# Patient Record
Sex: MALE | Race: BLACK OR AFRICAN AMERICAN | NOT HISPANIC OR LATINO
[De-identification: names, ages, dates, MRNs, and addresses within clinical notes are randomized per-mention and may not be internally consistent; named-entity substitution may affect disease eponyms.]

---

## 2017-01-10 ENCOUNTER — APPOINTMENT (OUTPATIENT)
Dept: GASTROENTEROLOGY | Facility: AMBULATORY MEDICAL SERVICES | Age: 47
End: 2017-01-10

## 2017-03-28 ENCOUNTER — APPOINTMENT (OUTPATIENT)
Dept: GASTROENTEROLOGY | Facility: AMBULATORY MEDICAL SERVICES | Age: 47
End: 2017-03-28

## 2017-04-12 ENCOUNTER — APPOINTMENT (OUTPATIENT)
Dept: NEPHROLOGY | Facility: CLINIC | Age: 47
End: 2017-04-12

## 2017-04-19 ENCOUNTER — APPOINTMENT (OUTPATIENT)
Dept: NEPHROLOGY | Facility: CLINIC | Age: 47
End: 2017-04-19

## 2017-04-25 ENCOUNTER — APPOINTMENT (OUTPATIENT)
Dept: NEPHROLOGY | Facility: CLINIC | Age: 47
End: 2017-04-25

## 2017-04-25 ENCOUNTER — LABORATORY RESULT (OUTPATIENT)
Age: 47
End: 2017-04-25

## 2017-04-25 VITALS
HEIGHT: 71 IN | SYSTOLIC BLOOD PRESSURE: 123 MMHG | BODY MASS INDEX: 31.78 KG/M2 | OXYGEN SATURATION: 96 % | WEIGHT: 227 LBS | DIASTOLIC BLOOD PRESSURE: 79 MMHG | HEART RATE: 86 BPM

## 2017-04-26 LAB
25(OH)D3 SERPL-MCNC: 31.7 NG/ML
ALBUMIN SERPL ELPH-MCNC: 3.9 G/DL
ANION GAP SERPL CALC-SCNC: 12 MMOL/L
APPEARANCE: CLEAR
BACTERIA: NEGATIVE
BASOPHILS # BLD AUTO: 0.05 K/UL
BASOPHILS NFR BLD AUTO: 1 %
BILIRUBIN URINE: NEGATIVE
BLOOD URINE: NEGATIVE
BUN SERPL-MCNC: 17 MG/DL
CALCIUM SERPL-MCNC: 9.4 MG/DL
CHLORIDE SERPL-SCNC: 106 MMOL/L
CO2 SERPL-SCNC: 22 MMOL/L
COLOR: YELLOW
CREAT SERPL-MCNC: 1.22 MG/DL
CREAT SPEC-SCNC: 74 MG/DL
CREAT/PROT UR: 0.2 RATIO
EOSINOPHIL # BLD AUTO: 0 K/UL
EOSINOPHIL NFR BLD AUTO: 0 %
GLUCOSE QUALITATIVE U: NORMAL MG/DL
GLUCOSE SERPL-MCNC: 80 MG/DL
HCT VFR BLD CALC: 24 %
HGB BLD-MCNC: 8.5 G/DL
HYALINE CASTS: 1 /LPF
KETONES URINE: NEGATIVE
LEUKOCYTE ESTERASE URINE: NEGATIVE
LYMPHOCYTES # BLD AUTO: 1.38 K/UL
LYMPHOCYTES NFR BLD AUTO: 30 %
MAN DIFF?: NORMAL
MCHC RBC-ENTMCNC: 35.4 GM/DL
MCHC RBC-ENTMCNC: 41.1 PG
MCV RBC AUTO: 115.9 FL
MICROSCOPIC-UA: NORMAL
MONOCYTES # BLD AUTO: 0.28 K/UL
MONOCYTES NFR BLD AUTO: 6 %
NEUTROPHILS # BLD AUTO: 2.9 K/UL
NEUTROPHILS NFR BLD AUTO: 63 %
NITRITE URINE: NEGATIVE
PH URINE: 6
PHOSPHATE SERPL-MCNC: 3.3 MG/DL
PLATELET # BLD AUTO: 254 K/UL
POTASSIUM SERPL-SCNC: 4.5 MMOL/L
PROT UR-MCNC: 18 MG/DL
PROTEIN URINE: NEGATIVE MG/DL
RBC # BLD: 2.07 M/UL
RBC # FLD: 17.8 %
RED BLOOD CELLS URINE: 1 /HPF
SODIUM SERPL-SCNC: 140 MMOL/L
SPECIFIC GRAVITY URINE: 1.01
SQUAMOUS EPITHELIAL CELLS: 0 /HPF
UROBILINOGEN URINE: NORMAL MG/DL
WBC # FLD AUTO: 4.61 K/UL
WHITE BLOOD CELLS URINE: 0 /HPF

## 2017-10-25 ENCOUNTER — APPOINTMENT (OUTPATIENT)
Dept: NEPHROLOGY | Facility: CLINIC | Age: 47
End: 2017-10-25

## 2017-11-08 ENCOUNTER — LABORATORY RESULT (OUTPATIENT)
Age: 47
End: 2017-11-08

## 2017-11-08 ENCOUNTER — APPOINTMENT (OUTPATIENT)
Dept: NEPHROLOGY | Facility: CLINIC | Age: 47
End: 2017-11-08
Payer: COMMERCIAL

## 2017-11-08 VITALS
WEIGHT: 238.1 LBS | SYSTOLIC BLOOD PRESSURE: 123 MMHG | DIASTOLIC BLOOD PRESSURE: 83 MMHG | HEART RATE: 93 BPM | HEIGHT: 71 IN | BODY MASS INDEX: 33.33 KG/M2 | OXYGEN SATURATION: 99 %

## 2017-11-08 VITALS — DIASTOLIC BLOOD PRESSURE: 80 MMHG | SYSTOLIC BLOOD PRESSURE: 140 MMHG

## 2017-11-08 PROCEDURE — 99214 OFFICE O/P EST MOD 30 MIN: CPT

## 2017-11-08 RX ORDER — BUDESONIDE AND FORMOTEROL FUMARATE DIHYDRATE 160; 4.5 UG/1; UG/1
160-4.5 AEROSOL RESPIRATORY (INHALATION)
Qty: 10 | Refills: 0 | Status: DISCONTINUED | COMMUNITY
Start: 2017-01-06 | End: 2017-11-08

## 2017-11-08 RX ORDER — ERYTHROPOIETIN 40000 [IU]/ML
40000 INJECTION, SOLUTION INTRAVENOUS; SUBCUTANEOUS
Qty: 4 | Refills: 0 | Status: DISCONTINUED | COMMUNITY
Start: 2016-10-26 | End: 2017-11-08

## 2017-11-09 LAB
25(OH)D3 SERPL-MCNC: 28.6 NG/ML
ALBUMIN SERPL ELPH-MCNC: 4.2 G/DL
ANION GAP SERPL CALC-SCNC: 15 MMOL/L
APPEARANCE: CLEAR
BACTERIA: NEGATIVE
BASOPHILS # BLD AUTO: 0 K/UL
BASOPHILS NFR BLD AUTO: 0 %
BILIRUBIN URINE: NEGATIVE
BLOOD URINE: NEGATIVE
BUN SERPL-MCNC: 18 MG/DL
CALCIUM SERPL-MCNC: 9.9 MG/DL
CALCIUM SERPL-MCNC: 9.9 MG/DL
CHLORIDE SERPL-SCNC: 105 MMOL/L
CO2 SERPL-SCNC: 21 MMOL/L
COLOR: YELLOW
CREAT SERPL-MCNC: 1.19 MG/DL
CREAT SPEC-SCNC: 64 MG/DL
CREAT/PROT UR: 0.3 RATIO
EOSINOPHIL # BLD AUTO: 0.06 K/UL
EOSINOPHIL NFR BLD AUTO: 1 %
GLUCOSE QUALITATIVE U: NEGATIVE MG/DL
GLUCOSE SERPL-MCNC: 118 MG/DL
HCT VFR BLD CALC: 24.6 %
HGB BLD-MCNC: 8.5 G/DL
KETONES URINE: NEGATIVE
LEUKOCYTE ESTERASE URINE: NEGATIVE
LYMPHOCYTES # BLD AUTO: 1.16 K/UL
LYMPHOCYTES NFR BLD AUTO: 19 %
MAN DIFF?: NORMAL
MCHC RBC-ENTMCNC: 34.6 GM/DL
MCHC RBC-ENTMCNC: 41.7 PG
MCV RBC AUTO: 120.6 FL
MICROSCOPIC-UA: NORMAL
MONOCYTES # BLD AUTO: 0.31 K/UL
MONOCYTES NFR BLD AUTO: 5 %
NEUTROPHILS # BLD AUTO: 4.03 K/UL
NEUTROPHILS NFR BLD AUTO: 75 %
NITRITE URINE: NEGATIVE
PARATHYROID HORMONE INTACT: 75 PG/ML
PH URINE: 5.5
PHOSPHATE SERPL-MCNC: 3.7 MG/DL
PLATELET # BLD AUTO: 305 K/UL
POTASSIUM SERPL-SCNC: 4.4 MMOL/L
PROT UR-MCNC: 19 MG/DL
PROTEIN URINE: NEGATIVE MG/DL
RBC # BLD: 2.04 M/UL
RBC # FLD: 16.9 %
RED BLOOD CELLS URINE: 1 /HPF
SODIUM SERPL-SCNC: 141 MMOL/L
SPECIFIC GRAVITY URINE: 1.01
SQUAMOUS EPITHELIAL CELLS: 0 /HPF
UROBILINOGEN URINE: NEGATIVE MG/DL
WBC # FLD AUTO: 6.11 K/UL
WHITE BLOOD CELLS URINE: 0 /HPF

## 2018-03-06 ENCOUNTER — APPOINTMENT (OUTPATIENT)
Dept: NEPHROLOGY | Facility: CLINIC | Age: 48
End: 2018-03-06

## 2018-04-05 ENCOUNTER — RX RENEWAL (OUTPATIENT)
Age: 48
End: 2018-04-05

## 2018-11-14 ENCOUNTER — LABORATORY RESULT (OUTPATIENT)
Age: 48
End: 2018-11-14

## 2018-11-14 ENCOUNTER — APPOINTMENT (OUTPATIENT)
Dept: NEPHROLOGY | Facility: CLINIC | Age: 48
End: 2018-11-14
Payer: COMMERCIAL

## 2018-11-14 VITALS
WEIGHT: 244 LBS | BODY MASS INDEX: 34.16 KG/M2 | HEART RATE: 94 BPM | OXYGEN SATURATION: 97 % | DIASTOLIC BLOOD PRESSURE: 87 MMHG | SYSTOLIC BLOOD PRESSURE: 146 MMHG | HEIGHT: 71 IN

## 2018-11-14 VITALS — SYSTOLIC BLOOD PRESSURE: 136 MMHG | DIASTOLIC BLOOD PRESSURE: 80 MMHG

## 2018-11-14 DIAGNOSIS — Z78.9 OTHER SPECIFIED HEALTH STATUS: ICD-10-CM

## 2018-11-14 DIAGNOSIS — E55.9 VITAMIN D DEFICIENCY, UNSPECIFIED: ICD-10-CM

## 2018-11-14 DIAGNOSIS — Z87.891 PERSONAL HISTORY OF NICOTINE DEPENDENCE: ICD-10-CM

## 2018-11-14 LAB
APPEARANCE: CLEAR
BACTERIA: NEGATIVE
BILIRUBIN URINE: NEGATIVE
BLOOD URINE: NEGATIVE
COLOR: YELLOW
GLUCOSE QUALITATIVE U: NEGATIVE MG/DL
HYALINE CASTS: 1 /LPF
KETONES URINE: NEGATIVE
LEUKOCYTE ESTERASE URINE: NEGATIVE
MICROSCOPIC-UA: NORMAL
NITRITE URINE: NEGATIVE
PH URINE: 6
PROTEIN URINE: 30 MG/DL
RED BLOOD CELLS URINE: 1 /HPF
SPECIFIC GRAVITY URINE: 1.01
SQUAMOUS EPITHELIAL CELLS: 0 /HPF
UROBILINOGEN URINE: NEGATIVE MG/DL
WHITE BLOOD CELLS URINE: 0 /HPF

## 2018-11-14 PROCEDURE — 99214 OFFICE O/P EST MOD 30 MIN: CPT

## 2018-11-15 PROBLEM — E55.9 VITAMIN D DEFICIENCY, UNSPECIFIED: Status: ACTIVE | Noted: 2018-11-15

## 2018-11-15 LAB
25(OH)D3 SERPL-MCNC: 17.9 NG/ML
ALBUMIN SERPL ELPH-MCNC: 3.9 G/DL
ANION GAP SERPL CALC-SCNC: 11 MMOL/L
BASOPHILS # BLD AUTO: 0.04 K/UL
BASOPHILS NFR BLD AUTO: 0.4 %
BUN SERPL-MCNC: 16 MG/DL
CALCIUM SERPL-MCNC: 9.8 MG/DL
CALCIUM SERPL-MCNC: 9.8 MG/DL
CHLORIDE SERPL-SCNC: 104 MMOL/L
CO2 SERPL-SCNC: 24 MMOL/L
CREAT SERPL-MCNC: 1.09 MG/DL
CREAT SPEC-SCNC: 71 MG/DL
CREAT/PROT UR: 0.5 RATIO
EOSINOPHIL # BLD AUTO: 0.5 K/UL
EOSINOPHIL NFR BLD AUTO: 4.4 %
GLUCOSE SERPL-MCNC: 100 MG/DL
HCT VFR BLD CALC: 25 %
HGB BLD-MCNC: 8.5 G/DL
IMM GRANULOCYTES NFR BLD AUTO: 0.8 %
LYMPHOCYTES # BLD AUTO: 1.83 K/UL
LYMPHOCYTES NFR BLD AUTO: 16.3 %
MAN DIFF?: NORMAL
MCHC RBC-ENTMCNC: 34 GM/DL
MCHC RBC-ENTMCNC: 34.8 PG
MCV RBC AUTO: 102.5 FL
MONOCYTES # BLD AUTO: 1.17 K/UL
MONOCYTES NFR BLD AUTO: 10.4 %
NEUTROPHILS # BLD AUTO: 7.62 K/UL
NEUTROPHILS NFR BLD AUTO: 67.7 %
PARATHYROID HORMONE INTACT: 79 PG/ML
PHOSPHATE SERPL-MCNC: 3.2 MG/DL
PLATELET # BLD AUTO: 428 K/UL
POTASSIUM SERPL-SCNC: 4.7 MMOL/L
PROT UR-MCNC: 37 MG/DL
RBC # BLD: 2.44 M/UL
RBC # FLD: 22.4 %
SODIUM SERPL-SCNC: 139 MMOL/L
WBC # FLD AUTO: 11.25 K/UL

## 2018-11-15 RX ORDER — ADHESIVE TAPE 3"X 2.3 YD
50 MCG TAPE, NON-MEDICATED TOPICAL
Qty: 90 | Refills: 2 | Status: DISCONTINUED | COMMUNITY
Start: 2017-11-09 | End: 2018-11-15

## 2019-03-13 ENCOUNTER — APPOINTMENT (OUTPATIENT)
Dept: NEPHROLOGY | Facility: CLINIC | Age: 49
End: 2019-03-13

## 2019-04-17 ENCOUNTER — RX RENEWAL (OUTPATIENT)
Age: 49
End: 2019-04-17

## 2019-05-22 ENCOUNTER — APPOINTMENT (OUTPATIENT)
Dept: GASTROENTEROLOGY | Facility: CLINIC | Age: 49
End: 2019-05-22
Payer: COMMERCIAL

## 2019-05-22 VITALS
DIASTOLIC BLOOD PRESSURE: 85 MMHG | HEIGHT: 71 IN | SYSTOLIC BLOOD PRESSURE: 130 MMHG | WEIGHT: 240 LBS | OXYGEN SATURATION: 98 % | HEART RATE: 79 BPM | BODY MASS INDEX: 33.6 KG/M2 | TEMPERATURE: 98.1 F

## 2019-05-22 PROCEDURE — 99214 OFFICE O/P EST MOD 30 MIN: CPT

## 2019-05-22 NOTE — PHYSICAL EXAM
[General Appearance - Alert] : alert [General Appearance - In No Acute Distress] : in no acute distress [Sclera] : the sclera and conjunctiva were normal [PERRL With Normal Accommodation] : pupils were equal in size, round, and reactive to light [Extraocular Movements] : extraocular movements were intact [Outer Ear] : the ears and nose were normal in appearance [Oropharynx] : the oropharynx was normal [Neck Appearance] : the appearance of the neck was normal [Neck Cervical Mass (___cm)] : no neck mass was observed [Jugular Venous Distention Increased] : there was no jugular-venous distention [Thyroid Diffuse Enlargement] : the thyroid was not enlarged [Thyroid Nodule] : there were no palpable thyroid nodules [] : no respiratory distress [Auscultation Breath Sounds / Voice Sounds] : lungs were clear to auscultation bilaterally [Heart Rate And Rhythm] : heart rate was normal and rhythm regular [Heart Sounds] : normal S1 and S2 [Heart Sounds Gallop] : no gallops [Murmurs] : no murmurs [Heart Sounds Pericardial Friction Rub] : no pericardial rub [Normal] : normal [Soft, Nontender] : the abdomen was soft and nontender [Epigastric] : in the epigastric area [No Mass] : no masses were palpated [No HSM] : no hepatosplenomegaly noted [Cervical Lymph Nodes Enlarged Posterior Bilaterally] : posterior cervical [Cervical Lymph Nodes Enlarged Anterior Bilaterally] : anterior cervical [Supraclavicular Lymph Nodes Enlarged Bilaterally] : supraclavicular [Axillary Lymph Nodes Enlarged Bilaterally] : axillary [Femoral Lymph Nodes Enlarged Bilaterally] : femoral [Inguinal Lymph Nodes Enlarged Bilaterally] : inguinal [No CVA Tenderness] : no ~M costovertebral angle tenderness [No Spinal Tenderness] : no spinal tenderness [Abnormal Walk] : normal gait [Nail Clubbing] : no clubbing  or cyanosis of the fingernails [Musculoskeletal - Swelling] : no joint swelling seen [Motor Tone] : muscle strength and tone were normal

## 2019-05-22 NOTE — HISTORY OF PRESENT ILLNESS
[de-identified] : Colon 2017 - [place] \par \par On Opiods \par \par Tried relistor \par \par  A low FODMAP diet was discussed with the patient at length. The patient had multiple questions all of which were answered. I recommended a nutritionist. Also recommended that the patient keep a food diary. We discussed  options such as Vegetables. Fresh fruits. Dairy that is lactose-free, and hard cheeses, or ripened/matured cheeses including... Beef, pork, chicken, fish, eggs. Avoid breadcrumbs, marinades, and sauces/gravies that may be high in FODMAPs. Soy products including tofu, tempeh. Grains.\par \par \par The causes of constipation were discussed at length  We discussed : Eat three meals each day. Do not skip meals. Gradually increase the amount of FIBER  in your diet. Choose more whole grain breads, cereals and rice. Select more raw fruits and vegetables -- eat the peel, if appropriate. Read food labels and look for the "dietary fiber" content of foods. Good sources have 2 grams of fiber or more. Drink six to eight glasses of water each day. Limit highly refined and processed foods. \par \par

## 2019-06-07 ENCOUNTER — APPOINTMENT (OUTPATIENT)
Dept: INTERNAL MEDICINE | Facility: CLINIC | Age: 49
End: 2019-06-07

## 2019-06-11 ENCOUNTER — APPOINTMENT (OUTPATIENT)
Dept: RADIOLOGY | Facility: CLINIC | Age: 49
End: 2019-06-11

## 2019-06-11 ENCOUNTER — APPOINTMENT (OUTPATIENT)
Dept: ULTRASOUND IMAGING | Facility: CLINIC | Age: 49
End: 2019-06-11

## 2019-06-24 ENCOUNTER — OUTPATIENT (OUTPATIENT)
Dept: OUTPATIENT SERVICES | Facility: HOSPITAL | Age: 49
LOS: 1 days | End: 2019-06-24

## 2019-06-24 ENCOUNTER — LABORATORY RESULT (OUTPATIENT)
Age: 49
End: 2019-06-24

## 2019-06-24 ENCOUNTER — APPOINTMENT (OUTPATIENT)
Dept: INTERNAL MEDICINE | Facility: CLINIC | Age: 49
End: 2019-06-24
Payer: COMMERCIAL

## 2019-06-24 VITALS — SYSTOLIC BLOOD PRESSURE: 140 MMHG | OXYGEN SATURATION: 96 % | HEART RATE: 87 BPM | DIASTOLIC BLOOD PRESSURE: 80 MMHG

## 2019-06-24 LAB — FERRITIN SERPL-MCNC: 1184 NG/ML — HIGH (ref 30–400)

## 2019-06-24 PROCEDURE — 99203 OFFICE O/P NEW LOW 30 MIN: CPT

## 2019-06-30 NOTE — HISTORY OF PRESENT ILLNESS
[de-identified] : Te patient is a 47 yo male with HGB SS, here for a second opinion. He has been mostly well during his lifetime, but recently ( within the last few years) has had increasing hospitalizations and sickle cell crises. On average, he is hospitalized 1x per year, for 5 days. He began taking hydroxyurea in 2012, at first 6 tabs/day, now taking HU 3 tabs per day. When he started the HU, he almost immediately did well. He notes an 80 pound weight gain over the past 7 years. \par since the worsening in his symptoms, he tried l-glutamine. He did not take it for very long, and stopped 9 months ago.Has not taken it since. He is interested in taking Xickle, if other options fail. He also asked about chronic transfusion, saying that post-transfusion, he feels much better\par He has been treated with Aranesp by his hematologist. he does not think that it has made a huge difference in his symptoms.\par He does have HTN ( due to Aranesp?) and has been treated with HCTZ, 25 mg QD as well as Lisinopril. \par \par Sickle cell history- He has mild proteinuria\par Core decompression right hip 1998, with subsequent good function. When he has sickle cell pain, he tends to get it in his hips\par no h/o CVA\par transfused only two units at [facility]\par Has had bilateral leg ulcers\par Hematologist: Dr. BurciagaGdz-[phone]\par [phone]\par \par other PMH- IBS on Linzess\par \par immunizations: unknown\par needs ophtho\par \par PE: gen- wdwn male in nad, talkative \par vss\par mild icterus\par no tonsillar hypertrophty\par lungs- cta\par cor: rrr-m\par abd benign\par ext; -c/c/e, scarring from past LE ulcers, both ankles\par \par a/P- 47 yo male with HGB SS, worsening symptoms, still mild\par 1. HTN- will DC HCTZ- would prefer agent that does not cause dehydration\par amlodipine- 5 mg QD\par 2. contact heme for baseline labs-  unable to obtain labs today\par 3.Aranesp- might actually be causing an increase in pain and blood pressure- will discuss with the patient's hematologist to see if the patient actually needs it\par 4. HU- will increase to 2000 mg QD, as patient has had weight gain, and 1500 mg may not be enough anymore\par Re-start trial of Endari for at least one month\par Consider Xickle if endari does not improve status\par 5. patient to come back in 3 months for flu shot/Prevnar/ophtho referral\par \par Brittany Velzo MD\par

## 2019-07-01 DIAGNOSIS — I10 ESSENTIAL (PRIMARY) HYPERTENSION: ICD-10-CM

## 2019-07-01 DIAGNOSIS — D57.1 SICKLE-CELL DISEASE WITHOUT CRISIS: ICD-10-CM

## 2019-07-01 DIAGNOSIS — R80.9 PROTEINURIA, UNSPECIFIED: ICD-10-CM

## 2019-07-01 DIAGNOSIS — Z87.19 PERSONAL HISTORY OF OTHER DISEASES OF THE DIGESTIVE SYSTEM: ICD-10-CM

## 2019-08-27 ENCOUNTER — APPOINTMENT (OUTPATIENT)
Dept: INTERNAL MEDICINE | Facility: CLINIC | Age: 49
End: 2019-08-27

## 2019-08-29 ENCOUNTER — APPOINTMENT (OUTPATIENT)
Dept: RADIOLOGY | Facility: CLINIC | Age: 49
End: 2019-08-29

## 2019-08-29 ENCOUNTER — APPOINTMENT (OUTPATIENT)
Dept: ULTRASOUND IMAGING | Facility: CLINIC | Age: 49
End: 2019-08-29

## 2020-02-17 ENCOUNTER — FORM ENCOUNTER (OUTPATIENT)
Age: 50
End: 2020-02-17

## 2020-02-18 ENCOUNTER — APPOINTMENT (OUTPATIENT)
Dept: RADIOLOGY | Facility: CLINIC | Age: 50
End: 2020-02-18
Payer: COMMERCIAL

## 2020-02-18 ENCOUNTER — OUTPATIENT (OUTPATIENT)
Dept: OUTPATIENT SERVICES | Facility: HOSPITAL | Age: 50
LOS: 1 days | End: 2020-02-18
Payer: COMMERCIAL

## 2020-02-18 ENCOUNTER — APPOINTMENT (OUTPATIENT)
Dept: ULTRASOUND IMAGING | Facility: CLINIC | Age: 50
End: 2020-02-18
Payer: COMMERCIAL

## 2020-02-18 ENCOUNTER — APPOINTMENT (OUTPATIENT)
Dept: RADIOLOGY | Facility: IMAGING CENTER | Age: 50
End: 2020-02-18
Payer: COMMERCIAL

## 2020-02-18 DIAGNOSIS — K59.09 OTHER CONSTIPATION: ICD-10-CM

## 2020-02-18 DIAGNOSIS — N18.2 CHRONIC KIDNEY DISEASE, STAGE 2 (MILD): ICD-10-CM

## 2020-02-18 DIAGNOSIS — Z87.19 PERSONAL HISTORY OF OTHER DISEASES OF THE DIGESTIVE SYSTEM: ICD-10-CM

## 2020-02-18 DIAGNOSIS — Z00.8 ENCOUNTER FOR OTHER GENERAL EXAMINATION: ICD-10-CM

## 2020-02-18 PROCEDURE — 74019 RADEX ABDOMEN 2 VIEWS: CPT | Mod: 26

## 2020-02-18 PROCEDURE — 76700 US EXAM ABDOM COMPLETE: CPT

## 2020-02-18 PROCEDURE — 71046 X-RAY EXAM CHEST 2 VIEWS: CPT | Mod: 26

## 2020-02-18 PROCEDURE — 76700 US EXAM ABDOM COMPLETE: CPT | Mod: 26

## 2020-02-18 PROCEDURE — 74019 RADEX ABDOMEN 2 VIEWS: CPT

## 2020-02-18 PROCEDURE — 71046 X-RAY EXAM CHEST 2 VIEWS: CPT

## 2020-02-25 ENCOUNTER — APPOINTMENT (OUTPATIENT)
Dept: INTERNAL MEDICINE | Facility: CLINIC | Age: 50
End: 2020-02-25
Payer: COMMERCIAL

## 2020-02-25 ENCOUNTER — OUTPATIENT (OUTPATIENT)
Dept: OUTPATIENT SERVICES | Facility: HOSPITAL | Age: 50
LOS: 1 days | End: 2020-02-25

## 2020-02-25 VITALS
BODY MASS INDEX: 33.46 KG/M2 | HEIGHT: 71 IN | HEART RATE: 80 BPM | DIASTOLIC BLOOD PRESSURE: 80 MMHG | SYSTOLIC BLOOD PRESSURE: 130 MMHG | OXYGEN SATURATION: 96 % | WEIGHT: 239 LBS

## 2020-02-25 PROCEDURE — 99213 OFFICE O/P EST LOW 20 MIN: CPT

## 2020-02-27 NOTE — HISTORY OF PRESENT ILLNESS
[de-identified] : Te patient is a 47 yo male with HGB SS, here for a second visit ( after 8 month hiatus). He never increased his HU to 2000 mg QD.\par Patient never increased his HU dose. Still taking aranesp.\par Says that the endari makes him ill.\par He has been taking amlodipine, 5 mg, reliably with his Lisinopril. \par He was hospitalized for a week, had DVT, now taking Eliquis\par Did not go to ophtho.\par Hematologist: Dr. BurciagaKoe-[phone]\par [phone]\par \par other PMH- IBS on Linzess\par \par PE: gen- wdwn male in nad, talkative \par vss\par mild icterus\par no tonsillar hypertrophty\par lungs- cta\par cor: rrr-m\par abd benign\par ext; -c/c/e, scarring from past LE ulcers, both ankles\par \par a/P- 47 yo male with HGB SS, worsening symptoms, still mild\par 1. HTN- will DC HCTZ- would prefer agent that does not cause dehydration\par Increase amlodipine- to 10 mg QD\par 2. heme to fax labs\par 3. HU- will increase to 2000 mg QD\par 4. start Voxelator\par 5. prevnar\par 6. ophtho referral\par 7. f/u three months\par \par Brittany Veloz MD\par

## 2020-02-28 DIAGNOSIS — D57.1 SICKLE-CELL DISEASE WITHOUT CRISIS: ICD-10-CM

## 2020-02-28 DIAGNOSIS — K59.09 OTHER CONSTIPATION: ICD-10-CM

## 2020-02-28 DIAGNOSIS — Z23 ENCOUNTER FOR IMMUNIZATION: ICD-10-CM

## 2020-02-28 DIAGNOSIS — I10 ESSENTIAL (PRIMARY) HYPERTENSION: ICD-10-CM

## 2020-03-04 ENCOUNTER — APPOINTMENT (OUTPATIENT)
Dept: GASTROENTEROLOGY | Facility: CLINIC | Age: 50
End: 2020-03-04

## 2020-03-17 ENCOUNTER — APPOINTMENT (OUTPATIENT)
Dept: GASTROENTEROLOGY | Facility: CLINIC | Age: 50
End: 2020-03-17

## 2020-04-14 ENCOUNTER — APPOINTMENT (OUTPATIENT)
Dept: GASTROENTEROLOGY | Facility: CLINIC | Age: 50
End: 2020-04-14
Payer: COMMERCIAL

## 2020-04-14 PROCEDURE — 99214 OFFICE O/P EST MOD 30 MIN: CPT | Mod: 95

## 2020-04-14 NOTE — HISTORY OF PRESENT ILLNESS
[Home] : at home, [unfilled] , at the time of the visit. [Other Location: e.g. Home (Enter Location, City,State)___] : at [unfilled] [Patient] : the patient [Self] : self [Heartburn] : heartburn [de-identified] : Pt with ibs-c , on linzess , \par moving bowels \par bloated\par GERD\par \par \par A low acid / reflux diet was discussed in great detail including  not smoking, not drinking alcohol, and not consuming foods that irritate the esophagus. It is helpful to eat small meals throughout the day instead of large meals. You should avoid eating before bedtime or lying down after you eat. It can be helpful to raise the head of your bed six inches. Additionally, you should maintain a healthy weight and good posture.. The patient was given written material to take home and review.\par \par The patient mentioned feeling BLOATED\par \par  A low FODMAP diet was discussed with the patient at length. The patient had multiple questions all of which were answered. I recommended a nutritionist. Also recommended that the patient keep a food diary. We discussed  options such as Vegetables. Fresh fruits. Dairy that is lactose-free, and hard cheeses, or ripened/matured cheeses including... Beef, pork, chicken, fish, eggs. Avoid breadcrumbs, marinades, and sauces/gravies that may be high in FODMAPs. Soy products including tofu, tempeh. Grains.\par \par \par The Patient mentioned being GASEOUS \par \par We discussed Probiotics and limiting leafy vegetables and other changes\par \par \par The patient mentioned some GERD\par \par We discussed a low acid , high protien diet \par \par The patient said their is occasional CONSTIPATION \par \par We discussed the proper use of fiber and water intake \par \par \par The above medical issues were discussed in detail and all questions answered over a 45 minute period.\par \par will check xray

## 2020-05-06 ENCOUNTER — APPOINTMENT (OUTPATIENT)
Dept: GASTROENTEROLOGY | Facility: CLINIC | Age: 50
End: 2020-05-06
Payer: COMMERCIAL

## 2020-05-06 PROCEDURE — 99214 OFFICE O/P EST MOD 30 MIN: CPT | Mod: 95

## 2020-05-06 NOTE — HISTORY OF PRESENT ILLNESS
[Heartburn] : heartburn [de-identified] : Pt with ibs-c , on linzess , \par Moving bowels \par Bloat better \par \par GERD- mild \par \par will get xrays\par \par \par A low acid / reflux diet was discussed in great detail including  not smoking, not drinking alcohol, and not consuming foods that irritate the esophagus. It is helpful to eat small meals throughout the day instead of large meals. You should avoid eating before bedtime or lying down after you eat. It can be helpful to raise the head of your bed six inches. Additionally, you should maintain a healthy weight and good posture.. The patient was given written material to take home and review.\par \par The patient mentioned feeling BLOATED\par \par  A low FODMAP diet was discussed with the patient at length. The patient had multiple questions all of which were answered. I recommended a nutritionist. Also recommended that the patient keep a food diary. We discussed  options such as Vegetables. Fresh fruits. Dairy that is lactose-free, and hard cheeses, or ripened/matured cheeses including... Beef, pork, chicken, fish, eggs. Avoid breadcrumbs, marinades, and sauces/gravies that may be high in FODMAPs. Soy products including tofu, tempeh. Grains.\par \par \par The Patient mentioned being GASEOUS \par \par We discussed Probiotics and limiting leafy vegetables and other changes\par \par \par The patient mentioned some GERD\par \par We discussed a low acid , high protien diet \par \par The patient said their is occasional CONSTIPATION \par \par We discussed the proper use of fiber and water intake \par \par \par The above medical issues were discussed in detail and all questions answered over a 45 minute period.\par \par will check xray [Home] : at home, [unfilled] , at the time of the visit. [Other Location: e.g. Home (Enter Location, City,State)___] : at [unfilled] [Patient] : the patient [Self] : self

## 2020-05-15 ENCOUNTER — NON-APPOINTMENT (OUTPATIENT)
Age: 50
End: 2020-05-15

## 2020-05-19 ENCOUNTER — APPOINTMENT (OUTPATIENT)
Dept: GASTROENTEROLOGY | Facility: CLINIC | Age: 50
End: 2020-05-19
Payer: COMMERCIAL

## 2020-05-19 PROCEDURE — 99214 OFFICE O/P EST MOD 30 MIN: CPT | Mod: 95

## 2020-05-19 NOTE — HISTORY OF PRESENT ILLNESS
[Heartburn] : heartburn [de-identified] : Pt with ibs-c , on linzess , - Soft BMs\par Moving bowels \par Bloat better \par \par GERD- mild \par \par Has not started PPI \par \par \par \par A low acid / reflux diet was discussed in great detail including  not smoking, not drinking alcohol, and not consuming foods that irritate the esophagus. It is helpful to eat small meals throughout the day instead of large meals. You should avoid eating before bedtime or lying down after you eat. It can be helpful to raise the head of your bed six inches. Additionally, you should maintain a healthy weight and good posture.. The patient was given written material to take home and review.\par \par The patient mentioned feeling BLOATED\par \par  A low FODMAP diet was discussed with the patient at length. The patient had multiple questions all of which were answered. I recommended a nutritionist. Also recommended that the patient keep a food diary. We discussed  options such as Vegetables. Fresh fruits. Dairy that is lactose-free, and hard cheeses, or ripened/matured cheeses including... Beef, pork, chicken, fish, eggs. Avoid breadcrumbs, marinades, and sauces/gravies that may be high in FODMAPs. Soy products including tofu, tempeh. Grains.\par \par \par The Patient mentioned being GASEOUS \par \par We discussed Probiotics and limiting leafy vegetables and other changes\par \par \par The patient mentioned some GERD\par \par We discussed a low acid , high protien diet \par \par The patient said their is occasional CONSTIPATION \par \par We discussed the proper use of fiber and water intake \par \par \par The above medical issues were discussed in detail and all questions answered over a 45 minute period.\par \par will check xray [Home] : at home, [unfilled] , at the time of the visit. [Other Location: e.g. Home (Enter Location, City,State)___] : at [unfilled] [Patient] : the patient [Self] : self

## 2020-05-26 ENCOUNTER — APPOINTMENT (OUTPATIENT)
Dept: GASTROENTEROLOGY | Facility: CLINIC | Age: 50
End: 2020-05-26
Payer: COMMERCIAL

## 2020-05-26 PROCEDURE — 99214 OFFICE O/P EST MOD 30 MIN: CPT | Mod: 95

## 2020-05-26 NOTE — HISTORY OF PRESENT ILLNESS
[Heartburn] : heartburn [de-identified] : Pt with ibs-c , on linzess- NOW QOQ , - NL  BMs\par Moving bowels \par Bloat better \par \par GERD- mild \par \par MUCH better on PPI \par \par \par \par A low acid / reflux diet was discussed in great detail including  not smoking, not drinking alcohol, and not consuming foods that irritate the esophagus. It is helpful to eat small meals throughout the day instead of large meals. You should avoid eating before bedtime or lying down after you eat. It can be helpful to raise the head of your bed six inches. Additionally, you should maintain a healthy weight and good posture.. The patient was given written material to take home and review.\par \par The patient mentioned feeling BLOATED\par \par  A low FODMAP diet was discussed with the patient at length. The patient had multiple questions all of which were answered. I recommended a nutritionist. Also recommended that the patient keep a food diary. We discussed  options such as Vegetables. Fresh fruits. Dairy that is lactose-free, and hard cheeses, or ripened/matured cheeses including... Beef, pork, chicken, fish, eggs. Avoid breadcrumbs, marinades, and sauces/gravies that may be high in FODMAPs. Soy products including tofu, tempeh. Grains.\par \par \par The Patient mentioned being GASEOUS \par \par We discussed Probiotics and limiting leafy vegetables and other changes\par \par \par The patient mentioned some GERD\par \par We discussed a low acid , high protien diet \par \par The patient said their is occasional CONSTIPATION \par \par We discussed the proper use of fiber and water intake \par \par \par The above medical issues were discussed in detail and all questions answered over a 45 minute period.\par \par will check xray [Other Location: e.g. Home (Enter Location, City,State)___] : at [unfilled] [Home] : at home, [unfilled] , at the time of the visit. [Patient] : the patient [Self] : self

## 2020-05-28 ENCOUNTER — TRANSCRIPTION ENCOUNTER (OUTPATIENT)
Age: 50
End: 2020-05-28

## 2020-06-02 ENCOUNTER — APPOINTMENT (OUTPATIENT)
Dept: INTERNAL MEDICINE | Facility: CLINIC | Age: 50
End: 2020-06-02
Payer: COMMERCIAL

## 2020-06-02 ENCOUNTER — OUTPATIENT (OUTPATIENT)
Dept: OUTPATIENT SERVICES | Facility: HOSPITAL | Age: 50
LOS: 1 days | End: 2020-06-02

## 2020-06-02 PROCEDURE — 99442: CPT

## 2020-06-02 NOTE — HISTORY OF PRESENT ILLNESS
[Home] : at home, [unfilled] , at the time of the visit. [Other Location: e.g. Home (Enter Location, City,State)___] : at [unfilled] [Verbal consent obtained from patient] : the patient, [unfilled] [FreeTextEntry1] : 48 yo male with HGB SS, for f/u. [de-identified] : 50 yo male with HGB SS, for f/u. This interview is being conducted via telephone.\par Had c/o foot ulcer. It is mostly healed, right foot. Took zinc supplement bid, with resolution. Seeing wound care.\par Oxbryta causes diarrhea, but he is taking it. \par He says that he is feeling healthier than he has before. Is taking the amlodipine 10 QD, and BP is normal. No complaints today.\par , wears mask gloves. Does not take fares anymore ([place] ruling)\par \par ROS- neg\par \par A/P- 50 yo male with HGB SS\par 1. continue HU, 2000 mg QD with Oxbryta- If HGB goes up substantially, will consider DC-ing Aranesp\par 2. wound- as per wound care\par  continue zinc supp indefinitely\par 3. covid prevention discussed\par 4. f/u 9/1 @ 11 am\par \par Brittany Veloz MD

## 2020-06-04 DIAGNOSIS — L97.909 NON-PRESSURE CHRONIC ULCER OF UNSPECIFIED PART OF UNSPECIFIED LOWER LEG WITH UNSPECIFIED SEVERITY: ICD-10-CM

## 2020-06-04 DIAGNOSIS — D57.1 SICKLE-CELL DISEASE WITHOUT CRISIS: ICD-10-CM

## 2020-06-04 DIAGNOSIS — Z87.19 PERSONAL HISTORY OF OTHER DISEASES OF THE DIGESTIVE SYSTEM: ICD-10-CM

## 2020-09-01 ENCOUNTER — OUTPATIENT (OUTPATIENT)
Dept: OUTPATIENT SERVICES | Facility: HOSPITAL | Age: 50
LOS: 1 days | End: 2020-09-01

## 2020-09-01 ENCOUNTER — APPOINTMENT (OUTPATIENT)
Dept: INTERNAL MEDICINE | Facility: CLINIC | Age: 50
End: 2020-09-01
Payer: COMMERCIAL

## 2020-09-01 PROCEDURE — 99442: CPT

## 2020-09-01 NOTE — HISTORY OF PRESENT ILLNESS
[de-identified] : 48 yo male with HGB SS, taking aranesp .\par Taking Oxbryta 1500 qd, gave him diarrhea. The patient's HGB went as high as 10 with the Oxbryta, but he stopped it due to side effects ( HGB returned to 7 with stopping). The patient is taking Linzess for IBS with diarrhea and constipaiton.\par ROS- new foot ulcer\par \par A/P- 48 yo male with HGB SS\par 1. patient to restart Oxbryta. He will also contact GI to discuss a possible role in stopping Linzess, and controlling constipation with the Oxbryta. \par 2. covid prevention discussed\par 3. f/u 11/3 @ 11 am\par Brittany Veloz MD

## 2020-09-08 DIAGNOSIS — I10 ESSENTIAL (PRIMARY) HYPERTENSION: ICD-10-CM

## 2020-09-08 DIAGNOSIS — D57.1 SICKLE-CELL DISEASE WITHOUT CRISIS: ICD-10-CM

## 2020-09-08 DIAGNOSIS — Z87.19 PERSONAL HISTORY OF OTHER DISEASES OF THE DIGESTIVE SYSTEM: ICD-10-CM

## 2020-09-08 DIAGNOSIS — L97.909 NON-PRESSURE CHRONIC ULCER OF UNSPECIFIED PART OF UNSPECIFIED LOWER LEG WITH UNSPECIFIED SEVERITY: ICD-10-CM

## 2020-10-28 ENCOUNTER — APPOINTMENT (OUTPATIENT)
Dept: OPHTHALMOLOGY | Facility: CLINIC | Age: 50
End: 2020-10-28

## 2020-11-24 ENCOUNTER — APPOINTMENT (OUTPATIENT)
Dept: OPHTHALMOLOGY | Facility: CLINIC | Age: 50
End: 2020-11-24
Payer: COMMERCIAL

## 2020-11-24 ENCOUNTER — NON-APPOINTMENT (OUTPATIENT)
Age: 50
End: 2020-11-24

## 2020-11-24 PROCEDURE — 92004 COMPRE OPH EXAM NEW PT 1/>: CPT

## 2021-02-02 ENCOUNTER — OUTPATIENT (OUTPATIENT)
Dept: OUTPATIENT SERVICES | Facility: HOSPITAL | Age: 51
LOS: 1 days | End: 2021-02-02

## 2021-02-02 ENCOUNTER — APPOINTMENT (OUTPATIENT)
Dept: INTERNAL MEDICINE | Facility: CLINIC | Age: 51
End: 2021-02-02
Payer: COMMERCIAL

## 2021-02-02 ENCOUNTER — NON-APPOINTMENT (OUTPATIENT)
Age: 51
End: 2021-02-02

## 2021-02-02 PROCEDURE — 99442: CPT

## 2021-02-02 NOTE — HISTORY OF PRESENT ILLNESS
[Home] : at home, [unfilled] , at the time of the visit. [Other Location: e.g. Home (Enter Location, City,State)___] : at [unfilled] [Verbal consent obtained from patient] : the patient, [unfilled] [de-identified] : 50 yo male with HGB SS, taking aranesp  and Oxbryta.\par The patient has re-started Oxbryta.  He patient does not need linzess anymore, as the oxbryta still causes diarrhea, counteracting the constipation that he has with IBS.\par Last HGB 8.5. \par The patient's job physician has requested that the patient go to the health clinic q 6months for monitoring of chronic condition.s. Despite the patient's FMLA form, the job has said that they will lay the patient off if his HGB does not reach 10. This is unreasonable, as the patient has FMLA protection, and in addition, will most likely never have a HGB of 10 ( baseline HGB 7-8).\par The patient has viery little sickle cell pain, has no CP, dyspnea, or other any other sx of anemia. 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 SCD- patient to start Aranesp wean as per heme\par continue Oxbryta- 1500 mg QD\par continue HU- 2000 mg QD\par 2. patient may continue his work as a  without restriction\par 3. f/u 4/26 @ 4:30\par Brittany Veloz MD

## 2021-02-05 DIAGNOSIS — I10 ESSENTIAL (PRIMARY) HYPERTENSION: ICD-10-CM

## 2021-02-05 DIAGNOSIS — D57.1 SICKLE-CELL DISEASE WITHOUT CRISIS: ICD-10-CM

## 2021-02-05 DIAGNOSIS — Z87.19 PERSONAL HISTORY OF OTHER DISEASES OF THE DIGESTIVE SYSTEM: ICD-10-CM

## 2021-04-26 ENCOUNTER — APPOINTMENT (OUTPATIENT)
Dept: INTERNAL MEDICINE | Facility: CLINIC | Age: 51
End: 2021-04-26
Payer: COMMERCIAL

## 2021-04-26 ENCOUNTER — RESULT REVIEW (OUTPATIENT)
Age: 51
End: 2021-04-26

## 2021-04-26 ENCOUNTER — OUTPATIENT (OUTPATIENT)
Dept: OUTPATIENT SERVICES | Facility: HOSPITAL | Age: 51
LOS: 1 days | End: 2021-04-26

## 2021-04-26 VITALS
OXYGEN SATURATION: 98 % | TEMPERATURE: 97.2 F | SYSTOLIC BLOOD PRESSURE: 116 MMHG | BODY MASS INDEX: 35 KG/M2 | HEIGHT: 71 IN | WEIGHT: 250 LBS | HEART RATE: 78 BPM | DIASTOLIC BLOOD PRESSURE: 70 MMHG

## 2021-04-26 LAB
ALBUMIN SERPL ELPH-MCNC: 4.4 G/DL — SIGNIFICANT CHANGE UP (ref 3.3–5)
ALP SERPL-CCNC: 96 U/L — SIGNIFICANT CHANGE UP (ref 40–120)
ALT FLD-CCNC: 16 U/L — SIGNIFICANT CHANGE UP (ref 4–41)
ANION GAP SERPL CALC-SCNC: 11 MMOL/L — SIGNIFICANT CHANGE UP (ref 7–14)
ANISOCYTOSIS BLD QL: SIGNIFICANT CHANGE UP
APPEARANCE UR: CLEAR — SIGNIFICANT CHANGE UP
AST SERPL-CCNC: 20 U/L — SIGNIFICANT CHANGE UP (ref 4–40)
BACTERIA # UR AUTO: NEGATIVE — SIGNIFICANT CHANGE UP
BASOPHILS # BLD AUTO: 0 K/UL — SIGNIFICANT CHANGE UP (ref 0–0.2)
BASOPHILS NFR BLD AUTO: 0 % — SIGNIFICANT CHANGE UP (ref 0–2)
BILIRUB SERPL-MCNC: 0.5 MG/DL — SIGNIFICANT CHANGE UP (ref 0.2–1.2)
BILIRUB UR-MCNC: NEGATIVE — SIGNIFICANT CHANGE UP
BUN SERPL-MCNC: 30 MG/DL — HIGH (ref 7–23)
CALCIUM SERPL-MCNC: 10.3 MG/DL — SIGNIFICANT CHANGE UP (ref 8.4–10.5)
CHLORIDE SERPL-SCNC: 107 MMOL/L — SIGNIFICANT CHANGE UP (ref 98–107)
CO2 SERPL-SCNC: 22 MMOL/L — SIGNIFICANT CHANGE UP (ref 22–31)
COLOR SPEC: YELLOW — SIGNIFICANT CHANGE UP
CREAT SERPL-MCNC: 1.55 MG/DL — HIGH (ref 0.5–1.3)
DIFF PNL FLD: NEGATIVE — SIGNIFICANT CHANGE UP
EOSINOPHIL # BLD AUTO: 0.1 K/UL — SIGNIFICANT CHANGE UP (ref 0–0.5)
EOSINOPHIL NFR BLD AUTO: 1.9 % — SIGNIFICANT CHANGE UP (ref 0–6)
EPI CELLS # UR: 6 /HPF — HIGH (ref 0–5)
FERRITIN SERPL-MCNC: 656 NG/ML — HIGH (ref 30–400)
GIANT PLATELETS BLD QL SMEAR: PRESENT — SIGNIFICANT CHANGE UP
GLUCOSE SERPL-MCNC: 123 MG/DL — HIGH (ref 70–99)
GLUCOSE UR QL: NEGATIVE — SIGNIFICANT CHANGE UP
HCT VFR BLD CALC: 23.9 % — LOW (ref 39–50)
HGB BLD-MCNC: 8.4 G/DL — LOW (ref 13–17)
HYALINE CASTS # UR AUTO: 4 /LPF — SIGNIFICANT CHANGE UP (ref 0–7)
IANC: 3.22 K/UL — SIGNIFICANT CHANGE UP (ref 1.5–8.5)
KETONES UR-MCNC: NEGATIVE — SIGNIFICANT CHANGE UP
LEUKOCYTE ESTERASE UR-ACNC: NEGATIVE — SIGNIFICANT CHANGE UP
LYMPHOCYTES # BLD AUTO: 1.46 K/UL — SIGNIFICANT CHANGE UP (ref 1–3.3)
LYMPHOCYTES # BLD AUTO: 27.1 % — SIGNIFICANT CHANGE UP (ref 13–44)
MACROCYTES BLD QL: SIGNIFICANT CHANGE UP
MCHC RBC-ENTMCNC: 35.1 GM/DL — SIGNIFICANT CHANGE UP (ref 32–36)
MCHC RBC-ENTMCNC: 42 PG — HIGH (ref 27–34)
MCV RBC AUTO: 119.5 FL — HIGH (ref 80–100)
MONOCYTES # BLD AUTO: 0.2 K/UL — SIGNIFICANT CHANGE UP (ref 0–0.9)
MONOCYTES NFR BLD AUTO: 3.7 % — SIGNIFICANT CHANGE UP (ref 2–14)
NEUTROPHILS # BLD AUTO: 3.63 K/UL — SIGNIFICANT CHANGE UP (ref 1.8–7.4)
NEUTROPHILS NFR BLD AUTO: 67.3 % — SIGNIFICANT CHANGE UP (ref 43–77)
NITRITE UR-MCNC: NEGATIVE — SIGNIFICANT CHANGE UP
NRBC # BLD: 33 /100 — HIGH (ref 0–0)
PH UR: 5.5 — SIGNIFICANT CHANGE UP (ref 5–8)
PLAT MORPH BLD: NORMAL — SIGNIFICANT CHANGE UP
PLATELET # BLD AUTO: 291 K/UL — SIGNIFICANT CHANGE UP (ref 150–400)
PLATELET COUNT - ESTIMATE: NORMAL — SIGNIFICANT CHANGE UP
POLYCHROMASIA BLD QL SMEAR: SIGNIFICANT CHANGE UP
POTASSIUM SERPL-MCNC: 4.4 MMOL/L — SIGNIFICANT CHANGE UP (ref 3.5–5.3)
POTASSIUM SERPL-SCNC: 4.4 MMOL/L — SIGNIFICANT CHANGE UP (ref 3.5–5.3)
PROT SERPL-MCNC: 7.6 G/DL — SIGNIFICANT CHANGE UP (ref 6–8.3)
PROT UR-MCNC: ABNORMAL
RBC # BLD: 2 M/UL — LOW (ref 4.2–5.8)
RBC # BLD: 2 M/UL — LOW (ref 4.2–5.8)
RBC # FLD: 16.6 % — HIGH (ref 10.3–14.5)
RBC BLD AUTO: ABNORMAL
RBC CASTS # UR COMP ASSIST: 1 /HPF — SIGNIFICANT CHANGE UP (ref 0–4)
RETICS #: 86.8 K/UL — SIGNIFICANT CHANGE UP (ref 25–125)
RETICS/RBC NFR: 4.3 % — HIGH (ref 0.5–2.5)
SICKLE CELLS BLD QL SMEAR: SLIGHT — SIGNIFICANT CHANGE UP
SODIUM SERPL-SCNC: 140 MMOL/L — SIGNIFICANT CHANGE UP (ref 135–145)
SP GR SPEC: 1.01 — SIGNIFICANT CHANGE UP (ref 1.01–1.02)
TARGETS BLD QL SMEAR: SIGNIFICANT CHANGE UP
UROBILINOGEN FLD QL: SIGNIFICANT CHANGE UP
WBC # BLD: 5.4 K/UL — SIGNIFICANT CHANGE UP (ref 3.8–10.5)
WBC # FLD AUTO: 5.4 K/UL — SIGNIFICANT CHANGE UP (ref 3.8–10.5)
WBC UR QL: 7 /HPF — HIGH (ref 0–5)

## 2021-04-26 PROCEDURE — 99214 OFFICE O/P EST MOD 30 MIN: CPT

## 2021-04-27 DIAGNOSIS — D57.1 SICKLE-CELL DISEASE WITHOUT CRISIS: ICD-10-CM

## 2021-04-27 DIAGNOSIS — I10 ESSENTIAL (PRIMARY) HYPERTENSION: ICD-10-CM

## 2021-04-27 DIAGNOSIS — R80.9 PROTEINURIA, UNSPECIFIED: ICD-10-CM

## 2021-04-27 LAB
24R-OH-CALCIDIOL SERPL-MCNC: 38.2 NG/ML — SIGNIFICANT CHANGE UP (ref 30–80)
HEMOGLOBIN INTERPRETATION: SIGNIFICANT CHANGE UP
HGB A MFR BLD: 0 % — LOW
HGB A2 MFR BLD: 4.4 % — HIGH (ref 2.4–3.5)
HGB F MFR BLD: 23.4 % — HIGH (ref 0–1.5)
HGB S MFR BLD: 72.2 % — HIGH

## 2021-04-27 NOTE — HISTORY OF PRESENT ILLNESS
[de-identified] : The patient is a 49 yo male with HGB SS, here for f/u. Taking  HU 2000 mg QD.Still taking aranesp q 3 weeks with his hematologist.\par Taking Oxbryta at full dose 2x perweek. It still causes severe diarrhea, and so he rarely takes it during the week when he is working.\par He feels that the Oxbryta has contributed to his well-being, and decrease in hospitalizations. \par Says that the endari makes him ill.\par He has been taking amlodipine, 5 mg, reliably with his Lisinopril. \par He had a sickle cell crisis that required an ED visit within the last month. Otherwise he has been well.\par The patient has been treated with eliquis for more than a year for a first DVT-\par His workplace is requiring a doctor's note saying that the patient may continue working ( as a ) with a HGB less than 10. The patient has been working as a  without difficulty, for a least 10 years.\par Ophtho 11/2020. No sickle cell retinopathy seen. \par Hematologist: Dr. BurciagaEyl-[phone]\par [phone]\par fully covid vaxed\par other PMH- IBS- no longer needs the Linzess ( as the Oxbryta decreases his constipation).\par \par PE: gen- wdwn male in nad, talkative \par vss\par mild icterus\par lungs- cta\par cor: rrr-m\par abd benign, obese\par ext; -c/c/e, scarring from past LE ulcers, both ankles\par \par a/P- 49 yo male with HGB SS, worsening symptoms, still mild\par 1. HTN- continue amlodipine- to 10 mg QD, continue lisinopril 20 qd for proteinuria\par 2. routine  labs\par 3. HU- continue 2000 mg QD, continue arenesp \par 4. continue Voxelator as tolerated\par 5. patient encouraged to wear mask despite covid vax, as disease is still a risk\par 6. dilaudid- 2 mg tab- #10\par ISTOP checked\par 7. D/C eliquis-\par 8. f/u 6 months\par \par time-istop 2 ', interview 25', note 7'\par \par Brittany Veloz MD\par

## 2021-05-12 ENCOUNTER — APPOINTMENT (OUTPATIENT)
Dept: NEPHROLOGY | Facility: CLINIC | Age: 51
End: 2021-05-12
Payer: COMMERCIAL

## 2021-05-12 VITALS
SYSTOLIC BLOOD PRESSURE: 129 MMHG | DIASTOLIC BLOOD PRESSURE: 69 MMHG | BODY MASS INDEX: 34.17 KG/M2 | TEMPERATURE: 97.9 F | OXYGEN SATURATION: 99 % | WEIGHT: 245 LBS | HEART RATE: 96 BPM

## 2021-05-12 DIAGNOSIS — N17.9 ACUTE KIDNEY FAILURE, UNSPECIFIED: ICD-10-CM

## 2021-05-12 PROCEDURE — 99072 ADDL SUPL MATRL&STAF TM PHE: CPT

## 2021-05-12 PROCEDURE — 99215 OFFICE O/P EST HI 40 MIN: CPT

## 2021-05-12 RX ORDER — AMLODIPINE BESYLATE 10 MG/1
10 TABLET ORAL
Qty: 90 | Refills: 3 | Status: DISCONTINUED | COMMUNITY
Start: 2019-06-24 | End: 2021-05-12

## 2021-05-13 PROBLEM — N17.9 ACUTE KIDNEY INJURY: Status: ACTIVE | Noted: 2021-05-13

## 2021-05-13 LAB
ALBUMIN SERPL ELPH-MCNC: 4.2 G/DL
ANION GAP SERPL CALC-SCNC: 11 MMOL/L
APPEARANCE: CLEAR
BACTERIA: NEGATIVE
BILIRUBIN URINE: NEGATIVE
BLOOD URINE: NEGATIVE
BUN SERPL-MCNC: 22 MG/DL
CALCIUM SERPL-MCNC: 10 MG/DL
CHLORIDE SERPL-SCNC: 107 MMOL/L
CO2 SERPL-SCNC: 21 MMOL/L
COLOR: NORMAL
CREAT SERPL-MCNC: 1.37 MG/DL
CREAT SPEC-SCNC: 84 MG/DL
CREAT/PROT UR: 0.3 RATIO
GLUCOSE QUALITATIVE U: NEGATIVE
GLUCOSE SERPL-MCNC: 129 MG/DL
HYALINE CASTS: 0 /LPF
KETONES URINE: NEGATIVE
LEUKOCYTE ESTERASE URINE: NEGATIVE
MICROSCOPIC-UA: NORMAL
NITRITE URINE: NEGATIVE
PH URINE: 6
PHOSPHATE SERPL-MCNC: 2.5 MG/DL
POTASSIUM SERPL-SCNC: 4 MMOL/L
PROT UR-MCNC: 23 MG/DL
PROTEIN URINE: NORMAL
RED BLOOD CELLS URINE: 0 /HPF
SODIUM SERPL-SCNC: 140 MMOL/L
SPECIFIC GRAVITY URINE: 1.01
SQUAMOUS EPITHELIAL CELLS: 0 /HPF
UROBILINOGEN URINE: NORMAL
WHITE BLOOD CELLS URINE: 0 /HPF

## 2021-05-13 NOTE — REVIEW OF SYSTEMS
[Feeling Tired] : feeling tired [Recent Weight Gain (___ Lbs)] : recent [unfilled] ~Ulb weight gain [Arthralgias] : arthralgias [Joint Pain] : joint pain [Joint Stiffness] : joint stiffness [Negative] : Genitourinary [Fever] : no fever [Chills] : no chills [Feeling Poorly] : not feeling poorly [Sore Throat] : no sore throat [Hoarseness] : no hoarseness [Lower Ext Edema] : no extremity edema [SOB on Exertion] : no shortness of breath during exertion [Orthopnea] : no orthopnea [Joint Swelling] : no joint swelling [FreeTextEntry9] : knees, elbows, shoulder, back

## 2021-05-13 NOTE — HISTORY OF PRESENT ILLNESS
[FreeTextEntry1] : Mr Arthur is a 51 y/o male with  sickle cell disease, HTN (Dx ~2010) , Mild CKD ( creatinine ranging between 1-1.2 mg/dl) , minimal  proteinuria here for a follow up visit. \par  \par He was last seen here in 2018. He states he feels OK. Has gained weight. Is currently working as a . He continues to have sickle cell crisis about 6 times a year , however the severity has improved significantly since he started  oxybryta in 4/2020. Hospitalizations have also improved. He is now also getting aranesp monthly\par  . His BP is OK. Occasionally forgets to take his PM dose of lisinopril. \par \par Recently he was told of having any elevated serum creatinine. Blood work last month with serum creatinine of 1.5 mg/dl. Last blood work in 9/2020 with a serum creatinine of 1.1 mg/dl.   He denies using any NSAIDS although appears that he was prescribed meloxicam . Renal sonogram in ealry 2020 with 12 cm kidneys. No stones or hydronephrosis. +Left mid pole cyst\par \par \par \par \par \par

## 2021-05-13 NOTE — PHYSICAL EXAM
[General Appearance - Alert] : alert [General Appearance - In No Acute Distress] : in no acute distress [General Appearance - Well Nourished] : well nourished [Heart Rate And Rhythm] : heart rate was normal and rhythm regular [Heart Sounds] : normal S1 and S2 [Murmurs] : no murmurs [Heart Sounds Gallop] : no gallops [Heart Sounds Pericardial Friction Rub] : no pericardial rub [Edema] : there was no peripheral edema [Bowel Sounds] : normal bowel sounds [Abdomen Soft] : soft [Abdomen Tenderness] : non-tender [] : no respiratory distress [Exaggerated Use Of Accessory Muscles For Inspiration] : no accessory muscle use [Auscultation Breath Sounds / Voice Sounds] : lungs were clear to auscultation bilaterally [Oriented To Time, Place, And Person] : oriented to person, place, and time [Impaired Insight] : insight and judgment were intact [Affect] : the affect was normal

## 2021-05-13 NOTE — ASSESSMENT
[FreeTextEntry1] :  51 y/o male with H/O HTN, SS and mild CKD with superimposed ALFREDO\par \par ALFREDO superimposed on CKD stage 2( baseline cr 1-1.2 mg/dl) :\par -recent serum creatinine is up to 1.5 mg/d. ? etiology. He denies any recent infections/contrast use or nSAIDS\par -Check repeat blood and urine  \par -Reiterated avoidance of NSAIDS\par -He needs to loose weight. \par \par HTN\par BP when checked by me was OK\par Continue with current medications. Take lisinopril 2 tabs daily\par \par Vitamin D Deficiency: \par Check labs\par \par ANEMIA:  in the setting of SS disease  \par On aranesp per hematologist\par \par F/U in 1 month\par \par ADDENDUM: \par renal function better\par Urine is bland \par Proteinuria minimal \par f/u next month\par Called patient to discuss results .  \par (c) [phone]

## 2021-06-02 ENCOUNTER — NON-APPOINTMENT (OUTPATIENT)
Age: 51
End: 2021-06-02

## 2021-07-07 ENCOUNTER — APPOINTMENT (OUTPATIENT)
Dept: NEPHROLOGY | Facility: CLINIC | Age: 51
End: 2021-07-07
Payer: COMMERCIAL

## 2021-07-07 ENCOUNTER — LABORATORY RESULT (OUTPATIENT)
Age: 51
End: 2021-07-07

## 2021-07-07 VITALS
BODY MASS INDEX: 33.67 KG/M2 | OXYGEN SATURATION: 98 % | HEART RATE: 89 BPM | DIASTOLIC BLOOD PRESSURE: 70 MMHG | SYSTOLIC BLOOD PRESSURE: 121 MMHG | WEIGHT: 241.41 LBS | TEMPERATURE: 97.8 F

## 2021-07-07 DIAGNOSIS — N25.81 SECONDARY HYPERPARATHYROIDISM OF RENAL ORIGIN: ICD-10-CM

## 2021-07-07 PROCEDURE — 99072 ADDL SUPL MATRL&STAF TM PHE: CPT

## 2021-07-07 PROCEDURE — 99213 OFFICE O/P EST LOW 20 MIN: CPT

## 2021-07-07 RX ORDER — APIXABAN 5 MG/1
5 TABLET, FILM COATED ORAL
Qty: 180 | Refills: 3 | Status: DISCONTINUED | COMMUNITY
Start: 2020-02-25 | End: 2021-07-07

## 2021-07-07 NOTE — PHYSICAL EXAM
[General Appearance - Alert] : alert [General Appearance - In No Acute Distress] : in no acute distress [General Appearance - Well Nourished] : well nourished [] : no respiratory distress [Exaggerated Use Of Accessory Muscles For Inspiration] : no accessory muscle use [Auscultation Breath Sounds / Voice Sounds] : lungs were clear to auscultation bilaterally [Heart Rate And Rhythm] : heart rate was normal and rhythm regular [Heart Sounds] : normal S1 and S2 [Heart Sounds Gallop] : no gallops [Murmurs] : no murmurs [Heart Sounds Pericardial Friction Rub] : no pericardial rub [Edema] : there was no peripheral edema [Oriented To Time, Place, And Person] : oriented to person, place, and time [Impaired Insight] : insight and judgment were intact [Affect] : the affect was normal

## 2021-07-08 PROBLEM — N25.81 SECONDARY HYPERPARATHYROIDISM: Status: ACTIVE | Noted: 2021-07-08

## 2021-07-08 LAB
25(OH)D3 SERPL-MCNC: 42.3 NG/ML
APPEARANCE: CLEAR
BACTERIA: NEGATIVE
BASOPHILS # BLD AUTO: 0.01 K/UL
BASOPHILS NFR BLD AUTO: 0.3 %
BILIRUBIN URINE: NEGATIVE
BLOOD URINE: NEGATIVE
CALCIUM SERPL-MCNC: 9.6 MG/DL
COLOR: NORMAL
COVID-19 SPIKE DOMAIN ANTIBODY INTERPRETATION: POSITIVE
CREAT SPEC-SCNC: 108 MG/DL
CREAT/PROT UR: 0.1 RATIO
EOSINOPHIL # BLD AUTO: 0.05 K/UL
EOSINOPHIL NFR BLD AUTO: 1.3 %
FERRITIN SERPL-MCNC: 858 NG/ML
GLUCOSE QUALITATIVE U: NEGATIVE
HCT VFR BLD CALC: 20.6 %
HGB BLD-MCNC: 7.4 G/DL
HYALINE CASTS: 2 /LPF
IMM GRANULOCYTES NFR BLD AUTO: 0.3 %
IRON SATN MFR SERPL: 61 %
IRON SERPL-MCNC: 168 UG/DL
KETONES URINE: NEGATIVE
LEUKOCYTE ESTERASE URINE: NEGATIVE
LYMPHOCYTES # BLD AUTO: 1.67 K/UL
LYMPHOCYTES NFR BLD AUTO: 42 %
MAGNESIUM SERPL-MCNC: 2.2 MG/DL
MAN DIFF?: NORMAL
MCHC RBC-ENTMCNC: 35.9 GM/DL
MCHC RBC-ENTMCNC: 42 PG
MCV RBC AUTO: 117 FL
MICROSCOPIC-UA: NORMAL
MONOCYTES # BLD AUTO: 0.36 K/UL
MONOCYTES NFR BLD AUTO: 9 %
NEUTROPHILS # BLD AUTO: 1.88 K/UL
NEUTROPHILS NFR BLD AUTO: 47.1 %
NITRITE URINE: NEGATIVE
PARATHYROID HORMONE INTACT: 104 PG/ML
PH URINE: 6
PLATELET # BLD AUTO: 247 K/UL
PROT UR-MCNC: 15 MG/DL
PROTEIN URINE: NORMAL
RBC # BLD: 1.76 M/UL
RBC # FLD: 21.6 %
RED BLOOD CELLS URINE: 1 /HPF
SARS-COV-2 AB SERPL IA-ACNC: 115 U/ML
SPECIFIC GRAVITY URINE: 1.01
SQUAMOUS EPITHELIAL CELLS: 0 /HPF
TIBC SERPL-MCNC: 276 UG/DL
UIBC SERPL-MCNC: 108 UG/DL
UROBILINOGEN URINE: NORMAL
WBC # FLD AUTO: 3.98 K/UL
WHITE BLOOD CELLS URINE: 0 /HPF

## 2021-07-08 NOTE — ASSESSMENT
[FreeTextEntry1] :  51 y/o male with H/O HTN, SS and mild CKD with superimposed ALFREDO\par \par ALFREDO superimposed on CKD stage 2( baseline cr 1-1.2 mg/dl) : improving\par -recent serum creatinine is down to 1.3 mg/dl. He denies any recent infections/contrast use or nSAIDS\par -Check repeat blood and urine  today\par -Reiterated avoidance of NSAIDS\par -He needs to loose weight. \par \par HTN\par BP when checked by me was OK\par Continue with current medications. \par \par Vitamin D Deficiency: \par Check labs\par \par ANEMIA:  in the setting of SS disease  \par On aranesp per hematologist\par \par F/U in 4 month\par \par ADDENDUM: \par  Renal function unchanged with creatinine of 1.3 \par will stop PPI and take Pepcid\par Mild secondary hyperparathyroidism with replete Vit D levels: monitor\par d/w him\par Will repeat renal sono next visit \par D/W him \par \par \par \par Called patient to discuss results .  \par (c) [phone]

## 2021-07-08 NOTE — REVIEW OF SYSTEMS
[Feeling Tired] : feeling tired [Arthralgias] : arthralgias [Joint Pain] : joint pain [Joint Stiffness] : joint stiffness [As Noted in HPI] : as noted in HPI [Diarrhea] : diarrhea [Negative] : Cardiovascular [Fever] : no fever [Chills] : no chills [Feeling Poorly] : not feeling poorly [Recent Weight Gain (___ Lbs)] : no recent weight gain [Sore Throat] : no sore throat [Hoarseness] : no hoarseness [Lower Ext Edema] : no extremity edema [SOB on Exertion] : no shortness of breath during exertion [Orthopnea] : no orthopnea [Joint Swelling] : no joint swelling [FreeTextEntry7] : resolved [FreeTextEntry9] : knees, elbows, shoulder, back

## 2021-07-08 NOTE — HISTORY OF PRESENT ILLNESS
[FreeTextEntry1] : Mr Arthur is a 49 y/o male with  sickle cell disease, HTN (Dx ~2010) , Mild CKD ( creatinine ranging between 1-1.2 mg/dl) , minimal  proteinuria here for a follow up visit. \par  \par Is currently working as a . He continues to have sickle cell crisis about 6 times a year , however the severity has improved significantly since he started  oxybryta in 4/2020. Hospitalizations have also improved. He is now also getting aranesp monthly \par \par Recently he was told of having any elevated serum creatinine. Blood work anna april ( 2021)  with serum creatinine of 1.5 mg/dl. Last blood work in 9/2020 with a serum creatinine of 1.1 mg/dl.   He denies using any NSAIDS although appears that he was prescribed meloxicam . Renal sonogram in ealry 2020 with 12 cm kidneys. No stones or hydronephrosis. +Left mid pole cyst\par \par \par He comes for a follow up visit today. Now no longer on Oxybryta as he was prescribed Adakveo 2 weeks ago for sickle cell pain. He will likely stop this also as he feels this is going to lower his immunity. He completed his vaccination in Feb. Denies taking any NSAIDS. Had some diarrhea for which he took peptobismol \par \par \par

## 2021-09-23 ENCOUNTER — APPOINTMENT (OUTPATIENT)
Dept: INTERNAL MEDICINE | Facility: CLINIC | Age: 51
End: 2021-09-23

## 2021-10-18 ENCOUNTER — APPOINTMENT (OUTPATIENT)
Dept: INTERNAL MEDICINE | Facility: CLINIC | Age: 51
End: 2021-10-18

## 2021-10-19 ENCOUNTER — APPOINTMENT (OUTPATIENT)
Dept: INTERNAL MEDICINE | Facility: CLINIC | Age: 51
End: 2021-10-19

## 2021-11-18 ENCOUNTER — OUTPATIENT (OUTPATIENT)
Dept: OUTPATIENT SERVICES | Facility: HOSPITAL | Age: 51
LOS: 1 days | End: 2021-11-18

## 2021-11-18 ENCOUNTER — APPOINTMENT (OUTPATIENT)
Dept: INTERNAL MEDICINE | Facility: CLINIC | Age: 51
End: 2021-11-18
Payer: COMMERCIAL

## 2021-11-18 VITALS
WEIGHT: 240 LBS | OXYGEN SATURATION: 99 % | DIASTOLIC BLOOD PRESSURE: 72 MMHG | HEART RATE: 82 BPM | SYSTOLIC BLOOD PRESSURE: 110 MMHG | BODY MASS INDEX: 33.47 KG/M2

## 2021-11-18 VITALS — TEMPERATURE: 95 F

## 2021-11-18 DIAGNOSIS — R14.0 ABDOMINAL DISTENSION (GASEOUS): ICD-10-CM

## 2021-11-18 PROCEDURE — 99214 OFFICE O/P EST MOD 30 MIN: CPT

## 2021-11-18 RX ORDER — LINACLOTIDE 145 UG/1
145 CAPSULE, GELATIN COATED ORAL
Qty: 30 | Refills: 3 | Status: DISCONTINUED | COMMUNITY
Start: 2020-04-14 | End: 2021-11-18

## 2021-11-19 NOTE — HISTORY OF PRESENT ILLNESS
[de-identified] : The patient is a 49 yo male with HGB SS, here for f/u. Taking  HU 2000 mg QD.Still taking aranesp q 3 weeks with his hematologist.\par Taking Oxbryta at full dose 2x perweek. It still causes severe diarrhea, and so he rarely takes it during the week when he is working.\par He feels that the Oxbryta has contributed to his well-being, and decrease in hospitalizations. The patient stopped Oxbryta @ 4 months ago. His HGB  went down to 6.8 despite Aranesp, and he required two units PRBCs 3 weeks ago. Prior to that, his HGB had been maintained in the 8s.He restarted Oxbryta 4x per week, when he is not at work.\par Says that the endari makes him ill.\par c/o gas pain and abdominal swelling\par Ophtho 11/2020. No sickle cell retinopathy seen. \par Hematologist: Dr. [phone]\par [phone]\par fully covid vaxed\par other PMH- IBS- no longer needs the Linzess ( as the Oxbryta decreases his constipation).\par \par PE: gen- wdwn male in nad, talkative \par vss\par mild icterus\par lungs- cta\par cor: rrr-m\par abd benign, obese\par ext; -c/c/e, scarring from past LE ulcers, both ankles\par \par a/P- 49 yo male with HGB SS, worsening symptoms, still mild\par 1. HTN- continue HCTZ continue lisinopril 20 qd for proteinuria\par 2.gas discomfort- likely from the Oxbryta- simethicone given\par 3. HU- continue 2000 mg QD, continue arenesp \par 4. continue Voxelator as tolerated\par 5. patient encouraged to wear mask despite covid vax, as disease is still a risk\par 6. dilaudid- 2 mg tab- #30\par ISTOP checked\par 7. f/u 4 months\par \par Brittany Veloz MD\par

## 2021-11-29 DIAGNOSIS — R80.9 PROTEINURIA, UNSPECIFIED: ICD-10-CM

## 2021-11-29 DIAGNOSIS — R14.0 ABDOMINAL DISTENSION (GASEOUS): ICD-10-CM

## 2021-11-29 DIAGNOSIS — I10 ESSENTIAL (PRIMARY) HYPERTENSION: ICD-10-CM

## 2021-11-29 DIAGNOSIS — Z87.19 PERSONAL HISTORY OF OTHER DISEASES OF THE DIGESTIVE SYSTEM: ICD-10-CM

## 2021-11-29 DIAGNOSIS — D57.1 SICKLE-CELL DISEASE WITHOUT CRISIS: ICD-10-CM

## 2022-02-07 ENCOUNTER — NON-APPOINTMENT (OUTPATIENT)
Age: 52
End: 2022-02-07

## 2022-02-09 ENCOUNTER — APPOINTMENT (OUTPATIENT)
Dept: GASTROENTEROLOGY | Facility: CLINIC | Age: 52
End: 2022-02-09

## 2022-02-26 ENCOUNTER — INPATIENT (INPATIENT)
Facility: HOSPITAL | Age: 52
LOS: 10 days | Discharge: HOME | DRG: 553 | End: 2022-03-09
Attending: INTERNAL MEDICINE | Admitting: INTERNAL MEDICINE
Payer: COMMERCIAL

## 2022-02-26 VITALS
HEART RATE: 85 BPM | SYSTOLIC BLOOD PRESSURE: 117 MMHG | TEMPERATURE: 98 F | WEIGHT: 238.1 LBS | RESPIRATION RATE: 20 BRPM | OXYGEN SATURATION: 98 % | DIASTOLIC BLOOD PRESSURE: 74 MMHG | HEIGHT: 71 IN

## 2022-02-26 DIAGNOSIS — D57.00 HB-SS DISEASE WITH CRISIS, UNSPECIFIED: ICD-10-CM

## 2022-02-26 DIAGNOSIS — D57.1 SICKLE-CELL DISEASE WITHOUT CRISIS: ICD-10-CM

## 2022-02-26 DIAGNOSIS — Z90.49 ACQUIRED ABSENCE OF OTHER SPECIFIED PARTS OF DIGESTIVE TRACT: Chronic | ICD-10-CM

## 2022-02-26 DIAGNOSIS — M25.461 EFFUSION, RIGHT KNEE: ICD-10-CM

## 2022-02-26 DIAGNOSIS — Z29.9 ENCOUNTER FOR PROPHYLACTIC MEASURES, UNSPECIFIED: ICD-10-CM

## 2022-02-26 LAB
ALBUMIN SERPL ELPH-MCNC: 3.5 G/DL — SIGNIFICANT CHANGE UP (ref 3.3–5)
ALP SERPL-CCNC: 94 U/L — SIGNIFICANT CHANGE UP (ref 40–120)
ALT FLD-CCNC: 20 U/L — SIGNIFICANT CHANGE UP (ref 10–45)
ANION GAP SERPL CALC-SCNC: 11 MMOL/L — SIGNIFICANT CHANGE UP (ref 5–17)
APTT BLD: 31.7 SEC — SIGNIFICANT CHANGE UP (ref 27.5–35.5)
AST SERPL-CCNC: 21 U/L — SIGNIFICANT CHANGE UP (ref 10–40)
B PERT IGG+IGM PNL SER: ABNORMAL
BASOPHILS # BLD AUTO: 0.02 K/UL — SIGNIFICANT CHANGE UP (ref 0–0.2)
BASOPHILS NFR BLD AUTO: 0.2 % — SIGNIFICANT CHANGE UP (ref 0–2)
BILIRUB DIRECT SERPL-MCNC: <0.1 MG/DL — SIGNIFICANT CHANGE UP (ref 0–0.3)
BILIRUB INDIRECT FLD-MCNC: >0.1 MG/DL — LOW (ref 0.2–1)
BILIRUB SERPL-MCNC: 0.2 MG/DL — SIGNIFICANT CHANGE UP (ref 0.2–1.2)
BILIRUB SERPL-MCNC: 0.2 MG/DL — SIGNIFICANT CHANGE UP (ref 0.2–1.2)
BUN SERPL-MCNC: 15 MG/DL — SIGNIFICANT CHANGE UP (ref 7–23)
CALCIUM SERPL-MCNC: 9 MG/DL — SIGNIFICANT CHANGE UP (ref 8.4–10.5)
CHLORIDE SERPL-SCNC: 103 MMOL/L — SIGNIFICANT CHANGE UP (ref 96–108)
CO2 SERPL-SCNC: 21 MMOL/L — LOW (ref 22–31)
COLOR FLD: SIGNIFICANT CHANGE UP
CREAT SERPL-MCNC: 1.24 MG/DL — SIGNIFICANT CHANGE UP (ref 0.5–1.3)
CRP SERPL-MCNC: 34 MG/L — HIGH (ref 0–4)
EOSINOPHIL # BLD AUTO: 0.04 K/UL — SIGNIFICANT CHANGE UP (ref 0–0.5)
EOSINOPHIL NFR BLD AUTO: 0.4 % — SIGNIFICANT CHANGE UP (ref 0–6)
ERYTHROCYTE [SEDIMENTATION RATE] IN BLOOD: 120 MM/HR — HIGH (ref 0–20)
FLUID INTAKE SUBSTANCE CLASS: SIGNIFICANT CHANGE UP
FLUID SEGMENTED GRANULOCYTES: 96 % — SIGNIFICANT CHANGE UP
GLUCOSE FLD-MCNC: 66 MG/DL — SIGNIFICANT CHANGE UP
GLUCOSE SERPL-MCNC: 117 MG/DL — HIGH (ref 70–99)
GRAM STN FLD: SIGNIFICANT CHANGE UP
HCT VFR BLD CALC: 24.8 % — LOW (ref 39–50)
HGB BLD-MCNC: 8 G/DL — LOW (ref 13–17)
IMM GRANULOCYTES NFR BLD AUTO: 0.7 % — SIGNIFICANT CHANGE UP (ref 0–1.5)
INR BLD: 1.17 RATIO — HIGH (ref 0.88–1.16)
LYMPHOCYTES # BLD AUTO: 1.06 K/UL — SIGNIFICANT CHANGE UP (ref 1–3.3)
LYMPHOCYTES # BLD AUTO: 10.9 % — LOW (ref 13–44)
LYMPHOCYTES # FLD: 1 % — SIGNIFICANT CHANGE UP
MCHC RBC-ENTMCNC: 32.3 GM/DL — SIGNIFICANT CHANGE UP (ref 32–36)
MCHC RBC-ENTMCNC: 32.8 PG — SIGNIFICANT CHANGE UP (ref 27–34)
MCV RBC AUTO: 101.6 FL — HIGH (ref 80–100)
MONOCYTES # BLD AUTO: 1.02 K/UL — HIGH (ref 0–0.9)
MONOCYTES NFR BLD AUTO: 10.5 % — SIGNIFICANT CHANGE UP (ref 2–14)
MONOS+MACROS # FLD: 3 % — SIGNIFICANT CHANGE UP
NEUTROPHILS # BLD AUTO: 7.53 K/UL — HIGH (ref 1.8–7.4)
NEUTROPHILS NFR BLD AUTO: 77.3 % — HIGH (ref 43–77)
NRBC # BLD: 0 /100 WBCS — SIGNIFICANT CHANGE UP (ref 0–0)
PLATELET # BLD AUTO: 549 K/UL — HIGH (ref 150–400)
POTASSIUM SERPL-MCNC: 3.8 MMOL/L — SIGNIFICANT CHANGE UP (ref 3.5–5.3)
POTASSIUM SERPL-SCNC: 3.8 MMOL/L — SIGNIFICANT CHANGE UP (ref 3.5–5.3)
PROT FLD-MCNC: 5.1 G/DL — SIGNIFICANT CHANGE UP
PROT SERPL-MCNC: 7.9 G/DL — SIGNIFICANT CHANGE UP (ref 6–8.3)
PROTHROM AB SERPL-ACNC: 14.1 SEC — HIGH (ref 10.5–13.4)
RBC # BLD: 2.44 M/UL — LOW (ref 4.2–5.8)
RBC # BLD: 2.44 M/UL — LOW (ref 4.2–5.8)
RBC # FLD: 18.9 % — HIGH (ref 10.3–14.5)
RCV VOL RI: 6500 /UL — HIGH (ref 0–0)
RETICS #: 132.7 K/UL — HIGH (ref 25–125)
RETICS/RBC NFR: 5.4 % — HIGH (ref 0.5–2.5)
SARS-COV-2 RNA SPEC QL NAA+PROBE: SIGNIFICANT CHANGE UP
SODIUM SERPL-SCNC: 135 MMOL/L — SIGNIFICANT CHANGE UP (ref 135–145)
SPECIMEN SOURCE: SIGNIFICANT CHANGE UP
SYNOVIAL CRYSTALS CLARITY: ABNORMAL
SYNOVIAL CRYSTALS COLOR: ABNORMAL
SYNOVIAL CRYSTALS ID: ABNORMAL
SYNOVIAL CRYSTALS TUBE: SIGNIFICANT CHANGE UP
TOTAL NUCLEATED CELL COUNT, BODY FLUID: SIGNIFICANT CHANGE UP /UL
TUBE TYPE: SIGNIFICANT CHANGE UP
URATE SERPL-MCNC: 8.3 MG/DL — SIGNIFICANT CHANGE UP (ref 3.4–8.8)
WBC # BLD: 9.74 K/UL — SIGNIFICANT CHANGE UP (ref 3.8–10.5)
WBC # FLD AUTO: 9.74 K/UL — SIGNIFICANT CHANGE UP (ref 3.8–10.5)

## 2022-02-26 PROCEDURE — 99223 1ST HOSP IP/OBS HIGH 75: CPT

## 2022-02-26 PROCEDURE — 36000 PLACE NEEDLE IN VEIN: CPT | Mod: 59

## 2022-02-26 PROCEDURE — 73564 X-RAY EXAM KNEE 4 OR MORE: CPT | Mod: 26,RT

## 2022-02-26 PROCEDURE — 76882 US LMTD JT/FCL EVL NVASC XTR: CPT | Mod: 26,59

## 2022-02-26 PROCEDURE — 99285 EMERGENCY DEPT VISIT HI MDM: CPT | Mod: 25

## 2022-02-26 PROCEDURE — 20610 DRAIN/INJ JOINT/BURSA W/O US: CPT | Mod: 59

## 2022-02-26 PROCEDURE — 76937 US GUIDE VASCULAR ACCESS: CPT | Mod: 26

## 2022-02-26 RX ORDER — HYDROMORPHONE HYDROCHLORIDE 2 MG/ML
2 INJECTION INTRAMUSCULAR; INTRAVENOUS; SUBCUTANEOUS EVERY 4 HOURS
Refills: 0 | Status: DISCONTINUED | OUTPATIENT
Start: 2022-02-26 | End: 2022-02-27

## 2022-02-26 RX ORDER — HYDROMORPHONE HYDROCHLORIDE 2 MG/ML
1 INJECTION INTRAMUSCULAR; INTRAVENOUS; SUBCUTANEOUS ONCE
Refills: 0 | Status: DISCONTINUED | OUTPATIENT
Start: 2022-02-26 | End: 2022-02-26

## 2022-02-26 RX ORDER — SODIUM CHLORIDE 9 MG/ML
500 INJECTION INTRAMUSCULAR; INTRAVENOUS; SUBCUTANEOUS ONCE
Refills: 0 | Status: COMPLETED | OUTPATIENT
Start: 2022-02-26 | End: 2022-02-26

## 2022-02-26 RX ORDER — HYDROMORPHONE HYDROCHLORIDE 2 MG/ML
1 INJECTION INTRAMUSCULAR; INTRAVENOUS; SUBCUTANEOUS
Refills: 0 | Status: DISCONTINUED | OUTPATIENT
Start: 2022-02-26 | End: 2022-02-27

## 2022-02-26 RX ORDER — ACETAMINOPHEN 500 MG
650 TABLET ORAL EVERY 6 HOURS
Refills: 0 | Status: DISCONTINUED | OUTPATIENT
Start: 2022-02-26 | End: 2022-03-09

## 2022-02-26 RX ORDER — VANCOMYCIN HCL 1 G
1000 VIAL (EA) INTRAVENOUS ONCE
Refills: 0 | Status: COMPLETED | OUTPATIENT
Start: 2022-02-26 | End: 2022-02-26

## 2022-02-26 RX ORDER — HYDROXYUREA 500 MG/1
1500 CAPSULE ORAL DAILY
Refills: 0 | Status: DISCONTINUED | OUTPATIENT
Start: 2022-02-26 | End: 2022-03-09

## 2022-02-26 RX ORDER — ENOXAPARIN SODIUM 100 MG/ML
40 INJECTION SUBCUTANEOUS DAILY
Refills: 0 | Status: DISCONTINUED | OUTPATIENT
Start: 2022-02-26 | End: 2022-02-27

## 2022-02-26 RX ORDER — FOLIC ACID 0.8 MG
1 TABLET ORAL DAILY
Refills: 0 | Status: DISCONTINUED | OUTPATIENT
Start: 2022-02-26 | End: 2022-03-09

## 2022-02-26 RX ORDER — SODIUM CHLORIDE 9 MG/ML
1000 INJECTION, SOLUTION INTRAVENOUS
Refills: 0 | Status: DISCONTINUED | OUTPATIENT
Start: 2022-02-26 | End: 2022-03-05

## 2022-02-26 RX ORDER — DIPHENHYDRAMINE HCL 50 MG
25 CAPSULE ORAL ONCE
Refills: 0 | Status: COMPLETED | OUTPATIENT
Start: 2022-02-26 | End: 2022-02-26

## 2022-02-26 RX ORDER — CEFTRIAXONE 500 MG/1
2000 INJECTION, POWDER, FOR SOLUTION INTRAMUSCULAR; INTRAVENOUS ONCE
Refills: 0 | Status: COMPLETED | OUTPATIENT
Start: 2022-02-26 | End: 2022-02-26

## 2022-02-26 RX ADMIN — HYDROMORPHONE HYDROCHLORIDE 1 MILLIGRAM(S): 2 INJECTION INTRAMUSCULAR; INTRAVENOUS; SUBCUTANEOUS at 14:38

## 2022-02-26 RX ADMIN — HYDROMORPHONE HYDROCHLORIDE 1 MILLIGRAM(S): 2 INJECTION INTRAMUSCULAR; INTRAVENOUS; SUBCUTANEOUS at 19:52

## 2022-02-26 RX ADMIN — HYDROMORPHONE HYDROCHLORIDE 2 MILLIGRAM(S): 2 INJECTION INTRAMUSCULAR; INTRAVENOUS; SUBCUTANEOUS at 22:14

## 2022-02-26 RX ADMIN — Medication 25 MILLIGRAM(S): at 13:37

## 2022-02-26 RX ADMIN — CEFTRIAXONE 100 MILLIGRAM(S): 500 INJECTION, POWDER, FOR SOLUTION INTRAMUSCULAR; INTRAVENOUS at 23:09

## 2022-02-26 RX ADMIN — HYDROMORPHONE HYDROCHLORIDE 1 MILLIGRAM(S): 2 INJECTION INTRAMUSCULAR; INTRAVENOUS; SUBCUTANEOUS at 16:17

## 2022-02-26 RX ADMIN — HYDROMORPHONE HYDROCHLORIDE 1 MILLIGRAM(S): 2 INJECTION INTRAMUSCULAR; INTRAVENOUS; SUBCUTANEOUS at 13:14

## 2022-02-26 RX ADMIN — SODIUM CHLORIDE 500 MILLILITER(S): 9 INJECTION INTRAMUSCULAR; INTRAVENOUS; SUBCUTANEOUS at 13:16

## 2022-02-26 RX ADMIN — HYDROMORPHONE HYDROCHLORIDE 1 MILLIGRAM(S): 2 INJECTION INTRAMUSCULAR; INTRAVENOUS; SUBCUTANEOUS at 19:37

## 2022-02-26 RX ADMIN — HYDROMORPHONE HYDROCHLORIDE 2 MILLIGRAM(S): 2 INJECTION INTRAMUSCULAR; INTRAVENOUS; SUBCUTANEOUS at 22:29

## 2022-02-26 RX ADMIN — SODIUM CHLORIDE 500 MILLILITER(S): 9 INJECTION INTRAMUSCULAR; INTRAVENOUS; SUBCUTANEOUS at 18:20

## 2022-02-26 RX ADMIN — HYDROXYUREA 1500 MILLIGRAM(S): 500 CAPSULE ORAL at 22:34

## 2022-02-26 RX ADMIN — ENOXAPARIN SODIUM 40 MILLIGRAM(S): 100 INJECTION SUBCUTANEOUS at 22:43

## 2022-02-26 RX ADMIN — HYDROMORPHONE HYDROCHLORIDE 1 MILLIGRAM(S): 2 INJECTION INTRAMUSCULAR; INTRAVENOUS; SUBCUTANEOUS at 14:49

## 2022-02-26 RX ADMIN — Medication 250 MILLIGRAM(S): at 23:55

## 2022-02-26 RX ADMIN — HYDROMORPHONE HYDROCHLORIDE 1 MILLIGRAM(S): 2 INJECTION INTRAMUSCULAR; INTRAVENOUS; SUBCUTANEOUS at 16:14

## 2022-02-26 NOTE — H&P ADULT - PROBLEM SELECTOR PLAN 1
See above.  Empiric IV Rocephin 2 gm x 1 and IV Vancomycin x 1 pending cultures.   Would consider formal rheumatologic evaluation in the AM.

## 2022-02-26 NOTE — ED PROVIDER NOTE - ATTENDING CONTRIBUTION TO CARE
Attending MD Pamela Arriaga:  I personally have seen and examined this patient.  Resident note reviewed and agree on plan of care and except where noted.  See HPI, PE, and MDM for details.

## 2022-02-26 NOTE — H&P ADULT - NSHPLABSRESULTS_GEN_ALL_CORE
WBC 9.7    Hgb 8.0    Platelets of 549K    retic 132K    INR 1.1    COVID-19 PCR>>negative.    K+ 3.8    Random glucose of 117    Cr 1.2    RIGHT knee with no fracture, + osteonecrosis    Patient refused chest radiograph. WBC 9.7    Hgb 8.0    Platelets of 549K    retic 132K    INR 1.1    COVID-19 PCR>>negative.    K+ 3.8    Random glucose of 117    Cr 1.2    RIGHT knee with no fracture, + osteonecrosis    Patient refused chest radiograph.    EKG tracing reviewed with NSR at 86 with nonspecific T wave changes.

## 2022-02-26 NOTE — H&P ADULT - NSHPREVIEWOFSYSTEMS_GEN_ALL_CORE
NO HA< no focal weakness.  NO chest pain/pressure.  NO palpitations.  NO cough, no dyspnoea.  NO wheezing.  NO rash  No abdominal pain, no red blood per rectum or melena.    No fever, no chills, no rigors.  No dysuria, no hematuria.  No weight loss or anorexia.  NO thyroid symptoms.  NO SI/HI.

## 2022-02-26 NOTE — CONSULT NOTE ADULT - ASSESSMENT
50 yo male h/o sickle cell disease on home dilaudid recenlty admitted to ohs for worsening pain presenting with persistent pain. pt just d/c from outside hospital. today worsening pain to knees and joints similar to prior sickle cell flares. also reports pain to right knee and swelling. was reporteldy more swollen and has improved but still hurts. did fall at outside hospital but no recent falls. no fevers today. no sob or difficulty breathing. no back pain. no chest pain. no cough. no sick contacts  s/p arthrocentesis in ER   states to still have knee and leg pain   requesting around the clock dilaudid

## 2022-02-26 NOTE — ED PROVIDER NOTE - OBJECTIVE STATEMENT
50yo M w/ history of SS coming in w/ knee and back pain 52yo M w/ history of SS coming in w/ knee and back pain  Attending Pamela Arriaga: 50 yo male h/o sickle cell disease on home dilaudid recenlty admitted to ohs for worsening pain presenting with persistent pain. pt just d/c from outside hospital. today worsening pain to knees and joints similar to prior sickle cell flares. also reports pain to right knee and swelling. was reporteldy more swollen and has improved but still hurts. did fall at outside hospital but no recent falls. no fevers today. no sob or difficulty breathing. no back pain. no chest pain. no cough. no sick contacts 50yo M w/ history of SS coming in w/ knee and back pain  Attending Pamela Arriaga: 52 yo male h/o sickle cell disease on home dilaudid , prior dvt to right leg, recenlty admitted to ohs for worsening pain presenting with persistent pain. pt just d/c from outside hospital. today worsening pain to knees and joints similar to prior sickle cell flares. also reports pain to right knee and swelling. was reporteldy more swollen and has improved but still hurts. did fall at outside hospital but no recent falls. no fevers today. no sob or difficulty breathing. no back pain. no chest pain. no cough. no sick contacts

## 2022-02-26 NOTE — H&P ADULT - ASSESSMENT
NIGHT HOSPITALIST:  Sickle cell crisis with total body pain with inadequate response to the single mg dose IV Dilaudid in the ER.  Concerned about septic arthritis will provide empiric IV Rocephin x 1 and IV vancomycin x 1 pending arthrocentesis  cultures.   Would consider formal rheumatologic evaluation in the AM.    Will resume patient's Hydrea as above.    Patient agrees to pharmacologic DVT prophylaxis.   Venous Duplex ordered.

## 2022-02-26 NOTE — ED PROVIDER NOTE - PROGRESS NOTE DETAILS
[place], PGY2: Synovial fluid negative for septic joint and appear to be more gout given crystals. However, given persistent pain, will admit to medicine

## 2022-02-26 NOTE — H&P ADULT - NSHPADDITIONALINFOADULT_GEN_ALL_CORE
NIGHT HOSPITALIST:    Patient/ spouse aware of course and agrees with plan/care as above.   The patient did not wish for the examiner to contact the spouse at this hour.  Emotional support provided to patient.  Care reviewed with covering NP/PA for endorsement to my physician colleagues.    Gerry Barajas MD  Available on [employer] Teams.

## 2022-02-26 NOTE — ED PROCEDURE NOTE - CPROC ED INDICATIONS1
Patient: Nubia Dominguez    Procedure(s):  LAPAROSCOPIC CHOLECYSTECTOMY    Diagnosis: Biliary dyskinesia [K82.8]  Diagnosis Additional Information: No value filed.    Anesthesia Type:   General     Note:  Airway :Blow-by and Face Mask  Patient transferred to:PACU  Handoff Report: Identifed the Patient, Identified the Reponsible Provider, Reviewed the pertinent medical history, Discussed the surgical course, Reviewed Intra-OP anesthesia mangement and issues during anesthesia, Set expectations for post-procedure period and Allowed opportunity for questions and acknowledgement of understanding      Vitals: (Last set prior to Anesthesia Care Transfer)    CRNA VITALS  1/11/2021 1330 - 1/11/2021 1405      1/11/2021             SpO2:  100 %                Electronically Signed By: KATARINA Patel CRNA  January 11, 2021  2:05 PM  
joint pain/joint swelling/synovial infection

## 2022-02-26 NOTE — H&P ADULT - NSHPSOCIALHISTORY_GEN_ALL_CORE
Quit remote tobacco years ago, 1-2 cigarettes.  Rare ethanol, negative CAGE screen.   Supportive spouse.   No recreational substance use.

## 2022-02-26 NOTE — CONSULT NOTE ADULT - SUBJECTIVE AND OBJECTIVE BOX
CHIEF COMPLAINT:    HISTORY OF PRESENT ILLNESS:  50 yo male h/o sickle cell disease on home dilaudid aundreaty admitted to ohs for worsening pain presenting with persistent pain. pt just d/c from outside hospital. today worsening pain to knees and joints similar to prior sickle cell flares. also reports pain to right knee and swelling. was reporteldy more swollen and has improved but still hurts. did fall at outside hospital but no recent falls. no fevers today. no sob or difficulty breathing. no back pain. no chest pain. no cough. no sick contacts  s/p arthrocentesis in ER   states to still have knee and leg pain   requesting around the clock dilaudid     PAST MEDICAL & SURGICAL HISTORY:  Sickle cell disease            MEDICATIONS:                  FAMILY HISTORY:      SOCIAL HISTORY:    [ ] Non-smoker  [ ] Smoker  [ ] Alcohol    Allergies    No Known Drug Allergies  shellfish (Short breath)    Intolerances    	    REVIEW OF SYSTEMS:  CONSTITUTIONAL: No fever, weight loss, + fatigue  EYES: No eye pain, visual disturbances, or discharge  ENMT:  No difficulty hearing, tinnitus, vertigo; No sinus or throat pain  NECK: No pain or stiffness  RESPIRATORY: No cough, wheezing, chills or hemoptysis; No Shortness of Breath  CARDIOVASCULAR: No chest pain, palpitations, passing out, dizziness, or leg swelling  GASTROINTESTINAL: No abdominal or epigastric pain. No nausea, vomiting, or hematemesis; No diarrhea or constipation. No melena or hematochezia.  GENITOURINARY: No dysuria, frequency, hematuria, or incontinence  NEUROLOGICAL: No headaches, memory loss, loss of strength, numbness, or tremors  SKIN: No itching, burning, rashes, or lesions   LYMPH Nodes: No enlarged glands  ENDOCRINE: No heat or cold intolerance; No hair loss  MUSCULOSKELETAL: + joint pain or swelling; No muscle, back, or extremity pain  PSYCHIATRIC: No depression, anxiety, mood swings, or difficulty sleeping  HEME/LYMPH: No easy bruising, or bleeding gums  ALLERY AND IMMUNOLOGIC: No hives or eczema	    [ ] All others negative	  [ ] Unable to obtain    PHYSICAL EXAM:  T(C): 36.8 (02-26-22 @ 19:35), Max: 37.2 (02-26-22 @ 15:21)  HR: 92 (02-26-22 @ 19:35) (85 - 92)  BP: 111/71 (02-26-22 @ 19:35) (111/71 - 143/86)  RR: 18 (02-26-22 @ 19:35) (18 - 20)  SpO2: 97% (02-26-22 @ 19:35) (97% - 99%)  Wt(kg): --  I&O's Summary      Appearance: Normal	  HEENT:   Normal oral mucosa, PERRL, EOMI	  Lymphatic: No lymphadenopathy  Cardiovascular: Normal S1 S2, No JVD, No murmurs, No edema  Respiratory: Lungs clear to auscultation	  Psychiatry: A & O x 3, Mood & affect appropriate  Gastrointestinal:  Soft, Non-tender, + BS	  Skin: No rashes, No ecchymoses, No cyanosis	  Neurologic: Non-focal  Extremities: R knee/leg edema, tender  Vascular: Peripheral pulses palpable 2+ bilaterally    TELEMETRY: 	    ECG:  	  RADIOLOGY: < from: Xray Knee 4 Views, Right (02.26.22 @ 14:54) >    ACC: [id number] EXAM:  XR KNEE COMP 4+ VIEWS RT                          PROCEDURE DATE:  02/26/2022          INTERPRETATION:  CLINICAL INFORMATION: Knee pain. History of sickle cell   disease.    TECHNIQUE: 4 views of the right knee.    COMPARISON: None.    FINDINGS:    Patchy sclerotic foci noted within the proximal tibia and distal femur,   compatible with areas of osteonecrosis in the setting of sickle cell   disease. No evidence of acute fracture or dislocation. The joint spaces   are preserved. No periarticular joint calcifications. No knee joint   effusion.      IMPRESSION: Areas of osteonecrosis in the distal femur and proximal   tibia. No acute fracture or dislocation.    --- End of Report ---          VIJAYA GONZALEZ MD; Resident Radiologist  This document has been electronically signed.  JOSEP JIMENEZ MD; Attending Radiologist  This document has been electronically signed. Feb 26 2022  6:57PM    < end of copied text >    OTHER: 	  	  LABS:	 	    CARDIAC MARKERS:                                  8.0    9.74  )-----------( 549      ( 26 Feb 2022 13:17 )             24.8     02-26    135  |  103  |  15  ----------------------------<  117<H>  3.8   |  21<L>  |  1.24    Ca    9.0      26 Feb 2022 13:17    TPro  7.9  /  Alb  3.5  /  TBili  0.2  /  DBili  <0.1  /  AST  21  /  ALT  20  /  AlkPhos  94  02-26    proBNP:   Lipid Profile:   HgA1c:   TSH:

## 2022-02-26 NOTE — ED PROVIDER NOTE - CARE PLAN
1 Principal Discharge DX:	Sickle cell pain crisis   Principal Discharge DX:	Sickle cell pain crisis  Secondary Diagnosis:	Knee effusion, right

## 2022-02-26 NOTE — ED ADULT NURSE REASSESSMENT NOTE - NS ED NURSE REASSESS COMMENT FT1
although pt states pain is only decreased from a 10 to a 9, he appears more comfortable,as he is no longer moaning constantly as he was at the time of his initial presentation.

## 2022-02-26 NOTE — ED PROVIDER NOTE - CLINICAL SUMMARY MEDICAL DECISION MAKING FREE TEXT BOX
52 yo male h/o sickle cell disease on home dilaudid recenlty admitted to ohs for worsening pain presenting with persistent pain particularly in the knee; concerns for vasoocclusive crisis vs septic joint  Plan: labs + xray +/- arthrocentesis 50 yo male h/o sickle cell disease on home dilaudid recenlty admitted to ohs for worsening pain presenting with persistent pain particularly in the knee; concerns for vasoocclusive crisis vs septic joint  Plan: labs + xray +/- arthrocentesis  Attending Pamela Arriaga: 50 yo male h/o sickle cell disease in the past presenting with knee pain, and joint discomfort similar to prior sickle cell episodes. upon arrival pt uncomfortable appearing. on exam pt with swelling and ttp to right knee with warmth. no fevers in the ed. concern for possible septic joint and arthrocentesis performed. pt did have a prior dvt to right leg in the past. [facility] no sob or chest pain. pt hemodynamically stable. given pain control. will need arthrocentesis to evlauate for infection and duplex. likely need admission for pain control

## 2022-02-26 NOTE — ED ADULT NURSE NOTE - OBJECTIVE STATEMENT
Pt presents with sickle cell pain which has been ongoing x 3 weeks,  He was released yesterday from another hospital for the same reason.  He also notes that his right knee is more painful, slightly swollen and warm to touch,  No redness noted at site. reports chills, but no temperature checked at home.

## 2022-02-26 NOTE — H&P ADULT - NSHPOUTPATIENTPROVIDERS_GEN_ALL_CORE
Brittany Veloz MD ([facility]) [phone] Brittany Veloz MD ([facility]-hematology) [phone]

## 2022-02-26 NOTE — PATIENT PROFILE ADULT - FALL HARM RISK - HARM RISK INTERVENTIONS

## 2022-02-26 NOTE — CONSULT NOTE ADULT - PROBLEM SELECTOR RECOMMENDATION 9
Leg/knee pain and swelling likely secondary to SC crisis   check LE venous US rule out DVT   Pain control   IV hydration   Orders per PMD

## 2022-02-26 NOTE — H&P ADULT - HISTORY OF PRESENT ILLNESS
NIGHT HOSPITALIST:   Patient UNKNOWN to me previously, assigned to me at this point via the ER to admit this  50 y/o m--patient followed by Dr. Brittany Veloz at [facility] Hematology--patient with a history of sickle cell disease, essential HTN (apparently not currently on Rx--previously on HCTZ and lisinopril?), with apparent recent discharge from [facility] following a one week hospitalization following a sickle cell crisis episode with diffuse body pain and severe RIGHT knee pain, apparently for about 3 weeks with no history of trauma--admitted at [facility] with no arthrocentesis performed at the time, with patient then was discharged yesterday following a delay in referral to rehab at [facility], with patient with severe episodes of RIGHT knee pain, swelling.  No fever, no chills, no rigors.  No cough, no dyspnoea.  NO diaphoresis.  Pain worsens with movement of joint. NIGHT HOSPITALIST:   Patient UNKNOWN to me previously, assigned to me at this point via the ER to admit this  52 y/o m--patient followed by Dr. Brittany Veloz at [facility] Hematology--patient with a history of sickle cell disease, essential HTN (apparently not currently on Rx--previously on HCTZ and lisinopril?), with apparent recent discharge from [facility] following a one week hospitalization following a sickle cell crisis episode with diffuse body pain and severe RIGHT knee pain, apparently for about 3 weeks with no history of trauma--admitted at [facility] with no arthrocentesis performed at the time, with patient then was discharged yesterday following a delay in referral to rehab at [facility], with patient with severe episodes of RIGHT knee pain, swelling.  No fever, no chills, no rigors.  No cough, no dyspnoea.  NO diaphoresis.  Pain worsens with movement of joint.    Patient reports receiving the Moderna COVID-19 vaccine x 3.

## 2022-02-26 NOTE — H&P ADULT - NSHPSOURCEINFOTX_GEN_ALL_CORE
Reviewed Medex with patient via patient recall.   Spouse aware of admission and the patient did not wish for the examiner to contact his spouse at this hour. Reviewed Medex with patient via patient recall.   Spouse aware of admission and the patient did not wish for the examiner to contact his spouse at this hour.  [place] I Stop # [id number] in the chart.

## 2022-02-26 NOTE — H&P ADULT - REASON FOR ADMISSION
S/P discharge from [facility] with 3 weeks of RIGHT knee pain, sickle cell crisis

## 2022-02-26 NOTE — ED ADULT NURSE NOTE - NSIMPLEMENTINTERV_GEN_ALL_ED
Implemented All Fall with Harm Risk Interventions:  [place] to call system. Call bell, personal items and telephone within reach. Instruct patient to call for assistance. Room bathroom lighting operational. Non-slip footwear when patient is off stretcher. Physically safe environment: no spills, clutter or unnecessary equipment. Stretcher in lowest position, wheels locked, appropriate side rails in place. Provide visual cue, wrist band, yellow gown, etc. Monitor gait and stability. Monitor for mental status changes and reorient to person, place, and time. Review medications for side effects contributing to fall risk. Reinforce activity limits and safety measures with patient and family. Provide visual clues: red socks.

## 2022-02-26 NOTE — ED PROVIDER NOTE - PHYSICAL EXAMINATION
Attending Pamela Arriaga: Gen: NAD, heent: atrauamtic, eomi, perrla, mmm, op pink, uvula midline, neck; nttp, no nuchal rigidity, chest: nttp, no crepitus, cv: rrr, no murmurs, lungs: ctab, abd: soft, nontender, nondistended, no peritoneal signs, no guarding, ext: wwp, right knee swelling with mild warmth, painful to range. no erythema skin: no rash, neuro: awake and alert, following commands, speech clear, sensation and strength intact, no focal deficits

## 2022-02-27 LAB
ANION GAP SERPL CALC-SCNC: 12 MMOL/L — SIGNIFICANT CHANGE UP (ref 5–17)
BUN SERPL-MCNC: 11 MG/DL — SIGNIFICANT CHANGE UP (ref 7–23)
CALCIUM SERPL-MCNC: 8.6 MG/DL — SIGNIFICANT CHANGE UP (ref 8.4–10.5)
CHLORIDE SERPL-SCNC: 105 MMOL/L — SIGNIFICANT CHANGE UP (ref 96–108)
CO2 SERPL-SCNC: 18 MMOL/L — LOW (ref 22–31)
CREAT SERPL-MCNC: 1.12 MG/DL — SIGNIFICANT CHANGE UP (ref 0.5–1.3)
GLUCOSE SERPL-MCNC: 93 MG/DL — SIGNIFICANT CHANGE UP (ref 70–99)
HCT VFR BLD CALC: 25.3 % — LOW (ref 39–50)
HGB BLD-MCNC: 8.2 G/DL — LOW (ref 13–17)
LDH SERPL L TO P-CCNC: 360 U/L — HIGH (ref 50–242)
MCHC RBC-ENTMCNC: 32.4 GM/DL — SIGNIFICANT CHANGE UP (ref 32–36)
MCHC RBC-ENTMCNC: 32.7 PG — SIGNIFICANT CHANGE UP (ref 27–34)
MCV RBC AUTO: 100.8 FL — HIGH (ref 80–100)
NRBC # BLD: 0 /100 WBCS — SIGNIFICANT CHANGE UP (ref 0–0)
PLATELET # BLD AUTO: 473 K/UL — HIGH (ref 150–400)
POTASSIUM SERPL-MCNC: 5.5 MMOL/L — HIGH (ref 3.5–5.3)
POTASSIUM SERPL-SCNC: 5.5 MMOL/L — HIGH (ref 3.5–5.3)
RBC # BLD: 2.51 M/UL — LOW (ref 4.2–5.8)
RBC # FLD: 19.1 % — HIGH (ref 10.3–14.5)
SODIUM SERPL-SCNC: 135 MMOL/L — SIGNIFICANT CHANGE UP (ref 135–145)
WBC # BLD: 7.6 K/UL — SIGNIFICANT CHANGE UP (ref 3.8–10.5)
WBC # FLD AUTO: 7.6 K/UL — SIGNIFICANT CHANGE UP (ref 3.8–10.5)

## 2022-02-27 PROCEDURE — 99233 SBSQ HOSP IP/OBS HIGH 50: CPT

## 2022-02-27 PROCEDURE — 93971 EXTREMITY STUDY: CPT | Mod: 26,RT

## 2022-02-27 RX ORDER — HYDROMORPHONE HYDROCHLORIDE 2 MG/ML
30 INJECTION INTRAMUSCULAR; INTRAVENOUS; SUBCUTANEOUS
Refills: 0 | Status: DISCONTINUED | OUTPATIENT
Start: 2022-02-27 | End: 2022-02-28

## 2022-02-27 RX ORDER — ENOXAPARIN SODIUM 100 MG/ML
100 INJECTION SUBCUTANEOUS
Refills: 0 | Status: DISCONTINUED | OUTPATIENT
Start: 2022-02-27 | End: 2022-02-28

## 2022-02-27 RX ADMIN — HYDROMORPHONE HYDROCHLORIDE 1 MILLIGRAM(S): 2 INJECTION INTRAMUSCULAR; INTRAVENOUS; SUBCUTANEOUS at 00:23

## 2022-02-27 RX ADMIN — HYDROMORPHONE HYDROCHLORIDE 30 MILLILITER(S): 2 INJECTION INTRAMUSCULAR; INTRAVENOUS; SUBCUTANEOUS at 19:12

## 2022-02-27 RX ADMIN — Medication 1 TABLET(S): at 12:23

## 2022-02-27 RX ADMIN — HYDROMORPHONE HYDROCHLORIDE 1 MILLIGRAM(S): 2 INJECTION INTRAMUSCULAR; INTRAVENOUS; SUBCUTANEOUS at 14:20

## 2022-02-27 RX ADMIN — HYDROMORPHONE HYDROCHLORIDE 2 MILLIGRAM(S): 2 INJECTION INTRAMUSCULAR; INTRAVENOUS; SUBCUTANEOUS at 07:53

## 2022-02-27 RX ADMIN — HYDROMORPHONE HYDROCHLORIDE 2 MILLIGRAM(S): 2 INJECTION INTRAMUSCULAR; INTRAVENOUS; SUBCUTANEOUS at 12:25

## 2022-02-27 RX ADMIN — ENOXAPARIN SODIUM 40 MILLIGRAM(S): 100 INJECTION SUBCUTANEOUS at 12:24

## 2022-02-27 RX ADMIN — Medication 1 MILLIGRAM(S): at 12:24

## 2022-02-27 RX ADMIN — HYDROMORPHONE HYDROCHLORIDE 2 MILLIGRAM(S): 2 INJECTION INTRAMUSCULAR; INTRAVENOUS; SUBCUTANEOUS at 02:31

## 2022-02-27 RX ADMIN — HYDROXYUREA 1500 MILLIGRAM(S): 500 CAPSULE ORAL at 12:24

## 2022-02-27 RX ADMIN — ENOXAPARIN SODIUM 100 MILLIGRAM(S): 100 INJECTION SUBCUTANEOUS at 18:43

## 2022-02-27 RX ADMIN — HYDROMORPHONE HYDROCHLORIDE 2 MILLIGRAM(S): 2 INJECTION INTRAMUSCULAR; INTRAVENOUS; SUBCUTANEOUS at 02:46

## 2022-02-27 RX ADMIN — HYDROMORPHONE HYDROCHLORIDE 30 MILLILITER(S): 2 INJECTION INTRAMUSCULAR; INTRAVENOUS; SUBCUTANEOUS at 16:20

## 2022-02-27 RX ADMIN — HYDROMORPHONE HYDROCHLORIDE 1 MILLIGRAM(S): 2 INJECTION INTRAMUSCULAR; INTRAVENOUS; SUBCUTANEOUS at 10:12

## 2022-02-27 RX ADMIN — HYDROMORPHONE HYDROCHLORIDE 1 MILLIGRAM(S): 2 INJECTION INTRAMUSCULAR; INTRAVENOUS; SUBCUTANEOUS at 05:08

## 2022-02-27 RX ADMIN — HYDROMORPHONE HYDROCHLORIDE 1 MILLIGRAM(S): 2 INJECTION INTRAMUSCULAR; INTRAVENOUS; SUBCUTANEOUS at 05:23

## 2022-02-27 RX ADMIN — HYDROMORPHONE HYDROCHLORIDE 1 MILLIGRAM(S): 2 INJECTION INTRAMUSCULAR; INTRAVENOUS; SUBCUTANEOUS at 00:08

## 2022-02-27 RX ADMIN — SODIUM CHLORIDE 75 MILLILITER(S): 9 INJECTION, SOLUTION INTRAVENOUS at 00:10

## 2022-02-27 NOTE — CONSULT NOTE ADULT - ASSESSMENT
52 y/o m--patient -patient with a history of sickle cell disease, essential HTN presenting with knee pain       Right knee pain   - swollen knee   - was tapped yesterday   - would recommend rheum and ortho consult       Sickle cell disease   - pt with SS disease   - on hydrea   - follows up with hematology at [facility]   - hg 8.2   - bili normal   - doesnt appear to be in sickle crisis   - cont home regimen of pain med  - if not helping PP consult for pain management   - cont to follow up with his Hematologist outpt         Maicol Patrick MD  HematologyOncology   O: [phone]

## 2022-02-27 NOTE — PROGRESS NOTE ADULT - SUBJECTIVE AND OBJECTIVE BOX
Patient is a 51y old  Male who presents with a chief complaint of S/P discharge from [facility] with 3 weeks of RIGHT knee pain, sickle cell crisis (27 Feb 2022 08:38)      SUBJECTIVE / OVERNIGHT EVENTS:  Reports ongoing pain in the knees and ankles.  Noted to have RLE DVT.    MEDICATIONS  (STANDING):  enoxaparin Injectable 40 milliGRAM(s) SubCutaneous daily  folic acid 1 milliGRAM(s) Oral daily  hydroxyurea 1500 milliGRAM(s) Oral daily  multivitamin 1 Tablet(s) Oral daily  sodium chloride 0.45%. 1000 milliLiter(s) (75 mL/Hr) IV Continuous <Continuous>    MEDICATIONS  (PRN):  acetaminophen     Tablet .. 650 milliGRAM(s) Oral every 6 hours PRN Temp greater or equal to 38C (100.4F), Mild Pain (1 - 3)  HYDROmorphone  Injectable 2 milliGRAM(s) IV Push every 4 hours PRN Severe Pain (7 - 10)  HYDROmorphone  Injectable 1 milliGRAM(s) IV Push every 2 hours PRN Severe Pain (7 - 10)      CAPILLARY BLOOD GLUCOSE        I&O's Summary    26 Feb 2022 07:01  -  27 Feb 2022 07:00  --------------------------------------------------------  IN: 915 mL / OUT: 0 mL / NET: 915 mL        PHYSICAL EXAM:  Vital Signs Last 24 Hrs  T(C): 37.1 (27 Feb 2022 04:00), Max: 37.2 (26 Feb 2022 15:21)  T(F): 98.8 (27 Feb 2022 04:00), Max: 98.9 (26 Feb 2022 15:21)  HR: 97 (27 Feb 2022 04:00) (90 - 97)  BP: 132/75 (27 Feb 2022 04:00) (111/71 - 143/86)  BP(mean): --  RR: 18 (27 Feb 2022 04:00) (18 - 20)  SpO2: 96% (27 Feb 2022 04:00) (96% - 99%)  GENERAL: NAD, well-developed  HEAD:  Atraumatic, Normocephalic  EYES: EOMI, PERRLA, conjunctiva and sclera clear  NECK: Supple, No JVD  CHEST/LUNG: Clear to auscultation bilaterally; No wheeze  HEART: Regular rate and rhythm; No murmurs, rubs, or gallops  ABDOMEN: Soft, Nontender, Nondistended; Bowel sounds present  EXTREMITIES: R knee swollen. RLE edema.   PSYCH: AAOx3  NEUROLOGY: non-focal  SKIN: No rashes or lesions    LABS:                        8.2    7.60  )-----------( 473      ( 27 Feb 2022 07:45 )             25.3     02-27    135  |  105  |  11  ----------------------------<  93  5.5<H>   |  18<L>  |  1.12    Ca    8.6      27 Feb 2022 08:13    TPro  7.9  /  Alb  3.5  /  TBili  0.2  /  DBili  <0.1  /  AST  21  /  ALT  20  /  AlkPhos  94  02-26    PT/INR - ( 26 Feb 2022 13:17 )   PT: 14.1 sec;   INR: 1.17 ratio         PTT - ( 26 Feb 2022 13:17 )  PTT:31.7 sec          RADIOLOGY & ADDITIONAL TESTS:    Imaging Personally Reviewed:    Consultant(s) Notes Reviewed:      Care Discussed with Consultants/Other Providers:

## 2022-02-27 NOTE — PROGRESS NOTE ADULT - PROBLEM SELECTOR PLAN 1
- Patient is s/p arthrocentesis in the ED.   - Antibiotics given in the ED.  - No overt signs of septic knee.   - No leukocytosis or fever.  - Awaiting cultures and studies.  - Of note, patient also noted to have a RLE DVT.  - Rheumatology eval once studies are back.

## 2022-02-27 NOTE — PROGRESS NOTE ADULT - PROBLEM SELECTOR PLAN 1
Leg/knee pain and swelling likely secondary to SC crisis     - 2/26 s/p arthrocentesis R knee  pending LE venous US rule out DVT   Pain control   IV hydration   Orders per PMD

## 2022-02-27 NOTE — CONSULT NOTE ADULT - SUBJECTIVE AND OBJECTIVE BOX
Reason for consult: sickle cell     HPI:    52 y/o m--patient followed by Dr. Brittany Veloz at [facility]--patient with a history of sickle cell disease, essential HTN (apparently not currently on Rx--previously on HCTZ and lisinopril?), with apparent recent discharge from [facility] following a one week hospitalization following a sickle cell crisis episode with diffuse body pain and severe RIGHT knee pain, apparently for about 3 weeks with no history of trauma--admitted at [facility] with no arthrocentesis performed at the time, with patient then was discharged yesterday following a delay in referral to rehab at [facility], with patient with severe episodes of RIGHT knee pain, swelling.  No fever, no chills, no rigors.  No cough, no dyspnoea.  NO diaphoresis.  Pain worsens with movement of joint.    Patient reports receiving the Moderna COVID-19 vaccine x 3. (26 Feb 2022 21:50)    Seen at bedside,. complaing of knee pain   PAST MEDICAL & SURGICAL HISTORY:  Sickle cell disease    Right knee pain    History of cholecystectomy        FAMILY HISTORY:  Family history of essential hypertension    Family history of sickle cell trait        Alochol: Denied  Smoking: Nonsmoker  Drug Use: Denied  Marital Status:         Allergies    No Known Drug Allergies  shellfish (Short breath)    Intolerances        MEDICATIONS  (STANDING):  enoxaparin Injectable 40 milliGRAM(s) SubCutaneous daily  folic acid 1 milliGRAM(s) Oral daily  HYDROmorphone PCA (1 mG/mL) 30 milliLiter(s) PCA Continuous PCA Continuous  hydroxyurea 1500 milliGRAM(s) Oral daily  multivitamin 1 Tablet(s) Oral daily  sodium chloride 0.45%. 1000 milliLiter(s) (75 mL/Hr) IV Continuous <Continuous>    MEDICATIONS  (PRN):  acetaminophen     Tablet .. 650 milliGRAM(s) Oral every 6 hours PRN Temp greater or equal to 38C (100.4F), Mild Pain (1 - 3)  HYDROmorphone  Injectable 2 milliGRAM(s) IV Push every 4 hours PRN Severe Pain (7 - 10)  HYDROmorphone  Injectable 1 milliGRAM(s) IV Push every 2 hours PRN Severe Pain (7 - 10)      ROS:     General:  No wt loss, fevers, chills, night sweats, fatigue,   Eyes:  Good vision, no reported pain  ENT:  No sore throat, pain, runny nose, dysphagia  CV:  No pain, palpitations, hypo/hypertension  Resp:  No dyspnea, cough, tachypnea, wheezing  Muscle:  knee pain   Neuro:  No weakness, tingling, memory problems  Psych:  No fatigue, insomnia, mood problems, depression  Endocrine:  No polyuria, polydipsia, cold/heat intolerance  Heme:  No petechiae, ecchymosis, easy bruisability  Skin:  No rash, tattoos, scars, edema      PHYSICAL EXAM:     GENERAL:  Appears stated age, well-groomed  HEENT:  NC/AT,  conjunctivae clear and pink  CHEST: CTA   HEART:  s1s2+   ABDOMEN:  Soft, non-tender, non-distended  EXTEREMITIES:  + right knee swellling   SKIN:  No rash/erythema/ecchymoses  NEURO:  Alert, oriented, no asterixis  LN: no palpable Lymphadenopathy                           8.2    7.60  )-----------( 473      ( 27 Feb 2022 07:45 )             25.3       02-27    135  |  105  |  11  ----------------------------<  93  5.5<H>   |  18<L>  |  1.12    Ca    8.6      27 Feb 2022 08:13    TPro  7.9  /  Alb  3.5  /  TBili  0.2  /  DBili  <0.1  /  AST  21  /  ALT  20  /  AlkPhos  94  02-26

## 2022-02-27 NOTE — CHART NOTE - NSCHARTNOTEFT_GEN_A_CORE
51 M with SS disease, HTN, presented with SS crisis, right knee swelling, s/p arthrocentesis in the ED.        < from: [facility] Duplex Lower Ext Vein Scan, Right (02.27.22 @ 10:36) >    IMPRESSION:  Partially compressible mid and distal right femoral vein and popliteal   vein consistent with deep venous thrombosis; thrombus is of indeterminate   age, however likely subacute or chronic.    Findings were discussed with Dr. CHIRAG Alonso 2/27/2022 10:46 AM by   Ultrasound Technician with read back confirmation.    --- End of Report ---      < end of copied text >    D/w Dr. Merida     Plan   Lovenox 100 mg BID Subq    will continue to monitor  will endorse to night team    Krystle Alonso NP

## 2022-02-27 NOTE — PROGRESS NOTE ADULT - PROBLEM SELECTOR PLAN 2
- Full dose Lovenox given RLE DVT.  - As per patient, he had a diagnosed DVT over a year ago in the same location, and was on Eliquis for 1 year. There was no repeat studies done after discontinuation of AC, until this admission.  - Likely a chronic DVT that never resolved.

## 2022-02-27 NOTE — PROGRESS NOTE ADULT - ASSESSMENT
51 M with SS disease, HTN, presented with SS crisis, right knee swelling, s/p arthrocentesis in the ED.

## 2022-02-27 NOTE — PROGRESS NOTE ADULT - PROBLEM SELECTOR PLAN 3
- Currently on Dilaudid 1 and 2mg IV prn.  - Will initiate Dilaudid PCA while inpatient.   - Continue fluids.   - Continue with MVI and Folic Acid.   - Once cultures return, will restart Hydrea.

## 2022-02-27 NOTE — PROGRESS NOTE ADULT - SUBJECTIVE AND OBJECTIVE BOX
Subjective: Patient seen and examined. No new events except as noted.   Complaining of joint pain all over still.  Has not been able to get out of bed because of the pain, but does get relief with pain meds.     REVIEW OF SYSTEMS:    CONSTITUTIONAL: + weakness, fevers or chills  EYES/ENT: No visual changes;  No vertigo or throat pain   NECK: No pain or stiffness  RESPIRATORY: No cough, wheezing, hemoptysis; No shortness of breath  CARDIOVASCULAR: No chest pain or palpitations  GASTROINTESTINAL: No abdominal or epigastric pain. No nausea, vomiting, or hematemesis; No diarrhea or constipation. No melena or hematochezia.  GENITOURINARY: No dysuria, frequency or hematuria  NEUROLOGICAL: No numbness or weakness  SKIN: No itching, burning, rashes, or lesions   All other review of systems is negative unless indicated above.    MEDICATIONS:  MEDICATIONS  (STANDING):  enoxaparin Injectable 40 milliGRAM(s) SubCutaneous daily  folic acid 1 milliGRAM(s) Oral daily  hydroxyurea 1500 milliGRAM(s) Oral daily  multivitamin 1 Tablet(s) Oral daily  sodium chloride 0.45%. 1000 milliLiter(s) (75 mL/Hr) IV Continuous <Continuous>      PHYSICAL EXAM:  T(C): 37.1 (02-27-22 @ 04:00), Max: 37.2 (02-26-22 @ 15:21)  HR: 97 (02-27-22 @ 04:00) (85 - 97)  BP: 132/75 (02-27-22 @ 04:00) (111/71 - 143/86)  RR: 18 (02-27-22 @ 04:00) (18 - 20)  SpO2: 96% (02-27-22 @ 04:00) (96% - 99%)  Wt(kg): --  I&O's Summary    26 Feb 2022 07:01  -  27 Feb 2022 07:00  --------------------------------------------------------  IN: 915 mL / OUT: 0 mL / NET: 915 mL      Height (cm): 180.3 (02-26 @ 19:35)  Weight (kg): 102.3 (02-26 @ 19:35)  BMI (kg/m2): 31.5 (02-26 @ 19:35)  BSA (m2): 2.22 (02-26 @ 19:35)    Appearance: NAD	  HEENT: dry oral mucosa, PERRL, EOMI	  Lymphatic: No lymphadenopathy , no edema  Cardiovascular: Normal S1 S2, No JVD, No murmurs , Peripheral pulses palpable 2+ bilaterally  Respiratory: Lungs clear to auscultation, normal effort 	  Gastrointestinal:  Soft, Non-tender, + BS	  Skin: No rashes, No ecchymoses, No cyanosis, warm to touch  Musculoskeletal: Normal range of motion, normal strength  Psychiatry:  Mood & affect appropriate  Ext: Joint pain all over.      LABS:    CARDIAC MARKERS:                        8.2    7.60  )-----------( 473      ( 27 Feb 2022 07:45 )             25.3     02-26    135  |  103  |  15  ----------------------------<  117<H>  3.8   |  21<L>  |  1.24    Ca    9.0      26 Feb 2022 13:17    TPro  7.9  /  Alb  3.5  /  TBili  0.2  /  DBili  <0.1  /  AST  21  /  ALT  20  /  AlkPhos  94  02-26    proBNP:   Lipid Profile:   HgA1c:   TSH:     TELEMETRY: 	    ECG:  	  RADIOLOGY:   DIAGNOSTIC TESTING:  [ ] Echocardiogram:  [ ]  Catheterization:  [ ] Stress Test:    OTHER:

## 2022-02-28 DIAGNOSIS — I82.409 ACUTE EMBOLISM AND THROMBOSIS OF UNSPECIFIED DEEP VEINS OF UNSPECIFIED LOWER EXTREMITY: ICD-10-CM

## 2022-02-28 LAB
ANION GAP SERPL CALC-SCNC: 10 MMOL/L — SIGNIFICANT CHANGE UP (ref 5–17)
B BURGDOR C6 AB SER-ACNC: NEGATIVE — SIGNIFICANT CHANGE UP
B BURGDOR IGG+IGM SER-ACNC: 0.04 INDEX — SIGNIFICANT CHANGE UP (ref 0.01–0.89)
BASOPHILS # BLD AUTO: 0.08 K/UL — SIGNIFICANT CHANGE UP (ref 0–0.2)
BASOPHILS NFR BLD AUTO: 0.9 % — SIGNIFICANT CHANGE UP (ref 0–2)
BUN SERPL-MCNC: 9 MG/DL — SIGNIFICANT CHANGE UP (ref 7–23)
CALCIUM SERPL-MCNC: 8.8 MG/DL — SIGNIFICANT CHANGE UP (ref 8.4–10.5)
CHLORIDE SERPL-SCNC: 104 MMOL/L — SIGNIFICANT CHANGE UP (ref 96–108)
CO2 SERPL-SCNC: 19 MMOL/L — LOW (ref 22–31)
CREAT SERPL-MCNC: 1.17 MG/DL — SIGNIFICANT CHANGE UP (ref 0.5–1.3)
EGFR: 75 ML/MIN/1.73M2 — SIGNIFICANT CHANGE UP
EOSINOPHIL # BLD AUTO: 0.08 K/UL — SIGNIFICANT CHANGE UP (ref 0–0.5)
EOSINOPHIL NFR BLD AUTO: 0.9 % — SIGNIFICANT CHANGE UP (ref 0–6)
GLUCOSE SERPL-MCNC: 142 MG/DL — HIGH (ref 70–99)
HCT VFR BLD CALC: 21.8 % — LOW (ref 39–50)
HCT VFR BLD CALC: 23.6 % — LOW (ref 39–50)
HGB BLD-MCNC: 6.9 G/DL — CRITICAL LOW (ref 13–17)
HGB BLD-MCNC: 7.6 G/DL — LOW (ref 13–17)
LYMPHOCYTES # BLD AUTO: 0.97 K/UL — LOW (ref 1–3.3)
LYMPHOCYTES # BLD AUTO: 11.3 % — LOW (ref 13–44)
MCHC RBC-ENTMCNC: 31.7 GM/DL — LOW (ref 32–36)
MCHC RBC-ENTMCNC: 32.2 GM/DL — SIGNIFICANT CHANGE UP (ref 32–36)
MCHC RBC-ENTMCNC: 32.4 PG — SIGNIFICANT CHANGE UP (ref 27–34)
MCHC RBC-ENTMCNC: 32.5 PG — SIGNIFICANT CHANGE UP (ref 27–34)
MCV RBC AUTO: 100.9 FL — HIGH (ref 80–100)
MCV RBC AUTO: 102.3 FL — HIGH (ref 80–100)
MONOCYTES # BLD AUTO: 0.97 K/UL — HIGH (ref 0–0.9)
MONOCYTES NFR BLD AUTO: 11.3 % — SIGNIFICANT CHANGE UP (ref 2–14)
NEUTROPHILS # BLD AUTO: 6.51 K/UL — SIGNIFICANT CHANGE UP (ref 1.8–7.4)
NEUTROPHILS NFR BLD AUTO: 75.6 % — SIGNIFICANT CHANGE UP (ref 43–77)
NRBC # BLD: 0 /100 WBCS — SIGNIFICANT CHANGE UP (ref 0–0)
PLATELET # BLD AUTO: 421 K/UL — HIGH (ref 150–400)
PLATELET # BLD AUTO: 540 K/UL — HIGH (ref 150–400)
POTASSIUM SERPL-MCNC: 4.5 MMOL/L — SIGNIFICANT CHANGE UP (ref 3.5–5.3)
POTASSIUM SERPL-SCNC: 4.5 MMOL/L — SIGNIFICANT CHANGE UP (ref 3.5–5.3)
RBC # BLD: 2.13 M/UL — LOW (ref 4.2–5.8)
RBC # BLD: 2.13 M/UL — LOW (ref 4.2–5.8)
RBC # BLD: 2.34 M/UL — LOW (ref 4.2–5.8)
RBC # FLD: 19.2 % — HIGH (ref 10.3–14.5)
RBC # FLD: 19.3 % — HIGH (ref 10.3–14.5)
RETICS #: 91.2 K/UL — SIGNIFICANT CHANGE UP (ref 25–125)
RETICS/RBC NFR: 4.3 % — HIGH (ref 0.5–2.5)
SODIUM SERPL-SCNC: 133 MMOL/L — LOW (ref 135–145)
WBC # BLD: 8.61 K/UL — SIGNIFICANT CHANGE UP (ref 3.8–10.5)
WBC # BLD: 9.68 K/UL — SIGNIFICANT CHANGE UP (ref 3.8–10.5)
WBC # FLD AUTO: 8.61 K/UL — SIGNIFICANT CHANGE UP (ref 3.8–10.5)
WBC # FLD AUTO: 9.68 K/UL — SIGNIFICANT CHANGE UP (ref 3.8–10.5)

## 2022-02-28 PROCEDURE — 99254 IP/OBS CNSLTJ NEW/EST MOD 60: CPT

## 2022-02-28 PROCEDURE — 99233 SBSQ HOSP IP/OBS HIGH 50: CPT

## 2022-02-28 RX ORDER — SENNA PLUS 8.6 MG/1
2 TABLET ORAL AT BEDTIME
Refills: 0 | Status: DISCONTINUED | OUTPATIENT
Start: 2022-02-28 | End: 2022-03-09

## 2022-02-28 RX ORDER — PANTOPRAZOLE SODIUM 20 MG/1
40 TABLET, DELAYED RELEASE ORAL
Refills: 0 | Status: DISCONTINUED | OUTPATIENT
Start: 2022-02-28 | End: 2022-03-09

## 2022-02-28 RX ORDER — HYDROMORPHONE HYDROCHLORIDE 2 MG/ML
30 INJECTION INTRAMUSCULAR; INTRAVENOUS; SUBCUTANEOUS
Refills: 0 | Status: DISCONTINUED | OUTPATIENT
Start: 2022-02-28 | End: 2022-03-07

## 2022-02-28 RX ORDER — APIXABAN 2.5 MG/1
5 TABLET, FILM COATED ORAL EVERY 12 HOURS
Refills: 0 | Status: DISCONTINUED | OUTPATIENT
Start: 2022-02-28 | End: 2022-03-09

## 2022-02-28 RX ORDER — HYDROMORPHONE HYDROCHLORIDE 2 MG/ML
1 INJECTION INTRAMUSCULAR; INTRAVENOUS; SUBCUTANEOUS ONCE
Refills: 0 | Status: DISCONTINUED | OUTPATIENT
Start: 2022-02-28 | End: 2022-02-28

## 2022-02-28 RX ORDER — POLYETHYLENE GLYCOL 3350 17 G/17G
17 POWDER, FOR SOLUTION ORAL DAILY
Refills: 0 | Status: DISCONTINUED | OUTPATIENT
Start: 2022-02-28 | End: 2022-03-09

## 2022-02-28 RX ADMIN — Medication 20 MILLIGRAM(S): at 18:31

## 2022-02-28 RX ADMIN — HYDROMORPHONE HYDROCHLORIDE 30 MILLILITER(S): 2 INJECTION INTRAMUSCULAR; INTRAVENOUS; SUBCUTANEOUS at 19:23

## 2022-02-28 RX ADMIN — HYDROMORPHONE HYDROCHLORIDE 1 MILLIGRAM(S): 2 INJECTION INTRAMUSCULAR; INTRAVENOUS; SUBCUTANEOUS at 11:44

## 2022-02-28 RX ADMIN — Medication 1 MILLIGRAM(S): at 11:41

## 2022-02-28 RX ADMIN — HYDROXYUREA 1500 MILLIGRAM(S): 500 CAPSULE ORAL at 11:42

## 2022-02-28 RX ADMIN — ENOXAPARIN SODIUM 100 MILLIGRAM(S): 100 INJECTION SUBCUTANEOUS at 06:29

## 2022-02-28 RX ADMIN — HYDROMORPHONE HYDROCHLORIDE 30 MILLILITER(S): 2 INJECTION INTRAMUSCULAR; INTRAVENOUS; SUBCUTANEOUS at 07:15

## 2022-02-28 RX ADMIN — SENNA PLUS 2 TABLET(S): 8.6 TABLET ORAL at 21:10

## 2022-02-28 RX ADMIN — HYDROMORPHONE HYDROCHLORIDE 30 MILLILITER(S): 2 INJECTION INTRAMUSCULAR; INTRAVENOUS; SUBCUTANEOUS at 12:47

## 2022-02-28 RX ADMIN — SODIUM CHLORIDE 75 MILLILITER(S): 9 INJECTION, SOLUTION INTRAVENOUS at 07:24

## 2022-02-28 RX ADMIN — POLYETHYLENE GLYCOL 3350 17 GRAM(S): 17 POWDER, FOR SOLUTION ORAL at 11:41

## 2022-02-28 RX ADMIN — HYDROMORPHONE HYDROCHLORIDE 1 MILLIGRAM(S): 2 INJECTION INTRAMUSCULAR; INTRAVENOUS; SUBCUTANEOUS at 11:04

## 2022-02-28 RX ADMIN — Medication 1 TABLET(S): at 11:41

## 2022-02-28 RX ADMIN — PANTOPRAZOLE SODIUM 40 MILLIGRAM(S): 20 TABLET, DELAYED RELEASE ORAL at 18:31

## 2022-02-28 RX ADMIN — APIXABAN 5 MILLIGRAM(S): 2.5 TABLET, FILM COATED ORAL at 17:32

## 2022-02-28 RX ADMIN — HYDROMORPHONE HYDROCHLORIDE 30 MILLILITER(S): 2 INJECTION INTRAMUSCULAR; INTRAVENOUS; SUBCUTANEOUS at 10:43

## 2022-02-28 NOTE — PROGRESS NOTE ADULT - PROBLEM SELECTOR PLAN 1
Leg/knee pain and swelling likely secondary to SC crisis     - 2/26 s/p arthrocentesis R knee  2/27 Right Lower Extremity Doppler - partially compressible mid and distal right fem vein and popliteal vein consistent with DVT; indeterminate age, likely subacute/chronic.    - Eliquis 5mg PO q12hrs  Pain control - PCA pump  IV hydration   Orders per PMD

## 2022-02-28 NOTE — PROGRESS NOTE ADULT - SUBJECTIVE AND OBJECTIVE BOX
Subjective: Patient seen and examined. No new events except as noted.   Complaining of knee pain.  Says PCA pump does not help, only IVP.    REVIEW OF SYSTEMS:    CONSTITUTIONAL: + weakness, fevers or chills  EYES/ENT: No visual changes;  No vertigo or throat pain   NECK: No pain or stiffness  RESPIRATORY: No cough, wheezing, hemoptysis; No shortness of breath  CARDIOVASCULAR: No chest pain or palpitations  GASTROINTESTINAL: No abdominal or epigastric pain. No nausea, vomiting, or hematemesis; No diarrhea or constipation. No melena or hematochezia.  GENITOURINARY: No dysuria, frequency or hematuria  NEUROLOGICAL: No numbness or weakness  SKIN: No itching, burning, rashes, or lesions   All other review of systems is negative unless indicated above.    MEDICATIONS:  MEDICATIONS  (STANDING):  apixaban 5 milliGRAM(s) Oral every 12 hours  folic acid 1 milliGRAM(s) Oral daily  HYDROmorphone PCA (1 mG/mL) 30 milliLiter(s) PCA Continuous PCA Continuous  hydroxyurea 1500 milliGRAM(s) Oral daily  multivitamin 1 Tablet(s) Oral daily  polyethylene glycol 3350 17 Gram(s) Oral daily  senna 2 Tablet(s) Oral at bedtime  sodium chloride 0.45%. 1000 milliLiter(s) (75 mL/Hr) IV Continuous <Continuous>      PHYSICAL EXAM:  T(C): 37.3 (02-28-22 @ 03:56), Max: 37.4 (02-27-22 @ 13:56)  HR: 97 (02-28-22 @ 03:56) (93 - 100)  BP: 167/72 (02-28-22 @ 03:56) (139/74 - 167/72)  RR: 18 (02-28-22 @ 03:56) (18 - 18)  SpO2: 97% (02-28-22 @ 03:56) (95% - 97%)  Wt(kg): --  I&O's Summary    27 Feb 2022 07:01  -  28 Feb 2022 07:00  --------------------------------------------------------  IN: 1080 mL / OUT: 2550 mL / NET: -1470 mL      Appearance: NAD  HEENT: Normal oral mucosa, PERRL, EOMI	  Lymphatic: No lymphadenopathy , no edema  Cardiovascular: Normal S1 S2, No JVD, No murmurs , Peripheral pulses palpable 2+ bilaterally  Respiratory: Lungs clear to auscultation, normal effort 	  Gastrointestinal:  Soft, Non-tender, + BS	  Skin: No rashes, No ecchymoses, No cyanosis, warm to touch  Musculoskeletal: decreased ROM/strength due to pain  Psychiatry:  Mood & affect appropriate  Ext: No edema      LABS:    CARDIAC MARKERS:                          7.6    9.68  )-----------( 540      ( 28 Feb 2022 10:24 )             23.6     02-27    135  |  105  |  11  ----------------------------<  93  5.5<H>   |  18<L>  |  1.12    Ca    8.6      27 Feb 2022 08:13      proBNP:   Lipid Profile:   HgA1c:   TSH:     TELEMETRY: 	    ECG:  	  RADIOLOGY:   DIAGNOSTIC TESTING:  [ ] Echocardiogram:  [ ]  Catheterization:  [ ] Stress Test:    OTHER:

## 2022-02-28 NOTE — PROGRESS NOTE ADULT - SUBJECTIVE AND OBJECTIVE BOX
Patient is a 51y old  Male who presents with a chief complaint of S/P discharge from [facility] with 3 weeks of RIGHT knee pain, sickle cell crisis (27 Feb 2022 13:04)      MEDICATIONS  (STANDING):  enoxaparin Injectable 100 milliGRAM(s) SubCutaneous two times a day  folic acid 1 milliGRAM(s) Oral daily  HYDROmorphone  Injectable 1 milliGRAM(s) IV Push once  HYDROmorphone PCA (1 mG/mL) 30 milliLiter(s) PCA Continuous PCA Continuous  hydroxyurea 1500 milliGRAM(s) Oral daily  multivitamin 1 Tablet(s) Oral daily  sodium chloride 0.45%. 1000 milliLiter(s) (75 mL/Hr) IV Continuous <Continuous>    MEDICATIONS  (PRN):  acetaminophen     Tablet .. 650 milliGRAM(s) Oral every 6 hours PRN Temp greater or equal to 38C (100.4F), Mild Pain (1 - 3)      ROS  No fever, sweats, chills  No epistaxis, HA, sore throat  No CP, SOB, cough, sputum  No n/v/d, abd pain, melena, hematochezia  No edema  No rash  No anxiety  No back pain, joint pain  No bleeding, bruising  No dysuria, hematuria    Vital Signs Last 24 Hrs  T(C): 37.3 (28 Feb 2022 03:56), Max: 37.4 (27 Feb 2022 13:56)  T(F): 99.1 (28 Feb 2022 03:56), Max: 99.4 (28 Feb 2022 00:32)  HR: 97 (28 Feb 2022 03:56) (93 - 100)  BP: 167/72 (28 Feb 2022 03:56) (139/74 - 167/72)  BP(mean): --  RR: 18 (28 Feb 2022 03:56) (18 - 18)  SpO2: 97% (28 Feb 2022 03:56) (95% - 97%)    PE  NAD  Awake, alert  Anicteric, MMM  RRR  CTAB  Abd soft, NT, ND  No c/c/e  No rash grossly  FROM                          7.6    9.68  )-----------( 540      ( 28 Feb 2022 10:24 )             23.6       02-27    135  |  105  |  11  ----------------------------<  93  5.5<H>   |  18<L>  |  1.12    Ca    8.6      27 Feb 2022 08:13    TPro  7.9  /  Alb  3.5  /  TBili  0.2  /  DBili  <0.1  /  AST  21  /  ALT  20  /  AlkPhos  94  02-26      ACC: [id number] EXAM:  DUPLEX EXT VEINS LOWER RT                          PROCEDURE DATE:  02/27/2022          INTERPRETATION:  CLINICAL INFORMATION: Right lower extremity edema.   History of sickle cell disease. Evaluate for DVT. History of DVT.    COMPARISON: None available.    TECHNIQUE: Duplex sonography of the RIGHT LOWER extremity veins with   color and spectral Doppler, with and without compression.    FINDINGS:    There is normal compressibility of the right common femoral vein. There   is partial filling and partial compressibility of the right mid and   distal femoral veins and the popliteal system of deep venous thrombosis;   thrombus of indeterminate age.  The contralateral common femoral vein is patent.  Doppler examination shows normal spontaneous and phasic flow.    Calf veins are not visualized.    Note is made of a prominent morphologically normal lymph node in the   right groin measuring 1.5 x 0.7 x 1.7 cm.    IMPRESSION:  Partially compressible mid and distal right femoral vein and popliteal   vein consistent with deep venous thrombosis; thrombus is of indeterminate   age, however likely subacute or chronic.    Findings were discussed with Dr. CHIRAG Alonso 2/27/2022 10:46 AM by   Ultrasound Technician with read back confirmation.    --- End of Report ---   Patient is a 51y old  Male who presents with a chief complaint of S/P discharge from [facility] with 3 weeks of RIGHT knee pain, sickle cell crisis (27 Feb 2022 13:04)      MEDICATIONS  (STANDING):  enoxaparin Injectable 100 milliGRAM(s) SubCutaneous two times a day  folic acid 1 milliGRAM(s) Oral daily  HYDROmorphone  Injectable 1 milliGRAM(s) IV Push once  HYDROmorphone PCA (1 mG/mL) 30 milliLiter(s) PCA Continuous PCA Continuous  hydroxyurea 1500 milliGRAM(s) Oral daily  multivitamin 1 Tablet(s) Oral daily  sodium chloride 0.45%. 1000 milliLiter(s) (75 mL/Hr) IV Continuous <Continuous>    MEDICATIONS  (PRN):  acetaminophen     Tablet .. 650 milliGRAM(s) Oral every 6 hours PRN Temp greater or equal to 38C (100.4F), Mild Pain (1 - 3)      ROS  No fever, sweats, chills  No epistaxis, HA, sore throat  No CP, SOB, cough, sputum  No n/v/d, abd pain, melena, hematochezia  No edema  No rash  No anxiety  No back pain, joint pain  No bleeding, bruising  No dysuria, hematuria    Vital Signs Last 24 Hrs  T(C): 37.3 (28 Feb 2022 03:56), Max: 37.4 (27 Feb 2022 13:56)  T(F): 99.1 (28 Feb 2022 03:56), Max: 99.4 (28 Feb 2022 00:32)  HR: 97 (28 Feb 2022 03:56) (93 - 100)  BP: 167/72 (28 Feb 2022 03:56) (139/74 - 167/72)  BP(mean): --  RR: 18 (28 Feb 2022 03:56) (18 - 18)  SpO2: 97% (28 Feb 2022 03:56) (95% - 97%)    PE  NAD  Awake, alert  Anicteric, MMM  RRR  CTAB  Abd soft, NT, ND  No c/c/e  No rash grossly                            7.6    9.68  )-----------( 540      ( 28 Feb 2022 10:24 )             23.6       02-27    135  |  105  |  11  ----------------------------<  93  5.5<H>   |  18<L>  |  1.12    Ca    8.6      27 Feb 2022 08:13    TPro  7.9  /  Alb  3.5  /  TBili  0.2  /  DBili  <0.1  /  AST  21  /  ALT  20  /  AlkPhos  94  02-26      ACC: [id number] EXAM:  DUPLEX EXT VEINS LOWER RT                          PROCEDURE DATE:  02/27/2022          INTERPRETATION:  CLINICAL INFORMATION: Right lower extremity edema.   History of sickle cell disease. Evaluate for DVT. History of DVT.    COMPARISON: None available.    TECHNIQUE: Duplex sonography of the RIGHT LOWER extremity veins with   color and spectral Doppler, with and without compression.    FINDINGS:    There is normal compressibility of the right common femoral vein. There   is partial filling and partial compressibility of the right mid and   distal femoral veins and the popliteal system of deep venous thrombosis;   thrombus of indeterminate age.  The contralateral common femoral vein is patent.  Doppler examination shows normal spontaneous and phasic flow.    Calf veins are not visualized.    Note is made of a prominent morphologically normal lymph node in the   right groin measuring 1.5 x 0.7 x 1.7 cm.    IMPRESSION:  Partially compressible mid and distal right femoral vein and popliteal   vein consistent with deep venous thrombosis; thrombus is of indeterminate   age, however likely subacute or chronic.    Findings were discussed with Dr. CHIRAG Alonso 2/27/2022 10:46 AM by   Ultrasound Technician with read back confirmation.    --- End of Report ---

## 2022-02-28 NOTE — PHYSICAL THERAPY INITIAL EVALUATION ADULT - GAIT TRAINING, PT EVAL
GOAL: Patient will ambulate 200ft independently using least restrictive device for community distances in 2 weeks

## 2022-02-28 NOTE — CONSULT NOTE ADULT - ASSESSMENT
51M with sickle cell disease presenting with polyarthritis including right knee, bilateral ankles and MTPs with diagnostic arthrocentesis demonstrating + MSU crystals, hyperuricemia 8.3, suggestive of initial gout flare likely in the setting of rapid cell turnover and dehydration    Plan:  -Soumedrol 20mg IVP now and then tomorrow morning. Will follow up tomorrow for response and further steroid management.  -Recommend to add PPI for now given that pt is also on apixaban to lower GI bleed risk  -xrays bilateral ankles and feet  -orthopedic consult for osteonecrosis of knees    Dr. Iwona Zapata  Rheumatology Attending  [phone]  [email or web address] 51M with sickle cell disease presenting with polyarthritis including right knee, bilateral ankles and MTPs with diagnostic arthrocentesis demonstrating + MSU crystals, hyperuricemia 8.3, suggestive of initial gout flare likely in the setting of rapid cell turnover and dehydration.  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 Will follow up tomorrow for response and further steroid management.  -Recommend to add PPI for now given that pt is also on apixaban to lower GI bleed risk  -xrays bilateral ankles and feet  -orthopedic consult for osteonecrosis of knees    Dr. Iwona Zapata  Rheumatology Attending  [phone]  [email or web address] 51M with sickle cell disease presenting with polyarthritis including right knee, bilateral ankles and MTPs with diagnostic arthrocentesis demonstrating + MSU crystals, hyperuricemia 8.3, suggestive of initial gout flare likely in the setting of rapid cell turnover and dehydration.  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 Will follow up tomorrow for response and further management.  -Recommend to add PPI for now given that pt is also on apixaban to lower GI bleed risk  -xrays bilateral ankles and feet  -orthopedic consult for osteonecrosis of knees    Dr. Iwona Zapata  Rheumatology Attending  [phone]  [email or web address]

## 2022-02-28 NOTE — PHYSICAL THERAPY INITIAL EVALUATION ADULT - BALANCE TRAINING, PT EVAL
GOAL: Patient will improve standing balance to good to improve stability while out of bed in 2 weeks.

## 2022-02-28 NOTE — PROGRESS NOTE ADULT - ASSESSMENT
50 y/o m--patient -patient with a history of sickle cell disease, essential HTN presenting with knee pain       Right knee pain   - swollen knee   - was tapped yesterday   - would recommend rheum and ortho consult     DVT  --Doppler  IMPRESSION:  Partially compressible mid and distal right femoral vein and popliteal   vein consistent with deep venous thrombosis; thrombus is of indeterminate   age, however likely subacute or chronic.  --on Lovenox 100 mg bid for now    Sickle cell disease   - pt with SS disease   - on hydrea   - follows up with hematology at [facility]   - hgb 7.6   - bili normal   - doesnt appear to be in sickle crisis   - cont home regimen of pain med  - if not helping PP consult for pain management   - cont to follow up with his Hematologist outpt     Patient will follow with his private hematologist after discharge    Thank you for the courtesy of this referral    Ofe Maza NP  Hematology/Oncology  [facility]  [phone] (Office)  [phone] (Alt office)  Evenings and weekends please call MD on call or office     50 y/o m--patient -patient with a history of sickle cell disease, essential HTN presenting with knee pain       Right knee pain   - swollen knee   - was tapped yesterday   - would recommend rheum and ortho consult     Known DVT  --Doppler  IMPRESSION:  Partially compressible mid and distal right femoral vein and popliteal   vein consistent with deep venous thrombosis; thrombus is of indeterminate   age, however likely subacute or chronic.  --on Eliquis at home  --on Lovenox 100 mg bid for now    Sickle cell disease   - pt with SS disease   - on hydrea   - follows up with hematology at [facility]   - hgb 7.6   - bili normal   - doesnt appear to be in sickle crisis   - cont home regimen of pain med  - if not helping PP consult for pain management   - cont to follow up with his Hematologist outpt     Patient will follow with his private hematologist after discharge    Thank you for the courtesy of this referral    Ofe Maza NP  Hematology/Oncology  [facility]  [phone] (Office)  [phone] (Alt office)  Evenings and weekends please call MD on call or office     50 y/o m--patient -patient with a history of sickle cell disease, essential HTN presenting with knee pain       Right knee pain   - swollen knee   - was tapped yesterday   - would recommend rheum and ortho consult     Known DVT  --Doppler  IMPRESSION:  Partially compressible mid and distal right femoral vein and popliteal   vein consistent with deep venous thrombosis; thrombus is of indeterminate   age, however likely subacute or chronic.  --on Eliquis at home  --on Lovenox 100 mg bid for now    Sickle cell disease   - pt with SS disease   - on hydrea   - follows up with hematology at [facility]   - hgb 7.6   - bili normal   - doesnt appear to be in sickle crisis   - cont home regimen of pain med  - if not helping PP consult for pain management   - cont to follow up with his Hematologist outpt     Patient will follow with his private hematologist after discharge    Thank you for the courtesy of this referral    Ofe Maza NP  Hematology/Oncology  [facility]  [phone] (Office)  [phone] (Alt office)  Evenings and weekends please call MD on call or office    Agree with ongoing anticoagulation and pain management     Isaiah Martinez D.O  Hematology Oncology   [facility]  [phone] ( Office)   Evenings and weekends please call MD on call or office

## 2022-02-28 NOTE — PHYSICAL THERAPY INITIAL EVALUATION ADULT - ADDITIONAL COMMENTS
Pt lives with spouse in an apartment +5STE, +elevator to floor. Pt reports he needed assistance with amb with R/W and needed assistance with ADLs PTA. Pt owns R/W

## 2022-02-28 NOTE — CONSULT NOTE ADULT - SUBJECTIVE AND OBJECTIVE BOX
HISTORY OF PRESENT ILLNESS:    52 y/o with a history of sickle cell disease, essential HTN recent discharge from [facility] following a one week hospitalization following a sickle cell crisis episode now presenting with ongoing severe right knee pain and swelling. The knee pain started 3 weeks ago during the admission at [facility] however was not addressed during the stay and no arthrocentesis performed at the time. No fever, no chills, no rigors. Pain worsens with movement of joint. Orthopedics was consulted, diagnostic arthrocentesis performed on 2/26 demonstrating inflammatory synovial fluid WBC >40K, PMN predominant with slight intracellular MSU crystals.      PAST MEDICAL & SURGICAL HISTORY:  Sickle cell disease  Right knee pain  History of cholecystectomy    MEDICATIONS  (STANDING):  apixaban 5 milliGRAM(s) Oral every 12 hours  folic acid 1 milliGRAM(s) Oral daily  HYDROmorphone PCA (1 mG/mL) 30 milliLiter(s) PCA Continuous PCA Continuous  hydroxyurea 1500 milliGRAM(s) Oral daily  multivitamin 1 Tablet(s) Oral daily  polyethylene glycol 3350 17 Gram(s) Oral daily  senna 2 Tablet(s) Oral at bedtime  sodium chloride 0.45%. 1000 milliLiter(s) (75 mL/Hr) IV Continuous <Continuous>    MEDICATIONS  (PRN):  acetaminophen     Tablet .. 650 milliGRAM(s) Oral every 6 hours PRN Temp greater or equal to 38C (100.4F), Mild Pain (1 - 3)      Allergies    No Known Drug Allergies  shellfish (Short breath)    Intolerances        PERTINENT MEDICATION HISTORY:    SOCIAL HISTORY:    FAMILY HISTORY:  Family history of essential hypertension    Family history of sickle cell trait        Vital Signs Last 24 Hrs  T(C): 37.1 (28 Feb 2022 14:11), Max: 37.4 (28 Feb 2022 00:32)  T(F): 98.7 (28 Feb 2022 14:11), Max: 99.4 (28 Feb 2022 00:32)  HR: 91 (28 Feb 2022 14:11) (91 - 100)  BP: 159/86 (28 Feb 2022 14:11) (143/93 - 167/72)  BP(mean): --  RR: 18 (28 Feb 2022 14:11) (18 - 18)  SpO2: 98% (28 Feb 2022 14:11) (95% - 98%)    Daily     Daily     PHYSICAL EXAM:  Constitutional:  HEENT:  Pulmonary:  Cardio:  Abdomen:  Extremities:  Musculoskeletal:  Skin:  Neurological:  Psychiatric:    LABS:                        7.6    9.68  )-----------( 540      ( 28 Feb 2022 10:24 )             23.6     02-27    135  |  105  |  11  ----------------------------<  93  5.5<H>   |  18<L>  |  1.12    Ca    8.6      27 Feb 2022 08:13    uric acid 8.3    Cell Count, Body Fluid (02.26.22 @ 16:18)   Fluid Segmented Granulocytes: 96 %   Total Nucleated Cell Count, Body Fluid: 74181:  Synovial Crystals Id: Crystals Present few intracellular msu (monosodium urate) crystals present  Culture Results:   No growth     RADIOLOGY & ADDITIONAL STUDIES:  < from: Xray Knee 4 Views, Right (02.26.22 @ 14:54) >    IMPRESSION: Areas of osteonecrosis in the distal femur and proximal   tibia. No acute fracture or dislocation.   HISTORY OF PRESENT ILLNESS:    52 y/o with a history of sickle cell disease, essential HTN recent discharge from [facility] following a one week hospitalization following a sickle cell crisis episode now presenting with ongoing severe right knee pain and swelling in addition to pain and swelling of bilateral ankles and dorsum of feet. The knee pain started 3 weeks ago during the admission at [facility] however was not addressed during the stay and no arthrocentesis performed at the time. No fever, no chills, no rigors. Pain worsens with movement of joint. Diagnostic arthrocentesis performed on 2/26 in ED demonstrating inflammatory synovial fluid WBC >40K, PMN predominant with slight intracellular MSU crystals, culture neg. No h/o gout, no FH of gout, no h/o kidney stones, not on diuretics. Pt drinks fructose containing drinks, otherwise denies high red meat, shellfish or alcohol consumption.  His joint pain is not consistent with a sickle cell crisis, however his back pain is consistent with a sickle cell crisis.      PAST MEDICAL & SURGICAL HISTORY:  Sickle cell disease  Right knee pain  History of cholecystectomy    MEDICATIONS  (STANDING):  apixaban 5 milliGRAM(s) Oral every 12 hours  folic acid 1 milliGRAM(s) Oral daily  HYDROmorphone PCA (1 mG/mL) 30 milliLiter(s) PCA Continuous PCA Continuous  hydroxyurea 1500 milliGRAM(s) Oral daily  multivitamin 1 Tablet(s) Oral daily  polyethylene glycol 3350 17 Gram(s) Oral daily  senna 2 Tablet(s) Oral at bedtime  sodium chloride 0.45%. 1000 milliLiter(s) (75 mL/Hr) IV Continuous <Continuous>    MEDICATIONS  (PRN):  acetaminophen     Tablet .. 650 milliGRAM(s) Oral every 6 hours PRN Temp greater or equal to 38C (100.4F), Mild Pain (1 - 3)      Allergies    No Known Drug Allergies  shellfish (Short breath)    SOCIAL HISTORY: nc    FAMILY HISTORY: no gout  Family history of essential hypertension    Family history of sickle cell trait    Vital Signs Last 24 Hrs  T(C): 37.1 (28 Feb 2022 14:11), Max: 37.4 (28 Feb 2022 00:32)  T(F): 98.7 (28 Feb 2022 14:11), Max: 99.4 (28 Feb 2022 00:32)  HR: 91 (28 Feb 2022 14:11) (91 - 100)  BP: 159/86 (28 Feb 2022 14:11) (143/93 - 167/72)  BP(mean): --  RR: 18 (28 Feb 2022 14:11) (18 - 18)  SpO2: 98% (28 Feb 2022 14:11) (95% - 98%)      PHYSICAL EXAM:  Constitutional: NAD  Musculoskeletal: large effusion right knee, warmth and TTP of right knee with limited ROM.swelling, TTP and limited ROM of bilateral ankles, swelling and TTP of bilateral dorsa of feet, left 1st MTP TTP  Skin: no tophi  Psychiatric: aaox3    LABS:                        7.6    9.68  )-----------( 540      ( 28 Feb 2022 10:24 )             23.6     02-27    135  |  105  |  11  ----------------------------<  93  5.5<H>   |  18<L>  |  1.12    Ca    8.6      27 Feb 2022 08:13    uric acid 8.3    Cell Count, Body Fluid (02.26.22 @ 16:18)   Fluid Segmented Granulocytes: 96 %   Total Nucleated Cell Count, Body Fluid: 10336:  Synovial Crystals Id: Crystals Present few intracellular msu (monosodium urate) crystals present  Culture Results:   No growth     RADIOLOGY & ADDITIONAL STUDIES:  < from: Xray Knee 4 Views, Right (02.26.22 @ 14:54) >    IMPRESSION: Areas of osteonecrosis in the distal femur and proximal   tibia. No acute fracture or dislocation.

## 2022-02-28 NOTE — PROGRESS NOTE ADULT - PROBLEM SELECTOR PLAN 3
- acute R prox DVT  Partially compressible mid and distal right femoral vein and popliteal vein consistent with deep venous thrombosis; thrombus is of indeterminate age, however likely subacute or chronic.- As per patient, he had a diagnosed DVT over a year ago in the same location, and was on Eliquis for 1 year. There was no repeat studies done after discontinuation of AC, until this admission.  - on lovenox 100mg bid, will switch to DOAC if heme ok with it

## 2022-02-28 NOTE — PHYSICAL THERAPY INITIAL EVALUATION ADULT - PLANNED THERAPY INTERVENTIONS, PT EVAL
GOAL: Patient will perform 5 steps independently using least restrictive device to enter home in 2 weeks./balance training/bed mobility training/gait training/transfer training

## 2022-02-28 NOTE — PHYSICAL THERAPY INITIAL EVALUATION ADULT - ACTIVE RANGE OF MOTION EXAMINATION, REHAB EVAL
RLE not assessed 2/2 pain; L DF ROM limited 2/2 pain/bilateral upper extremity Active ROM was WFL (within functional limits)/Left LE Active ROM was WFL (within functional limits)

## 2022-02-28 NOTE — PHYSICAL THERAPY INITIAL EVALUATION ADULT - PERTINENT HX OF CURRENT PROBLEM, REHAB EVAL
Pt is a 52 y/o M with PMH: sickle cell disease, essential HTN (not on meds) w/ recent D/C from [facility] following a one week hospitalization w/ a sickle cell crisis episode +diffuse body pain and severe R knee pain (x 3 weeks) +worsens w/ movement. Pt a/w severe R knee pain 2/2 sickle cell disease. Pt s/p arthocentesis in ED. XRAY R KNEE (2/26): Areas of osteonecrosis in the distal femur and proximal tibia. CONT BELOW

## 2022-02-28 NOTE — PROGRESS NOTE ADULT - PROBLEM SELECTOR PLAN 2
- with vasoocclusive crisis; on demand 2mg/lockout 30min/4hr limit 6mg  - no IV benadryl, PO ok   - Continue 1/2NS  - c/w incentive spirometer   - Continue with MVI and Folic Acid.   -c/w home hydrea   - add bowel regimen

## 2022-02-28 NOTE — PROGRESS NOTE ADULT - PROBLEM SELECTOR PLAN 1
- Patient is s/p arthrocentesis in the ED. gram stain of fluid negative for PMN bacteria  - Fluid studies consistent with goat- crystals, monosodium urate, rheum c/s re: steroids   - less likely septic joint  - Of note, patient also noted to have a RLE DVT.

## 2022-02-28 NOTE — PROGRESS NOTE ADULT - PROBLEM SELECTOR PLAN 4
- full dose lovenox  d/w dr sergio cornejo patient pcp - full dose lovenox  d/w dr sergio cornejo patient pcp    PT pending

## 2022-02-28 NOTE — PHYSICAL THERAPY INITIAL EVALUATION ADULT - PRECAUTIONS/LIMITATIONS, REHAB EVAL
-) fracture/dislocation. US R KNEE (2/26): R knee effusion. VA DUPLEX (2/26): Partially compressible mid and distal R femoral vein and popliteal vein; DVT; thrombus is of indeterminate age.

## 2022-02-28 NOTE — PROGRESS NOTE ADULT - ASSESSMENT
52 yo male h/o sickle cell disease on home dilaudsusannah guillaumety [facility] presenting with persistent pain. pt just d/c from outside hospital. today worsening pain to knees and joints similar to prior sickle cell flares. also reports pain to right knee and swelling. was reportedly more swollen and has improved but still hurts. did fall at outside hospital but no recent falls. no fevers today. no sob or difficulty breathing. no back pain. no chest pain. no cough. no sick contacts  s/p arthrocentesis in ER   states to still have knee and leg pain   requesting around the clock dilaudid

## 2022-03-01 ENCOUNTER — APPOINTMENT (OUTPATIENT)
Dept: OPHTHALMOLOGY | Facility: CLINIC | Age: 52
End: 2022-03-01

## 2022-03-01 DIAGNOSIS — M10.9 GOUT, UNSPECIFIED: ICD-10-CM

## 2022-03-01 LAB
ANION GAP SERPL CALC-SCNC: 9 MMOL/L — SIGNIFICANT CHANGE UP (ref 5–17)
BUN SERPL-MCNC: 15 MG/DL — SIGNIFICANT CHANGE UP (ref 7–23)
CALCIUM SERPL-MCNC: 9.2 MG/DL — SIGNIFICANT CHANGE UP (ref 8.4–10.5)
CHLORIDE SERPL-SCNC: 105 MMOL/L — SIGNIFICANT CHANGE UP (ref 96–108)
CO2 SERPL-SCNC: 20 MMOL/L — LOW (ref 22–31)
CREAT SERPL-MCNC: 1.1 MG/DL — SIGNIFICANT CHANGE UP (ref 0.5–1.3)
EGFR: 81 ML/MIN/1.73M2 — SIGNIFICANT CHANGE UP
GLUCOSE SERPL-MCNC: 194 MG/DL — HIGH (ref 70–99)
HCT VFR BLD CALC: 22.4 % — LOW (ref 39–50)
HGB BLD-MCNC: 7.5 G/DL — LOW (ref 13–17)
MCHC RBC-ENTMCNC: 32.6 PG — SIGNIFICANT CHANGE UP (ref 27–34)
MCHC RBC-ENTMCNC: 33.5 GM/DL — SIGNIFICANT CHANGE UP (ref 32–36)
MCV RBC AUTO: 97.4 FL — SIGNIFICANT CHANGE UP (ref 80–100)
NRBC # BLD: 1 /100 WBCS — HIGH (ref 0–0)
PLATELET # BLD AUTO: 573 K/UL — HIGH (ref 150–400)
POTASSIUM SERPL-MCNC: 4.9 MMOL/L — SIGNIFICANT CHANGE UP (ref 3.5–5.3)
POTASSIUM SERPL-SCNC: 4.9 MMOL/L — SIGNIFICANT CHANGE UP (ref 3.5–5.3)
RBC # BLD: 2.3 M/UL — LOW (ref 4.2–5.8)
RBC # BLD: 2.3 M/UL — LOW (ref 4.2–5.8)
RBC # FLD: 19.1 % — HIGH (ref 10.3–14.5)
RETICS #: 122.8 K/UL — SIGNIFICANT CHANGE UP (ref 25–125)
RETICS/RBC NFR: 5.3 % — HIGH (ref 0.5–2.5)
SODIUM SERPL-SCNC: 134 MMOL/L — LOW (ref 135–145)
WBC # BLD: 10.83 K/UL — HIGH (ref 3.8–10.5)
WBC # FLD AUTO: 10.83 K/UL — HIGH (ref 3.8–10.5)

## 2022-03-01 PROCEDURE — 99232 SBSQ HOSP IP/OBS MODERATE 35: CPT

## 2022-03-01 PROCEDURE — 99233 SBSQ HOSP IP/OBS HIGH 50: CPT

## 2022-03-01 RX ORDER — COLCHICINE 0.6 MG
0.6 TABLET ORAL ONCE
Refills: 0 | Status: COMPLETED | OUTPATIENT
Start: 2022-03-01 | End: 2022-03-01

## 2022-03-01 RX ORDER — HYDRALAZINE HCL 50 MG
25 TABLET ORAL DAILY
Refills: 0 | Status: DISCONTINUED | OUTPATIENT
Start: 2022-03-01 | End: 2022-03-01

## 2022-03-01 RX ORDER — HYDRALAZINE HCL 50 MG
10 TABLET ORAL
Refills: 0 | Status: DISCONTINUED | OUTPATIENT
Start: 2022-03-01 | End: 2022-03-02

## 2022-03-01 RX ORDER — COLCHICINE 0.6 MG
0.6 TABLET ORAL ONCE
Refills: 0 | Status: COMPLETED | OUTPATIENT
Start: 2022-03-02 | End: 2022-03-02

## 2022-03-01 RX ADMIN — HYDROMORPHONE HYDROCHLORIDE 30 MILLILITER(S): 2 INJECTION INTRAMUSCULAR; INTRAVENOUS; SUBCUTANEOUS at 07:26

## 2022-03-01 RX ADMIN — Medication 1 TABLET(S): at 17:33

## 2022-03-01 RX ADMIN — Medication 1 MILLIGRAM(S): at 17:33

## 2022-03-01 RX ADMIN — APIXABAN 5 MILLIGRAM(S): 2.5 TABLET, FILM COATED ORAL at 17:32

## 2022-03-01 RX ADMIN — Medication 0.6 MILLIGRAM(S): at 21:30

## 2022-03-01 RX ADMIN — Medication 10 MILLIGRAM(S): at 18:36

## 2022-03-01 RX ADMIN — HYDROMORPHONE HYDROCHLORIDE 30 MILLILITER(S): 2 INJECTION INTRAMUSCULAR; INTRAVENOUS; SUBCUTANEOUS at 08:32

## 2022-03-01 RX ADMIN — HYDROXYUREA 1500 MILLIGRAM(S): 500 CAPSULE ORAL at 17:32

## 2022-03-01 RX ADMIN — APIXABAN 5 MILLIGRAM(S): 2.5 TABLET, FILM COATED ORAL at 05:12

## 2022-03-01 RX ADMIN — Medication 20 MILLIGRAM(S): at 05:12

## 2022-03-01 RX ADMIN — PANTOPRAZOLE SODIUM 40 MILLIGRAM(S): 20 TABLET, DELAYED RELEASE ORAL at 05:12

## 2022-03-01 RX ADMIN — POLYETHYLENE GLYCOL 3350 17 GRAM(S): 17 POWDER, FOR SOLUTION ORAL at 17:34

## 2022-03-01 RX ADMIN — HYDROMORPHONE HYDROCHLORIDE 30 MILLILITER(S): 2 INJECTION INTRAMUSCULAR; INTRAVENOUS; SUBCUTANEOUS at 19:08

## 2022-03-01 NOTE — PROGRESS NOTE ADULT - ASSESSMENT
51 M with SS disease, HTN, presented with SS crisis, right knee swelling, s/p arthrocentesis in the ED with acute gouty attack

## 2022-03-01 NOTE — PROGRESS NOTE ADULT - SUBJECTIVE AND OBJECTIVE BOX
Patient is a 51y old  Male who presents with a chief complaint of S/P discharge from [facility] with 3 weeks of RIGHT knee pain, sickle cell crisis (28 Feb 2022 14:57)      MEDICATIONS  (STANDING):  apixaban 5 milliGRAM(s) Oral every 12 hours  folic acid 1 milliGRAM(s) Oral daily  HYDROmorphone PCA (1 mG/mL) 30 milliLiter(s) PCA Continuous PCA Continuous  hydroxyurea 1500 milliGRAM(s) Oral daily  multivitamin 1 Tablet(s) Oral daily  pantoprazole    Tablet 40 milliGRAM(s) Oral before breakfast  polyethylene glycol 3350 17 Gram(s) Oral daily  senna 2 Tablet(s) Oral at bedtime  sodium chloride 0.45%. 1000 milliLiter(s) (75 mL/Hr) IV Continuous <Continuous>    MEDICATIONS  (PRN):  acetaminophen     Tablet .. 650 milliGRAM(s) Oral every 6 hours PRN Temp greater or equal to 38C (100.4F), Mild Pain (1 - 3)      ROS  No fever, sweats, chills  No epistaxis, HA, sore throat  No CP, SOB, cough, sputum  No n/v/d, abd pain, melena, hematochezia  No edema  No rash  No anxiety  c/o bilateral knee pain  No bleeding, bruising  No dysuria, hematuria    Vital Signs Last 24 Hrs  T(C): 36.9 (01 Mar 2022 04:16), Max: 37.1 (28 Feb 2022 14:11)  T(F): 98.5 (01 Mar 2022 04:16), Max: 98.7 (28 Feb 2022 14:11)  HR: 101 (01 Mar 2022 04:16) (91 - 101)  BP: 155/87 (01 Mar 2022 04:16) (136/79 - 160/81)  BP(mean): --  RR: 17 (01 Mar 2022 04:16) (17 - 18)  SpO2: 95% (01 Mar 2022 04:16) (95% - 99%)    PE  NAD  Awake, alert  Anicteric, MMM  No c/c/e  No rash grossly  decreased ROM lower extremities                          7.6    9.68  )-----------( 540      ( 28 Feb 2022 10:24 )             23.6       02-28    133<L>  |  104  |  9   ----------------------------<  142<H>  4.5   |  19<L>  |  1.17    Ca    8.8      28 Feb 2022 15:52

## 2022-03-01 NOTE — PROGRESS NOTE ADULT - PROBLEM SELECTOR PLAN 1
Leg/knee pain and swelling likely secondary to SC crisis     - 2/26 s/p arthrocentesis R knee  2/27 Right Lower Extremity Doppler - partially compressible mid and distal right fem vein and popliteal vein consistent with DVT; indeterminate age, likely subacute/chronic.    - Eliquis 5mg PO q12hrs  s/p solumedrol 20mg IVP x1  Pain control - PCA pump  IV hydration   Orders per PMD

## 2022-03-01 NOTE — PROGRESS NOTE ADULT - SUBJECTIVE AND OBJECTIVE BOX
Subjective: Patient seen and examined. No new events except as noted.   Pain has much improved.  Encouraged OOB to chair today.     REVIEW OF SYSTEMS:    CONSTITUTIONAL: + weakness, fevers or chills  EYES/ENT: No visual changes;  No vertigo or throat pain   NECK: No pain or stiffness  RESPIRATORY: No cough, wheezing, hemoptysis; No shortness of breath  CARDIOVASCULAR: No chest pain or palpitations  GASTROINTESTINAL: No abdominal or epigastric pain. No nausea, vomiting, or hematemesis; No diarrhea or constipation. No melena or hematochezia.  GENITOURINARY: No dysuria, frequency or hematuria  NEUROLOGICAL: No numbness or weakness  SKIN: No itching, burning, rashes, or lesions   All other review of systems is negative unless indicated above.    MEDICATIONS:  MEDICATIONS  (STANDING):  apixaban 5 milliGRAM(s) Oral every 12 hours  folic acid 1 milliGRAM(s) Oral daily  HYDROmorphone PCA (1 mG/mL) 30 milliLiter(s) PCA Continuous PCA Continuous  hydroxyurea 1500 milliGRAM(s) Oral daily  multivitamin 1 Tablet(s) Oral daily  pantoprazole    Tablet 40 milliGRAM(s) Oral before breakfast  polyethylene glycol 3350 17 Gram(s) Oral daily  senna 2 Tablet(s) Oral at bedtime  sodium chloride 0.45%. 1000 milliLiter(s) (75 mL/Hr) IV Continuous <Continuous>      PHYSICAL EXAM:  T(C): 36.7 (03-01-22 @ 11:24), Max: 37.1 (02-28-22 @ 14:11)  HR: 99 (03-01-22 @ 11:24) (91 - 101)  BP: 161/81 (03-01-22 @ 11:24) (136/79 - 161/81)  RR: 17 (03-01-22 @ 11:24) (17 - 18)  SpO2: 97% (03-01-22 @ 11:24) (95% - 99%)  Wt(kg): --  I&O's Summary    28 Feb 2022 07:01  -  01 Mar 2022 07:00  --------------------------------------------------------  IN: 730 mL / OUT: 1815 mL / NET: -1085 mL      Appearance: NAD  HEENT: Normal oral mucosa, PERRL, EOMI	  Lymphatic: No lymphadenopathy , no edema  Cardiovascular: Normal S1 S2, No JVD, No murmurs , Peripheral pulses palpable 2+ bilaterally  Respiratory: Lungs clear to auscultation, normal effort 	  Gastrointestinal:  Soft, Non-tender, + BS	  Skin: No rashes, No ecchymoses, No cyanosis, warm to touch  Musculoskeletal: decreased ROM/strength due to pain  Psychiatry:  Mood & affect appropriate  Ext: R knee swelling        LABS:    CARDIAC MARKERS:                        7.6    9.68  )-----------( 540      ( 28 Feb 2022 10:24 )             23.6     02-28    133<L>  |  104  |  9   ----------------------------<  142<H>  4.5   |  19<L>  |  1.17    Ca    8.8      28 Feb 2022 15:52      proBNP:   Lipid Profile:   HgA1c:   TSH:     TELEMETRY: 	    ECG:  	  RADIOLOGY:   DIAGNOSTIC TESTING:  [ ] Echocardiogram:  [ ]  Catheterization:  [ ] Stress Test:    OTHER:

## 2022-03-01 NOTE — PROGRESS NOTE ADULT - SUBJECTIVE AND OBJECTIVE BOX
INTERVAL HPI/OVERNIGHT EVENTS:  Patient reports 10% improvement in right knee pain. Also with pain in ankles and feet.     MEDICATIONS  (STANDING):  apixaban 5 milliGRAM(s) Oral every 12 hours  folic acid 1 milliGRAM(s) Oral daily  HYDROmorphone PCA (1 mG/mL) 30 milliLiter(s) PCA Continuous PCA Continuous  hydroxyurea 1500 milliGRAM(s) Oral daily  multivitamin 1 Tablet(s) Oral daily  pantoprazole    Tablet 40 milliGRAM(s) Oral before breakfast  polyethylene glycol 3350 17 Gram(s) Oral daily  senna 2 Tablet(s) Oral at bedtime  sodium chloride 0.45%. 1000 milliLiter(s) (75 mL/Hr) IV Continuous <Continuous>    MEDICATIONS  (PRN):  acetaminophen     Tablet .. 650 milliGRAM(s) Oral every 6 hours PRN Temp greater or equal to 38C (100.4F), Mild Pain (1 - 3)        Vital Signs Last 24 Hrs  T(C): 36.7 (01 Mar 2022 11:24), Max: 37.1 (28 Feb 2022 14:11)  T(F): 98.1 (01 Mar 2022 11:24), Max: 98.7 (28 Feb 2022 14:11)  HR: 99 (01 Mar 2022 11:24) (91 - 101)  BP: 161/81 (01 Mar 2022 11:24) (136/79 - 161/81)  BP(mean): --  RR: 17 (01 Mar 2022 11:24) (17 - 18)  SpO2: 97% (01 Mar 2022 11:24) (95% - 99%)    PHYSICAL EXAMINATION:  Constitutional: NAD  Musculoskeletal: large effusion right knee, warmth and TTP of right knee with ROM partially reduced. Swelling, TTP and limited ROM of bilateral ankles, swelling and TTP of bilateral dorsa of feet, left 1st MTP TTP  Skin: no tophi  Psychiatric: aaox3      LABS:                        7.6    9.68  )-----------( 540      ( 28 Feb 2022 10:24 )             23.6     02-28    133<L>  |  104  |  9   ----------------------------<  142<H>  4.5   |  19<L>  |  1.17    Ca    8.8      28 Feb 2022 15:52      uric acid 8.3    Cell Count, Body Fluid (02.26.22 @ 16:18)   Fluid Segmented Granulocytes: 96 %   Total Nucleated Cell Count, Body Fluid: [id number]:  Synovial Crystals Id: Crystals Present few intracellular msu (monosodium urate) crystals present  Culture Results:   No growth     RADIOLOGY & ADDITIONAL STUDIES:  < from: Xray Knee 4 Views, Right (02.26.22 @ 14:54) >    IMPRESSION: Areas of osteonecrosis in the distal femur and proximal   tibia. No acute fracture or dislocation.

## 2022-03-01 NOTE — CONSULT NOTE ADULT - REASON FOR ADMISSION
S/P discharge from [facility] with 3 weeks of RIGHT knee pain, sickle cell crisis
S/P discharge from [facility] with 3 weeks of RIGHT knee pain, sickle cell crisis
S/P discharge from [facility] with 3 weeks of RIGHT knee pain, sickle cell crisis

## 2022-03-01 NOTE — PROGRESS NOTE ADULT - ASSESSMENT
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 Zapata, Attending    Peewee Oconnell MD  Rheumatology Fellow, PGY-4  Pager: [phone]

## 2022-03-01 NOTE — PROGRESS NOTE ADULT - SUBJECTIVE AND OBJECTIVE BOX
INTERVAL HPI/OVERNIGHT EVENTS:    MEDICATIONS  (STANDING):  apixaban 5 milliGRAM(s) Oral every 12 hours  folic acid 1 milliGRAM(s) Oral daily  HYDROmorphone PCA (1 mG/mL) 30 milliLiter(s) PCA Continuous PCA Continuous  hydroxyurea 1500 milliGRAM(s) Oral daily  multivitamin 1 Tablet(s) Oral daily  pantoprazole    Tablet 40 milliGRAM(s) Oral before breakfast  polyethylene glycol 3350 17 Gram(s) Oral daily  senna 2 Tablet(s) Oral at bedtime  sodium chloride 0.45%. 1000 milliLiter(s) (75 mL/Hr) IV Continuous <Continuous>    MEDICATIONS  (PRN):  acetaminophen     Tablet .. 650 milliGRAM(s) Oral every 6 hours PRN Temp greater or equal to 38C (100.4F), Mild Pain (1 - 3)    Vital Signs Last 24 Hrs  T(C): 36.7 (01 Mar 2022 11:24), Max: 37.1 (28 Feb 2022 14:11)  T(F): 98.1 (01 Mar 2022 11:24), Max: 98.7 (28 Feb 2022 14:11)  HR: 99 (01 Mar 2022 11:24) (91 - 101)  BP: 161/81 (01 Mar 2022 11:24) (136/79 - 161/81)  BP(mean): --  RR: 17 (01 Mar 2022 11:24) (17 - 18)  SpO2: 97% (01 Mar 2022 11:24) (95% - 99%)    PHYSICAL EXAMINATION:  Constitutional:  Musculoskeletal:  Skin:  Psychiatric:    LABS:                        7.6    9.68  )-----------( 540      ( 28 Feb 2022 10:24 )             23.6     02-28    133<L>  |  104  |  9   ----------------------------<  142<H>  4.5   |  19<L>  |  1.17    Ca    8.8      28 Feb 2022 15:52              RADIOLOGY & ADDITIONAL TESTS:   INTERVAL HPI/OVERNIGHT EVENTS:  Pt reports feeling significantly better after 2 doses of Solumedrol 20mg. He is able to bend his right knee and is able to step down on his feet, He has not walked today.    MEDICATIONS  (STANDING):  apixaban 5 milliGRAM(s) Oral every 12 hours  folic acid 1 milliGRAM(s) Oral daily  HYDROmorphone PCA (1 mG/mL) 30 milliLiter(s) PCA Continuous PCA Continuous  hydroxyurea 1500 milliGRAM(s) Oral daily  multivitamin 1 Tablet(s) Oral daily  pantoprazole    Tablet 40 milliGRAM(s) Oral before breakfast  polyethylene glycol 3350 17 Gram(s) Oral daily  senna 2 Tablet(s) Oral at bedtime  sodium chloride 0.45%. 1000 milliLiter(s) (75 mL/Hr) IV Continuous <Continuous>    MEDICATIONS  (PRN):  acetaminophen     Tablet .. 650 milliGRAM(s) Oral every 6 hours PRN Temp greater or equal to 38C (100.4F), Mild Pain (1 - 3)    Vital Signs Last 24 Hrs  T(C): 36.7 (01 Mar 2022 11:24), Max: 37.1 (28 Feb 2022 14:11)  T(F): 98.1 (01 Mar 2022 11:24), Max: 98.7 (28 Feb 2022 14:11)  HR: 99 (01 Mar 2022 11:24) (91 - 101)  BP: 161/81 (01 Mar 2022 11:24) (136/79 - 161/81)  BP(mean): --  RR: 17 (01 Mar 2022 11:24) (17 - 18)  SpO2: 97% (01 Mar 2022 11:24) (95% - 99%)    PHYSICAL EXAMINATION:  Constitutional: nad  Musculoskeletal: improved swelling, however still with large right knee effusion, not TTP, improvement in swelling of bilateral ankles and dorsum of feet  Skin:  no erythema  Psychiatric: aaox3    LABS:                        7.6    9.68  )-----------( 540      ( 28 Feb 2022 10:24 )             23.6     02-28    133<L>  |  104  |  9   ----------------------------<  142<H>  4.5   |  19<L>  |  1.17    Ca    8.8      28 Feb 2022 15:52              RADIOLOGY & ADDITIONAL TESTS:

## 2022-03-01 NOTE — PROGRESS NOTE ADULT - ASSESSMENT
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 Iwona Zapata  Rheumatology Attending  [phone]  [email or web address] 51M with sickle cell disease presenting with polyarthritis including right knee, bilateral ankles and MTPs with diagnostic arthrocentesis demonstrating + MSU crystals, hyperuricemia 8.3, suggestive of initial gout flare likely in the setting of rapid cell turnover and dehydration.  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    Dr. Iwona Zapata  Rheumatology Attending  [phone]  [email or web address] 51M with sickle cell disease presenting with polyarthritis including right knee, bilateral ankles and MTPs with diagnostic arthrocentesis demonstrating + MSU crystals, hyperuricemia 8.3, suggestive of initial gout flare likely in the setting of rapid cell turnover and dehydration.  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     Plan:  -Will hold off on further steroids given risk of worsening vasoocclusive crisis. Can give colchicine 0.6mg x 1 now then tomorrow morning. Will reassess tomorrow  -If no improvement of right knee swelling, could do arthrocentesis give intra-articular steroid injection to right knee despite ostenecrosis right knee  -given severity of initial gout flare, may consider urate lowering therapy as outpatient after resolution of present gout flare - please check HLA B [id number] (send out test) given race.    Dr. Iwona Zapata  Rheumatology Attending  [phone]  [email or web address]

## 2022-03-01 NOTE — CONSULT NOTE ADULT - SUBJECTIVE AND OBJECTIVE BOX
51y Male presents with bilateral knee pain and inability to ambulate due to a sickle cell pain crisis. Currently complains of pain that does not radiate. He has had pain in his knees before and this feels like "what he has felt before". Patient denies numbness/tingling/burning in the BLLE. No other bone/joint complaints. Patient is a community ambulator at baseline with/without assistive devices.    PAST MEDICAL & SURGICAL HISTORY:  Sickle cell disease    Right knee pain    History of cholecystectomy      MEDICATIONS  (STANDING):  apixaban 5 milliGRAM(s) Oral every 12 hours  folic acid 1 milliGRAM(s) Oral daily  HYDROmorphone PCA (1 mG/mL) 30 milliLiter(s) PCA Continuous PCA Continuous  hydroxyurea 1500 milliGRAM(s) Oral daily  multivitamin 1 Tablet(s) Oral daily  pantoprazole    Tablet 40 milliGRAM(s) Oral before breakfast  polyethylene glycol 3350 17 Gram(s) Oral daily  senna 2 Tablet(s) Oral at bedtime  sodium chloride 0.45%. 1000 milliLiter(s) (75 mL/Hr) IV Continuous <Continuous>    MEDICATIONS  (PRN):  acetaminophen     Tablet .. 650 milliGRAM(s) Oral every 6 hours PRN Temp greater or equal to 38C (100.4F), Mild Pain (1 - 3)    Allergies    No Known Drug Allergies  shellfish (Short breath)    Intolerances                              7.6    9.68  )-----------( 540      ( 28 Feb 2022 10:24 )             23.6     02-28    133<L>  |  104  |  9   ----------------------------<  142<H>  4.5   |  19<L>  |  1.17    Ca    8.8      28 Feb 2022 15:52          T(C): 36.7 (03-01-22 @ 11:24), Max: 37.1 (02-28-22 @ 14:11)  HR: 99 (03-01-22 @ 11:24) (91 - 101)  BP: 161/81 (03-01-22 @ 11:24) (136/79 - 161/81)  RR: 17 (03-01-22 @ 11:24) (17 - 18)  SpO2: 97% (03-01-22 @ 11:24) (95% - 99%)  Wt(kg): --    PE   RLE:  Skin intact; No ecchymosis/soft tissue swelling  Compartments soft; + TTP about knee globally, no point tenderness  Full AROM/PROM  Able to SLR;  Motor intact GS/TA/FHL/EHL  SILT L2-S1  DP/PT pulses 2+    LLE:  Skin intact; No ecchymosis/soft tissue swelling  Compartments soft; + TTP about knee globally, no point tenderness  Full AROM/PROM  Able to SLR;  Motor intact GS/TA/FHL/EHL  SILT L2-S1  DP/PT pulses 2+      Imaging:  XR bilateral knees demonstrating osteonecosis of the distal femurs and proximal tibias, without fracture or dislcoation      A/P: 51y Male with SCA in pain crisis presents with knee pain due to bony infarcts  - Pain control  - WBAT BLLE  - PT/OT  - No acute orthopedic surgical intervention needed at this time  - Ortho to sign off  - FU with Dr. Kaur as an outpatient in 2-3 weeks from discharge PRN

## 2022-03-01 NOTE — PROGRESS NOTE ADULT - PROBLEM SELECTOR PLAN 3
- acute R prox DVT  Partially compressible mid and distal right femoral vein and popliteal vein consistent with deep venous thrombosis; thrombus is of indeterminate age, however likely subacute or chronic.-   as per patient and heme patient had has this for a couple weeks prior and also last year  switched to apixaban 5mg bid which is home dose

## 2022-03-01 NOTE — PROGRESS NOTE ADULT - PROBLEM SELECTOR PLAN 1
- Patient is s/p arthrocentesis in the ED. gram stain of fluid negative for PMN bacteria  - Fluid studies consistent with goat- crystals, monosodium urate  - rheum consulted greatly appreciate their recommendations- s/p solumedrol 20mg iV x 1 with significant improvement in pain; d/w DR. Zapata may need colchicine given hx of sickle cell dx, will follow up official recs; dose reduced to 20mg IV and improved greatly  - ortho c/s for osteonecrosis of kness  - foot and ankle xrays ordered - Patient is s/p arthrocentesis in the ED. gram stain of fluid negative for PMN bacteria  - Fluid studies consistent with goat- crystals, monosodium urate  - rheum consulted greatly appreciate their recommendations- s/p solumedrol 20mg iV x 1 with significant improvement in pain; d/w DR. Zapata may need colchicine given hx of sickle cell dx, will follow up official recs; dose reduced to 20mg IV and improved greatly  - ortho c/s for osteonecrosis of R Areas of osteonecrosis in the distal femur and proximal   tibia. No acute fracture or dislocation.  - foot and ankle xrays ordered as well as tib/fib
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
## 2022-03-01 NOTE — PROGRESS NOTE ADULT - SUBJECTIVE AND OBJECTIVE BOX
Patient is a 51y old  Male who presents with a chief complaint of S/P discharge from [facility] with 3 weeks of RIGHT knee pain, sickle cell crisis (01 Mar 2022 10:41)      SUBJECTIVE / OVERNIGHT EVENTS:    feels so much better. finally slept/ pain is improved greatly. moving bowels. no cp, sob, palpitations. wants to try to walk today     ROS:  14 point ROS negative in detail except stated as above    MEDICATIONS  (STANDING):  apixaban 5 milliGRAM(s) Oral every 12 hours  folic acid 1 milliGRAM(s) Oral daily  HYDROmorphone PCA (1 mG/mL) 30 milliLiter(s) PCA Continuous PCA Continuous  hydroxyurea 1500 milliGRAM(s) Oral daily  multivitamin 1 Tablet(s) Oral daily  pantoprazole    Tablet 40 milliGRAM(s) Oral before breakfast  polyethylene glycol 3350 17 Gram(s) Oral daily  senna 2 Tablet(s) Oral at bedtime  sodium chloride 0.45%. 1000 milliLiter(s) (75 mL/Hr) IV Continuous <Continuous>    MEDICATIONS  (PRN):  acetaminophen     Tablet .. 650 milliGRAM(s) Oral every 6 hours PRN Temp greater or equal to 38C (100.4F), Mild Pain (1 - 3)      CAPILLARY BLOOD GLUCOSE        I&O's Summary    28 Feb 2022 07:01  -  01 Mar 2022 07:00  --------------------------------------------------------  IN: 730 mL / OUT: 1815 mL / NET: -1085 mL        PHYSICAL EXAM:  Vital Signs Last 24 Hrs  T(C): 36.9 (01 Mar 2022 04:16), Max: 37.1 (28 Feb 2022 14:11)  T(F): 98.5 (01 Mar 2022 04:16), Max: 98.7 (28 Feb 2022 14:11)  HR: 101 (01 Mar 2022 04:16) (91 - 101)  BP: 155/87 (01 Mar 2022 04:16) (136/79 - 160/81)  BP(mean): --  RR: 17 (01 Mar 2022 04:16) (17 - 18)  SpO2: 95% (01 Mar 2022 04:16) (95% - 99%)    GENERAL: NAD, well-developed  HEAD:  Atraumatic, Normocephalic  EYES: EOMI, PERRL, conjunctiva and sclera clear  NECK: Supple, No JVD  CHEST/LUNG: Clear to auscultation bilaterally; No wheeze  HEART: Regular rate and rhythm; No murmurs, rubs, or gallops  ABDOMEN: Soft, Nontender, Nondistended; Bowel sounds present  EXTREMITIES:  2+ Peripheral Pulses, No clubbing, cyanosis, or edema  R knee no erythema + edema improving  full ROM. TTP R leg +1 pitting edema > L  NEUROLOGY: AAOx3; non-focal  SKIN: No rashes or lesions    LABS:                        7.6    9.68  )-----------( 540      ( 28 Feb 2022 10:24 )             23.6     02-28    133<L>  |  104  |  9   ----------------------------<  142<H>  4.5   |  19<L>  |  1.17    Ca    8.8      28 Feb 2022 15:52                RADIOLOGY & ADDITIONAL TESTS:    Imaging Personally Reviewed:    Consultant(s) Notes Reviewed:      Care Discussed with Consultants/Other Providers:  mal Alvarez Patient is a 51y old  Male who presents with a chief complaint of S/P discharge from [facility] with 3 weeks of RIGHT knee pain, sickle cell crisis (01 Mar 2022 10:41)      SUBJECTIVE / OVERNIGHT EVENTS:    feels so much better. finally slept/ pain is improved greatly. moving bowels. no cp, sob, palpitations. wants to try to walk today     ROS:  14 point ROS negative in detail except stated as above    MEDICATIONS  (STANDING):  apixaban 5 milliGRAM(s) Oral every 12 hours  folic acid 1 milliGRAM(s) Oral daily  HYDROmorphone PCA (1 mG/mL) 30 milliLiter(s) PCA Continuous PCA Continuous  hydroxyurea 1500 milliGRAM(s) Oral daily  multivitamin 1 Tablet(s) Oral daily  pantoprazole    Tablet 40 milliGRAM(s) Oral before breakfast  polyethylene glycol 3350 17 Gram(s) Oral daily  senna 2 Tablet(s) Oral at bedtime  sodium chloride 0.45%. 1000 milliLiter(s) (75 mL/Hr) IV Continuous <Continuous>    MEDICATIONS  (PRN):  acetaminophen     Tablet .. 650 milliGRAM(s) Oral every 6 hours PRN Temp greater or equal to 38C (100.4F), Mild Pain (1 - 3)      CAPILLARY BLOOD GLUCOSE        I&O's Summary    28 Feb 2022 07:01  -  01 Mar 2022 07:00  --------------------------------------------------------  IN: 730 mL / OUT: 1815 mL / NET: -1085 mL        PHYSICAL EXAM:  Vital Signs Last 24 Hrs  T(C): 36.9 (01 Mar 2022 04:16), Max: 37.1 (28 Feb 2022 14:11)  T(F): 98.5 (01 Mar 2022 04:16), Max: 98.7 (28 Feb 2022 14:11)  HR: 101 (01 Mar 2022 04:16) (91 - 101)  BP: 155/87 (01 Mar 2022 04:16) (136/79 - 160/81)  BP(mean): --  RR: 17 (01 Mar 2022 04:16) (17 - 18)  SpO2: 95% (01 Mar 2022 04:16) (95% - 99%)    GENERAL: NAD, well-developed  HEAD:  Atraumatic, Normocephalic  EYES: EOMI, PERRL, conjunctiva and sclera clear  NECK: Supple, No JVD  CHEST/LUNG: Clear to auscultation bilaterally; No wheeze  HEART: Regular rate and rhythm; No murmurs, rubs, or gallops  ABDOMEN: Soft, Nontender, Nondistended; Bowel sounds present  EXTREMITIES:  2+ Peripheral Pulses, No clubbing, cyanosis, or edema  R knee no erythema + edema improving  full ROM. TTP R leg +1 pitting edema > L  NEUROLOGY: AAOx3; non-focal  SKIN: No rashes or lesions    LABS:                        7.6    9.68  )-----------( 540      ( 28 Feb 2022 10:24 )             23.6     02-28    133<L>  |  104  |  9   ----------------------------<  142<H>  4.5   |  19<L>  |  1.17    Ca    8.8      28 Feb 2022 15:52                RADIOLOGY & ADDITIONAL TESTS:    Imaging Personally Reviewed:    < from: Xray Knee 4 Views, Right (02.26.22 @ 14:54) >  IMPRESSION: Areas of osteonecrosis in the distal femur and proximal   tibia. No acute fracture or dislocation.    < end of copied text >      Consultant(s) Notes Reviewed:      Care Discussed with Consultants/Other Providers:  aml Alvarez

## 2022-03-02 DIAGNOSIS — I10 ESSENTIAL (PRIMARY) HYPERTENSION: ICD-10-CM

## 2022-03-02 LAB
ANION GAP SERPL CALC-SCNC: 9 MMOL/L — SIGNIFICANT CHANGE UP (ref 5–17)
BUN SERPL-MCNC: 23 MG/DL — SIGNIFICANT CHANGE UP (ref 7–23)
CALCIUM SERPL-MCNC: 9.7 MG/DL — SIGNIFICANT CHANGE UP (ref 8.4–10.5)
CHLORIDE SERPL-SCNC: 106 MMOL/L — SIGNIFICANT CHANGE UP (ref 96–108)
CO2 SERPL-SCNC: 19 MMOL/L — LOW (ref 22–31)
CREAT SERPL-MCNC: 1.17 MG/DL — SIGNIFICANT CHANGE UP (ref 0.5–1.3)
DRVVT 50/50: 53.3 SEC — SIGNIFICANT CHANGE UP
DRVVT SCREEN TO CONFIRM RATIO: SIGNIFICANT CHANGE UP
EGFR: 75 ML/MIN/1.73M2 — SIGNIFICANT CHANGE UP
GLUCOSE SERPL-MCNC: 132 MG/DL — HIGH (ref 70–99)
HCT VFR BLD CALC: 22.8 % — LOW (ref 39–50)
HGB BLD-MCNC: 7.4 G/DL — LOW (ref 13–17)
LA NT DPL PPP QL: 73.2 SEC — SIGNIFICANT CHANGE UP
MCHC RBC-ENTMCNC: 31.5 PG — SIGNIFICANT CHANGE UP (ref 27–34)
MCHC RBC-ENTMCNC: 32.5 GM/DL — SIGNIFICANT CHANGE UP (ref 32–36)
MCV RBC AUTO: 97 FL — SIGNIFICANT CHANGE UP (ref 80–100)
NORMALIZED SCT PPP-RTO: 0.91 RATIO — SIGNIFICANT CHANGE UP (ref 0–1.16)
NORMALIZED SCT PPP-RTO: SIGNIFICANT CHANGE UP
NRBC # BLD: 5 /100 WBCS — HIGH (ref 0–0)
PLATELET # BLD AUTO: 537 K/UL — HIGH (ref 150–400)
POTASSIUM SERPL-MCNC: 4.6 MMOL/L — SIGNIFICANT CHANGE UP (ref 3.5–5.3)
POTASSIUM SERPL-SCNC: 4.6 MMOL/L — SIGNIFICANT CHANGE UP (ref 3.5–5.3)
RBC # BLD: 2.35 M/UL — LOW (ref 4.2–5.8)
RBC # FLD: 19.5 % — HIGH (ref 10.3–14.5)
SODIUM SERPL-SCNC: 134 MMOL/L — LOW (ref 135–145)
WBC # BLD: 12.71 K/UL — HIGH (ref 3.8–10.5)
WBC # FLD AUTO: 12.71 K/UL — HIGH (ref 3.8–10.5)

## 2022-03-02 PROCEDURE — 99233 SBSQ HOSP IP/OBS HIGH 50: CPT

## 2022-03-02 PROCEDURE — 99232 SBSQ HOSP IP/OBS MODERATE 35: CPT

## 2022-03-02 RX ORDER — COLCHICINE 0.6 MG
0.6 TABLET ORAL
Refills: 0 | Status: COMPLETED | OUTPATIENT
Start: 2022-03-02 | End: 2022-03-05

## 2022-03-02 RX ORDER — HYDRALAZINE HCL 50 MG
25 TABLET ORAL EVERY 12 HOURS
Refills: 0 | Status: DISCONTINUED | OUTPATIENT
Start: 2022-03-02 | End: 2022-03-04

## 2022-03-02 RX ORDER — GABAPENTIN 400 MG/1
300 CAPSULE ORAL AT BEDTIME
Refills: 0 | Status: DISCONTINUED | OUTPATIENT
Start: 2022-03-02 | End: 2022-03-09

## 2022-03-02 RX ADMIN — SENNA PLUS 2 TABLET(S): 8.6 TABLET ORAL at 21:31

## 2022-03-02 RX ADMIN — PANTOPRAZOLE SODIUM 40 MILLIGRAM(S): 20 TABLET, DELAYED RELEASE ORAL at 05:02

## 2022-03-02 RX ADMIN — HYDROMORPHONE HYDROCHLORIDE 30 MILLILITER(S): 2 INJECTION INTRAMUSCULAR; INTRAVENOUS; SUBCUTANEOUS at 07:19

## 2022-03-02 RX ADMIN — Medication 0.6 MILLIGRAM(S): at 10:02

## 2022-03-02 RX ADMIN — GABAPENTIN 300 MILLIGRAM(S): 400 CAPSULE ORAL at 21:30

## 2022-03-02 RX ADMIN — HYDROMORPHONE HYDROCHLORIDE 30 MILLILITER(S): 2 INJECTION INTRAMUSCULAR; INTRAVENOUS; SUBCUTANEOUS at 11:09

## 2022-03-02 RX ADMIN — Medication 1 TABLET(S): at 12:35

## 2022-03-02 RX ADMIN — HYDROMORPHONE HYDROCHLORIDE 30 MILLILITER(S): 2 INJECTION INTRAMUSCULAR; INTRAVENOUS; SUBCUTANEOUS at 19:06

## 2022-03-02 RX ADMIN — Medication 0.6 MILLIGRAM(S): at 21:31

## 2022-03-02 RX ADMIN — POLYETHYLENE GLYCOL 3350 17 GRAM(S): 17 POWDER, FOR SOLUTION ORAL at 12:34

## 2022-03-02 RX ADMIN — Medication 25 MILLIGRAM(S): at 17:16

## 2022-03-02 RX ADMIN — Medication 1 MILLIGRAM(S): at 12:34

## 2022-03-02 RX ADMIN — APIXABAN 5 MILLIGRAM(S): 2.5 TABLET, FILM COATED ORAL at 05:02

## 2022-03-02 RX ADMIN — HYDROXYUREA 1500 MILLIGRAM(S): 500 CAPSULE ORAL at 12:34

## 2022-03-02 RX ADMIN — Medication 10 MILLIGRAM(S): at 05:03

## 2022-03-02 RX ADMIN — APIXABAN 5 MILLIGRAM(S): 2.5 TABLET, FILM COATED ORAL at 17:15

## 2022-03-02 NOTE — PROGRESS NOTE ADULT - SUBJECTIVE AND OBJECTIVE BOX
Patient is a 51y old  Male who presents with a chief complaint of S/P discharge from [facility] with 3 weeks of RIGHT knee pain, sickle cell crisis (02 Mar 2022 10:52)      SUBJECTIVE / OVERNIGHT EVENTS:    feels ok r knee pain is overall improved, has some neck pain now too but unclear if he slept on it wrong. wants to try to go home soon. +BM    ROS:  14 point ROS negative in detail except stated as above    MEDICATIONS  (STANDING):  apixaban 5 milliGRAM(s) Oral every 12 hours  folic acid 1 milliGRAM(s) Oral daily  hydrALAZINE 25 milliGRAM(s) Oral every 12 hours  HYDROmorphone PCA (1 mG/mL) 30 milliLiter(s) PCA Continuous PCA Continuous  hydroxyurea 1500 milliGRAM(s) Oral daily  multivitamin 1 Tablet(s) Oral daily  pantoprazole    Tablet 40 milliGRAM(s) Oral before breakfast  polyethylene glycol 3350 17 Gram(s) Oral daily  senna 2 Tablet(s) Oral at bedtime  sodium chloride 0.45%. 1000 milliLiter(s) (75 mL/Hr) IV Continuous <Continuous>    MEDICATIONS  (PRN):  acetaminophen     Tablet .. 650 milliGRAM(s) Oral every 6 hours PRN Temp greater or equal to 38C (100.4F), Mild Pain (1 - 3)      CAPILLARY BLOOD GLUCOSE        I&O's Summary    01 Mar 2022 07:01  -  02 Mar 2022 07:00  --------------------------------------------------------  IN: 360 mL / OUT: 600 mL / NET: -240 mL        PHYSICAL EXAM:  Vital Signs Last 24 Hrs  T(C): 36.7 (02 Mar 2022 11:10), Max: 36.9 (01 Mar 2022 19:03)  T(F): 98.1 (02 Mar 2022 11:10), Max: 98.4 (01 Mar 2022 19:03)  HR: 88 (02 Mar 2022 11:10) (86 - 90)  BP: 145/73 (02 Mar 2022 11:10) (139/75 - 160/82)  BP(mean): --  RR: 18 (02 Mar 2022 11:10) (17 - 18)  SpO2: 96% (02 Mar 2022 11:10) (95% - 96%)    GENERAL: NAD, well-developed  HEAD:  Atraumatic, Normocephalic  EYES: EOMI, PERRL, conjunctiva and sclera clear  NECK: Supple, No JVD  CHEST/LUNG: Clear to auscultation bilaterally; No wheeze  HEART: Regular rate and rhythm; No murmurs, rubs, or gallops  ABDOMEN: Soft, Nontender, Nondistended; Bowel sounds present  EXTREMITIES:  2+ Peripheral Pulses, No clubbing, cyanosis, or edema  R knee no erythema + edema improving  full ROM. TTP R leg +1 pitting edema > L  NEUROLOGY: AAOx3; non-focal  SKIN: No rashes or lesions    LABS:                        7.4    12.71 )-----------( 537      ( 02 Mar 2022 07:07 )             22.8     03-02    134<L>  |  106  |  23  ----------------------------<  132<H>  4.6   |  19<L>  |  1.17    Ca    9.7      02 Mar 2022 07:43                RADIOLOGY & ADDITIONAL TESTS:    Imaging Personally Reviewed:    Consultant(s) Notes Reviewed:      Care Discussed with Consultants/Other Providers:  mal Live

## 2022-03-02 NOTE — PROGRESS NOTE ADULT - SUBJECTIVE AND OBJECTIVE BOX
INTERVAL HPI/OVERNIGHT EVENTS:  Knee pain is slightly better, still very swollen.  Neck pain and shoulders hurting more today    MEDICATIONS  (STANDING):  apixaban 5 milliGRAM(s) Oral every 12 hours  colchicine 0.6 milliGRAM(s) Oral two times a day  folic acid 1 milliGRAM(s) Oral daily  gabapentin 300 milliGRAM(s) Oral at bedtime  hydrALAZINE 25 milliGRAM(s) Oral every 12 hours  HYDROmorphone PCA (1 mG/mL) 30 milliLiter(s) PCA Continuous PCA Continuous  hydroxyurea 1500 milliGRAM(s) Oral daily  multivitamin 1 Tablet(s) Oral daily  pantoprazole    Tablet 40 milliGRAM(s) Oral before breakfast  polyethylene glycol 3350 17 Gram(s) Oral daily  senna 2 Tablet(s) Oral at bedtime  sodium chloride 0.45%. 1000 milliLiter(s) (75 mL/Hr) IV Continuous <Continuous>    MEDICATIONS  (PRN):  acetaminophen     Tablet .. 650 milliGRAM(s) Oral every 6 hours PRN Temp greater or equal to 38C (100.4F), Mild Pain (1 - 3)        Vital Signs Last 24 Hrs  T(C): 36.8 (02 Mar 2022 17:10), Max: 36.9 (02 Mar 2022 15:10)  T(F): 98.2 (02 Mar 2022 17:10), Max: 98.4 (02 Mar 2022 15:10)  HR: 99 (02 Mar 2022 17:10) (83 - 99)  BP: 148/79 (02 Mar 2022 17:10) (145/73 - 160/82)  BP(mean): --  RR: 18 (02 Mar 2022 17:10) (17 - 18)  SpO2: 98% (02 Mar 2022 17:10) (96% - 98%)    PHYSICAL EXAMINATION:  Constitutional: nad  Musculoskeletal: improved swelling, however still with large right knee effusion, not TTP, improvement in swelling of bilateral ankles and dorsum of feet  Skin:  no erythema  Psychiatric: aaox3    LABS:                        7.4    12.71 )-----------( 537      ( 02 Mar 2022 07:07 )             22.8     03-02    134<L>  |  106  |  23  ----------------------------<  132<H>  4.6   |  19<L>  |  1.17    Ca    9.7      02 Mar 2022 07:43              RADIOLOGY & ADDITIONAL TESTS:

## 2022-03-02 NOTE — PROGRESS NOTE ADULT - ASSESSMENT
50 yo male h/o sickle cell disease on home dilaudsusannah guillaumety admitted to [facility] presenting with persistent pain. pt just d/c from outside hospital. today worsening pain to knees and joints similar to prior sickle cell flares. also reports pain to right knee and swelling. was reportedly more swollen and has improved but still hurts. did fall at outside hospital but no recent falls. no fevers today. no sob or difficulty breathing. no back pain. no chest pain. no cough. no sick contacts  s/p arthrocentesis in ER   states to still have knee and leg pain   requesting around the clock dilaudid

## 2022-03-02 NOTE — PROGRESS NOTE ADULT - PROBLEM SELECTOR PLAN 3
- with vasoocclusive crisis; on demand 2mg/lockout 30min/4hr limit 6mg  - no IV benadryl, PO ok   - c/w 1/2NS 75cc/hr  - c/w incentive spirometer   - Continue with MVI and Folic Acid.   -c/w home hydrea   - c/w bowel regimen

## 2022-03-02 NOTE — PROGRESS NOTE ADULT - ASSESSMENT
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     Plan:  -Will hold off on further steroids given risk of worsening vasoocclusive crisis.   -cw colchicine 0.6mg bid for 3 days  -If no improvement of right knee swelling, could do arthrocentesis give intra-articular steroid injection to right knee despite ostenecrosis right knee  -given severity of initial gout flare, may consider urate lowering therapy as outpatient after resolution of present gout flare - please check HLA B [id number] (send out test) given race.    Dr. Iwona Zapata  Rheumatology Attending  [phone]  [email or web address]

## 2022-03-02 NOTE — PROGRESS NOTE ADULT - SUBJECTIVE AND OBJECTIVE BOX
Patient is a 51y old  Male who presents with a chief complaint of S/P discharge from [facility] with 3 weeks of RIGHT knee pain, sickle cell crisis (01 Mar 2022 12:20)    Patient seen this morning. Dr. Drew was at bedside as well. Patient complaining of neck pain this morning. Currently receiving hydromorphone via PCA pump.     MEDICATIONS  (STANDING):  apixaban 5 milliGRAM(s) Oral every 12 hours  folic acid 1 milliGRAM(s) Oral daily  hydrALAZINE 10 milliGRAM(s) Oral two times a day  HYDROmorphone PCA (1 mG/mL) 30 milliLiter(s) PCA Continuous PCA Continuous  hydroxyurea 1500 milliGRAM(s) Oral daily  multivitamin 1 Tablet(s) Oral daily  pantoprazole    Tablet 40 milliGRAM(s) Oral before breakfast  polyethylene glycol 3350 17 Gram(s) Oral daily  senna 2 Tablet(s) Oral at bedtime  sodium chloride 0.45%. 1000 milliLiter(s) (75 mL/Hr) IV Continuous <Continuous>    MEDICATIONS  (PRN):  acetaminophen     Tablet .. 650 milliGRAM(s) Oral every 6 hours PRN Temp greater or equal to 38C (100.4F), Mild Pain (1 - 3)      ROS  No fever, sweats, chills  No epistaxis, HA, sore throat  No CP, SOB, cough, sputum  No n/v/d, abd pain, melena, hematochezia  No edema  No rash  No anxiety  Patient reporting neck pain this morning.  Knee pain under control  No bleeding, bruising  No dysuria, hematuria    Vital Signs Last 24 Hrs  T(C): 36.8 (02 Mar 2022 04:51), Max: 36.9 (01 Mar 2022 19:03)  T(F): 98.2 (02 Mar 2022 04:51), Max: 98.4 (01 Mar 2022 19:03)  HR: 86 (02 Mar 2022 04:51) (86 - 99)  BP: 160/82 (02 Mar 2022 04:51) (139/75 - 161/81)  BP(mean): --  RR: 17 (02 Mar 2022 04:51) (17 - 18)  SpO2: 96% (02 Mar 2022 04:51) (95% - 97%)    PE  NAD  Awake, alert  Anicteric, MMM  No c/c/e  No rash grossly                            7.4    12.71 )-----------( 537      ( 02 Mar 2022 07:07 )             22.8       03-02    134<L>  |  106  |  23  ----------------------------<  132<H>  4.6   |  19<L>  |  1.17    Ca    9.7      02 Mar 2022 07:43

## 2022-03-02 NOTE — PROGRESS NOTE ADULT - PROBLEM SELECTOR PLAN 1
- Patient is s/p arthrocentesis in the ED. gram stain of fluid negative for PMN bacteria  - Fluid studies consistent with goat- crystals, monosodium urate  - rheum consulted greatly appreciate their recommendations- s/p solumedrol 20mg IV x 2 with significant improvement in pain; d/w DR. Zapata Colchicine .6mg x2 given, follow up rheum recs  - ortho c/s appreciated  - foot and ankle xrays ordered as well as tib/fib

## 2022-03-02 NOTE — PROGRESS NOTE ADULT - SUBJECTIVE AND OBJECTIVE BOX
Subjective: Patient seen and examined. No new events except as noted.   Pain has significantly improved.  Encouraged OOB to chair again today.     REVIEW OF SYSTEMS:    CONSTITUTIONAL: No weakness, fevers or chills  EYES/ENT: No visual changes;  No vertigo or throat pain   NECK: No pain or stiffness  RESPIRATORY: No cough, wheezing, hemoptysis; No shortness of breath  CARDIOVASCULAR: No chest pain or palpitations  GASTROINTESTINAL: No abdominal or epigastric pain. No nausea, vomiting, or hematemesis; No diarrhea or constipation. No melena or hematochezia.  GENITOURINARY: No dysuria, frequency or hematuria  NEUROLOGICAL: No numbness or weakness  SKIN: No itching, burning, rashes, or lesions   All other review of systems is negative unless indicated above.    MEDICATIONS:  MEDICATIONS  (STANDING):  apixaban 5 milliGRAM(s) Oral every 12 hours  folic acid 1 milliGRAM(s) Oral daily  gabapentin 300 milliGRAM(s) Oral at bedtime  hydrALAZINE 25 milliGRAM(s) Oral every 12 hours  HYDROmorphone PCA (1 mG/mL) 30 milliLiter(s) PCA Continuous PCA Continuous  hydroxyurea 1500 milliGRAM(s) Oral daily  multivitamin 1 Tablet(s) Oral daily  pantoprazole    Tablet 40 milliGRAM(s) Oral before breakfast  polyethylene glycol 3350 17 Gram(s) Oral daily  senna 2 Tablet(s) Oral at bedtime  sodium chloride 0.45%. 1000 milliLiter(s) (75 mL/Hr) IV Continuous <Continuous>      PHYSICAL EXAM:  T(C): 36.7 (03-02-22 @ 13:38), Max: 36.9 (03-01-22 @ 19:03)  HR: 83 (03-02-22 @ 13:38) (83 - 90)  BP: 147/73 (03-02-22 @ 13:38) (139/75 - 160/82)  RR: 18 (03-02-22 @ 13:38) (17 - 18)  SpO2: 96% (03-02-22 @ 13:38) (95% - 96%)  Wt(kg): --  I&O's Summary    01 Mar 2022 07:01  -  02 Mar 2022 07:00  --------------------------------------------------------  IN: 360 mL / OUT: 600 mL / NET: -240 mL      Appearance: NAD  HEENT: Normal oral mucosa, PERRL, EOMI	  Lymphatic: No lymphadenopathy , no edema  Cardiovascular: Normal S1 S2, No JVD, No murmurs , Peripheral pulses palpable 2+ bilaterally  Respiratory: Lungs clear to auscultation, normal effort 	  Gastrointestinal:  Soft, Non-tender, + BS	  Skin: No rashes, No ecchymoses, No cyanosis, warm to touch  Musculoskeletal: decreased ROM/strength due to pain  Psychiatry:  Mood & affect appropriate  Ext: R knee mild swelling      LABS:    CARDIAC MARKERS:                        7.4    12.71 )-----------( 537      ( 02 Mar 2022 07:07 )             22.8     03-02    134<L>  |  106  |  23  ----------------------------<  132<H>  4.6   |  19<L>  |  1.17    Ca    9.7      02 Mar 2022 07:43      proBNP:   Lipid Profile:   HgA1c:   TSH:     TELEMETRY: 	    ECG:  	  RADIOLOGY:   DIAGNOSTIC TESTING:  [ ] Echocardiogram:  [ ]  Catheterization:  [ ] Stress Test:    OTHER:

## 2022-03-02 NOTE — PROGRESS NOTE ADULT - ASSESSMENT
52 y/o m--patient -patient with a history of sickle cell disease, essential HTN presenting with knee pain     Sickle Cell Disease  --Under care by Dr. Javier Lincoln of [facility]  --Currently on Hydroxyurea 1500 mg PO daily and on Folic Acid 1 mg daily  --Receives PRBC transfusions as needed at [facility] - with Hgb 7.4, no indication to transfuse at this time  --Continue pain meds as indicated - patient may resume home meds when ready for discharge if adequate    Right knee pain   -- Diagnosed by Rheumatology with acute flare of gout  -- S/P joint tapping on 2/28.   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 Javier Lincoln at [facility] after discharge.    Discussed with Dr. Drew.    Thank you for the opportunity to participate in Mr. Arthur's care.    Lyle Pandya PA-C  Hematology/Oncology  [facility]   [phone] (office)  [phone] (alt office)  Evenings and weekends please call MD on call or office

## 2022-03-03 LAB
B PERT IGG+IGM PNL SER: ABNORMAL
COLOR FLD: YELLOW — SIGNIFICANT CHANGE UP
CULTURE RESULTS: SIGNIFICANT CHANGE UP
CULTURE RESULTS: SIGNIFICANT CHANGE UP
FLUID INTAKE SUBSTANCE CLASS: SIGNIFICANT CHANGE UP
HCT VFR BLD CALC: 22.3 % — LOW (ref 39–50)
HGB BLD-MCNC: 7.2 G/DL — LOW (ref 13–17)
MCHC RBC-ENTMCNC: 32.1 PG — SIGNIFICANT CHANGE UP (ref 27–34)
MCHC RBC-ENTMCNC: 32.3 GM/DL — SIGNIFICANT CHANGE UP (ref 32–36)
MCV RBC AUTO: 99.6 FL — SIGNIFICANT CHANGE UP (ref 80–100)
NRBC # BLD: 20 /100 WBCS — HIGH (ref 0–0)
PLATELET # BLD AUTO: 396 K/UL — SIGNIFICANT CHANGE UP (ref 150–400)
RBC # BLD: 2.24 M/UL — LOW (ref 4.2–5.8)
RBC # FLD: 19.8 % — HIGH (ref 10.3–14.5)
RCV VOL RI: 1550 /UL — HIGH (ref 0–0)
SPECIMEN SOURCE: SIGNIFICANT CHANGE UP
SPECIMEN SOURCE: SIGNIFICANT CHANGE UP
TOTAL NUCLEATED CELL COUNT, BODY FLUID: 4417 /UL — SIGNIFICANT CHANGE UP
TUBE TYPE: SIGNIFICANT CHANGE UP
WBC # BLD: 8.75 K/UL — SIGNIFICANT CHANGE UP (ref 3.8–10.5)
WBC # FLD AUTO: 8.75 K/UL — SIGNIFICANT CHANGE UP (ref 3.8–10.5)

## 2022-03-03 PROCEDURE — 99233 SBSQ HOSP IP/OBS HIGH 50: CPT

## 2022-03-03 PROCEDURE — 99232 SBSQ HOSP IP/OBS MODERATE 35: CPT | Mod: 25

## 2022-03-03 PROCEDURE — 20610 DRAIN/INJ JOINT/BURSA W/O US: CPT | Mod: GC

## 2022-03-03 RX ORDER — HYDROMORPHONE HYDROCHLORIDE 2 MG/ML
2 INJECTION INTRAMUSCULAR; INTRAVENOUS; SUBCUTANEOUS ONCE
Refills: 0 | Status: DISCONTINUED | OUTPATIENT
Start: 2022-03-03 | End: 2022-03-03

## 2022-03-03 RX ADMIN — Medication 1 TABLET(S): at 13:04

## 2022-03-03 RX ADMIN — HYDROMORPHONE HYDROCHLORIDE 2 MILLIGRAM(S): 2 INJECTION INTRAMUSCULAR; INTRAVENOUS; SUBCUTANEOUS at 13:41

## 2022-03-03 RX ADMIN — Medication 25 MILLIGRAM(S): at 17:59

## 2022-03-03 RX ADMIN — Medication 0.6 MILLIGRAM(S): at 17:58

## 2022-03-03 RX ADMIN — Medication 0.6 MILLIGRAM(S): at 05:28

## 2022-03-03 RX ADMIN — PANTOPRAZOLE SODIUM 40 MILLIGRAM(S): 20 TABLET, DELAYED RELEASE ORAL at 05:28

## 2022-03-03 RX ADMIN — Medication 1 MILLIGRAM(S): at 13:04

## 2022-03-03 RX ADMIN — HYDROMORPHONE HYDROCHLORIDE 30 MILLILITER(S): 2 INJECTION INTRAMUSCULAR; INTRAVENOUS; SUBCUTANEOUS at 04:32

## 2022-03-03 RX ADMIN — HYDROXYUREA 1500 MILLIGRAM(S): 500 CAPSULE ORAL at 13:04

## 2022-03-03 RX ADMIN — HYDROMORPHONE HYDROCHLORIDE 30 MILLILITER(S): 2 INJECTION INTRAMUSCULAR; INTRAVENOUS; SUBCUTANEOUS at 07:09

## 2022-03-03 RX ADMIN — APIXABAN 5 MILLIGRAM(S): 2.5 TABLET, FILM COATED ORAL at 17:59

## 2022-03-03 RX ADMIN — HYDROMORPHONE HYDROCHLORIDE 2 MILLIGRAM(S): 2 INJECTION INTRAMUSCULAR; INTRAVENOUS; SUBCUTANEOUS at 14:15

## 2022-03-03 RX ADMIN — HYDROMORPHONE HYDROCHLORIDE 30 MILLILITER(S): 2 INJECTION INTRAMUSCULAR; INTRAVENOUS; SUBCUTANEOUS at 19:13

## 2022-03-03 RX ADMIN — Medication 25 MILLIGRAM(S): at 05:28

## 2022-03-03 RX ADMIN — POLYETHYLENE GLYCOL 3350 17 GRAM(S): 17 POWDER, FOR SOLUTION ORAL at 13:04

## 2022-03-03 RX ADMIN — APIXABAN 5 MILLIGRAM(S): 2.5 TABLET, FILM COATED ORAL at 05:28

## 2022-03-03 RX ADMIN — GABAPENTIN 300 MILLIGRAM(S): 400 CAPSULE ORAL at 21:09

## 2022-03-03 NOTE — PROGRESS NOTE ADULT - SUBJECTIVE AND OBJECTIVE BOX
INTERVAL HPI/OVERNIGHT EVENTS:  right knee pain slightly better, but still with pain on standing, still very swollen    MEDICATIONS  (STANDING):  apixaban 5 milliGRAM(s) Oral every 12 hours  colchicine 0.6 milliGRAM(s) Oral two times a day  folic acid 1 milliGRAM(s) Oral daily  gabapentin 300 milliGRAM(s) Oral at bedtime  hydrALAZINE 25 milliGRAM(s) Oral every 12 hours  HYDROmorphone PCA (1 mG/mL) 30 milliLiter(s) PCA Continuous PCA Continuous  hydroxyurea 1500 milliGRAM(s) Oral daily  multivitamin 1 Tablet(s) Oral daily  pantoprazole    Tablet 40 milliGRAM(s) Oral before breakfast  polyethylene glycol 3350 17 Gram(s) Oral daily  senna 2 Tablet(s) Oral at bedtime  sodium chloride 0.45%. 1000 milliLiter(s) (75 mL/Hr) IV Continuous <Continuous>    MEDICATIONS  (PRN):  acetaminophen     Tablet .. 650 milliGRAM(s) Oral every 6 hours PRN Temp greater or equal to 38C (100.4F), Mild Pain (1 - 3)        Vital Signs Last 24 Hrs  T(C): 36.9 (03 Mar 2022 18:28), Max: 36.9 (02 Mar 2022 21:09)  T(F): 98.4 (03 Mar 2022 18:28), Max: 98.5 (02 Mar 2022 21:09)  HR: 90 (03 Mar 2022 18:28) (75 - 91)  BP: 166/79 (03 Mar 2022 18:28) (153/95 - 166/79)  BP(mean): --  RR: 18 (03 Mar 2022 18:28) (18 - 18)  SpO2: 97% (03 Mar 2022 18:28) (96% - 100%)    PHYSICAL EXAMINATION:  proConstitutional: nad  Musculoskeletal: improved swelling, however still with large right knee effusion, not TTP, improvement in swelling of bilateral ankles and dorsum of feet  Skin:  no erythema  Psychiatric: aaox3    LABS:                        7.2    8.75  )-----------( 396      ( 03 Mar 2022 06:56 )             22.3     03-02    134<L>  |  106  |  23  ----------------------------<  132<H>  4.6   |  19<L>  |  1.17    Ca    9.7      02 Mar 2022 07:43              RADIOLOGY & ADDITIONAL TESTS:

## 2022-03-03 NOTE — PROGRESS NOTE ADULT - SUBJECTIVE AND OBJECTIVE BOX
Patient is a 51y old  Male who presents with a chief complaint of S/P discharge from [facility] with 3 weeks of RIGHT knee pain, sickle cell crisis (02 Mar 2022 19:14)      SUBJECTIVE / OVERNIGHT EVENTS:    feel wells. r knee pain better, just feels like its hard for him to get out of bed but feels overall better.     ROS:  14 point ROS negative in detail except stated as above    MEDICATIONS  (STANDING):  apixaban 5 milliGRAM(s) Oral every 12 hours  colchicine 0.6 milliGRAM(s) Oral two times a day  folic acid 1 milliGRAM(s) Oral daily  gabapentin 300 milliGRAM(s) Oral at bedtime  hydrALAZINE 25 milliGRAM(s) Oral every 12 hours  HYDROmorphone PCA (1 mG/mL) 30 milliLiter(s) PCA Continuous PCA Continuous  hydroxyurea 1500 milliGRAM(s) Oral daily  multivitamin 1 Tablet(s) Oral daily  pantoprazole    Tablet 40 milliGRAM(s) Oral before breakfast  polyethylene glycol 3350 17 Gram(s) Oral daily  senna 2 Tablet(s) Oral at bedtime  sodium chloride 0.45%. 1000 milliLiter(s) (75 mL/Hr) IV Continuous <Continuous>    MEDICATIONS  (PRN):  acetaminophen     Tablet .. 650 milliGRAM(s) Oral every 6 hours PRN Temp greater or equal to 38C (100.4F), Mild Pain (1 - 3)      CAPILLARY BLOOD GLUCOSE        I&O's Summary    02 Mar 2022 07:01  -  03 Mar 2022 07:00  --------------------------------------------------------  IN: 2080 mL / OUT: 2100 mL / NET: -20 mL        PHYSICAL EXAM:  Vital Signs Last 24 Hrs  T(C): 36.7 (03 Mar 2022 04:51), Max: 36.9 (02 Mar 2022 15:10)  T(F): 98 (03 Mar 2022 04:51), Max: 98.5 (02 Mar 2022 21:09)  HR: 75 (03 Mar 2022 04:51) (75 - 99)  BP: 158/93 (03 Mar 2022 04:51) (147/73 - 163/66)  BP(mean): --  RR: 18 (03 Mar 2022 04:51) (18 - 18)  SpO2: 96% (03 Mar 2022 04:51) (96% - 98%)      GENERAL: NAD, well-developed  HEAD:  Atraumatic, Normocephalic  EYES: EOMI, PERRL, conjunctiva and sclera clear  NECK: Supple, No JVD  CHEST/LUNG: Clear to auscultation bilaterally; No wheeze  HEART: Regular rate and rhythm; No murmurs, rubs, or gallops  ABDOMEN: Soft, Nontender, Nondistended; Bowel sounds present  EXTREMITIES:  2+ Peripheral Pulses, No clubbing, cyanosis, or edema  R knee no erythema + edema improving  full ROM. TTP R leg +1 pitting edema > L  NEUROLOGY: AAOx3; non-focal  SKIN: No rashes or lesions    LABS:                        7.2    8.75  )-----------( 396      ( 03 Mar 2022 06:56 )             22.3     03-02    134<L>  |  106  |  23  ----------------------------<  132<H>  4.6   |  19<L>  |  1.17    Ca    9.7      02 Mar 2022 07:43                RADIOLOGY & ADDITIONAL TESTS:    Imaging Personally Reviewed:    Consultant(s) Notes Reviewed:      Care Discussed with Consultants/Other Providers:  mal Live

## 2022-03-03 NOTE — PROGRESS NOTE ADULT - PROBLEM SELECTOR PLAN 2
patient on hydralazine at home, c/w hydralazine 25mg bid, uptitrate to TID if neededm can ask norvasc if SBP >160  continue to monitor

## 2022-03-03 NOTE — PROGRESS NOTE ADULT - SUBJECTIVE AND OBJECTIVE BOX
Subjective: Patient seen and examined. No new events except as noted.   Resting comfortably.  OOB to chair encouraged.     REVIEW OF SYSTEMS:    CONSTITUTIONAL: + weakness, fevers or chills  EYES/ENT: No visual changes;  No vertigo or throat pain   NECK: No pain or stiffness  RESPIRATORY: No cough, wheezing, hemoptysis; No shortness of breath  CARDIOVASCULAR: No chest pain or palpitations  GASTROINTESTINAL: No abdominal or epigastric pain. No nausea, vomiting, or hematemesis; No diarrhea or constipation. No melena or hematochezia.  GENITOURINARY: No dysuria, frequency or hematuria  NEUROLOGICAL: No numbness or weakness  SKIN: No itching, burning, rashes, or lesions   All other review of systems is negative unless indicated above.    MEDICATIONS:  MEDICATIONS  (STANDING):  apixaban 5 milliGRAM(s) Oral every 12 hours  colchicine 0.6 milliGRAM(s) Oral two times a day  folic acid 1 milliGRAM(s) Oral daily  gabapentin 300 milliGRAM(s) Oral at bedtime  hydrALAZINE 25 milliGRAM(s) Oral every 12 hours  HYDROmorphone PCA (1 mG/mL) 30 milliLiter(s) PCA Continuous PCA Continuous  hydroxyurea 1500 milliGRAM(s) Oral daily  multivitamin 1 Tablet(s) Oral daily  pantoprazole    Tablet 40 milliGRAM(s) Oral before breakfast  polyethylene glycol 3350 17 Gram(s) Oral daily  senna 2 Tablet(s) Oral at bedtime  sodium chloride 0.45%. 1000 milliLiter(s) (75 mL/Hr) IV Continuous <Continuous>      PHYSICAL EXAM:  T(C): 36.7 (03-03-22 @ 04:51), Max: 36.9 (03-02-22 @ 15:10)  HR: 75 (03-03-22 @ 04:51) (75 - 99)  BP: 158/93 (03-03-22 @ 04:51) (147/73 - 163/66)  RR: 18 (03-03-22 @ 04:51) (18 - 18)  SpO2: 96% (03-03-22 @ 04:51) (96% - 98%)  Wt(kg): --  I&O's Summary    02 Mar 2022 07:01  -  03 Mar 2022 07:00  --------------------------------------------------------  IN: 2080 mL / OUT: 2100 mL / NET: -20 mL    Appearance: NAD  HEENT: Normal oral mucosa, PERRL, EOMI	  Lymphatic: No lymphadenopathy , no edema  Cardiovascular: Normal S1 S2, No JVD, No murmurs , Peripheral pulses palpable 2+ bilaterally  Respiratory: Lungs clear to auscultation, normal effort 	  Gastrointestinal:  Soft, Non-tender, + BS	  Skin: No rashes, No ecchymoses, No cyanosis, warm to touch  Musculoskeletal: decreased ROM/strength due to pain  Psychiatry:  Mood & affect appropriate  Ext: R knee mild swelling    LABS:    CARDIAC MARKERS:                          7.2    8.75  )-----------( 396      ( 03 Mar 2022 06:56 )             22.3     03-02    134<L>  |  106  |  23  ----------------------------<  132<H>  4.6   |  19<L>  |  1.17    Ca    9.7      02 Mar 2022 07:43      proBNP:   Lipid Profile:   HgA1c:   TSH:     TELEMETRY: 	    ECG:  	  RADIOLOGY:   DIAGNOSTIC TESTING:  [ ] Echocardiogram:  [ ]  Catheterization:  [ ] Stress Test:    OTHER:

## 2022-03-03 NOTE — PROGRESS NOTE ADULT - PROBLEM SELECTOR PLAN 1
- Patient is s/p arthrocentesis in the ED. gram stain of fluid negative for PMN bacteria  - Fluid studies consistent with goat- crystals, monosodium urate  - rheum consulted greatly appreciate their recommendations- s/p solumedrol 20mg IV x 2 with significant improvement in pain; d/w DR. Zapata Colchicine .6mg x2 given, continue for three more daysm follow up for long term recs for possible urate lowering therapy  - ortho c/s appreciated  - patient declined Xrays

## 2022-03-03 NOTE — PROGRESS NOTE ADULT - ASSESSMENT
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   -right knee intraarticular steroid injection today  -cw colchicine 0.6mg bid for 3 days, then colchicine 0.6mg daily  -given severity of initial gout flare, may consider urate lowering therapy as outpatient after resolution of present gout flare - please check HLA B [id number] (send out test) given race.    Dr. Iwona Zapata  Rheumatology Attending  [phone]  [email or web address]

## 2022-03-04 LAB
ANION GAP SERPL CALC-SCNC: 12 MMOL/L — SIGNIFICANT CHANGE UP (ref 5–17)
ANION GAP SERPL CALC-SCNC: 12 MMOL/L — SIGNIFICANT CHANGE UP (ref 5–17)
APTT BLD: 32.5 SEC — SIGNIFICANT CHANGE UP (ref 27.5–35.5)
BUN SERPL-MCNC: 18 MG/DL — SIGNIFICANT CHANGE UP (ref 7–23)
BUN SERPL-MCNC: 19 MG/DL — SIGNIFICANT CHANGE UP (ref 7–23)
CALCIUM SERPL-MCNC: 10 MG/DL — SIGNIFICANT CHANGE UP (ref 8.4–10.5)
CALCIUM SERPL-MCNC: 10.3 MG/DL — SIGNIFICANT CHANGE UP (ref 8.4–10.5)
CHLORIDE SERPL-SCNC: 107 MMOL/L — SIGNIFICANT CHANGE UP (ref 96–108)
CHLORIDE SERPL-SCNC: 108 MMOL/L — SIGNIFICANT CHANGE UP (ref 96–108)
CK MB CFR SERPL CALC: 1.6 NG/ML — SIGNIFICANT CHANGE UP (ref 0–6.7)
CK SERPL-CCNC: 41 U/L — SIGNIFICANT CHANGE UP (ref 30–200)
CO2 SERPL-SCNC: 18 MMOL/L — LOW (ref 22–31)
CO2 SERPL-SCNC: 20 MMOL/L — LOW (ref 22–31)
CREAT SERPL-MCNC: 1.1 MG/DL — SIGNIFICANT CHANGE UP (ref 0.5–1.3)
CREAT SERPL-MCNC: 1.1 MG/DL — SIGNIFICANT CHANGE UP (ref 0.5–1.3)
EGFR: 81 ML/MIN/1.73M2 — SIGNIFICANT CHANGE UP
EGFR: 81 ML/MIN/1.73M2 — SIGNIFICANT CHANGE UP
FLUID SEGMENTED GRANULOCYTES: 90 % — SIGNIFICANT CHANGE UP
GLUCOSE SERPL-MCNC: 122 MG/DL — HIGH (ref 70–99)
GLUCOSE SERPL-MCNC: 158 MG/DL — HIGH (ref 70–99)
GRAM STN FLD: SIGNIFICANT CHANGE UP
HCT VFR BLD CALC: 23.1 % — LOW (ref 39–50)
HCT VFR BLD CALC: 24.6 % — LOW (ref 39–50)
HGB BLD-MCNC: 7.7 G/DL — LOW (ref 13–17)
HGB BLD-MCNC: 7.9 G/DL — LOW (ref 13–17)
INR BLD: 1.31 RATIO — HIGH (ref 0.88–1.16)
LYMPHOCYTES # FLD: 5 % — SIGNIFICANT CHANGE UP
MAGNESIUM SERPL-MCNC: 1.6 MG/DL — SIGNIFICANT CHANGE UP (ref 1.6–2.6)
MCHC RBC-ENTMCNC: 31.6 PG — SIGNIFICANT CHANGE UP (ref 27–34)
MCHC RBC-ENTMCNC: 32.1 GM/DL — SIGNIFICANT CHANGE UP (ref 32–36)
MCHC RBC-ENTMCNC: 32.5 PG — SIGNIFICANT CHANGE UP (ref 27–34)
MCHC RBC-ENTMCNC: 33.3 GM/DL — SIGNIFICANT CHANGE UP (ref 32–36)
MCV RBC AUTO: 97.5 FL — SIGNIFICANT CHANGE UP (ref 80–100)
MCV RBC AUTO: 98.4 FL — SIGNIFICANT CHANGE UP (ref 80–100)
MONOS+MACROS # FLD: 5 % — SIGNIFICANT CHANGE UP
NRBC # BLD: 17 /100 WBCS — HIGH (ref 0–0)
NRBC # BLD: 18 /100 WBCS — HIGH (ref 0–0)
PHOSPHATE SERPL-MCNC: 3.2 MG/DL — SIGNIFICANT CHANGE UP (ref 2.5–4.5)
PLATELET # BLD AUTO: 616 K/UL — HIGH (ref 150–400)
PLATELET # BLD AUTO: 620 K/UL — HIGH (ref 150–400)
POTASSIUM SERPL-MCNC: 4.4 MMOL/L — SIGNIFICANT CHANGE UP (ref 3.5–5.3)
POTASSIUM SERPL-MCNC: 4.5 MMOL/L — SIGNIFICANT CHANGE UP (ref 3.5–5.3)
POTASSIUM SERPL-SCNC: 4.4 MMOL/L — SIGNIFICANT CHANGE UP (ref 3.5–5.3)
POTASSIUM SERPL-SCNC: 4.5 MMOL/L — SIGNIFICANT CHANGE UP (ref 3.5–5.3)
PROTHROM AB SERPL-ACNC: 15.1 SEC — HIGH (ref 10.5–13.4)
RBC # BLD: 2.37 M/UL — LOW (ref 4.2–5.8)
RBC # BLD: 2.5 M/UL — LOW (ref 4.2–5.8)
RBC # BLD: 2.5 M/UL — LOW (ref 4.2–5.8)
RBC # FLD: 19.7 % — HIGH (ref 10.3–14.5)
RBC # FLD: 20 % — HIGH (ref 10.3–14.5)
RETICS #: 117.3 K/UL — SIGNIFICANT CHANGE UP (ref 25–125)
RETICS/RBC NFR: 4.7 % — HIGH (ref 0.5–2.5)
SODIUM SERPL-SCNC: 137 MMOL/L — SIGNIFICANT CHANGE UP (ref 135–145)
SODIUM SERPL-SCNC: 140 MMOL/L — SIGNIFICANT CHANGE UP (ref 135–145)
SPECIMEN SOURCE: SIGNIFICANT CHANGE UP
TROPONIN T, HIGH SENSITIVITY RESULT: 37 NG/L — SIGNIFICANT CHANGE UP (ref 0–51)
WBC # BLD: 8.23 K/UL — SIGNIFICANT CHANGE UP (ref 3.8–10.5)
WBC # BLD: 8.45 K/UL — SIGNIFICANT CHANGE UP (ref 3.8–10.5)
WBC # FLD AUTO: 8.23 K/UL — SIGNIFICANT CHANGE UP (ref 3.8–10.5)
WBC # FLD AUTO: 8.45 K/UL — SIGNIFICANT CHANGE UP (ref 3.8–10.5)

## 2022-03-04 PROCEDURE — 93010 ELECTROCARDIOGRAM REPORT: CPT | Mod: 76

## 2022-03-04 PROCEDURE — 99232 SBSQ HOSP IP/OBS MODERATE 35: CPT

## 2022-03-04 PROCEDURE — 99233 SBSQ HOSP IP/OBS HIGH 50: CPT

## 2022-03-04 RX ORDER — METOPROLOL TARTRATE 50 MG
5 TABLET ORAL ONCE
Refills: 0 | Status: DISCONTINUED | OUTPATIENT
Start: 2022-03-04 | End: 2022-03-04

## 2022-03-04 RX ORDER — METOPROLOL TARTRATE 50 MG
5 TABLET ORAL ONCE
Refills: 0 | Status: COMPLETED | OUTPATIENT
Start: 2022-03-04 | End: 2022-03-04

## 2022-03-04 RX ORDER — METOPROLOL TARTRATE 50 MG
25 TABLET ORAL ONCE
Refills: 0 | Status: COMPLETED | OUTPATIENT
Start: 2022-03-04 | End: 2022-03-04

## 2022-03-04 RX ORDER — COLCHICINE 0.6 MG
0.6 TABLET ORAL DAILY
Refills: 0 | Status: DISCONTINUED | OUTPATIENT
Start: 2022-03-06 | End: 2022-03-09

## 2022-03-04 RX ORDER — METOPROLOL TARTRATE 50 MG
25 TABLET ORAL THREE TIMES A DAY
Refills: 0 | Status: DISCONTINUED | OUTPATIENT
Start: 2022-03-04 | End: 2022-03-05

## 2022-03-04 RX ORDER — HYDRALAZINE HCL 50 MG
25 TABLET ORAL EVERY 8 HOURS
Refills: 0 | Status: DISCONTINUED | OUTPATIENT
Start: 2022-03-04 | End: 2022-03-09

## 2022-03-04 RX ORDER — SODIUM CHLORIDE 9 MG/ML
250 INJECTION INTRAMUSCULAR; INTRAVENOUS; SUBCUTANEOUS ONCE
Refills: 0 | Status: COMPLETED | OUTPATIENT
Start: 2022-03-04 | End: 2022-03-04

## 2022-03-04 RX ADMIN — HYDROMORPHONE HYDROCHLORIDE 30 MILLILITER(S): 2 INJECTION INTRAMUSCULAR; INTRAVENOUS; SUBCUTANEOUS at 19:34

## 2022-03-04 RX ADMIN — Medication 25 MILLIGRAM(S): at 22:22

## 2022-03-04 RX ADMIN — SODIUM CHLORIDE 250 MILLILITER(S): 9 INJECTION INTRAMUSCULAR; INTRAVENOUS; SUBCUTANEOUS at 20:32

## 2022-03-04 RX ADMIN — Medication 1 TABLET(S): at 11:32

## 2022-03-04 RX ADMIN — PANTOPRAZOLE SODIUM 40 MILLIGRAM(S): 20 TABLET, DELAYED RELEASE ORAL at 05:58

## 2022-03-04 RX ADMIN — APIXABAN 5 MILLIGRAM(S): 2.5 TABLET, FILM COATED ORAL at 18:58

## 2022-03-04 RX ADMIN — Medication 1 MILLIGRAM(S): at 11:32

## 2022-03-04 RX ADMIN — HYDROMORPHONE HYDROCHLORIDE 30 MILLILITER(S): 2 INJECTION INTRAMUSCULAR; INTRAVENOUS; SUBCUTANEOUS at 07:01

## 2022-03-04 RX ADMIN — HYDROXYUREA 1500 MILLIGRAM(S): 500 CAPSULE ORAL at 11:35

## 2022-03-04 RX ADMIN — POLYETHYLENE GLYCOL 3350 17 GRAM(S): 17 POWDER, FOR SOLUTION ORAL at 11:33

## 2022-03-04 RX ADMIN — Medication 5 MILLIGRAM(S): at 23:18

## 2022-03-04 RX ADMIN — GABAPENTIN 300 MILLIGRAM(S): 400 CAPSULE ORAL at 22:22

## 2022-03-04 RX ADMIN — Medication 0.6 MILLIGRAM(S): at 05:58

## 2022-03-04 RX ADMIN — Medication 25 MILLIGRAM(S): at 05:58

## 2022-03-04 RX ADMIN — Medication 0.6 MILLIGRAM(S): at 18:26

## 2022-03-04 RX ADMIN — HYDROMORPHONE HYDROCHLORIDE 30 MILLILITER(S): 2 INJECTION INTRAMUSCULAR; INTRAVENOUS; SUBCUTANEOUS at 05:02

## 2022-03-04 RX ADMIN — Medication 25 MILLIGRAM(S): at 19:30

## 2022-03-04 RX ADMIN — Medication 25 MILLIGRAM(S): at 14:53

## 2022-03-04 RX ADMIN — APIXABAN 5 MILLIGRAM(S): 2.5 TABLET, FILM COATED ORAL at 05:58

## 2022-03-04 NOTE — PROGRESS NOTE ADULT - SUBJECTIVE AND OBJECTIVE BOX
INTERVAL HPI/OVERNIGHT EVENTS:  Patients right knee is feeling much better, less swollen and more mobile    MEDICATIONS  (STANDING):  apixaban 5 milliGRAM(s) Oral every 12 hours  colchicine 0.6 milliGRAM(s) Oral two times a day  folic acid 1 milliGRAM(s) Oral daily  gabapentin 300 milliGRAM(s) Oral at bedtime  hydrALAZINE 25 milliGRAM(s) Oral every 8 hours  HYDROmorphone PCA (1 mG/mL) 30 milliLiter(s) PCA Continuous PCA Continuous  hydroxyurea 1500 milliGRAM(s) Oral daily  multivitamin 1 Tablet(s) Oral daily  pantoprazole    Tablet 40 milliGRAM(s) Oral before breakfast  polyethylene glycol 3350 17 Gram(s) Oral daily  senna 2 Tablet(s) Oral at bedtime  sodium chloride 0.45%. 1000 milliLiter(s) (75 mL/Hr) IV Continuous <Continuous>    MEDICATIONS  (PRN):  acetaminophen     Tablet .. 650 milliGRAM(s) Oral every 6 hours PRN Temp greater or equal to 38C (100.4F), Mild Pain (1 - 3)        Vital Signs Last 24 Hrs  T(C): 36.8 (04 Mar 2022 21:21), Max: 36.8 (04 Mar 2022 00:15)  T(F): 98.2 (04 Mar 2022 21:21), Max: 98.3 (04 Mar 2022 00:15)  HR: 137 (04 Mar 2022 23:16) (81 - 138)  BP: 130/91 (04 Mar 2022 23:16) (128/87 - 162/91)  BP(mean): --  RR: 18 (04 Mar 2022 21:21) (18 - 18)  SpO2: 97% (04 Mar 2022 21:21) (93% - 97%)    PHYSICAL EXAMINATION:  Constitutional: nad  Musculoskeletal: significantly improved swelling of right knee, no TTP  improvement in swelling of bilateral ankles and dorsum of feet  Skin:  no erythema  Psychiatric: aaox3    LABS:                        7.7    8.45  )-----------( 620      ( 04 Mar 2022 19:06 )             23.1     03-04    137  |  107  |  19  ----------------------------<  158<H>  4.4   |  18<L>  |  1.10    Ca    10.0      04 Mar 2022 19:06  Phos  3.2     03-04  Mg     1.6     03-04      Total Nucleated Cell Count, Body Fluid: 4417: Reference Ranges:   Crystals, Synovial Fluid (02.26.22 @ 16:18)   Synovial Crystals Clarity: Turbid   Synovial Crystals Tube: Sterile Screw Top   Synovial Crystals Color: Orange   Synovial Crystals Id: Crystals Present few intracellular msu (monosodium urate) crystals present

## 2022-03-04 NOTE — DIETITIAN INITIAL EVALUATION ADULT. - OTHER CALCULATIONS
Dosing wt 225.5 pounds used for calorie needs calculation. IBW+10% 189.2 pounds used for protein needs calculations. Defer fluids for team per fluid accumulation status

## 2022-03-04 NOTE — PROGRESS NOTE ADULT - PROBLEM SELECTOR PLAN 4
- acute R prox DVT  Partially compressible mid and distal right femoral vein and popliteal vein consistent with deep venous thrombosis; thrombus is of indeterminate age, however likely subacute or chronic.-   as per patient and heme patient had has this for a couple weeks prior and also last year  switched to apixaban 5mg bid which is home dose - acute R prox DVT  Partially compressible mid and distal right femoral vein and popliteal vein consistent with deep venous thrombosis; thrombus is of indeterminate age, however likely subacute or chronic.-   as per patient and heme patient had has this for a couple weeks prior and also last year  switched to apixaban 5mg bid which is home dose  - Lupus AC neg as per heme, can pursue hypercoag w/u as an opt

## 2022-03-04 NOTE — PROGRESS NOTE ADULT - ASSESSMENT
52 y/o m--patient -patient with a history of sickle cell disease, essential HTN presenting with knee pain     Sickle Cell Disease  --Under care by Dr. Javier Lincoln of [facility]  --Currently on Hydroxyurea 1500 mg PO daily and on Folic Acid 1 mg daily  --Receives PRBC transfusions as needed at [facility] - with Hgb 7.2, no indication to transfuse at this time  --Please transfuse PRBC's if Hgb <7.0 grams  --Continue pain meds as indicated - patient may resume home meds when ready for discharge if adequate    Right knee pain   -- Diagnosed by Rheumatology with acute flare of gout  -- S/P arthrocentesis 3/3/2022  --Methylprednisolone injected into joint  --IV Solumedrol being changed to colchicine  --Rheumatology recommendations appreciated    Osteonecrosis of knees   --likely secondary to vaso-occlusive disease  --Management per Orthopedics  --No urgent intervention  --Patient recommended to follow up after discharge     DVT  --subacute vs chronic  --Lupus AC sent, results are pending  --on Eliquis 5 mg q12 hours    Patient will follow up with his outpatient Hematologist  Dr. Javier Lincoln at [facility] after discharge.    Thank you for the opportunity to participate in Mr. rAthur's care.    Lyle Pandya PA-C  Hematology/Oncology  [facility]   [phone] (office)  [phone] (alt office)  Evenings and weekends please call MD on call or office

## 2022-03-04 NOTE — DIETITIAN NUTRITION RISK NOTIFICATION - TREATMENT: THE FOLLOWING DIET HAS BEEN RECOMMENDED
Diet, Regular:   Low Sodium  No Shellfish  Supplement Feeding Modality:  Oral  Glucerna Shake Cans or Servings Per Day:  1       Frequency:  Daily (03-04-22 @ 13:41) [Active]

## 2022-03-04 NOTE — PROGRESS NOTE ADULT - SUBJECTIVE AND OBJECTIVE BOX
Patient is a 51y old  Male who presents with a chief complaint of S/P discharge from [facility] with 3 weeks of RIGHT knee pain, sickle cell crisis (03 Mar 2022 20:06)      SUBJECTIVE / OVERNIGHT EVENTS:    feels better. pain is well controlled on pca. + BM. r knee pain a lot better after injection.    ROS:  14 point ROS negative in detail except stated as above    MEDICATIONS  (STANDING):  apixaban 5 milliGRAM(s) Oral every 12 hours  colchicine 0.6 milliGRAM(s) Oral two times a day  folic acid 1 milliGRAM(s) Oral daily  gabapentin 300 milliGRAM(s) Oral at bedtime  hydrALAZINE 25 milliGRAM(s) Oral every 8 hours  HYDROmorphone PCA (1 mG/mL) 30 milliLiter(s) PCA Continuous PCA Continuous  hydroxyurea 1500 milliGRAM(s) Oral daily  multivitamin 1 Tablet(s) Oral daily  pantoprazole    Tablet 40 milliGRAM(s) Oral before breakfast  polyethylene glycol 3350 17 Gram(s) Oral daily  senna 2 Tablet(s) Oral at bedtime  sodium chloride 0.45%. 1000 milliLiter(s) (75 mL/Hr) IV Continuous <Continuous>    MEDICATIONS  (PRN):  acetaminophen     Tablet .. 650 milliGRAM(s) Oral every 6 hours PRN Temp greater or equal to 38C (100.4F), Mild Pain (1 - 3)      CAPILLARY BLOOD GLUCOSE        I&O's Summary    03 Mar 2022 07:01  -  04 Mar 2022 07:00  --------------------------------------------------------  IN: 840 mL / OUT: 1700 mL / NET: -860 mL    04 Mar 2022 07:01  -  04 Mar 2022 10:09  --------------------------------------------------------  IN: 0 mL / OUT: 650 mL / NET: -650 mL        PHYSICAL EXAM:  Vital Signs Last 24 Hrs  T(C): 36.4 (04 Mar 2022 04:05), Max: 36.9 (03 Mar 2022 18:28)  T(F): 97.6 (04 Mar 2022 04:05), Max: 98.4 (03 Mar 2022 18:28)  HR: 81 (04 Mar 2022 04:05) (81 - 95)  BP: 157/83 (04 Mar 2022 04:05) (157/83 - 167/87)  BP(mean): --  RR: 18 (04 Mar 2022 04:05) (18 - 18)  SpO2: 95% (04 Mar 2022 04:05) (93% - 100%)    GENERAL: NAD, well-developed  HEAD:  Atraumatic, Normocephalic  EYES: EOMI, PERRL, conjunctiva and sclera clear  NECK: Supple, No JVD  CHEST/LUNG: Clear to auscultation bilaterally; No wheeze  HEART: Regular rate and rhythm; No murmurs, rubs, or gallops  ABDOMEN: Soft, Nontender, Nondistended; Bowel sounds present  EXTREMITIES:  2+ Peripheral Pulses, No clubbing, cyanosis, or edema  R knee no erythema + edema improving  full ROM.   NEUROLOGY: AAOx3; non-focal  SKIN: No rashes or lesions      LABS:                        7.2    8.75  )-----------( 396      ( 03 Mar 2022 06:56 )             22.3                     RADIOLOGY & ADDITIONAL TESTS:    Imaging Personally Reviewed:    Consultant(s) Notes Reviewed:      Care Discussed with Consultants/Other Providers:  med NP

## 2022-03-04 NOTE — CHART NOTE - NSCHARTNOTEFT_GEN_A_CORE
Medicine NP note     51y old  Male who presents with a chief complaint of S/P discharge from [facility] with 3 weeks of RIGHT knee pain, sickle cell crisis (04 Mar 2022 10:08)    Vital Signs Last 24 Hrs  T(C): 36.8 (04 Mar 2022 17:05), Max: 36.8 (03 Mar 2022 20:52)  T(F): 98.2 (04 Mar 2022 17:05), Max: 98.3 (04 Mar 2022 00:15)  HR: 134 (04 Mar 2022 17:05) (81 - 136)  BP: 128/87 (04 Mar 2022 17:05) (128/87 - 167/87)  BP(mean): --  RR: 18 (04 Mar 2022 17:05) (18 - 18)  SpO2: 96% (04 Mar 2022 17:05) (93% - 97%)     Today called by RN for  and pt. was found to be asymptomatic without CP, denies any SOB.  Pt. had IV site changed and pain meds via PCA in progress after restart. Pt. remains with pain 8/10; and denies any ongoing SOB, CP.  12 lead EKG done with  sinus tach. vs aflutter 2:1. Labs drawn and cardiac enzymes sent stat with troponin result  - 37  Discussed results with Dr. Sellers  / Dr. Drew.  -Cardiac enzymes   -250ml bolus NS   -CTA pending to r/o acute chest syndrome  -c/w PCA  -Metoprolol 25 mg po with results pending.      call placed to Dr. Cruz and awaiting CTA  - cards consult called and awaiting tele transfer     SHARI Gunter NP  [phone]

## 2022-03-04 NOTE — DIETITIAN INITIAL EVALUATION ADULT. - PERTINENT MEDS FT
MEDICATIONS  (STANDING):  apixaban 5 milliGRAM(s) Oral every 12 hours  colchicine 0.6 milliGRAM(s) Oral two times a day  folic acid 1 milliGRAM(s) Oral daily  gabapentin 300 milliGRAM(s) Oral at bedtime  hydrALAZINE 25 milliGRAM(s) Oral every 8 hours  HYDROmorphone PCA (1 mG/mL) 30 milliLiter(s) PCA Continuous PCA Continuous  hydroxyurea 1500 milliGRAM(s) Oral daily  multivitamin 1 Tablet(s) Oral daily  pantoprazole    Tablet 40 milliGRAM(s) Oral before breakfast  polyethylene glycol 3350 17 Gram(s) Oral daily  senna 2 Tablet(s) Oral at bedtime  sodium chloride 0.45%. 1000 milliLiter(s) (75 mL/Hr) IV Continuous <Continuous>    MEDICATIONS  (PRN):  acetaminophen     Tablet .. 650 milliGRAM(s) Oral every 6 hours PRN Temp greater or equal to 38C (100.4F), Mild Pain (1 - 3)

## 2022-03-04 NOTE — PROGRESS NOTE ADULT - PROBLEM SELECTOR PLAN 1
- Patient is s/p arthrocentesis in the ED. gram stain of fluid negative for PMN bacteria  - s/p intra articular steroid injection 3/3   - Fluid studies consistent with goat- crystals, monosodium urate  - rheum consulted greatly appreciate their recommendations- s/p solumedrol 20mg IV x 2 with significant improvement in pain; d/w DR. Zapata Colchicine .6mg x2 given, continue for three more days and colchicine .6mg daily  - opt rheum follow up for urate lowering therapy   - ortho c/s appreciated  - patient declined Xrays - Patient is s/p arthrocentesis in the ED. gram stain of fluid negative for PMN bacteria  - s/p intra articular steroid injection 3/3   - Fluid studies consistent with goat- crystals, monosodium urate  - rheum consulted greatly appreciate their recommendations- s/p solumedrol 20mg IV x 2 with significant improvement in pain; d/w DR. Zapata Colchicine .6mg x2 given, continue for three more days and colchicine .6mg daily  - opt rheum follow up for urate lowering therapy   - ortho c/s appreciated  - patient declined Xrays  - HLA sent

## 2022-03-04 NOTE — DIETITIAN INITIAL EVALUATION ADULT. - ORAL INTAKE PTA/DIET HISTORY
Pt interviewed at bedside, wife present as well. Reports decreased appetite PTA x2 weeks, not following specific therapeutic diet PTA except for low sodium diet. Confirms food allergy to SHELLFISH, noted in chart. Pt reports use of dietary supplements/micronutrients PTA: folic acid, multivitamin and zinc. Pt denies history of chewing/swallowing difficulty.

## 2022-03-04 NOTE — DIETITIAN INITIAL EVALUATION ADULT. - PHYSCIAL ASSESSMENT
131% IBW  Skin: no noted pressure injuries as per flowsheets; abrasion on left lower leg as per flowsheets   Performed nutrition focused physical exam with pt's consent and noted xx signs of muscle/fat loss 131% IBW  Skin: no noted pressure injuries as per flowsheets; abrasion on left lower leg as per flowsheets   Performed nutrition focused physical exam with pt's consent and noted no signs of muscle/fat loss

## 2022-03-04 NOTE — DIETITIAN INITIAL EVALUATION ADULT. - REASON FOR ADMISSION
Pt is 52 y/o M with PMH as per chart: "51 M with SS disease, HTN, presented with SS crisis, right knee swelling, s/p arthrocentesis in the ED with acute gouty attack"

## 2022-03-04 NOTE — DIETITIAN INITIAL EVALUATION ADULT. - OTHER INFO
Pt reports improved appetite with ~50% intake of meals in house, states it was 0% consumption and now improved. Denies current chewing/swallowing difficulty.     Pt reports nausea this am, reports having nausea for ~1 week. Pt denies vomiting, constipation, diarrhea. Reports last BM this am, Pt currently on bowel regimens (Miralax and senna).    Per EMR, pt currently ordered for Multivitamin and folic acid; receiving sodium chloride 0.45%. 1000 milliLiter(s) (75 mL/Hr) IV Continuous     Reports  pounds, states wt was measured at doctor's office per last visit ~2 weeks ago.   Noted pt upon admission 238 pounds; dosing wt 225.5 pounds; possible 24.5 pounds wt loss (~10% wt loss x 2 weeks)    - Encouraged adequate PO intake for meeting nutritional needs, improving nutritional status and healing and for preventing wt loss   - Discussed low sodium diet; reviewed foods high in Na and cholesterol to avoid. Reviewed ways to decrease Na in your diet, discussed meal and snack options, tips for eating out; provided written handout to Pt  - Food preferences explored and documented. Pt made aware RD remains available.

## 2022-03-04 NOTE — PROGRESS NOTE ADULT - PROBLEM SELECTOR PLAN 3
- with vasoocclusive crisis; on demand 2mg/lockout 30min/4hr limit 6mg  - no IV benadryl, PO ok   - c/w 1/2NS 75cc/hr  - c/w incentive spirometer   - Continue with MVI and Folic Acid.   -c/w home hydrea   - c/w bowel regimen - with vasoocclusive crisis; on demand 2mg/lockout 30min/4hr limit 6mg  - no IV benadryl, PO ok   - c/w 1/2NS 75cc/hr  - c/w incentive spirometer   - Continue with MVI and Folic Acid.   - c/w home hydrea 1500mg po daily  - c/w bowel regimen

## 2022-03-04 NOTE — PROGRESS NOTE ADULT - SUBJECTIVE AND OBJECTIVE BOX
Patient is a 51y old  Male who presents with a chief complaint of S/P discharge from [facility] with 3 weeks of RIGHT knee pain, sickle cell crisis (04 Mar 2022 10:08)    Patient seen this morning. Knee pain and swelling better S/P arthrocentesis/steroid injection.    MEDICATIONS  (STANDING):  apixaban 5 milliGRAM(s) Oral every 12 hours  colchicine 0.6 milliGRAM(s) Oral two times a day  folic acid 1 milliGRAM(s) Oral daily  gabapentin 300 milliGRAM(s) Oral at bedtime  hydrALAZINE 25 milliGRAM(s) Oral every 8 hours  HYDROmorphone PCA (1 mG/mL) 30 milliLiter(s) PCA Continuous PCA Continuous  hydroxyurea 1500 milliGRAM(s) Oral daily  multivitamin 1 Tablet(s) Oral daily  pantoprazole    Tablet 40 milliGRAM(s) Oral before breakfast  polyethylene glycol 3350 17 Gram(s) Oral daily  senna 2 Tablet(s) Oral at bedtime  sodium chloride 0.45%. 1000 milliLiter(s) (75 mL/Hr) IV Continuous <Continuous>    MEDICATIONS  (PRN):  acetaminophen     Tablet .. 650 milliGRAM(s) Oral every 6 hours PRN Temp greater or equal to 38C (100.4F), Mild Pain (1 - 3)      ROS  No fever, sweats, chills  No epistaxis, HA, sore throat  No CP, SOB, cough, sputum  No n/v/d, abd pain, melena, hematochezia  No edema  No rash  No anxiety  Right knee still swollen but better  No bleeding, bruising  No dysuria, hematuria    Vital Signs Last 24 Hrs  T(C): 36.4 (04 Mar 2022 04:05), Max: 36.9 (03 Mar 2022 18:28)  T(F): 97.6 (04 Mar 2022 04:05), Max: 98.4 (03 Mar 2022 18:28)  HR: 81 (04 Mar 2022 04:05) (81 - 95)  BP: 157/83 (04 Mar 2022 04:05) (157/83 - 167/87)  BP(mean): --  RR: 18 (04 Mar 2022 04:05) (18 - 18)  SpO2: 95% (04 Mar 2022 04:05) (93% - 100%)    PE  NAD  Awake, alert  Anicteric, MMM  RRR  CTAB  Abd soft, NT, ND  R knee minimally swollen  No rash grossly                            7.2    8.75  )-----------( 396      ( 03 Mar 2022 06:56 )             22.3

## 2022-03-04 NOTE — PROGRESS NOTE ADULT - ASSESSMENT
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   -cw colchicine 0.6mg bid for 3 days, then colchicine 0.6mg daily x 1 week  -given severity of initial gout flare, may consider urate lowering therapy as outpatient after resolution of present gout flare - please check HLA B [id number] (send out test) given race.  -Signing off, reconsult as needed    Dr. Iwona Zapata  Rheumatology Attending  [phone]  [email or web address]

## 2022-03-04 NOTE — PROGRESS NOTE ADULT - SUBJECTIVE AND OBJECTIVE BOX
Subjective: Patient seen and examined. No new events except as noted.   s/p repeat arthrocentesis R knee   resting comfortably         REVIEW OF SYSTEMS:    CONSTITUTIONAL: No weakness, fevers or chills  EYES/ENT: No visual changes;  No vertigo or throat pain   NECK: No pain or stiffness  RESPIRATORY: No cough, wheezing, hemoptysis; No shortness of breath  CARDIOVASCULAR: No chest pain or palpitations  GASTROINTESTINAL: No abdominal or epigastric pain. No nausea, vomiting, or hematemesis; No diarrhea or constipation. No melena or hematochezia.  GENITOURINARY: No dysuria, frequency or hematuria  NEUROLOGICAL: No numbness or weakness  SKIN: No itching, burning, rashes, or lesions   All other review of systems is negative unless indicated above.    MEDICATIONS:  MEDICATIONS  (STANDING):  apixaban 5 milliGRAM(s) Oral every 12 hours  colchicine 0.6 milliGRAM(s) Oral two times a day  folic acid 1 milliGRAM(s) Oral daily  gabapentin 300 milliGRAM(s) Oral at bedtime  hydrALAZINE 25 milliGRAM(s) Oral every 8 hours  HYDROmorphone PCA (1 mG/mL) 30 milliLiter(s) PCA Continuous PCA Continuous  hydroxyurea 1500 milliGRAM(s) Oral daily  multivitamin 1 Tablet(s) Oral daily  pantoprazole    Tablet 40 milliGRAM(s) Oral before breakfast  polyethylene glycol 3350 17 Gram(s) Oral daily  senna 2 Tablet(s) Oral at bedtime  sodium chloride 0.45%. 1000 milliLiter(s) (75 mL/Hr) IV Continuous <Continuous>      PHYSICAL EXAM:  T(C): 36.4 (03-04-22 @ 04:05), Max: 36.9 (03-03-22 @ 18:28)  HR: 81 (03-04-22 @ 04:05) (81 - 95)  BP: 157/83 (03-04-22 @ 04:05) (157/83 - 167/87)  RR: 18 (03-04-22 @ 04:05) (18 - 18)  SpO2: 95% (03-04-22 @ 04:05) (93% - 100%)  Wt(kg): --  I&O's Summary    03 Mar 2022 07:01  -  04 Mar 2022 07:00  --------------------------------------------------------  IN: 840 mL / OUT: 1700 mL / NET: -860 mL    04 Mar 2022 07:01  -  04 Mar 2022 10:39  --------------------------------------------------------  IN: 0 mL / OUT: 650 mL / NET: -650 mL          Appearance: NAD  HEENT: Normal oral mucosa, PERRL, EOMI	  Lymphatic: No lymphadenopathy , no edema  Cardiovascular: Normal S1 S2, No JVD, No murmurs , Peripheral pulses palpable 2+ bilaterally  Respiratory: Lungs clear to auscultation, normal effort 	  Gastrointestinal:  Soft, Non-tender, + BS	  Skin: No rashes, No ecchymoses, No cyanosis, warm to touch  Musculoskeletal: decreased ROM/strength due to pain  Psychiatry:  Mood & affect appropriate  Ext: R knee mild swelling        LABS:    CARDIAC MARKERS:                                7.2    8.75  )-----------( 396      ( 03 Mar 2022 06:56 )             22.3           proBNP:   Lipid Profile:   HgA1c:   TSH:             TELEMETRY: 	    ECG:  	  RADIOLOGY:   DIAGNOSTIC TESTING:  [ ] Echocardiogram:  [ ]  Catheterization:  [ ] Stress Test:    OTHER:

## 2022-03-04 NOTE — DIETITIAN INITIAL EVALUATION ADULT. - RD TO REMAIN AVAILABLE
Malnutrition sticker placed, RD to follow-up as per protocol. Will continue to monitor PO intake, weight, labs, skin, GI status, diet./yes

## 2022-03-05 DIAGNOSIS — I48.92 UNSPECIFIED ATRIAL FLUTTER: ICD-10-CM

## 2022-03-05 DIAGNOSIS — R00.0 TACHYCARDIA, UNSPECIFIED: ICD-10-CM

## 2022-03-05 LAB
ANION GAP SERPL CALC-SCNC: 10 MMOL/L — SIGNIFICANT CHANGE UP (ref 5–17)
BUN SERPL-MCNC: 19 MG/DL — SIGNIFICANT CHANGE UP (ref 7–23)
CALCIUM SERPL-MCNC: 10 MG/DL — SIGNIFICANT CHANGE UP (ref 8.4–10.5)
CHLORIDE SERPL-SCNC: 109 MMOL/L — HIGH (ref 96–108)
CK MB CFR SERPL CALC: 1.7 NG/ML — SIGNIFICANT CHANGE UP (ref 0–6.7)
CK SERPL-CCNC: 43 U/L — SIGNIFICANT CHANGE UP (ref 30–200)
CO2 SERPL-SCNC: 22 MMOL/L — SIGNIFICANT CHANGE UP (ref 22–31)
CREAT SERPL-MCNC: 1.16 MG/DL — SIGNIFICANT CHANGE UP (ref 0.5–1.3)
EGFR: 76 ML/MIN/1.73M2 — SIGNIFICANT CHANGE UP
GLUCOSE SERPL-MCNC: 133 MG/DL — HIGH (ref 70–99)
HCT VFR BLD CALC: 23.6 % — LOW (ref 39–50)
HGB BLD-MCNC: 7.7 G/DL — LOW (ref 13–17)
INR BLD: 1.32 RATIO — HIGH (ref 0.88–1.16)
MCHC RBC-ENTMCNC: 32.5 PG — SIGNIFICANT CHANGE UP (ref 27–34)
MCHC RBC-ENTMCNC: 32.6 GM/DL — SIGNIFICANT CHANGE UP (ref 32–36)
MCV RBC AUTO: 99.6 FL — SIGNIFICANT CHANGE UP (ref 80–100)
NRBC # BLD: 22 /100 WBCS — HIGH (ref 0–0)
PLATELET # BLD AUTO: 588 K/UL — HIGH (ref 150–400)
POTASSIUM SERPL-MCNC: 4.5 MMOL/L — SIGNIFICANT CHANGE UP (ref 3.5–5.3)
POTASSIUM SERPL-SCNC: 4.5 MMOL/L — SIGNIFICANT CHANGE UP (ref 3.5–5.3)
PROTHROM AB SERPL-ACNC: 15.2 SEC — HIGH (ref 10.5–13.4)
RBC # BLD: 2.37 M/UL — LOW (ref 4.2–5.8)
RBC # FLD: 20.2 % — HIGH (ref 10.3–14.5)
SARS-COV-2 RNA SPEC QL NAA+PROBE: SIGNIFICANT CHANGE UP
SODIUM SERPL-SCNC: 141 MMOL/L — SIGNIFICANT CHANGE UP (ref 135–145)
TROPONIN T, HIGH SENSITIVITY RESULT: 33 NG/L — SIGNIFICANT CHANGE UP (ref 0–51)
WBC # BLD: 7.81 K/UL — SIGNIFICANT CHANGE UP (ref 3.8–10.5)
WBC # FLD AUTO: 7.81 K/UL — SIGNIFICANT CHANGE UP (ref 3.8–10.5)

## 2022-03-05 PROCEDURE — 93010 ELECTROCARDIOGRAM REPORT: CPT

## 2022-03-05 PROCEDURE — 99233 SBSQ HOSP IP/OBS HIGH 50: CPT

## 2022-03-05 PROCEDURE — 71275 CT ANGIOGRAPHY CHEST: CPT | Mod: 26

## 2022-03-05 RX ORDER — DILTIAZEM HCL 120 MG
30 CAPSULE, EXT RELEASE 24 HR ORAL ONCE
Refills: 0 | Status: COMPLETED | OUTPATIENT
Start: 2022-03-05 | End: 2022-03-05

## 2022-03-05 RX ORDER — DILTIAZEM HCL 120 MG
5 CAPSULE, EXT RELEASE 24 HR ORAL
Qty: 125 | Refills: 0 | Status: DISCONTINUED | OUTPATIENT
Start: 2022-03-05 | End: 2022-03-05

## 2022-03-05 RX ORDER — METOPROLOL TARTRATE 50 MG
5 TABLET ORAL EVERY 6 HOURS
Refills: 0 | Status: DISCONTINUED | OUTPATIENT
Start: 2022-03-05 | End: 2022-03-05

## 2022-03-05 RX ORDER — AMIODARONE HYDROCHLORIDE 400 MG/1
1 TABLET ORAL
Qty: 900 | Refills: 0 | Status: DISCONTINUED | OUTPATIENT
Start: 2022-03-05 | End: 2022-03-06

## 2022-03-05 RX ORDER — SODIUM CHLORIDE 9 MG/ML
1000 INJECTION, SOLUTION INTRAVENOUS
Refills: 0 | Status: DISCONTINUED | OUTPATIENT
Start: 2022-03-05 | End: 2022-03-06

## 2022-03-05 RX ORDER — AMIODARONE HYDROCHLORIDE 400 MG/1
150 TABLET ORAL ONCE
Refills: 0 | Status: COMPLETED | OUTPATIENT
Start: 2022-03-05 | End: 2022-03-05

## 2022-03-05 RX ORDER — METOPROLOL TARTRATE 50 MG
5 TABLET ORAL ONCE
Refills: 0 | Status: COMPLETED | OUTPATIENT
Start: 2022-03-05 | End: 2022-03-05

## 2022-03-05 RX ORDER — DILTIAZEM HCL 120 MG
20 CAPSULE, EXT RELEASE 24 HR ORAL ONCE
Refills: 0 | Status: COMPLETED | OUTPATIENT
Start: 2022-03-05 | End: 2022-03-05

## 2022-03-05 RX ADMIN — HYDROMORPHONE HYDROCHLORIDE 30 MILLILITER(S): 2 INJECTION INTRAMUSCULAR; INTRAVENOUS; SUBCUTANEOUS at 00:58

## 2022-03-05 RX ADMIN — APIXABAN 5 MILLIGRAM(S): 2.5 TABLET, FILM COATED ORAL at 05:08

## 2022-03-05 RX ADMIN — AMIODARONE HYDROCHLORIDE 33.3 MG/MIN: 400 TABLET ORAL at 21:21

## 2022-03-05 RX ADMIN — SODIUM CHLORIDE 125 MILLILITER(S): 9 INJECTION, SOLUTION INTRAVENOUS at 20:10

## 2022-03-05 RX ADMIN — AMIODARONE HYDROCHLORIDE 600 MILLIGRAM(S): 400 TABLET ORAL at 21:00

## 2022-03-05 RX ADMIN — Medication 25 MILLIGRAM(S): at 21:22

## 2022-03-05 RX ADMIN — Medication 25 MILLIGRAM(S): at 13:45

## 2022-03-05 RX ADMIN — Medication 30 MILLIGRAM(S): at 15:05

## 2022-03-05 RX ADMIN — HYDROXYUREA 1500 MILLIGRAM(S): 500 CAPSULE ORAL at 11:07

## 2022-03-05 RX ADMIN — SENNA PLUS 2 TABLET(S): 8.6 TABLET ORAL at 21:23

## 2022-03-05 RX ADMIN — HYDROMORPHONE HYDROCHLORIDE 30 MILLILITER(S): 2 INJECTION INTRAMUSCULAR; INTRAVENOUS; SUBCUTANEOUS at 04:50

## 2022-03-05 RX ADMIN — PANTOPRAZOLE SODIUM 40 MILLIGRAM(S): 20 TABLET, DELAYED RELEASE ORAL at 05:08

## 2022-03-05 RX ADMIN — Medication 1 TABLET(S): at 11:06

## 2022-03-05 RX ADMIN — HYDROMORPHONE HYDROCHLORIDE 30 MILLILITER(S): 2 INJECTION INTRAMUSCULAR; INTRAVENOUS; SUBCUTANEOUS at 07:15

## 2022-03-05 RX ADMIN — Medication 20 MILLIGRAM(S): at 11:06

## 2022-03-05 RX ADMIN — SODIUM CHLORIDE 75 MILLILITER(S): 9 INJECTION, SOLUTION INTRAVENOUS at 08:08

## 2022-03-05 RX ADMIN — Medication 0.6 MILLIGRAM(S): at 05:08

## 2022-03-05 RX ADMIN — Medication 25 MILLIGRAM(S): at 08:07

## 2022-03-05 RX ADMIN — Medication 5 MILLIGRAM(S): at 09:34

## 2022-03-05 RX ADMIN — HYDROMORPHONE HYDROCHLORIDE 30 MILLILITER(S): 2 INJECTION INTRAMUSCULAR; INTRAVENOUS; SUBCUTANEOUS at 19:21

## 2022-03-05 RX ADMIN — APIXABAN 5 MILLIGRAM(S): 2.5 TABLET, FILM COATED ORAL at 18:56

## 2022-03-05 RX ADMIN — Medication 0.6 MILLIGRAM(S): at 18:56

## 2022-03-05 RX ADMIN — GABAPENTIN 300 MILLIGRAM(S): 400 CAPSULE ORAL at 21:22

## 2022-03-05 RX ADMIN — Medication 25 MILLIGRAM(S): at 00:02

## 2022-03-05 RX ADMIN — Medication 1 MILLIGRAM(S): at 11:06

## 2022-03-05 RX ADMIN — HYDROMORPHONE HYDROCHLORIDE 30 MILLILITER(S): 2 INJECTION INTRAMUSCULAR; INTRAVENOUS; SUBCUTANEOUS at 23:52

## 2022-03-05 RX ADMIN — Medication 25 MILLIGRAM(S): at 05:08

## 2022-03-05 NOTE — PROGRESS NOTE ADULT - PROBLEM SELECTOR PLAN 3
- with vasoocclusive crisis; on demand 2mg/lockout 30min/4hr limit 6mg  - no IV benadryl, PO ok   - c/w 1/2NS 75cc/hr  - c/w incentive spirometer   - Continue with MVI and Folic Acid.   - c/w home hydrea 1500mg po daily  - c/w bowel regimen

## 2022-03-05 NOTE — PROGRESS NOTE ADULT - PROBLEM SELECTOR PLAN 4
with RVR s/p lopressor  cards on board, rec cardizem 20mg x1, will give  2d echo ordered  patient already on apixaban c/w  CTA PE protocol  may need EP on board for ablation of rates not controlled d/w patient  c/w lopressor 25mg tid titrate as per cards

## 2022-03-05 NOTE — PROGRESS NOTE ADULT - ASSESSMENT
51 M with SS disease, HTN, presented with SS crisis, right knee swelling, s/p arthrocentesis in the ED with acute gouty attack found to have atrial flutter in RVR

## 2022-03-05 NOTE — CHART NOTE - NSCHARTNOTEFT_GEN_A_CORE
MEDICINE PA NOTE    Notified by radiology of CTA Chest positive for subsegmental PE in the RUL. Patient already on Eliquis 5mg bid for acute RLE DVT found on 2/27.  Patient also in Aflutter with HR up to 140s MEDICINE PA NOTE    CTA Chest positive for subsegmental PE in the RUL. Patient already on Eliquis 5mg bid for acute RLE DVT found on 2/27.  Patient transferred to T yesterday for new Aflutter, overnight started on Lopressor 25mg po tid. Still with HR up to 140s since 8am today, s/p Lopressor 5 ivp x1 and Cardizem 20mg and 30mg ivp with no response. As per cardiology, started amio gtt and increased IV hydration. Discussed plan with medicine attending.     NAOMI Alcazar  [id number] MEDICINE PA NOTE    CTA Chest positive for subsegmental PE in the RUL. Patient already on Eliquis 5mg bid for acute RLE DVT found on 2/27.  Patient transferred to T yesterday for new rapid Afib/flutter, overnight started on Lopressor 25mg po tid. Still with HR up to 140s since 8am today, s/p Lopressor 5 ivp x1 and Cardizem 20mg and 30mg ivp with no response. As per cardiology, started amio gtt and increased IV hydration. Will continue to monitor closely on tele. Discussed plan with medicine attending.     NAOMI Alcazar  [id number]

## 2022-03-05 NOTE — PROGRESS NOTE ADULT - SUBJECTIVE AND OBJECTIVE BOX
Subjective: Patient seen and examined. No new events except as noted.   Pt with Afib/Flutter with RVR to 140s.  S/p Metoprolol 5mg IVP x1 with no response.     REVIEW OF SYSTEMS:    CONSTITUTIONAL: + weakness, fevers or chills  EYES/ENT: No visual changes;  No vertigo or throat pain   NECK: No pain or stiffness  RESPIRATORY: No cough, wheezing, hemoptysis; No shortness of breath  CARDIOVASCULAR: No chest pain or palpitations  GASTROINTESTINAL: No abdominal or epigastric pain. No nausea, vomiting, or hematemesis; No diarrhea or constipation. No melena or hematochezia.  GENITOURINARY: No dysuria, frequency or hematuria  NEUROLOGICAL: No numbness or weakness  SKIN: No itching, burning, rashes, or lesions   All other review of systems is negative unless indicated above.    MEDICATIONS:  MEDICATIONS  (STANDING):  apixaban 5 milliGRAM(s) Oral every 12 hours  colchicine 0.6 milliGRAM(s) Oral two times a day  folic acid 1 milliGRAM(s) Oral daily  gabapentin 300 milliGRAM(s) Oral at bedtime  hydrALAZINE 25 milliGRAM(s) Oral every 8 hours  HYDROmorphone PCA (1 mG/mL) 30 milliLiter(s) PCA Continuous PCA Continuous  hydroxyurea 1500 milliGRAM(s) Oral daily  metoprolol tartrate 25 milliGRAM(s) Oral three times a day  multivitamin 1 Tablet(s) Oral daily  pantoprazole    Tablet 40 milliGRAM(s) Oral before breakfast  polyethylene glycol 3350 17 Gram(s) Oral daily  senna 2 Tablet(s) Oral at bedtime  sodium chloride 0.45%. 1000 milliLiter(s) (75 mL/Hr) IV Continuous <Continuous>      PHYSICAL EXAM:  T(C): 36.7 (03-05-22 @ 08:00), Max: 36.8 (03-04-22 @ 17:05)  HR: 139 (03-05-22 @ 08:00) (131 - 139)  BP: 138/96 (03-05-22 @ 08:00) (128/87 - 161/78)  RR: 18 (03-05-22 @ 08:00) (18 - 18)  SpO2: 95% (03-05-22 @ 08:00) (95% - 97%)  Wt(kg): --  I&O's Summary    04 Mar 2022 07:01  -  05 Mar 2022 07:00  --------------------------------------------------------  IN: 0 mL / OUT: 1500 mL / NET: -1500 mL    05 Mar 2022 07:01  -  05 Mar 2022 10:14  --------------------------------------------------------  IN: 240 mL / OUT: 0 mL / NET: 240 mL      Appearance: NAD	  HEENT: Normal oral mucosa, PERRL, EOMI	  Lymphatic: No lymphadenopathy , no edema  Cardiovascular: irregular S1 S2, No JVD, No murmurs , Peripheral pulses palpable 2+ bilaterally  Respiratory: Lungs clear to auscultation, normal effort 	  Gastrointestinal:  Soft, Non-tender, + BS	  Skin: No rashes, No ecchymoses, No cyanosis, warm to touch  Musculoskeletal: Normal range of motion, normal strength  Psychiatry:  Mood & affect appropriate  Ext: No edema      LABS:    CARDIAC MARKERS:  CARDIAC MARKERS ( 05 Mar 2022 05:18 )  x     / x     / 43 U/L / x     / 1.7 ng/mL  CARDIAC MARKERS ( 04 Mar 2022 19:06 )  x     / x     / 41 U/L / x     / 1.6 ng/mL                         7.7    7.81  )-----------( 588      ( 05 Mar 2022 05:18 )             23.6     03-05    141  |  109<H>  |  19  ----------------------------<  133<H>  4.5   |  22  |  1.16    Ca    10.0      05 Mar 2022 05:18  Phos  3.2     03-04  Mg     1.6     03-04      proBNP:   Lipid Profile:   HgA1c:   TSH:     TELEMETRY: 	    ECG:  	  RADIOLOGY:   DIAGNOSTIC TESTING:  [ ] Echocardiogram:  [ ]  Catheterization:  [ ] Stress Test:    OTHER:

## 2022-03-05 NOTE — CHART NOTE - NSCHARTNOTEFT_GEN_A_CORE
51M with sickle cell disease who presented for sickle cell crisis on Feb 26. Course complicated by aflutter on March 4, HR ~130. Patient is completely asymptomatic. Mauri negative. Aflutter likely driven by current sickle cell crisis. Aflutter typically is difficult to control pharmacologically. Metoprolol IVP was unsuccessful. Informed team that Diltiazem 20-25mg IV bolus can be attempted as well. Patient is already on AC given sickle cell crisis. 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     Michelle Mccabe MD  Cardiology Fellow - PGY 4  For all New Consults and Questions:  [email or web address]   Login: [email or web address]

## 2022-03-05 NOTE — PROGRESS NOTE ADULT - PROBLEM SELECTOR PLAN 1
- Patient is s/p arthrocentesis in the ED. gram stain of fluid negative for PMN bacteria  - s/p intra articular steroid injection 3/3   - Fluid studies consistent with goat- crystals, monosodium urate  - rheum consulted greatly appreciate their recommendations- s/p solumedrol 20mg IV x 2 with significant improvement in pain; d/w DR. Zapata Colchicine .6mg x2 given, continue for three more days and colchicine .6mg daily  - opt rheum follow up for urate lowering therapy   - ortho c/s appreciated  - patient declined Xrays  - HLA sent

## 2022-03-05 NOTE — CHART NOTE - NSCHARTNOTEFT_GEN_A_CORE
50 y/o m--patient followed by Dr. Brittany Veloz at [facility]--patient with a history of sickle cell disease, essential HTN (apparently not currently on Rx--previously on HCTZ and lisinopril?), with apparent recent discharge from [facility] following a one week hospitalization following a sickle cell crisis episode with diffuse body pain and severe RIGHT knee pain, apparently for about 3 weeks with no history of trauma--admitted at [facility] with no arthrocentesis performed at the time, with patient then was discharged yesterday following a delay in referral to rehab at [facility], with patient with severe episodes of RIGHT knee pain, swelling.   Now patient with Aflutter with RVR. Patient denies CP, SOB, palpitations, lightheadedness, N/V. Prevous ACP discuss case with attending and house cardiology consulted.  Patient transfer to [facility] for telemetry monitoring, report given to floor ACP.     Emelin Reyes [place], [facility]  Medicine Department   Spectralink [id number]

## 2022-03-05 NOTE — PROGRESS NOTE ADULT - PROBLEM SELECTOR PLAN 4
transferred to tele for aflutter with RVR     - rate 140s    - s/p metoprolol 5mg IVP x1 with no response  BP stable - pt asymptomatic  give Cardizem 20mg IVP x1 now  continue to monitor on tele

## 2022-03-05 NOTE — PROGRESS NOTE ADULT - SUBJECTIVE AND OBJECTIVE BOX
Patient is a 51y old  Male who presents with a chief complaint of S/P discharge from [facility] with 3 weeks of RIGHT knee pain, sickle cell crisis (05 Mar 2022 10:14)      SUBJECTIVE / OVERNIGHT EVENTS:      tele: a flutters 130s     feels ok. no chest pain, palpitations, sob. does not realize his HR is too fast.  ROS:  14 point ROS negative in detail except stated as above    MEDICATIONS  (STANDING):  apixaban 5 milliGRAM(s) Oral every 12 hours  colchicine 0.6 milliGRAM(s) Oral two times a day  diltiazem Injectable 20 milliGRAM(s) IV Push once  folic acid 1 milliGRAM(s) Oral daily  gabapentin 300 milliGRAM(s) Oral at bedtime  hydrALAZINE 25 milliGRAM(s) Oral every 8 hours  HYDROmorphone PCA (1 mG/mL) 30 milliLiter(s) PCA Continuous PCA Continuous  hydroxyurea 1500 milliGRAM(s) Oral daily  metoprolol tartrate 25 milliGRAM(s) Oral three times a day  multivitamin 1 Tablet(s) Oral daily  pantoprazole    Tablet 40 milliGRAM(s) Oral before breakfast  polyethylene glycol 3350 17 Gram(s) Oral daily  senna 2 Tablet(s) Oral at bedtime  sodium chloride 0.45%. 1000 milliLiter(s) (75 mL/Hr) IV Continuous <Continuous>    MEDICATIONS  (PRN):  acetaminophen     Tablet .. 650 milliGRAM(s) Oral every 6 hours PRN Temp greater or equal to 38C (100.4F), Mild Pain (1 - 3)  metoprolol tartrate Injectable 5 milliGRAM(s) IV Push every 6 hours PRN HR >120 bpm      CAPILLARY BLOOD GLUCOSE        I&O's Summary    04 Mar 2022 07:01  -  05 Mar 2022 07:00  --------------------------------------------------------  IN: 0 mL / OUT: 1500 mL / NET: -1500 mL    05 Mar 2022 07:01  -  05 Mar 2022 10:36  --------------------------------------------------------  IN: 240 mL / OUT: 300 mL / NET: -60 mL        PHYSICAL EXAM:  Vital Signs Last 24 Hrs  T(C): 36.7 (05 Mar 2022 08:00), Max: 36.8 (04 Mar 2022 17:05)  T(F): 98 (05 Mar 2022 08:00), Max: 98.2 (04 Mar 2022 17:05)  HR: 139 (05 Mar 2022 08:00) (131 - 139)  BP: 138/96 (05 Mar 2022 08:00) (128/87 - 161/78)  BP(mean): --  RR: 18 (05 Mar 2022 08:00) (18 - 18)  SpO2: 95% (05 Mar 2022 08:00) (95% - 97%)    GENERAL: NAD, well-developed  HEAD:  Atraumatic, Normocephalic  EYES: EOMI, PERRL, conjunctiva and sclera clear  NECK: Supple, No JVD  CHEST/LUNG: Clear to auscultation bilaterally; No wheezes, rales or rhonchi  HEART: tachycardic No murmurs, rubs, or gallops  ABDOMEN: Soft, Nontender, Nondistended; Bowel sounds present  EXTREMITIES:  2+ Peripheral Pulses, No clubbing, cyanosis, or edema  R knee no erythema no edema improving  full ROM.   NEUROLOGY: AAOx3; non-focal  SKIN: No rashes or lesions    LABS:                        7.7    7.81  )-----------( 588      ( 05 Mar 2022 05:18 )             23.6     03-05    141  |  109<H>  |  19  ----------------------------<  133<H>  4.5   |  22  |  1.16    Ca    10.0      05 Mar 2022 05:18  Phos  3.2     03-04  Mg     1.6     03-04      PT/INR - ( 05 Mar 2022 05:18 )   PT: 15.2 sec;   INR: 1.32 ratio         PTT - ( 04 Mar 2022 19:06 )  PTT:32.5 sec  CARDIAC MARKERS ( 05 Mar 2022 05:18 )  x     / x     / 43 U/L / x     / 1.7 ng/mL  CARDIAC MARKERS ( 04 Mar 2022 19:06 )  x     / x     / 41 U/L / x     / 1.6 ng/mL          RADIOLOGY & ADDITIONAL TESTS:    Imaging Personally Reviewed:    Consultant(s) Notes Reviewed:      Care Discussed with Consultants/Other Providers:  mal BEARD Noreen

## 2022-03-06 LAB
ALBUMIN SERPL ELPH-MCNC: 3 G/DL — LOW (ref 3.3–5)
ALP SERPL-CCNC: 81 U/L — SIGNIFICANT CHANGE UP (ref 40–120)
ALT FLD-CCNC: 22 U/L — SIGNIFICANT CHANGE UP (ref 10–45)
ANION GAP SERPL CALC-SCNC: 8 MMOL/L — SIGNIFICANT CHANGE UP (ref 5–17)
AST SERPL-CCNC: 19 U/L — SIGNIFICANT CHANGE UP (ref 10–40)
BASOPHILS # BLD AUTO: 0.05 K/UL — SIGNIFICANT CHANGE UP (ref 0–0.2)
BASOPHILS NFR BLD AUTO: 0.4 % — SIGNIFICANT CHANGE UP (ref 0–2)
BILIRUB SERPL-MCNC: 0.4 MG/DL — SIGNIFICANT CHANGE UP (ref 0.2–1.2)
BUN SERPL-MCNC: 16 MG/DL — SIGNIFICANT CHANGE UP (ref 7–23)
CALCIUM SERPL-MCNC: 9.7 MG/DL — SIGNIFICANT CHANGE UP (ref 8.4–10.5)
CHLORIDE SERPL-SCNC: 106 MMOL/L — SIGNIFICANT CHANGE UP (ref 96–108)
CO2 SERPL-SCNC: 22 MMOL/L — SIGNIFICANT CHANGE UP (ref 22–31)
CREAT SERPL-MCNC: 1.05 MG/DL — SIGNIFICANT CHANGE UP (ref 0.5–1.3)
EGFR: 85 ML/MIN/1.73M2 — SIGNIFICANT CHANGE UP
EOSINOPHIL # BLD AUTO: 0.15 K/UL — SIGNIFICANT CHANGE UP (ref 0–0.5)
EOSINOPHIL NFR BLD AUTO: 1.2 % — SIGNIFICANT CHANGE UP (ref 0–6)
GLUCOSE SERPL-MCNC: 116 MG/DL — HIGH (ref 70–99)
HCT VFR BLD CALC: 24.1 % — LOW (ref 39–50)
HGB BLD-MCNC: 8 G/DL — LOW (ref 13–17)
IMM GRANULOCYTES NFR BLD AUTO: 2.7 % — HIGH (ref 0–1.5)
LYMPHOCYTES # BLD AUTO: 1.86 K/UL — SIGNIFICANT CHANGE UP (ref 1–3.3)
LYMPHOCYTES # BLD AUTO: 14.3 % — SIGNIFICANT CHANGE UP (ref 13–44)
MAGNESIUM SERPL-MCNC: 1.6 MG/DL — SIGNIFICANT CHANGE UP (ref 1.6–2.6)
MCHC RBC-ENTMCNC: 33.1 PG — SIGNIFICANT CHANGE UP (ref 27–34)
MCHC RBC-ENTMCNC: 33.2 GM/DL — SIGNIFICANT CHANGE UP (ref 32–36)
MCV RBC AUTO: 99.6 FL — SIGNIFICANT CHANGE UP (ref 80–100)
MONOCYTES # BLD AUTO: 1.4 K/UL — HIGH (ref 0–0.9)
MONOCYTES NFR BLD AUTO: 10.7 % — SIGNIFICANT CHANGE UP (ref 2–14)
NEUTROPHILS # BLD AUTO: 9.22 K/UL — HIGH (ref 1.8–7.4)
NEUTROPHILS NFR BLD AUTO: 70.7 % — SIGNIFICANT CHANGE UP (ref 43–77)
NRBC # BLD: 30 /100 WBCS — HIGH (ref 0–0)
PLATELET # BLD AUTO: 530 K/UL — HIGH (ref 150–400)
POTASSIUM SERPL-MCNC: 4.1 MMOL/L — SIGNIFICANT CHANGE UP (ref 3.5–5.3)
POTASSIUM SERPL-SCNC: 4.1 MMOL/L — SIGNIFICANT CHANGE UP (ref 3.5–5.3)
PROT SERPL-MCNC: 6.9 G/DL — SIGNIFICANT CHANGE UP (ref 6–8.3)
RBC # BLD: 2.42 M/UL — LOW (ref 4.2–5.8)
RBC # FLD: 20.9 % — HIGH (ref 10.3–14.5)
SODIUM SERPL-SCNC: 136 MMOL/L — SIGNIFICANT CHANGE UP (ref 135–145)
WBC # BLD: 13.03 K/UL — HIGH (ref 3.8–10.5)
WBC # FLD AUTO: 13.03 K/UL — HIGH (ref 3.8–10.5)

## 2022-03-06 PROCEDURE — 93306 TTE W/DOPPLER COMPLETE: CPT | Mod: 26

## 2022-03-06 PROCEDURE — 93010 ELECTROCARDIOGRAM REPORT: CPT | Mod: 76

## 2022-03-06 PROCEDURE — 99233 SBSQ HOSP IP/OBS HIGH 50: CPT | Mod: GC

## 2022-03-06 RX ORDER — SODIUM CHLORIDE 9 MG/ML
1000 INJECTION, SOLUTION INTRAVENOUS
Refills: 0 | Status: DISCONTINUED | OUTPATIENT
Start: 2022-03-06 | End: 2022-03-09

## 2022-03-06 RX ORDER — AMIODARONE HYDROCHLORIDE 400 MG/1
0.5 TABLET ORAL
Qty: 900 | Refills: 0 | Status: DISCONTINUED | OUTPATIENT
Start: 2022-03-06 | End: 2022-03-06

## 2022-03-06 RX ORDER — AMIODARONE HYDROCHLORIDE 400 MG/1
0.5 TABLET ORAL
Qty: 900 | Refills: 0 | Status: DISCONTINUED | OUTPATIENT
Start: 2022-03-06 | End: 2022-03-09

## 2022-03-06 RX ORDER — AMIODARONE HYDROCHLORIDE 400 MG/1
400 TABLET ORAL
Refills: 0 | Status: DISCONTINUED | OUTPATIENT
Start: 2022-03-06 | End: 2022-03-06

## 2022-03-06 RX ORDER — MAGNESIUM SULFATE 500 MG/ML
2 VIAL (ML) INJECTION ONCE
Refills: 0 | Status: COMPLETED | OUTPATIENT
Start: 2022-03-06 | End: 2022-03-06

## 2022-03-06 RX ORDER — AMIODARONE HYDROCHLORIDE 400 MG/1
400 TABLET ORAL
Refills: 0 | Status: DISCONTINUED | OUTPATIENT
Start: 2022-03-06 | End: 2022-03-09

## 2022-03-06 RX ADMIN — SODIUM CHLORIDE 75 MILLILITER(S): 9 INJECTION, SOLUTION INTRAVENOUS at 10:41

## 2022-03-06 RX ADMIN — AMIODARONE HYDROCHLORIDE 16.7 MG/MIN: 400 TABLET ORAL at 06:08

## 2022-03-06 RX ADMIN — HYDROXYUREA 1500 MILLIGRAM(S): 500 CAPSULE ORAL at 11:06

## 2022-03-06 RX ADMIN — HYDROMORPHONE HYDROCHLORIDE 30 MILLILITER(S): 2 INJECTION INTRAMUSCULAR; INTRAVENOUS; SUBCUTANEOUS at 19:25

## 2022-03-06 RX ADMIN — Medication 25 GRAM(S): at 22:45

## 2022-03-06 RX ADMIN — SODIUM CHLORIDE 75 MILLILITER(S): 9 INJECTION, SOLUTION INTRAVENOUS at 15:18

## 2022-03-06 RX ADMIN — AMIODARONE HYDROCHLORIDE 16.7 MG/MIN: 400 TABLET ORAL at 04:14

## 2022-03-06 RX ADMIN — AMIODARONE HYDROCHLORIDE 400 MILLIGRAM(S): 400 TABLET ORAL at 22:23

## 2022-03-06 RX ADMIN — Medication 0.6 MILLIGRAM(S): at 11:06

## 2022-03-06 RX ADMIN — Medication 25 MILLIGRAM(S): at 20:39

## 2022-03-06 RX ADMIN — HYDROMORPHONE HYDROCHLORIDE 30 MILLILITER(S): 2 INJECTION INTRAMUSCULAR; INTRAVENOUS; SUBCUTANEOUS at 23:16

## 2022-03-06 RX ADMIN — APIXABAN 5 MILLIGRAM(S): 2.5 TABLET, FILM COATED ORAL at 05:14

## 2022-03-06 RX ADMIN — Medication 25 MILLIGRAM(S): at 05:14

## 2022-03-06 RX ADMIN — GABAPENTIN 300 MILLIGRAM(S): 400 CAPSULE ORAL at 21:09

## 2022-03-06 RX ADMIN — Medication 25 MILLIGRAM(S): at 13:43

## 2022-03-06 RX ADMIN — AMIODARONE HYDROCHLORIDE 16.7 MG/MIN: 400 TABLET ORAL at 15:25

## 2022-03-06 RX ADMIN — APIXABAN 5 MILLIGRAM(S): 2.5 TABLET, FILM COATED ORAL at 17:11

## 2022-03-06 RX ADMIN — Medication 1 MILLIGRAM(S): at 11:06

## 2022-03-06 RX ADMIN — HYDROMORPHONE HYDROCHLORIDE 30 MILLILITER(S): 2 INJECTION INTRAMUSCULAR; INTRAVENOUS; SUBCUTANEOUS at 07:30

## 2022-03-06 RX ADMIN — SODIUM CHLORIDE 125 MILLILITER(S): 9 INJECTION, SOLUTION INTRAVENOUS at 05:13

## 2022-03-06 RX ADMIN — Medication 1 TABLET(S): at 11:06

## 2022-03-06 RX ADMIN — SENNA PLUS 2 TABLET(S): 8.6 TABLET ORAL at 21:09

## 2022-03-06 RX ADMIN — PANTOPRAZOLE SODIUM 40 MILLIGRAM(S): 20 TABLET, DELAYED RELEASE ORAL at 05:14

## 2022-03-06 NOTE — PROVIDER CONTACT NOTE (OTHER) - ACTION/TREATMENT ORDERED:
Provider notified, orders to recheck BP in 30 min and administer AM BP meds at 5am. Will continue to monitor pt.
Provider ordered lopressor IV push one time dose.
Provider notified, ordered EKG. Will continue to monitor.

## 2022-03-06 NOTE — PROGRESS NOTE ADULT - PROBLEM SELECTOR PLAN 4
transferred to tele for aflutter with RVR     - rate 140s  now in SR   cont to complete amiodarone gtt   then start  bid for 3 days   AC with eliquis   monitor Hgb   check LE venous us

## 2022-03-06 NOTE — PROGRESS NOTE ADULT - PROBLEM SELECTOR PLAN 1
with RVR s/p lopressor now on amidoarone gtt  - check TFTS, LFTS  cards on board appreciate their recs  K >4 MG >2 BMP daily  2d echo ordered  patient already on apixaban c/w  CTA showing Subsegmental pulmonary embolism in the right upper lobe.  may need EP on board for ablation of rates not controlled d/w patient  c/w lopressor 25mg tid titrate as per cards

## 2022-03-06 NOTE — PROGRESS NOTE ADULT - SUBJECTIVE AND OBJECTIVE BOX
Subjective: Patient seen and examined. No new events except as noted.   Converted to SR at 4:20 am on Amiodarone gtt started last night   feels ok   no cp or sob     REVIEW OF SYSTEMS:    CONSTITUTIONAL:+ weakness, fevers or chills  EYES/ENT: No visual changes;  No vertigo or throat pain   NECK: No pain or stiffness  RESPIRATORY: No cough, wheezing, hemoptysis; No shortness of breath  CARDIOVASCULAR: No chest pain or palpitations  GASTROINTESTINAL: No abdominal or epigastric pain. No nausea, vomiting, or hematemesis; No diarrhea or constipation. No melena or hematochezia.  GENITOURINARY: No dysuria, frequency or hematuria  NEUROLOGICAL: No numbness or weakness  SKIN: No itching, burning, rashes, or lesions   All other review of systems is negative unless indicated above.    MEDICATIONS:  MEDICATIONS  (STANDING):  aMIOdarone    Tablet 400 milliGRAM(s) Oral two times a day  aMIOdarone Infusion 0.5 mG/Min (16.7 mL/Hr) IV Continuous <Continuous>  apixaban 5 milliGRAM(s) Oral every 12 hours  colchicine 0.6 milliGRAM(s) Oral daily  folic acid 1 milliGRAM(s) Oral daily  gabapentin 300 milliGRAM(s) Oral at bedtime  hydrALAZINE 25 milliGRAM(s) Oral every 8 hours  HYDROmorphone PCA (1 mG/mL) 30 milliLiter(s) PCA Continuous PCA Continuous  hydroxyurea 1500 milliGRAM(s) Oral daily  multivitamin 1 Tablet(s) Oral daily  pantoprazole    Tablet 40 milliGRAM(s) Oral before breakfast  polyethylene glycol 3350 17 Gram(s) Oral daily  senna 2 Tablet(s) Oral at bedtime  sodium chloride 0.45%. 1000 milliLiter(s) (125 mL/Hr) IV Continuous <Continuous>      PHYSICAL EXAM:  T(C): 36.5 (03-06-22 @ 08:05), Max: 36.9 (03-05-22 @ 15:48)  HR: 80 (03-06-22 @ 08:05) (72 - 144)  BP: 150/104 (03-06-22 @ 08:05) (108/75 - 158/100)  RR: 18 (03-06-22 @ 08:05) (18 - 18)  SpO2: 96% (03-06-22 @ 08:05) (95% - 98%)  Wt(kg): --  I&O's Summary    05 Mar 2022 07:01  -  06 Mar 2022 07:00  --------------------------------------------------------  IN: 1230 mL / OUT: 3600 mL / NET: -2370 mL    06 Mar 2022 07:01  -  06 Mar 2022 09:17  --------------------------------------------------------  IN: 120 mL / OUT: 0 mL / NET: 120 mL          Appearance: NAD  HEENT:   Dry oral mucosa, PERRL, EOMI	  Lymphatic: No lymphadenopathy , no edema  Cardiovascular: Normal S1 S2, No JVD, No murmurs , Peripheral pulses palpable 2+ bilaterally  Respiratory: Lungs clear to auscultation, normal effort 	  Gastrointestinal:  Soft, Non-tender, + BS	  Skin: No rashes, No ecchymoses, No cyanosis, warm to touch  Musculoskeletal: Normal range of motion, normal strength  Psychiatry:  Mood & affect appropriate  Ext: No edema      LABS:    CARDIAC MARKERS:  CARDIAC MARKERS ( 05 Mar 2022 05:18 )  x     / x     / 43 U/L / x     / 1.7 ng/mL  CARDIAC MARKERS ( 04 Mar 2022 19:06 )  x     / x     / 41 U/L / x     / 1.6 ng/mL                                7.7    7.81  )-----------( 588      ( 05 Mar 2022 05:18 )             23.6     03-05    141  |  109<H>  |  19  ----------------------------<  133<H>  4.5   |  22  |  1.16    Ca    10.0      05 Mar 2022 05:18  Phos  3.2     03-04  Mg     1.6     03-04      proBNP:   Lipid Profile:   HgA1c:   TSH:             TELEMETRY: 	 SR    ECG:  	  RADIOLOGY:   DIAGNOSTIC TESTING:  [ ] Echocardiogram:  [ ]  Catheterization:  [ ] Stress Test:    OTHER:

## 2022-03-06 NOTE — PROVIDER CONTACT NOTE (OTHER) - ASSESSMENT
A&O X 4. Patient is asymptomatic. VSS stable.
Pt is A&Ox4, no complainted of dizziness, SOB or chest pain noted. Pt was sleeping when converted to NSR, VSS see flowsheet
Pt is A&Ox4, on PCA pump with continuous IVF at 125ml/hr. Pt continues on amiodarone gtt at 33.3ml/hr, tolerating well. RN notified by staff of pts VS. Manual BP taken 158/100, 's. Pt reports no headaches, dizziness or SOB noted. After assessment, pt vomited x1. Pt noted its contents from previous meal and reports relief after vomiting.

## 2022-03-06 NOTE — PROGRESS NOTE ADULT - PROBLEM SELECTOR PLAN 3
patient on hydralazine at home, c/w hydralazine 25mg  q8hr may need to add second agent,  cards follow up  continue to monitor

## 2022-03-06 NOTE — PROVIDER CONTACT NOTE (OTHER) - REASON
Pt converted from AFlutter to NSR 60-80s
Heart rate is sustaining 130s
Pt is hypertensive with episode of vomiting x1

## 2022-03-06 NOTE — PROGRESS NOTE ADULT - PROBLEM SELECTOR PLAN 2
- Patient is s/p arthrocentesis in the ED. gram stain of fluid negative for PMN bacteria  - s/p intra articular steroid injection 3/3   - Fluid studies consistent with goat- crystals, monosodium urate  - rheum consulted greatly appreciate their recommendations- s/p solumedrol 20mg IV x 2 with significant improvement in pain; d/w DR. Zapata Colchicine .6mg x2 given given for 3 days now on and colchicine .6mg daily  - opt rheum follow up for urate lowering therapy   - ortho c/s appreciated  - patient declined Xrays  - HLA sent

## 2022-03-06 NOTE — PROGRESS NOTE ADULT - SUBJECTIVE AND OBJECTIVE BOX
Patient is a 51y old  Male who presents with a chief complaint of S/P discharge from [facility] with 3 weeks of RIGHT knee pain, sickle cell crisis (06 Mar 2022 09:16)      SUBJECTIVE / OVERNIGHT EVENTS:    feels ok. just very tired. no cp, palpitations, sob. r knee pain improved. crisis pain improving.    tele NSR 80s  converted at around 4am    ROS:  14 point ROS negative in detail except stated as above    MEDICATIONS  (STANDING):  aMIOdarone    Tablet 400 milliGRAM(s) Oral two times a day  aMIOdarone Infusion 0.5 mG/Min (16.7 mL/Hr) IV Continuous <Continuous>  apixaban 5 milliGRAM(s) Oral every 12 hours  colchicine 0.6 milliGRAM(s) Oral daily  folic acid 1 milliGRAM(s) Oral daily  gabapentin 300 milliGRAM(s) Oral at bedtime  hydrALAZINE 25 milliGRAM(s) Oral every 8 hours  HYDROmorphone PCA (1 mG/mL) 30 milliLiter(s) PCA Continuous PCA Continuous  hydroxyurea 1500 milliGRAM(s) Oral daily  multivitamin 1 Tablet(s) Oral daily  pantoprazole    Tablet 40 milliGRAM(s) Oral before breakfast  polyethylene glycol 3350 17 Gram(s) Oral daily  senna 2 Tablet(s) Oral at bedtime  sodium chloride 0.45%. 1000 milliLiter(s) (75 mL/Hr) IV Continuous <Continuous>    MEDICATIONS  (PRN):  acetaminophen     Tablet .. 650 milliGRAM(s) Oral every 6 hours PRN Temp greater or equal to 38C (100.4F), Mild Pain (1 - 3)      CAPILLARY BLOOD GLUCOSE        I&O's Summary    05 Mar 2022 07:01  -  06 Mar 2022 07:00  --------------------------------------------------------  IN: 1230 mL / OUT: 3600 mL / NET: -2370 mL    06 Mar 2022 07:01  -  06 Mar 2022 10:30  --------------------------------------------------------  IN: 120 mL / OUT: 0 mL / NET: 120 mL        PHYSICAL EXAM:  Vital Signs Last 24 Hrs  T(C): 36.5 (06 Mar 2022 08:05), Max: 36.9 (05 Mar 2022 15:48)  T(F): 97.7 (06 Mar 2022 08:05), Max: 98.4 (05 Mar 2022 15:48)  HR: 80 (06 Mar 2022 08:05) (72 - 144)  BP: 150/104 (06 Mar 2022 08:05) (108/75 - 158/100)  BP(mean): --  RR: 18 (06 Mar 2022 08:05) (18 - 18)  SpO2: 96% (06 Mar 2022 08:05) (95% - 98%)    GENERAL: NAD, well-developed  HEAD:  Atraumatic, Normocephalic  EYES: EOMI, PERRL, conjunctiva and sclera clear  NECK: Supple, No JVD  CHEST/LUNG: Clear to auscultation bilaterally; No wheezes, rales or rhonchi  HEART: s1 s2 RRR No murmurs, rubs, or gallops  ABDOMEN: Soft, Nontender, Nondistended; Bowel sounds present  EXTREMITIES:  2+ Peripheral Pulses, No clubbing, cyanosis, or edema  R knee no erythema no edema improving  full ROM.   NEUROLOGY: AAOx3; non-focal  SKIN: No rashes or lesions    LABS:                        7.7    7.81  )-----------( 588      ( 05 Mar 2022 05:18 )             23.6     03-05    141  |  109<H>  |  19  ----------------------------<  133<H>  4.5   |  22  |  1.16    Ca    10.0      05 Mar 2022 05:18  Phos  3.2     03-04  Mg     1.6     03-04      PT/INR - ( 05 Mar 2022 05:18 )   PT: 15.2 sec;   INR: 1.32 ratio         PTT - ( 04 Mar 2022 19:06 )  PTT:32.5 sec  CARDIAC MARKERS ( 05 Mar 2022 05:18 )  x     / x     / 43 U/L / x     / 1.7 ng/mL  CARDIAC MARKERS ( 04 Mar 2022 19:06 )  x     / x     / 41 U/L / x     / 1.6 ng/mL          RADIOLOGY & ADDITIONAL TESTS:    Imaging Personally Reviewed:    < from: CT Angio Chest PE Protocol w/ IV Cont (03.05.22 @ 17:23) >    LUNGS AND AIRWAYS: Patent central airways.  Lungs are clear.  PLEURA: No pleural effusion.  MEDIASTINUM AND CRISTINA: No lymphadenopathy.  VESSELS: Subsegmental pulmonary embolism in the right upper lobe (series   6, image 128).  HEART: Heart size is normal. No pericardial effusion.  CHEST WALL AND LOWER NECK: Within normal limits.  VISUALIZED UPPER ABDOMEN: Within normal limits.  BONES: Within normal limits.    IMPRESSION:  Subsegmental pulmonary embolism in the right upper lobe.    Findings were discussed with NAOMI Vang on 3/5/2022 at 6:04 PM by Dr. Vann   with read back confirmation.    < end of copied text >      Consultant(s) Notes Reviewed:      Care Discussed with Consultants/Other Providers:  mal Mccallum

## 2022-03-07 LAB
ALBUMIN SERPL ELPH-MCNC: 3 G/DL — LOW (ref 3.3–5)
ALP SERPL-CCNC: 91 U/L — SIGNIFICANT CHANGE UP (ref 40–120)
ALT FLD-CCNC: 20 U/L — SIGNIFICANT CHANGE UP (ref 10–45)
ANION GAP SERPL CALC-SCNC: 9 MMOL/L — SIGNIFICANT CHANGE UP (ref 5–17)
AST SERPL-CCNC: 18 U/L — SIGNIFICANT CHANGE UP (ref 10–40)
BASOPHILS # BLD AUTO: 0.02 K/UL — SIGNIFICANT CHANGE UP (ref 0–0.2)
BASOPHILS NFR BLD AUTO: 0.2 % — SIGNIFICANT CHANGE UP (ref 0–2)
BILIRUB SERPL-MCNC: 0.3 MG/DL — SIGNIFICANT CHANGE UP (ref 0.2–1.2)
BUN SERPL-MCNC: 15 MG/DL — SIGNIFICANT CHANGE UP (ref 7–23)
CALCIUM SERPL-MCNC: 9.8 MG/DL — SIGNIFICANT CHANGE UP (ref 8.4–10.5)
CHLORIDE SERPL-SCNC: 106 MMOL/L — SIGNIFICANT CHANGE UP (ref 96–108)
CO2 SERPL-SCNC: 22 MMOL/L — SIGNIFICANT CHANGE UP (ref 22–31)
CREAT SERPL-MCNC: 1.06 MG/DL — SIGNIFICANT CHANGE UP (ref 0.5–1.3)
EGFR: 84 ML/MIN/1.73M2 — SIGNIFICANT CHANGE UP
EOSINOPHIL # BLD AUTO: 0.09 K/UL — SIGNIFICANT CHANGE UP (ref 0–0.5)
EOSINOPHIL NFR BLD AUTO: 0.9 % — SIGNIFICANT CHANGE UP (ref 0–6)
GLUCOSE SERPL-MCNC: 134 MG/DL — HIGH (ref 70–99)
HCT VFR BLD CALC: 23.3 % — LOW (ref 39–50)
HGB BLD-MCNC: 7.8 G/DL — LOW (ref 13–17)
IMM GRANULOCYTES NFR BLD AUTO: 2 % — HIGH (ref 0–1.5)
LYMPHOCYTES # BLD AUTO: 0.75 K/UL — LOW (ref 1–3.3)
LYMPHOCYTES # BLD AUTO: 7.9 % — LOW (ref 13–44)
MAGNESIUM SERPL-MCNC: 1.8 MG/DL — SIGNIFICANT CHANGE UP (ref 1.6–2.6)
MCHC RBC-ENTMCNC: 33.5 GM/DL — SIGNIFICANT CHANGE UP (ref 32–36)
MCHC RBC-ENTMCNC: 33.6 PG — SIGNIFICANT CHANGE UP (ref 27–34)
MCV RBC AUTO: 100.4 FL — HIGH (ref 80–100)
MONOCYTES # BLD AUTO: 1.03 K/UL — HIGH (ref 0–0.9)
MONOCYTES NFR BLD AUTO: 10.9 % — SIGNIFICANT CHANGE UP (ref 2–14)
NEUTROPHILS # BLD AUTO: 7.4 K/UL — SIGNIFICANT CHANGE UP (ref 1.8–7.4)
NEUTROPHILS NFR BLD AUTO: 78.1 % — HIGH (ref 43–77)
NRBC # BLD: 49 /100 WBCS — HIGH (ref 0–0)
PLATELET # BLD AUTO: 526 K/UL — HIGH (ref 150–400)
POTASSIUM SERPL-MCNC: 3.9 MMOL/L — SIGNIFICANT CHANGE UP (ref 3.5–5.3)
POTASSIUM SERPL-SCNC: 3.9 MMOL/L — SIGNIFICANT CHANGE UP (ref 3.5–5.3)
PROT SERPL-MCNC: 6.6 G/DL — SIGNIFICANT CHANGE UP (ref 6–8.3)
RBC # BLD: 2.32 M/UL — LOW (ref 4.2–5.8)
RBC # FLD: 21.3 % — HIGH (ref 10.3–14.5)
SODIUM SERPL-SCNC: 137 MMOL/L — SIGNIFICANT CHANGE UP (ref 135–145)
T4 FREE SERPL-MCNC: 1.3 NG/DL — SIGNIFICANT CHANGE UP (ref 0.9–1.8)
TSH SERPL-MCNC: 1.77 UIU/ML — SIGNIFICANT CHANGE UP (ref 0.27–4.2)
WBC # BLD: 9.48 K/UL — SIGNIFICANT CHANGE UP (ref 3.8–10.5)
WBC # FLD AUTO: 9.48 K/UL — SIGNIFICANT CHANGE UP (ref 3.8–10.5)

## 2022-03-07 PROCEDURE — 99233 SBSQ HOSP IP/OBS HIGH 50: CPT | Mod: GC

## 2022-03-07 RX ORDER — LISINOPRIL 2.5 MG/1
10 TABLET ORAL DAILY
Refills: 0 | Status: DISCONTINUED | OUTPATIENT
Start: 2022-03-07 | End: 2022-03-09

## 2022-03-07 RX ORDER — LISINOPRIL 2.5 MG/1
5 TABLET ORAL DAILY
Refills: 0 | Status: DISCONTINUED | OUTPATIENT
Start: 2022-03-07 | End: 2022-03-07

## 2022-03-07 RX ORDER — ONDANSETRON 8 MG/1
4 TABLET, FILM COATED ORAL ONCE
Refills: 0 | Status: COMPLETED | OUTPATIENT
Start: 2022-03-07 | End: 2022-03-07

## 2022-03-07 RX ADMIN — HYDROMORPHONE HYDROCHLORIDE 30 MILLILITER(S): 2 INJECTION INTRAMUSCULAR; INTRAVENOUS; SUBCUTANEOUS at 20:13

## 2022-03-07 RX ADMIN — HYDROMORPHONE HYDROCHLORIDE 30 MILLILITER(S): 2 INJECTION INTRAMUSCULAR; INTRAVENOUS; SUBCUTANEOUS at 21:13

## 2022-03-07 RX ADMIN — LISINOPRIL 5 MILLIGRAM(S): 2.5 TABLET ORAL at 09:30

## 2022-03-07 RX ADMIN — Medication 25 MILLIGRAM(S): at 22:49

## 2022-03-07 RX ADMIN — HYDROMORPHONE HYDROCHLORIDE 30 MILLILITER(S): 2 INJECTION INTRAMUSCULAR; INTRAVENOUS; SUBCUTANEOUS at 07:11

## 2022-03-07 RX ADMIN — SENNA PLUS 2 TABLET(S): 8.6 TABLET ORAL at 22:49

## 2022-03-07 RX ADMIN — AMIODARONE HYDROCHLORIDE 400 MILLIGRAM(S): 400 TABLET ORAL at 05:17

## 2022-03-07 RX ADMIN — APIXABAN 5 MILLIGRAM(S): 2.5 TABLET, FILM COATED ORAL at 18:05

## 2022-03-07 RX ADMIN — APIXABAN 5 MILLIGRAM(S): 2.5 TABLET, FILM COATED ORAL at 05:13

## 2022-03-07 RX ADMIN — Medication 25 MILLIGRAM(S): at 14:29

## 2022-03-07 RX ADMIN — HYDROXYUREA 1500 MILLIGRAM(S): 500 CAPSULE ORAL at 11:13

## 2022-03-07 RX ADMIN — Medication 25 MILLIGRAM(S): at 05:13

## 2022-03-07 RX ADMIN — ONDANSETRON 4 MILLIGRAM(S): 8 TABLET, FILM COATED ORAL at 16:36

## 2022-03-07 RX ADMIN — PANTOPRAZOLE SODIUM 40 MILLIGRAM(S): 20 TABLET, DELAYED RELEASE ORAL at 05:13

## 2022-03-07 RX ADMIN — Medication 1 TABLET(S): at 11:12

## 2022-03-07 RX ADMIN — Medication 1 MILLIGRAM(S): at 11:12

## 2022-03-07 RX ADMIN — AMIODARONE HYDROCHLORIDE 400 MILLIGRAM(S): 400 TABLET ORAL at 18:05

## 2022-03-07 RX ADMIN — GABAPENTIN 300 MILLIGRAM(S): 400 CAPSULE ORAL at 22:50

## 2022-03-07 RX ADMIN — HYDROMORPHONE HYDROCHLORIDE 30 MILLILITER(S): 2 INJECTION INTRAMUSCULAR; INTRAVENOUS; SUBCUTANEOUS at 19:25

## 2022-03-07 RX ADMIN — Medication 0.6 MILLIGRAM(S): at 11:12

## 2022-03-07 RX ADMIN — SODIUM CHLORIDE 75 MILLILITER(S): 9 INJECTION, SOLUTION INTRAVENOUS at 16:36

## 2022-03-07 RX ADMIN — SODIUM CHLORIDE 75 MILLILITER(S): 9 INJECTION, SOLUTION INTRAVENOUS at 19:27

## 2022-03-07 NOTE — PROGRESS NOTE ADULT - PROBLEM SELECTOR PLAN 1
with RVR s/p lopressor and amiodarone gtt now on amidoarone 400mg tid  TFTS pending LFTS normal  cards on board appreciate their recs  K >4 MG >2 BMP daily  2d echo -->Severely dilated left atrium. . Normal left ventricular systolic function. Moderate diastolic dysfunction (Stage II).Normal right ventricular size and function.  patient already on apixaban c/w  CTA showing Subsegmental pulmonary embolism in the right upper lobe.

## 2022-03-07 NOTE — PROGRESS NOTE ADULT - PROBLEM SELECTOR PLAN 3
patient on hydralazine at home, c/w hydralazine 25mg  q8hr add lisinopril to 5mg daily, uptitrate as needed   continue to monitor

## 2022-03-07 NOTE — PROGRESS NOTE ADULT - SUBJECTIVE AND OBJECTIVE BOX
Subjective: Patient seen and examined. No new events except as noted.   Feeling good.  HR improved, rate controlled 70-90s, no events on tele overnight.     REVIEW OF SYSTEMS:    CONSTITUTIONAL: + weakness, fevers or chills  EYES/ENT: No visual changes;  No vertigo or throat pain   NECK: No pain or stiffness  RESPIRATORY: No cough, wheezing, hemoptysis; No shortness of breath  CARDIOVASCULAR: No chest pain or palpitations  GASTROINTESTINAL: No abdominal or epigastric pain. No nausea, vomiting, or hematemesis; No diarrhea or constipation. No melena or hematochezia.  GENITOURINARY: No dysuria, frequency or hematuria  NEUROLOGICAL: No numbness or weakness  SKIN: No itching, burning, rashes, or lesions   All other review of systems is negative unless indicated above.    MEDICATIONS:  MEDICATIONS  (STANDING):  aMIOdarone    Tablet 400 milliGRAM(s) Oral two times a day  aMIOdarone Infusion 0.5 mG/Min (16.7 mL/Hr) IV Continuous <Continuous>  apixaban 5 milliGRAM(s) Oral every 12 hours  colchicine 0.6 milliGRAM(s) Oral daily  folic acid 1 milliGRAM(s) Oral daily  gabapentin 300 milliGRAM(s) Oral at bedtime  hydrALAZINE 25 milliGRAM(s) Oral every 8 hours  HYDROmorphone PCA (1 mG/mL) 30 milliLiter(s) PCA Continuous PCA Continuous  hydroxyurea 1500 milliGRAM(s) Oral daily  lisinopril 5 milliGRAM(s) Oral daily  multivitamin 1 Tablet(s) Oral daily  pantoprazole    Tablet 40 milliGRAM(s) Oral before breakfast  polyethylene glycol 3350 17 Gram(s) Oral daily  senna 2 Tablet(s) Oral at bedtime  sodium chloride 0.45%. 1000 milliLiter(s) (75 mL/Hr) IV Continuous <Continuous>      PHYSICAL EXAM:  T(C): 36.7 (03-07-22 @ 11:44), Max: 36.8 (03-06-22 @ 12:10)  HR: 81 (03-07-22 @ 11:44) (70 - 96)  BP: 157/79 (03-07-22 @ 11:44) (130/87 - 174/102)  RR: 18 (03-07-22 @ 11:44) (18 - 19)  SpO2: 95% (03-07-22 @ 11:44) (93% - 98%)  Wt(kg): --  I&O's Summary    06 Mar 2022 07:01  -  07 Mar 2022 07:00  --------------------------------------------------------  IN: 2840.5 mL / OUT: 2100 mL / NET: 740.5 mL      Appearance: NAD	  HEENT: Normal oral mucosa, PERRL, EOMI	  Lymphatic: No lymphadenopathy , no edema  Cardiovascular: Normal S1 S2, No JVD, No murmurs , Peripheral pulses palpable 2+ bilaterally  Respiratory: Lungs clear to auscultation, normal effort 	  Gastrointestinal:  Soft, Non-tender, + BS	  Skin: No rashes, No ecchymoses, No cyanosis, warm to touch  Musculoskeletal: Normal range of motion, normal strength  Psychiatry:  Mood & affect appropriate  Ext: No edema      LABS:    CARDIAC MARKERS:  CARDIAC MARKERS ( 05 Mar 2022 05:18 )  x     / x     / 43 U/L / x     / 1.7 ng/mL  CARDIAC MARKERS ( 04 Mar 2022 19:06 )  x     / x     / 41 U/L / x     / 1.6 ng/mL                          7.8    9.48  )-----------( 526      ( 07 Mar 2022 10:49 )             23.3     03-07    137  |  106  |  15  ----------------------------<  134<H>  3.9   |  22  |  1.06    Ca    9.8      07 Mar 2022 10:49  Mg     1.8     03-07    TPro  6.6  /  Alb  3.0<L>  /  TBili  0.3  /  DBili  x   /  AST  18  /  ALT  20  /  AlkPhos  91  03-07    proBNP:   Lipid Profile:   HgA1c:   TSH:     TELEMETRY: SR 70s-90s	    ECG:  	  RADIOLOGY:   DIAGNOSTIC TESTING:  [ ] Echocardiogram:  [ ]  Catheterization:  [ ] Stress Test:    OTHER:

## 2022-03-07 NOTE — PROGRESS NOTE ADULT - PROBLEM SELECTOR PLAN 2
c/w lisinopril 5mg PO daily  c/w hydralazine 25mg PO q8hrs  low salt diet increased lisinopril to 10mg PO daily  c/w hydralazine 25mg PO q8hrs  low salt diet

## 2022-03-07 NOTE — CHART NOTE - NSCHARTNOTEFT_GEN_A_CORE
Nutrition Follow Up Note  Patient seen for: malnutrition initial follow-up. Chart reviewed, events noted.     "51 M with SS disease, HTN, presented with SS crisis, right knee swelling, s/p arthrocentesis in the ED with acute gouty attack found to have atrial flutter in RVR"    Source: [x] Patient       [x] Medical Record        [] RN        [] Family at bedside       [x] Other: pt's wife via phone call    -If unable to interview patient: [] Trach/Vent/BiPAP  [] Disoriented/confused/inappropriate to interview    Diet Order:   Diet, Regular:   Low Sodium  No Shellfish  Supplement Feeding Modality:  Oral  Glucerna Shake Cans or Servings Per Day:  1       Frequency:  Daily (03-04-22)    - Is current order appropriate/adequate? [x] Yes  []  No:     - PO intake :   [] >75%  Adequate    [x] 50-75%  Fair       [] <50%  Poor    - Nutrition-related concerns:      - Pt states his appetite is coming back, and his PO intake is improving. Reports he hasn't been taking the Glucerna shakes but is willing to try them if his PO intake is suboptimal. Flowsheets indicate variable but mainly fair PO intake.       - Pt asking about dietary recommendations for gout and healthy eating. See below for details.      - Per chart: pt ordered for multivitamin and thiamine      -  GI: Denies nausea, vomiting, constipation, diarrhea.  Last BM 3/5 per pt.   Bowel Regimen? [x] Yes (miralax, senna)  [] No      Weights:   Dosing wt: 102.3 kg (02-26)  No new weights noted. RD to continue to monitor weight trends as able/available.     MEDICATIONS  (STANDING):  aMIOdarone    Tablet 400 milliGRAM(s) Oral two times a day  aMIOdarone Infusion 0.5 mG/Min (16.7 mL/Hr) IV Continuous <Continuous>  apixaban 5 milliGRAM(s) Oral every 12 hours  colchicine 0.6 milliGRAM(s) Oral daily  folic acid 1 milliGRAM(s) Oral daily  gabapentin 300 milliGRAM(s) Oral at bedtime  hydrALAZINE 25 milliGRAM(s) Oral every 8 hours  HYDROmorphone PCA (1 mG/mL) 30 milliLiter(s) PCA Continuous PCA Continuous  hydroxyurea 1500 milliGRAM(s) Oral daily  lisinopril 5 milliGRAM(s) Oral daily  multivitamin 1 Tablet(s) Oral daily  pantoprazole    Tablet 40 milliGRAM(s) Oral before breakfast  polyethylene glycol 3350 17 Gram(s) Oral daily  senna 2 Tablet(s) Oral at bedtime  sodium chloride 0.45%. 1000 milliLiter(s) (75 mL/Hr) IV Continuous <Continuous>    MEDICATIONS  (PRN):  acetaminophen     Tablet .. 650 milliGRAM(s) Oral every 6 hours PRN Temp greater or equal to 38C (100.4F), Mild Pain (1 - 3)      Pertinent Labs:   03-07 @ 10:49: Na 137, BUN 15, Cr 1.06, <H>, K+ 3.9, Phos --, Mg 1.8, Alk Phos 91, ALT/SGPT 20, AST/SGOT 18  03-06 @ 11:47: Na 136, BUN 16, Cr 1.05, <H>, K+ 4.1, Phos --, Mg 1.6, Alk Phos 81, ALT/SGPT 22, AST/SGOT 19    Skin per nursing documentation: No pressure injuries noted.  Edema per nursing documentation: +1 generalized, +2 right arm    Estimated Needs:   [x] no change since previous assessment  18-22 kcal/kg = 1584-3692 kcal/day  - based on dosing wt: 102.3 kg (02-26)  1-1.3 g/kg =  g pro/day  - based on IBW+10% 85.8 kg  fluids deferred to provider     Previous Nutrition Diagnosis: Severe acute malnutrition   Nutrition Diagnosis is: [x] ongoing  [] resolved [] not applicable   -Being addressed with diet, oral nutrition supplements and micronutrient supplementation    Nutrition Care Plan:  [x] In Progress  [] Achieved  [] Not applicable    New Nutrition Diagnosis: [x] Not applicable    Nutrition Interventions:     Education Provided   [x] Yes: Provided verbal and written nutrition education on gout/low-purine nutrition therapy; and healthy eating/[employer]. Reviewed general healthy eating guidelines, explaining [employer], and reviewed low purine nutrition therapy handout with pt and wife via phone call. RD answered all questions, and made aware RD remains available. Also encouraged pt to consume meals and oral nutrition supplements as tolerated to assist in meeting estimated nutrient needs.    Recommendations:      1) Continue Low Sodium diet.  2) Continue Glucerna 1x/day, multivitamin, and folic acid, pending no medical contraindications.   3) Monitor PO intake, GI tolerance, skin integrity, labs, weight, and bowel movement regularity.   4) Honor food preferences as feasible. Assist with meals PRN and encourage PO intake.   5) malnutrition alert in chart     Monitoring and Evaluation:   Continue to monitor nutritional intake, tolerance to diet prescription, weights, labs, skin integrity    RD remains available upon request and will follow up per protocol  Mone Darby, EMI, CDN Pager #[phone]

## 2022-03-07 NOTE — PROGRESS NOTE ADULT - PROBLEM SELECTOR PLAN 1
Leg/knee pain and swelling likely secondary to SC crisis     - 2/26 s/p arthrocentesis R knee  2/27 Right Lower Extremity Doppler - partially compressible mid and distal right fem vein and popliteal vein consistent with DVT; indeterminate age, likely subacute/chronic.    - Eliquis 5mg PO q12hrs  s/p solumedrol 20mg IVP x1  Pain control - PCA pump  IV hydration   Orders per PMD No

## 2022-03-07 NOTE — PROGRESS NOTE ADULT - SUBJECTIVE AND OBJECTIVE BOX
Patient is a 51y old  Male who presents with a chief complaint of S/P discharge from [facility] with 3 weeks of RIGHT knee pain, sickle cell crisis (06 Mar 2022 10:30)    Patient seen this morning. Feeling well. No new complaints. Now in telemetry secondary to A fib over weekend (now in NSR)    MEDICATIONS  (STANDING):  aMIOdarone    Tablet 400 milliGRAM(s) Oral two times a day  aMIOdarone Infusion 0.5 mG/Min (16.7 mL/Hr) IV Continuous <Continuous>  apixaban 5 milliGRAM(s) Oral every 12 hours  colchicine 0.6 milliGRAM(s) Oral daily  folic acid 1 milliGRAM(s) Oral daily  gabapentin 300 milliGRAM(s) Oral at bedtime  hydrALAZINE 25 milliGRAM(s) Oral every 8 hours  HYDROmorphone PCA (1 mG/mL) 30 milliLiter(s) PCA Continuous PCA Continuous  hydroxyurea 1500 milliGRAM(s) Oral daily  lisinopril 5 milliGRAM(s) Oral daily  multivitamin 1 Tablet(s) Oral daily  pantoprazole    Tablet 40 milliGRAM(s) Oral before breakfast  polyethylene glycol 3350 17 Gram(s) Oral daily  senna 2 Tablet(s) Oral at bedtime  sodium chloride 0.45%. 1000 milliLiter(s) (75 mL/Hr) IV Continuous <Continuous>    MEDICATIONS  (PRN):  acetaminophen     Tablet .. 650 milliGRAM(s) Oral every 6 hours PRN Temp greater or equal to 38C (100.4F), Mild Pain (1 - 3)      ROS  No fever, sweats, chills  No epistaxis, HA, sore throat  No CP, SOB, cough, sputum  No n/v/d, abd pain, melena, hematochezia  No edema  No rash  No anxiety  Right knee pain and swelling improved  No bleeding, bruising  No dysuria, hematuria    Vital Signs Last 24 Hrs  T(C): 36.6 (07 Mar 2022 08:15), Max: 36.8 (06 Mar 2022 12:10)  T(F): 97.8 (07 Mar 2022 08:15), Max: 98.3 (06 Mar 2022 12:10)  HR: 74 (07 Mar 2022 08:15) (70 - 96)  BP: 168/92 (07 Mar 2022 08:15) (130/87 - 174/102)  BP(mean): --  RR: 18 (07 Mar 2022 08:15) (18 - 19)  SpO2: 98% (07 Mar 2022 08:15) (93% - 98%)    PE  NAD  Awake, alert  Anicteric, MMM  Right knee edema improved  No rash grossly                            8.0    13.03 )-----------( 530      ( 06 Mar 2022 11:47 )             24.1       03-06    136  |  106  |  16  ----------------------------<  116<H>  4.1   |  22  |  1.05    Ca    9.7      06 Mar 2022 11:47  Mg     1.6     03-06    TPro  6.9  /  Alb  3.0<L>  /  TBili  0.4  /  DBili  x   /  AST  19  /  ALT  22  /  AlkPhos  81  03-06      ACC: [id number] EXAM:  [facility]                          PROCEDURE DATE:  03/05/2022          INTERPRETATION:  CLINICAL INFORMATION: Atrial flutter. Acute chest   syndrome.    COMPARISON: None.    CONTRAST/COMPLICATIONS:  IV Contrast: Omnipaque 350  71 cc administered   29 cc discarded  Oral Contrast: NONE  Complications: None reported at time of study completion    PROCEDURE:  CT Angiography of the Chest.  Sagittal and coronal reformats were performed as well as 3D (MIP)   reconstructions.    FINDINGS:    LUNGS AND AIRWAYS: Patent central airways.  Lungs are clear.  PLEURA: No pleural effusion.  MEDIASTINUM AND CRISTINA: No lymphadenopathy.  VESSELS: Subsegmental pulmonary embolism in the right upper lobe (series   6, image 128).  HEART: Heart size is normal. No pericardial effusion.  CHEST WALL AND LOWER NECK: Within normal limits.  VISUALIZED UPPER ABDOMEN: Within normal limits.  BONES: Within normal limits.    IMPRESSION:  Subsegmental pulmonary embolism in the right upper lobe.    Findings were discussed with NAOMI Vang on 3/5/2022 at 6:04 PM by Dr. Vann   with read back confirmation.    --- End of Report ---      Patient name: MAXI BUCHANAN  YOB: 1970   Age: 51 (M)   MR#: [id number]  Study Date: 3/6/2022  Location: [address] [place], [place] [id number]: Melina Salinas RDCS  Study quality: Technically fair  Referring Physician: Ines Drew MD  Blood Pressure: 150/104 mmHg  Height: 180 cm  Weight: 102 kg  BSA: 2.2 m2  ------------------------------------------------------------------------  PROCEDURE: Transthoracic echocardiogram with 2-D, M-Mode  and complete spectral and color flow Doppler.  INDICATION: Cardiac murmur, unspecified (R01.1)  ------------------------------------------------------------------------  Dimensions:    Normal Values:  LA:     3.9    2.0 - 4.0 cm  Ao:     4.2    2.0 - 3.8 cm  SEPTUM: 1.3    0.6 - 1.2 cm  PWT:    1.0    0.6 - 1.1 cm  LVIDd:  5.6    3.0 - 5.6 cm  LVIDs:  4.0    1.8 - 4.0 cm  Derived variables:  LVMI: 119 g/m2  RWT: 0.35  Fractional short: 29 %  EF (Suazo Rule): 72 %Doppler Peak Velocity (m/sec):  AoV=1.2  ------------------------------------------------------------------------  Observations:  Mitral Valve: Normal mitral valve. Mild mitral  regurgitation.  Aortic Valve/Aorta: Normal trileaflet aortic valve. Peak  transaortic valve gradient equals 6 mm Hg, mean transaortic  valve gradient equals 3 mm Hg, aortic valve velocity time  integral equals 23 cm, estimated aortic valve area equals  4.3 sqcm. Minimal aortic regurgitation.  Peak left  ventricular outflow tract gradient equals 3 mm Hg, mean  gradient is equal to 2 mm Hg, LVOT velocity time integral  equals 20 cm.  Aortic Root: 4.2 cm.  LVOT diameter: 2.5 cm.  Left Atrium: Severely dilated left atrium.  LA volume index  = 63 cc/m2.  Left Ventricle: Normal left ventricular systolic function.  No segmental wall motion abnormalities. Normal left  ventricular internal dimensions and wall thicknesses.  Moderate diastolic dysfunction (Stage II).  Right Heart: Right atrial enlargement. Normal right  ventricular size and function. Normal tricuspid valve. Mild  tricuspid regurgitation. Normal pulmonic valve. Minimal  pulmonic regurgitation.  Pericardium/Pleura: Normal pericardium with no pericardial  effusion.  Hemodynamic: Estimated right atrial pressure is 8 mm Hg.  Estimated right ventricular systolic pressure equals 42 mm  Hg, assuming right atrial pressure equals 8 mm Hg,  consistent with mild pulmonary hypertension.  ------------------------------------------------------------------------  Conclusions:  1. Aortic Root: 4.2 cm.  LVOT diameter: 2.5 cm.  2. Severely dilated left atrium.  LA volume index = 63  cc/m2.  3. Normal left ventricular internal dimensions and wall  thicknesses.  4. Normal left ventricular systolic function. No segmental  wall motion abnormalities.  5. Moderate diastolic dysfunction (Stage II).  6. Normal right ventricular size and function.  7. Estimated pulmonary artery systolic pressure equals 42  mm Hg, assuming right atrial pressure equals 8 mm Hg,  consistent with mild pulmonary pressures.  *** No previous Echo exam.

## 2022-03-07 NOTE — PROGRESS NOTE ADULT - SUBJECTIVE AND OBJECTIVE BOX
Patient is a 51y old  Male who presents with a chief complaint of S/P discharge from [facility] with 3 weeks of RIGHT knee pain, sickle cell crisis (07 Mar 2022 12:04)      SUBJECTIVE / OVERNIGHT EVENTS:    tele NSR 60s-80s  feels a lot better than yesterday. no cp, palpitations sob. r knee pain is gone. sickle cell pain is improving     ROS:  14 point ROS negative in detail except stated as above    MEDICATIONS  (STANDING):  aMIOdarone    Tablet 400 milliGRAM(s) Oral two times a day  aMIOdarone Infusion 0.5 mG/Min (16.7 mL/Hr) IV Continuous <Continuous>  apixaban 5 milliGRAM(s) Oral every 12 hours  colchicine 0.6 milliGRAM(s) Oral daily  folic acid 1 milliGRAM(s) Oral daily  gabapentin 300 milliGRAM(s) Oral at bedtime  hydrALAZINE 25 milliGRAM(s) Oral every 8 hours  HYDROmorphone PCA (1 mG/mL) 30 milliLiter(s) PCA Continuous PCA Continuous  hydroxyurea 1500 milliGRAM(s) Oral daily  lisinopril 5 milliGRAM(s) Oral daily  multivitamin 1 Tablet(s) Oral daily  pantoprazole    Tablet 40 milliGRAM(s) Oral before breakfast  polyethylene glycol 3350 17 Gram(s) Oral daily  senna 2 Tablet(s) Oral at bedtime  sodium chloride 0.45%. 1000 milliLiter(s) (75 mL/Hr) IV Continuous <Continuous>    MEDICATIONS  (PRN):  acetaminophen     Tablet .. 650 milliGRAM(s) Oral every 6 hours PRN Temp greater or equal to 38C (100.4F), Mild Pain (1 - 3)      CAPILLARY BLOOD GLUCOSE        I&O's Summary    06 Mar 2022 07:01  -  07 Mar 2022 07:00  --------------------------------------------------------  IN: 2840.5 mL / OUT: 2100 mL / NET: 740.5 mL        PHYSICAL EXAM:  Vital Signs Last 24 Hrs  T(C): 36.7 (07 Mar 2022 11:44), Max: 36.8 (06 Mar 2022 13:29)  T(F): 98 (07 Mar 2022 11:44), Max: 98.2 (06 Mar 2022 13:29)  HR: 81 (07 Mar 2022 11:44) (70 - 96)  BP: 157/79 (07 Mar 2022 11:44) (130/87 - 174/102)  BP(mean): --  RR: 18 (07 Mar 2022 11:44) (18 - 19)  SpO2: 95% (07 Mar 2022 11:44) (93% - 98%)    GENERAL: NAD, well-developed  HEAD:  Atraumatic, Normocephalic  EYES: EOMI, PERRL, conjunctiva and sclera clear  NECK: Supple, No JVD  CHEST/LUNG: Clear to auscultation bilaterally; No wheezes, rales or rhonchi  HEART: s1 s2 RRR No murmurs, rubs, or gallops  ABDOMEN: Soft, Nontender, Nondistended; Bowel sounds present  EXTREMITIES:  2+ Peripheral Pulses, No clubbing, cyanosis, or edema  R knee no erythema no edema ifull ROM.   NEUROLOGY: AAOx3; non-focal  SKIN: No rashes or lesions      LABS:                        7.8    9.48  )-----------( 526      ( 07 Mar 2022 10:49 )             23.3     03-07    137  |  106  |  15  ----------------------------<  134<H>  3.9   |  22  |  1.06    Ca    9.8      07 Mar 2022 10:49  Mg     1.8     03-07    TPro  6.6  /  Alb  3.0<L>  /  TBili  0.3  /  DBili  x   /  AST  18  /  ALT  20  /  AlkPhos  91  03-07              RADIOLOGY & ADDITIONAL TESTS:    Imaging Personally Reviewed:    < from: Transthoracic Echocardiogram (03.06.22 @ 10:48) >  Conclusions:  1. Aortic Root: 4.2 cm.  LVOT diameter: 2.5 cm.  2. Severely dilated left atrium.  LA volume index = 63  cc/m2.  3. Normal left ventricular internal dimensions and wall  thicknesses.  4. Normal left ventricular systolic function. No segmental  wall motion abnormalities.  5. Moderate diastolic dysfunction (Stage II).  6. Normal right ventricular size and function.  7. Estimated pulmonary artery systolic pressure equals 42  mm Hg, assuming right atrial pressure equals 8 mm Hg,  consistent with mild pulmonary pressures.  *** No previous Echo exam.    < end of copied text >      Consultant(s) Notes Reviewed:      Care Discussed with Consultants/Other Providers:  mal Chen

## 2022-03-07 NOTE — PROGRESS NOTE ADULT - ASSESSMENT
50 y/o m--patient -patient with a history of sickle cell disease, essential HTN presenting with knee pain     Sickle Cell Disease  --Under care by Dr. Javier Lincoln of [facility]  --Currently on Hydroxyurea 1500 mg PO daily and on Folic Acid 1 mg daily  --Receives PRBC transfusions as needed at [facility] - with Hgb 7.2, no indication to transfuse at this time  --Please transfuse PRBC's if Hgb <7.0 grams  --Continue pain meds as indicated - patient may resume home meds when ready for discharge if adequate    Right knee pain   -- Diagnosed by Rheumatology with acute flare of gout  -- S/P arthrocentesis 3/3/2022  --Methylprednisolone injected into joint  --IV Solumedrol being changed to colchicine  --Rheumatology recommendations appreciated    Osteonecrosis of knees   --likely secondary to vaso-occlusive disease  --Management per Orthopedics  --No urgent intervention  --Patient recommended to follow up after discharge     DVT  --subacute vs chronic  --Lupus AC  negative for APS  --on Eliquis 5 mg q12 hours    Subsegmental PE  --Unclear age of PE  --May have been present at onset of DVT diagnosis  --Would maintain Eliquis for now  --If concern for DOAC failure, patient would need to transition to Lovenox, Coumadin or possibly Fondaparinux    Patient will follow up with his outpatient Hematologist  Dr. Javier Lincoln at [facility] after discharge.    Thank you for the opportunity to participate in Mr. Arthur's care.    Lyle Pandya PA-C  Hematology/Oncology  [facility]   [phone] (office)  [phone] (alt office)  Evenings and weekends please call MD on call or office     50 y/o m--patient -patient with a history of sickle cell disease, essential HTN presenting with knee pain     Sickle Cell Disease  --Under care by Dr. Javier Lincoln of [facility]  --Currently on Hydroxyurea 1500 mg PO daily and on Folic Acid 1 mg daily  --Receives PRBC transfusions as needed at [facility] - with Hgb 7.2, no indication to transfuse at this time  --Please transfuse PRBC's if Hgb <7.0 grams  --Continue pain meds as indicated - patient may resume home meds when ready for discharge if adequate    Right knee pain   -- Diagnosed by Rheumatology with acute flare of gout  -- S/P arthrocentesis 3/3/2022  --Methylprednisolone injected into joint  --IV Solumedrol being changed to colchicine  --Rheumatology recommendations appreciated    Osteonecrosis of knees.  --likely secondary to vaso-occlusive disease  --Management per Orthopedics  --No urgent intervention  --Patient recommended to follow up after discharge     DVT  --subacute vs chronic  --Lupus AC  negative for APS  --on Eliquis 5 mg q12 hours    Subsegmental PE  --Unclear age of PE  --May have been present at onset of DVT diagnosis  --Would maintain Eliquis for now  --If concern for DOAC failure, patient would need to transition to Lovenox, Coumadin or possibly Fondaparinux    Patient will follow up with his outpatient Hematologist  Dr. Javier Lincoln at [facility] after discharge.    Thank you for the opportunity to participate in Mr. Arthur's care.    Lyle Pandya PA-C  Hematology/Oncology  [facility]   [phone] (office)  [phone] (alt office)  Evenings and weekends please call MD on call or office

## 2022-03-07 NOTE — PROGRESS NOTE ADULT - PROBLEM SELECTOR PLAN 4
transferred to tele for aflutter with RVR     - rate 140s    - s/p cardizem IVP x2 - no response  s/p amiodarone gtt     - c/w Amiodarone 400mg PO BID for 3 days (Started 3/6/22)   3/5 CT PE - found to have PE in RUL   AC with Eliquis   monitor Hgb   check LE venous us

## 2022-03-08 ENCOUNTER — TRANSCRIPTION ENCOUNTER (OUTPATIENT)
Age: 52
End: 2022-03-08

## 2022-03-08 LAB
HCT VFR BLD CALC: 24.6 % — LOW (ref 39–50)
HGB BLD-MCNC: 8 G/DL — LOW (ref 13–17)
MAGNESIUM SERPL-MCNC: 2.2 MG/DL — SIGNIFICANT CHANGE UP (ref 1.6–2.6)
MCHC RBC-ENTMCNC: 32.3 PG — SIGNIFICANT CHANGE UP (ref 27–34)
MCHC RBC-ENTMCNC: 32.5 GM/DL — SIGNIFICANT CHANGE UP (ref 32–36)
MCV RBC AUTO: 99.2 FL — SIGNIFICANT CHANGE UP (ref 80–100)
MISCELLANEOUS TEST NAME: SIGNIFICANT CHANGE UP
MISCELLANEOUS, NORMALS: SIGNIFICANT CHANGE UP
MISCELLANEOUS, RESULT: SIGNIFICANT CHANGE UP
NRBC # BLD: 49 /100 WBCS — HIGH (ref 0–0)
PLATELET # BLD AUTO: 460 K/UL — HIGH (ref 150–400)
RBC # BLD: 2.48 M/UL — LOW (ref 4.2–5.8)
RBC # FLD: 21.5 % — HIGH (ref 10.3–14.5)
WBC # BLD: 8.43 K/UL — SIGNIFICANT CHANGE UP (ref 3.8–10.5)
WBC # FLD AUTO: 8.43 K/UL — SIGNIFICANT CHANGE UP (ref 3.8–10.5)

## 2022-03-08 PROCEDURE — 99239 HOSP IP/OBS DSCHRG MGMT >30: CPT

## 2022-03-08 RX ORDER — LISINOPRIL 2.5 MG/1
1 TABLET ORAL
Qty: 30 | Refills: 0
Start: 2022-03-08 | End: 2022-04-06

## 2022-03-08 RX ORDER — LISINOPRIL 2.5 MG/1
1 TABLET ORAL
Qty: 0 | Refills: 0 | DISCHARGE
Start: 2022-03-08 | End: 2022-04-06

## 2022-03-08 RX ORDER — COLCHICINE 0.6 MG
1 TABLET ORAL
Qty: 30 | Refills: 0
Start: 2022-03-08 | End: 2022-04-06

## 2022-03-08 RX ORDER — MAGNESIUM SULFATE 500 MG/ML
2 VIAL (ML) INJECTION ONCE
Refills: 0 | Status: COMPLETED | OUTPATIENT
Start: 2022-03-08 | End: 2022-03-08

## 2022-03-08 RX ORDER — AMIODARONE HYDROCHLORIDE 400 MG/1
2 TABLET ORAL
Qty: 4 | Refills: 0
Start: 2022-03-08 | End: 2022-03-08

## 2022-03-08 RX ORDER — HYDROMORPHONE HYDROCHLORIDE 2 MG/ML
30 INJECTION INTRAMUSCULAR; INTRAVENOUS; SUBCUTANEOUS
Refills: 0 | Status: DISCONTINUED | OUTPATIENT
Start: 2022-03-08 | End: 2022-03-09

## 2022-03-08 RX ORDER — APIXABAN 2.5 MG/1
1 TABLET, FILM COATED ORAL
Qty: 60 | Refills: 0
Start: 2022-03-08 | End: 2022-04-06

## 2022-03-08 RX ORDER — SENNA PLUS 8.6 MG/1
2 TABLET ORAL
Qty: 0 | Refills: 0 | DISCHARGE
Start: 2022-03-08

## 2022-03-08 RX ORDER — POLYETHYLENE GLYCOL 3350 17 G/17G
17 POWDER, FOR SOLUTION ORAL
Qty: 0 | Refills: 0 | DISCHARGE
Start: 2022-03-08

## 2022-03-08 RX ORDER — GABAPENTIN 400 MG/1
1 CAPSULE ORAL
Qty: 30 | Refills: 0
Start: 2022-03-08 | End: 2022-04-06

## 2022-03-08 RX ORDER — PANTOPRAZOLE SODIUM 20 MG/1
1 TABLET, DELAYED RELEASE ORAL
Qty: 30 | Refills: 0
Start: 2022-03-08 | End: 2022-04-06

## 2022-03-08 RX ORDER — HYDRALAZINE HCL 50 MG
1 TABLET ORAL
Qty: 90 | Refills: 0
Start: 2022-03-08 | End: 2022-04-06

## 2022-03-08 RX ADMIN — Medication 1 TABLET(S): at 11:03

## 2022-03-08 RX ADMIN — SENNA PLUS 2 TABLET(S): 8.6 TABLET ORAL at 21:26

## 2022-03-08 RX ADMIN — Medication 0.6 MILLIGRAM(S): at 11:03

## 2022-03-08 RX ADMIN — HYDROMORPHONE HYDROCHLORIDE 30 MILLILITER(S): 2 INJECTION INTRAMUSCULAR; INTRAVENOUS; SUBCUTANEOUS at 07:36

## 2022-03-08 RX ADMIN — HYDROMORPHONE HYDROCHLORIDE 30 MILLILITER(S): 2 INJECTION INTRAMUSCULAR; INTRAVENOUS; SUBCUTANEOUS at 01:04

## 2022-03-08 RX ADMIN — Medication 1 MILLIGRAM(S): at 11:03

## 2022-03-08 RX ADMIN — APIXABAN 5 MILLIGRAM(S): 2.5 TABLET, FILM COATED ORAL at 05:14

## 2022-03-08 RX ADMIN — GABAPENTIN 300 MILLIGRAM(S): 400 CAPSULE ORAL at 21:26

## 2022-03-08 RX ADMIN — SODIUM CHLORIDE 75 MILLILITER(S): 9 INJECTION, SOLUTION INTRAVENOUS at 06:48

## 2022-03-08 RX ADMIN — Medication 25 MILLIGRAM(S): at 21:26

## 2022-03-08 RX ADMIN — HYDROMORPHONE HYDROCHLORIDE 30 MILLILITER(S): 2 INJECTION INTRAMUSCULAR; INTRAVENOUS; SUBCUTANEOUS at 17:17

## 2022-03-08 RX ADMIN — PANTOPRAZOLE SODIUM 40 MILLIGRAM(S): 20 TABLET, DELAYED RELEASE ORAL at 05:16

## 2022-03-08 RX ADMIN — APIXABAN 5 MILLIGRAM(S): 2.5 TABLET, FILM COATED ORAL at 17:16

## 2022-03-08 RX ADMIN — HYDROXYUREA 1500 MILLIGRAM(S): 500 CAPSULE ORAL at 11:03

## 2022-03-08 RX ADMIN — SODIUM CHLORIDE 75 MILLILITER(S): 9 INJECTION, SOLUTION INTRAVENOUS at 21:26

## 2022-03-08 RX ADMIN — AMIODARONE HYDROCHLORIDE 400 MILLIGRAM(S): 400 TABLET ORAL at 17:15

## 2022-03-08 RX ADMIN — Medication 25 MILLIGRAM(S): at 05:16

## 2022-03-08 RX ADMIN — Medication 25 MILLIGRAM(S): at 13:10

## 2022-03-08 RX ADMIN — Medication 25 GRAM(S): at 13:10

## 2022-03-08 RX ADMIN — HYDROMORPHONE HYDROCHLORIDE 30 MILLILITER(S): 2 INJECTION INTRAMUSCULAR; INTRAVENOUS; SUBCUTANEOUS at 19:00

## 2022-03-08 RX ADMIN — HYDROMORPHONE HYDROCHLORIDE 30 MILLILITER(S): 2 INJECTION INTRAMUSCULAR; INTRAVENOUS; SUBCUTANEOUS at 05:19

## 2022-03-08 RX ADMIN — LISINOPRIL 10 MILLIGRAM(S): 2.5 TABLET ORAL at 05:18

## 2022-03-08 RX ADMIN — AMIODARONE HYDROCHLORIDE 400 MILLIGRAM(S): 400 TABLET ORAL at 05:15

## 2022-03-08 NOTE — PROGRESS NOTE ADULT - SUBJECTIVE AND OBJECTIVE BOX
Subjective: Patient seen and examined. No new events except as noted.   Feeling much better.     REVIEW OF SYSTEMS:    CONSTITUTIONAL: + weakness, fevers or chills  EYES/ENT: No visual changes;  No vertigo or throat pain   NECK: No pain or stiffness  RESPIRATORY: No cough, wheezing, hemoptysis; No shortness of breath  CARDIOVASCULAR: No chest pain or palpitations  GASTROINTESTINAL: No abdominal or epigastric pain. No nausea, vomiting, or hematemesis; No diarrhea or constipation. No melena or hematochezia.  GENITOURINARY: No dysuria, frequency or hematuria  NEUROLOGICAL: No numbness or weakness  SKIN: No itching, burning, rashes, or lesions   All other review of systems is negative unless indicated above.    MEDICATIONS:  MEDICATIONS  (STANDING):  aMIOdarone    Tablet 400 milliGRAM(s) Oral two times a day  aMIOdarone Infusion 0.5 mG/Min (16.7 mL/Hr) IV Continuous <Continuous>  apixaban 5 milliGRAM(s) Oral every 12 hours  colchicine 0.6 milliGRAM(s) Oral daily  folic acid 1 milliGRAM(s) Oral daily  gabapentin 300 milliGRAM(s) Oral at bedtime  hydrALAZINE 25 milliGRAM(s) Oral every 8 hours  HYDROmorphone PCA (1 mG/mL) 30 milliLiter(s) PCA Continuous PCA Continuous  hydroxyurea 1500 milliGRAM(s) Oral daily  lisinopril 10 milliGRAM(s) Oral daily  multivitamin 1 Tablet(s) Oral daily  pantoprazole    Tablet 40 milliGRAM(s) Oral before breakfast  polyethylene glycol 3350 17 Gram(s) Oral daily  senna 2 Tablet(s) Oral at bedtime  sodium chloride 0.45%. 1000 milliLiter(s) (75 mL/Hr) IV Continuous <Continuous>      PHYSICAL EXAM:  T(C): 36.7 (03-08-22 @ 12:02), Max: 36.9 (03-08-22 @ 00:01)  HR: 70 (03-08-22 @ 12:02) (70 - 84)  BP: 157/97 (03-08-22 @ 12:02) (126/77 - 166/85)  RR: 18 (03-08-22 @ 12:02) (18 - 18)  SpO2: 97% (03-08-22 @ 12:02) (96% - 98%)  Wt(kg): --  I&O's Summary    07 Mar 2022 07:01  -  08 Mar 2022 07:00  --------------------------------------------------------  IN: 2055 mL / OUT: 1350 mL / NET: 705 mL    08 Mar 2022 07:01  -  08 Mar 2022 13:35  --------------------------------------------------------  IN: 280 mL / OUT: 250 mL / NET: 30 mL    Appearance: NAD	  HEENT: Normal oral mucosa, PERRL, EOMI	  Lymphatic: No lymphadenopathy , no edema  Cardiovascular: Normal S1 S2, No JVD, No murmurs , Peripheral pulses palpable 2+ bilaterally  Respiratory: Lungs clear to auscultation, normal effort 	  Gastrointestinal:  Soft, Non-tender, + BS	  Skin: No rashes, No ecchymoses, No cyanosis, warm to touch  Musculoskeletal: Normal range of motion, normal strength  Psychiatry:  Mood & affect appropriate  Ext: No edema      LABS:    CARDIAC MARKERS:                        8.0    8.43  )-----------( 460      ( 08 Mar 2022 06:21 )             24.6     03-07    137  |  106  |  15  ----------------------------<  134<H>  3.9   |  22  |  1.06    Ca    9.8      07 Mar 2022 10:49  Mg     1.8     03-07    TPro  6.6  /  Alb  3.0<L>  /  TBili  0.3  /  DBili  x   /  AST  18  /  ALT  20  /  AlkPhos  91  03-07    proBNP:   Lipid Profile:   HgA1c:   TSH: Thyroid Stimulating Hormone, Serum: 1.77 uIU/mL (03-07 @ 16:31)    TELEMETRY: 	    ECG:  	  RADIOLOGY:   DIAGNOSTIC TESTING:  [ ] Echocardiogram:  [ ]  Catheterization:  [ ] Stress Test:    OTHER:

## 2022-03-08 NOTE — PROGRESS NOTE ADULT - NUTRITIONAL ASSESSMENT
This patient has been assessed with a concern for Malnutrition and has been determined to have a diagnosis/diagnoses of Severe protein-calorie malnutrition.    This patient is being managed with:   Diet Regular-  Low Sodium  No Shellfish  Supplement Feeding Modality:  Oral  Glucerna Shake Cans or Servings Per Day:  1       Frequency:  Daily  Entered: Mar  4 2022  1:39PM    

## 2022-03-08 NOTE — DISCHARGE NOTE PROVIDER - PROVIDER TOKENS
PROVIDER:[TOKEN:[[id number]],FOLLOWUP:[1 week]],PROVIDER:[TOKEN:[[id number]],FOLLOWUP:[1 week]],PROVIDER:[TOKEN:[[id number]],FOLLOWUP:[2 weeks]]

## 2022-03-08 NOTE — PROGRESS NOTE ADULT - SUBJECTIVE AND OBJECTIVE BOX
Patient is a 51y old  Male who presents with a chief complaint of S/P discharge from [facility] with 3 weeks of RIGHT knee pain, sickle cell crisis (07 Mar 2022 12:17)      SUBJECTIVE / OVERNIGHT EVENTS:    feels well. wants to go home.     NSR 70s-110s    ROS:  14 point ROS negative in detail except stated as above    MEDICATIONS  (STANDING):  aMIOdarone    Tablet 400 milliGRAM(s) Oral two times a day  aMIOdarone Infusion 0.5 mG/Min (16.7 mL/Hr) IV Continuous <Continuous>  apixaban 5 milliGRAM(s) Oral every 12 hours  colchicine 0.6 milliGRAM(s) Oral daily  folic acid 1 milliGRAM(s) Oral daily  gabapentin 300 milliGRAM(s) Oral at bedtime  hydrALAZINE 25 milliGRAM(s) Oral every 8 hours  HYDROmorphone PCA (1 mG/mL) 30 milliLiter(s) PCA Continuous PCA Continuous  hydroxyurea 1500 milliGRAM(s) Oral daily  lisinopril 10 milliGRAM(s) Oral daily  multivitamin 1 Tablet(s) Oral daily  pantoprazole    Tablet 40 milliGRAM(s) Oral before breakfast  polyethylene glycol 3350 17 Gram(s) Oral daily  senna 2 Tablet(s) Oral at bedtime  sodium chloride 0.45%. 1000 milliLiter(s) (75 mL/Hr) IV Continuous <Continuous>    MEDICATIONS  (PRN):  acetaminophen     Tablet .. 650 milliGRAM(s) Oral every 6 hours PRN Temp greater or equal to 38C (100.4F), Mild Pain (1 - 3)      CAPILLARY BLOOD GLUCOSE        I&O's Summary    07 Mar 2022 07:01  -  08 Mar 2022 07:00  --------------------------------------------------------  IN: 2055 mL / OUT: 1350 mL / NET: 705 mL    08 Mar 2022 07:01  -  08 Mar 2022 11:42  --------------------------------------------------------  IN: 280 mL / OUT: 250 mL / NET: 30 mL        PHYSICAL EXAM:  Vital Signs Last 24 Hrs  T(C): 36.6 (08 Mar 2022 08:18), Max: 36.9 (08 Mar 2022 00:01)  T(F): 97.9 (08 Mar 2022 08:18), Max: 98.5 (08 Mar 2022 00:01)  HR: 73 (08 Mar 2022 08:18) (72 - 84)  BP: 154/95 (08 Mar 2022 08:18) (126/77 - 166/85)  BP(mean): --  RR: 18 (08 Mar 2022 08:18) (18 - 18)  SpO2: 98% (08 Mar 2022 08:18) (95% - 98%)    GENERAL: NAD, well-developed  HEAD:  Atraumatic, Normocephalic  EYES: EOMI, PERRL, conjunctiva and sclera clear  NECK: Supple, No JVD  CHEST/LUNG: Clear to auscultation bilaterally; No wheezes, rales or rhonchi  HEART: s1 s2 RRR No murmurs, rubs, or gallops  ABDOMEN: Soft, Nontender, Nondistended; Bowel sounds present  EXTREMITIES:  2+ Peripheral Pulses, No clubbing, cyanosis, or edema  R knee no erythema no edema full ROM.   NEUROLOGY: AAOx3; non-focal  SKIN: No rashes or lesions        LABS:                        8.0    8.43  )-----------( 460      ( 08 Mar 2022 06:21 )             24.6     03-07    137  |  106  |  15  ----------------------------<  134<H>  3.9   |  22  |  1.06    Ca    9.8      07 Mar 2022 10:49  Mg     1.8     03-07    TPro  6.6  /  Alb  3.0<L>  /  TBili  0.3  /  DBili  x   /  AST  18  /  ALT  20  /  AlkPhos  91  03-07              RADIOLOGY & ADDITIONAL TESTS:    Imaging Personally Reviewed:    Consultant(s) Notes Reviewed:      Care Discussed with Consultants/Other Providers:  mal Chen

## 2022-03-08 NOTE — DISCHARGE NOTE PROVIDER - NSDCCPCAREPLAN_GEN_ALL_CORE_FT
PRINCIPAL DISCHARGE DIAGNOSIS  Diagnosis: Sickle cell pain crisis  Assessment and Plan of Treatment: Now reolved  Continue pain medication regimen  Follow up with primary care provider      SECONDARY DISCHARGE DIAGNOSES  Diagnosis: Knee effusion, right  Assessment and Plan of Treatment:     Diagnosis: Gout attack  Assessment and Plan of Treatment:      PRINCIPAL DISCHARGE DIAGNOSIS  Diagnosis: Sickle cell pain crisis  Assessment and Plan of Treatment: Now resolved  Continue pain medication regimen  Continue with MVI and Folic Acid,  Hydrea  Continue bowel regimen.  Follow up with primary care provider      SECONDARY DISCHARGE DIAGNOSES  Diagnosis: Knee effusion, right  Assessment and Plan of Treatment: Post arthrocentesis (joint aspiration) in the ED. gram stain of fluid negative for bacteria    Diagnosis: Gout attack  Assessment and Plan of Treatment: s/p solumedrol 20mg IV x 2 with significant improvement in pain;  Post intra articular steroid injection on 3/3   Continue Colchicine  Follow up with rheumatologist    Diagnosis: Atrial flutter  Assessment and Plan of Treatment: Take all medications as prescribed  Follow up with cardiologist  Atrial fibrillation is the most common heart rhythm problem.  The condition puts you at risk for has stroke and heart attack  It helps if you control your blood pressure, not drink more than 1-2 alcohol drinks per day, cut down on caffeine, getting treatment for over active thyroid gland, and get regular exercise  Call your doctor if you feel your heart racing or beating unusually, chest tightness or pain, lightheaded, faint, shortness of breath especially with exercise  It is important to take your heart medication as prescribed  You may be on anticoagulation which is very important to take as directed - you may need blood work to monitor drug levels      Diagnosis: Hypertension  Assessment and Plan of Treatment: Low salt diet  Activity as tolerated.  Take all medication as prescribed.  Follow up with your medical doctor for routine blood pressure monitoring at your next visit.  Notify your doctor if you have any of the following symptoms:   Dizziness, Lightheadedness, Blurry vision, Headache, Chest pain, Shortness of breath       PRINCIPAL DISCHARGE DIAGNOSIS  Diagnosis: Sickle cell pain crisis  Assessment and Plan of Treatment: Now resolved  Continue pain medication regimen  Continue with MVI and Folic Acid,  Hydrea  Continue bowel regimen.  Follow up with primary care provider      SECONDARY DISCHARGE DIAGNOSES  Diagnosis: Knee effusion, right  Assessment and Plan of Treatment: Post arthrocentesis (joint aspiration) in the ED. gram stain of fluid negative for bacteria    Diagnosis: Gout attack  Assessment and Plan of Treatment: s/p solumedrol 20mg IV x 2 with significant improvement in pain;  Post intra articular steroid injection on 3/3   Continue Colchicine  Follow up with rheumatologist    Diagnosis: Atrial flutter  Assessment and Plan of Treatment: Take all medications as prescribed  Follow up with cardiologist  Atrial fibrillation is the most common heart rhythm problem.  The condition puts you at risk for has stroke and heart attack  It helps if you control your blood pressure, not drink more than 1-2 alcohol drinks per day, cut down on caffeine, getting treatment for over active thyroid gland, and get regular exercise  Call your doctor if you feel your heart racing or beating unusually, chest tightness or pain, lightheaded, faint, shortness of breath especially with exercise  It is important to take your heart medication as prescribed  You may be on anticoagulation which is very important to take as directed - you may need blood work to monitor drug levels  Continue Amiodarone as prescribed       Diagnosis: Hypertension  Assessment and Plan of Treatment: Low salt diet  Activity as tolerated.  Take all medication as prescribed.  Follow up with your medical doctor for routine blood pressure monitoring at your next visit.  Notify your doctor if you have any of the following symptoms:   Dizziness, Lightheadedness, Blurry vision, Headache, Chest pain, Shortness of breath       PRINCIPAL DISCHARGE DIAGNOSIS  Diagnosis: Sickle cell pain crisis  Assessment and Plan of Treatment: Now resolved  Continue pain medication regimen  Continue with MVI and Folic Acid,  Hydrea  Continue bowel regimen.  Follow up with primary care provider for pain managment /Dilaudid prescription if needed.      SECONDARY DISCHARGE DIAGNOSES  Diagnosis: Knee effusion, right  Assessment and Plan of Treatment: Post arthrocentesis (joint aspiration) in the ED. gram stain of fluid negative for bacteria    Diagnosis: Gout attack  Assessment and Plan of Treatment: s/p solumedrol 20mg IV x 2 with significant improvement in pain;  Post intra articular steroid injection on 3/3   Continue Colchicine  Follow up with rheumatologist    Diagnosis: Atrial flutter  Assessment and Plan of Treatment: Take all medications as prescribed  Follow up with cardiologist  Atrial fibrillation is the most common heart rhythm problem.  The condition puts you at risk for has stroke and heart attack  It helps if you control your blood pressure, not drink more than 1-2 alcohol drinks per day, cut down on caffeine, getting treatment for over active thyroid gland, and get regular exercise  Call your doctor if you feel your heart racing or beating unusually, chest tightness or pain, lightheaded, faint, shortness of breath especially with exercise  It is important to take your heart medication as prescribed  You may be on anticoagulation which is very important to take as directed - you may need blood work to monitor drug levels  Continue Amiodarone as prescribed * follow up with cardiology in 2 weeks to check liver tests while on this medication      Diagnosis: Hypertension  Assessment and Plan of Treatment: Low salt diet  Activity as tolerated.  Take all medication as prescribed.  Follow up with your medical doctor for routine blood pressure monitoring at your next visit.  Notify your doctor if you have any of the following symptoms:   Dizziness, Lightheadedness, Blurry vision, Headache, Chest pain, Shortness of breath      Diagnosis: Pulmonary embolism  Assessment and Plan of Treatment: Eliquis as prescribed   Follow up with your health care provider within one week. Call for appointment.  If you develop shortness of breath or if your shortness of breath worsens call your Health Care Provider or go to the Emergency Department.

## 2022-03-08 NOTE — DISCHARGE NOTE PROVIDER - REASON FOR ADMISSION
S/P discharge from [facility] with 3 weeks of RIGHT knee pain, sickle cell crisis

## 2022-03-08 NOTE — DISCHARGE NOTE PROVIDER - CARE PROVIDER_API CALL
Brittany Veloz)  Internal Medicine  [address] [place] [address]  Phone: [phone]  Fax: [phone]  Follow Up Time: 1 week    Mukund Cruz ()  Cardiology; Internal Medicine  [address] [facility], [address]  [place] [address]  Phone: [phone]  Fax: [phone]  Follow Up Time: 1 week    Iwona Zapata)  Internal Medicine; Rheumatology  [address] [place] [address], [facility] [address]  [place] [address]  Phone: [phone]  Fax: [phone]  Follow Up Time: 2 weeks

## 2022-03-08 NOTE — PROGRESS NOTE ADULT - PROBLEM SELECTOR PLAN 5
- acute R prox DVT  Partially compressible mid and distal right femoral vein and popliteal vein consistent with deep venous thrombosis; thrombus is of indeterminate age, however likely subacute or chronic.-   as per patient and heme patient had has this for a couple weeks prior and also last year  switched to apixaban 5mg bid which is home dose  - Lupus AC neg as per heme, can pursue hypercoag w/u as an opt
- acute R prox DVT and Subsegmental pulmonary embolism in the right upper lobe.  Partially compressible mid and distal right femoral vein and popliteal vein consistent with deep venous thrombosis; thrombus is of indeterminate age, however likely subacute or chronic.-   as per patient and heme patient had has this for a couple weeks prior and also last year  switched to apixaban 5mg bid which is home dose  - Lupus AC neg as per heme, can pursue hypercoag w/u as an opt
- karlee   d/zina cornejo patient pcp    PT home pt
- acute R prox DVT and Subsegmental pulmonary embolism in the right upper lobe.  Partially compressible mid and distal right femoral vein and popliteal vein consistent with deep venous thrombosis; thrombus is of indeterminate age, however likely subacute or chronic.-   as per patient and heme patient had has this for a couple weeks prior and also last year  switched to apixaban 5mg bid which is home dose  - Lupus AC neg as per heme, can pursue hypercoag w/u as an opt
- karlee   d/zina cornejo patient pcp    PT pending
- karlee   d/zina cornejo patient pcp    PT pending
- acute R prox DVT and Subsegmental pulmonary embolism in the right upper lobe.  Partially compressible mid and distal right femoral vein and popliteal vein consistent with deep venous thrombosis; thrombus is of indeterminate age, however likely subacute or chronic.-   as per patient and heme patient had has this for a couple weeks prior and also last year  switched to apixaban 5mg bid which is home dose  - Lupus AC neg as per heme, can pursue hypercoag w/u as an opt

## 2022-03-08 NOTE — PROGRESS NOTE ADULT - PROBLEM SELECTOR PLAN 6
- karlee   d/zina cornejo patient pcp    PT home pt

## 2022-03-08 NOTE — DISCHARGE NOTE PROVIDER - NSDCFUADDAPPT_GEN_ALL_CORE_FT
APPTS ARE READY TO BE MADE: [x] YES    Best Family or Patient Contact (if needed):    Additional Information about above appointments (if needed):    1:   2:   3:     Other comments or requests:    APPTS ARE READY TO BE MADE: [x] YES    Best Family or Patient Contact (if needed):    Additional Information about above appointments (if needed):    1:   2:   3:     Other comments or requests:      									                                    Patient was previously scheduled with Dr. Brittany Veloz on 03/15 11am at [address] [facility]. 																		                               Patient was scheduled with Dr. Mukund Gaitan on 03/23 3:30pm at [address] [place], [address] [address] through the provider's office. Patient advised of appointment details.																		                                        Patient was scheduled with Dr. Owen Gaxiola on 03/24 11am at [address] [place], [id number] through the provider's office. Patient advised of appointment details. APPTS ARE READY TO BE MADE: [x] YES    Best Family or Patient Contact (if needed):    Additional Information about above appointments (if needed):    1:   2:   3:     Other comments or requests:      									                                    Patient was previously scheduled with Dr. Brittany Veloz on 03/15 11am at [address] [facility]. 																		                               Patient was scheduled with Dr. Mukund Cruz on 03/23 3:30pm at [address] [place], [address] [address] through the provider's office. Patient advised of appointment details.																		                                        Patient was scheduled with Dr. Owen Gaxiola on 03/24 11am at [address] [place], [id number] through the provider's office. Patient advised of appointment details. APPTS ARE READY TO BE MADE: [x] YES    Best Family or Patient Contact (if needed):    Additional Information about above appointments (if needed):    1:   2:   3:     Other comments or requests: 					  Patient was previously scheduled with Dr. Brittany Veloz on 03/15 11am at [address] [facility]. 																		                               Patient was scheduled with Dr. Mukund Cruz on 03/23 3:30pm at [address] [facility] [address] [place], [address] [address] through the provider's office. Patient advised of appointment details.  								                                        Patient was scheduled with Dr. Owen Gaxiola on 03/24 11am at [address] [place], [address] through the provider's office. Patient advised of appointment details.    Update:  Patient was scheduled with Mukund Jarrett on 4/26/2022 3:00pm at [address] [place] [address] through the provider's office. Patient advised of appointment details.      Patient was previously scheduled with Brittany Mccabe on 6/14/2022 11:00am at [address] [place] [address].

## 2022-03-08 NOTE — PROGRESS NOTE ADULT - PROBLEM SELECTOR PLAN 1
with RVR s/p lopressor and amiodarone gtt now on Amiodarone 400mg tid transition to 200mg po daily  TSH 1.77 T4 1.3  cards on board appreciate their recs  K >4 MG >2 BMP daily  2d echo -->Severely dilated left atrium. . Normal left ventricular systolic function. Moderate diastolic dysfunction (Stage II).Normal right ventricular size and function.  patient already on apixaban c/w  CTA showing Subsegmental pulmonary embolism in the right upper lobe.

## 2022-03-08 NOTE — PROGRESS NOTE ADULT - PROBLEM SELECTOR PROBLEM 5
Acute deep vein thrombosis (DVT)
Need for prophylactic measure
Need for prophylactic measure
Acute deep vein thrombosis (DVT)
Need for prophylactic measure
Acute deep vein thrombosis (DVT)
Acute deep vein thrombosis (DVT)

## 2022-03-08 NOTE — DISCHARGE NOTE PROVIDER - NSDCMRMEDTOKEN_GEN_ALL_CORE_FT
Dilaudid:   folic acid 1 mg oral tablet: 1 tab(s) orally once a day  Hydrea 500 mg oral capsule: 3 cap(s) orally once a day  multivitamin:    amiodarone 200 mg oral tablet: 2 tab(s) orally 2 times a day  amiodarone 200 mg oral tablet: 1 tab(s) orally once a day   START on 3/10/22  apixaban 5 mg oral tablet: 1 tab(s) orally every 12 hours  colchicine 0.6 mg oral tablet: 1 tab(s) orally once a day  Dilaudid:   folic acid 1 mg oral tablet: 1 tab(s) orally once a day  gabapentin 300 mg oral capsule: 1 cap(s) orally once a day (at bedtime)  hydrALAZINE 25 mg oral tablet: 1 tab(s) orally every 8 hours  Hydrea 500 mg oral capsule: 3 cap(s) orally once a day  lisinopril 10 mg oral tablet: 1 tab(s) orally once a day  Multiple Vitamins oral tablet: 1 tab(s) orally once a day  pantoprazole 40 mg oral delayed release tablet: 1 tab(s) orally once a day (before a meal)  polyethylene glycol 3350 oral powder for reconstitution: 17 gram(s) orally once a day  senna oral tablet: 2 tab(s) orally once a day (at bedtime)   amiodarone 200 mg oral tablet: 2 tab(s) orally 2 times a day  amiodarone 200 mg oral tablet: 1 tab(s) orally once a day   START on 3/10/22  apixaban 5 mg oral tablet: 1 tab(s) orally every 12 hours  colchicine 0.6 mg oral tablet: 1 tab(s) orally once a day  Dilaudid 2 mg oral tablet: 1 tab(s) orally every 6 hours MDD:max 4 tabs daily  folic acid 1 mg oral tablet: 1 tab(s) orally once a day  gabapentin 300 mg oral capsule: 1 cap(s) orally once a day (at bedtime)  hydrALAZINE 25 mg oral tablet: 1 tab(s) orally every 8 hours  Hydrea 500 mg oral capsule: 3 cap(s) orally once a day  lisinopril 10 mg oral tablet: 1 tab(s) orally once a day  Multiple Vitamins oral tablet: 1 tab(s) orally once a day  pantoprazole 40 mg oral delayed release tablet: 1 tab(s) orally once a day (before a meal)  polyethylene glycol 3350 oral powder for reconstitution: 17 gram(s) orally once a day  senna oral tablet: 2 tab(s) orally once a day (at bedtime)

## 2022-03-08 NOTE — DISCHARGE NOTE PROVIDER - CARE PROVIDERS DIRECT ADDRESSES
,[email or web address],DirectAddress_Unknown,[email or web address]

## 2022-03-08 NOTE — DISCHARGE NOTE PROVIDER - HOSPITAL COURSE
51 M with SS disease, HTN, presented with SS crisis, right knee swelling, s/p arthrocentesis in the ED with acute gouty attack found to have atrial flutter in RVR    Atrial flutter.   ·  Plan: with RVR s/p lopressor and amiodarone gtt now on Amiodarone 400mg tid transition to 200mg po daily  TSH 1.77 T4 1.3  cards on board appreciate their recs  K >4 MG >2 BMP daily  2d echo -->Severely dilated left atrium. . Normal left ventricular systolic function. Moderate diastolic dysfunction (Stage II).Normal right ventricular size and function.  patient already on apixaban c/w  CTA showing Subsegmental pulmonary embolism in the right upper lobe.    Gout attack.   ·  Plan: - Patient is s/p arthrocentesis in the ED. gram stain of fluid negative for PMN bacteria  - s/p intra articular steroid injection 3/3   - Fluid studies consistent with goat- crystals, monosodium urate  - rheum consulted greatly appreciate their recommendations- s/p solumedrol 20mg IV x 2 with significant improvement in pain; d/w DR. Zapata Colchicine .6mg x2 given given for 3 days now on and colchicine .6mg daily  - opt rheum follow up for urate lowering therapy   - ortho c/s appreciated  - patient declined Xrays  - HLA sent.    Hypertension.   ·  Plan: patient on hydralazine at home, c/w hydralazine 25mg  q8hr add lisinopril to 5mg daily, uptitrate as needed   continue to monitor.    Sickle cell disease.   ·  Plan: - with vasoocclusive crisis; on demand 2mg/lockout 30min/4hr limit 6mg  - no IV benadryl, PO ok   - c/w 1/2NS 75cc/hr  - c/w incentive spirometer   - Continue with MVI and Folic Acid.   - c/w home Hydrea 1500mg po daily  - c/w bowel regimen.     Acute deep vein thrombosis (DVT).   ·  Plan: - acute R prox DVT and Subsegmental pulmonary embolism in the right upper lobe.  Partially compressible mid and distal right femoral vein and popliteal vein consistent with deep venous thrombosis; thrombus is of indeterminate age, however likely subacute or chronic.-   as per patient and heme patient had has this for a couple weeks prior and also last year  switched to apixaban 5mg bid which is home dose  - Lupus AC neg as per heme, can pursue hypercoag w/u as an opt.    Need for prophylactic measure.   ·  Plan: - Eliquis     PT home pt, Pt declined

## 2022-03-08 NOTE — DISCHARGE NOTE PROVIDER - DETAILS OF MALNUTRITION DIAGNOSIS/DIAGNOSES
This patient has been assessed with a concern for Malnutrition and was treated during this hospitalization for the following Nutrition diagnosis/diagnoses:     -  03/04/2022: Severe protein-calorie malnutrition

## 2022-03-09 ENCOUNTER — TRANSCRIPTION ENCOUNTER (OUTPATIENT)
Age: 52
End: 2022-03-09

## 2022-03-09 VITALS
DIASTOLIC BLOOD PRESSURE: 76 MMHG | OXYGEN SATURATION: 97 % | HEART RATE: 77 BPM | SYSTOLIC BLOOD PRESSURE: 140 MMHG | RESPIRATION RATE: 18 BRPM | TEMPERATURE: 98 F

## 2022-03-09 LAB — HLA-B*57:01 SPEC QL: NEGATIVE — SIGNIFICANT CHANGE UP

## 2022-03-09 PROCEDURE — 84443 ASSAY THYROID STIM HORMONE: CPT

## 2022-03-09 PROCEDURE — U0003: CPT

## 2022-03-09 PROCEDURE — 93005 ELECTROCARDIOGRAM TRACING: CPT

## 2022-03-09 PROCEDURE — 89051 BODY FLUID CELL COUNT: CPT

## 2022-03-09 PROCEDURE — 99285 EMERGENCY DEPT VISIT HI MDM: CPT | Mod: 25

## 2022-03-09 PROCEDURE — 96374 THER/PROPH/DIAG INJ IV PUSH: CPT

## 2022-03-09 PROCEDURE — 82247 BILIRUBIN TOTAL: CPT

## 2022-03-09 PROCEDURE — 85027 COMPLETE CBC AUTOMATED: CPT

## 2022-03-09 PROCEDURE — 84439 ASSAY OF FREE THYROXINE: CPT

## 2022-03-09 PROCEDURE — 36415 COLL VENOUS BLD VENIPUNCTURE: CPT

## 2022-03-09 PROCEDURE — 86140 C-REACTIVE PROTEIN: CPT

## 2022-03-09 PROCEDURE — 87070 CULTURE OTHR SPECIMN AEROBIC: CPT

## 2022-03-09 PROCEDURE — 73564 X-RAY EXAM KNEE 4 OR MORE: CPT

## 2022-03-09 PROCEDURE — 80053 COMPREHEN METABOLIC PANEL: CPT

## 2022-03-09 PROCEDURE — 83735 ASSAY OF MAGNESIUM: CPT

## 2022-03-09 PROCEDURE — 85025 COMPLETE CBC W/AUTO DIFF WBC: CPT

## 2022-03-09 PROCEDURE — 87040 BLOOD CULTURE FOR BACTERIA: CPT

## 2022-03-09 PROCEDURE — 76882 US LMTD JT/FCL EVL NVASC XTR: CPT

## 2022-03-09 PROCEDURE — 36410 VNPNXR 3YR/> PHY/QHP DX/THER: CPT

## 2022-03-09 PROCEDURE — 85610 PROTHROMBIN TIME: CPT

## 2022-03-09 PROCEDURE — 84484 ASSAY OF TROPONIN QUANT: CPT

## 2022-03-09 PROCEDURE — 82945 GLUCOSE OTHER FLUID: CPT

## 2022-03-09 PROCEDURE — 85045 AUTOMATED RETICULOCYTE COUNT: CPT

## 2022-03-09 PROCEDURE — 96376 TX/PRO/DX INJ SAME DRUG ADON: CPT

## 2022-03-09 PROCEDURE — 97530 THERAPEUTIC ACTIVITIES: CPT

## 2022-03-09 PROCEDURE — 86618 LYME DISEASE ANTIBODY: CPT

## 2022-03-09 PROCEDURE — 84100 ASSAY OF PHOSPHORUS: CPT

## 2022-03-09 PROCEDURE — 71275 CT ANGIOGRAPHY CHEST: CPT

## 2022-03-09 PROCEDURE — 82553 CREATINE MB FRACTION: CPT

## 2022-03-09 PROCEDURE — 87075 CULTR BACTERIA EXCEPT BLOOD: CPT

## 2022-03-09 PROCEDURE — 82550 ASSAY OF CK (CPK): CPT

## 2022-03-09 PROCEDURE — 84157 ASSAY OF PROTEIN OTHER: CPT

## 2022-03-09 PROCEDURE — 93971 EXTREMITY STUDY: CPT

## 2022-03-09 PROCEDURE — 87205 SMEAR GRAM STAIN: CPT

## 2022-03-09 PROCEDURE — 83615 LACTATE (LD) (LDH) ENZYME: CPT

## 2022-03-09 PROCEDURE — 85730 THROMBOPLASTIN TIME PARTIAL: CPT

## 2022-03-09 PROCEDURE — 97110 THERAPEUTIC EXERCISES: CPT

## 2022-03-09 PROCEDURE — 81381 HLA I TYPING 1 ALLELE HR: CPT

## 2022-03-09 PROCEDURE — 93306 TTE W/DOPPLER COMPLETE: CPT

## 2022-03-09 PROCEDURE — 82248 BILIRUBIN DIRECT: CPT

## 2022-03-09 PROCEDURE — 76937 US GUIDE VASCULAR ACCESS: CPT

## 2022-03-09 PROCEDURE — 84550 ASSAY OF BLOOD/URIC ACID: CPT

## 2022-03-09 PROCEDURE — 85652 RBC SED RATE AUTOMATED: CPT

## 2022-03-09 PROCEDURE — U0005: CPT

## 2022-03-09 PROCEDURE — 97116 GAIT TRAINING THERAPY: CPT

## 2022-03-09 PROCEDURE — 99233 SBSQ HOSP IP/OBS HIGH 50: CPT

## 2022-03-09 PROCEDURE — 89060 EXAM SYNOVIAL FLUID CRYSTALS: CPT

## 2022-03-09 PROCEDURE — 97161 PT EVAL LOW COMPLEX 20 MIN: CPT

## 2022-03-09 PROCEDURE — 96375 TX/PRO/DX INJ NEW DRUG ADDON: CPT

## 2022-03-09 PROCEDURE — 80048 BASIC METABOLIC PNL TOTAL CA: CPT

## 2022-03-09 RX ORDER — ONDANSETRON 8 MG/1
4 TABLET, FILM COATED ORAL ONCE
Refills: 0 | Status: COMPLETED | OUTPATIENT
Start: 2022-03-09 | End: 2022-03-09

## 2022-03-09 RX ORDER — HYDROMORPHONE HYDROCHLORIDE 2 MG/ML
1 INJECTION INTRAMUSCULAR; INTRAVENOUS; SUBCUTANEOUS
Qty: 12 | Refills: 0
Start: 2022-03-09 | End: 2022-03-11

## 2022-03-09 RX ORDER — HYDROMORPHONE HYDROCHLORIDE 2 MG/ML
0 INJECTION INTRAMUSCULAR; INTRAVENOUS; SUBCUTANEOUS
Qty: 0 | Refills: 0 | DISCHARGE

## 2022-03-09 RX ADMIN — LISINOPRIL 10 MILLIGRAM(S): 2.5 TABLET ORAL at 05:05

## 2022-03-09 RX ADMIN — APIXABAN 5 MILLIGRAM(S): 2.5 TABLET, FILM COATED ORAL at 05:05

## 2022-03-09 RX ADMIN — Medication 0.6 MILLIGRAM(S): at 13:36

## 2022-03-09 RX ADMIN — HYDROMORPHONE HYDROCHLORIDE 30 MILLILITER(S): 2 INJECTION INTRAMUSCULAR; INTRAVENOUS; SUBCUTANEOUS at 07:32

## 2022-03-09 RX ADMIN — PANTOPRAZOLE SODIUM 40 MILLIGRAM(S): 20 TABLET, DELAYED RELEASE ORAL at 05:06

## 2022-03-09 RX ADMIN — HYDROXYUREA 1500 MILLIGRAM(S): 500 CAPSULE ORAL at 13:35

## 2022-03-09 RX ADMIN — Medication 1 MILLIGRAM(S): at 13:35

## 2022-03-09 RX ADMIN — Medication 25 MILLIGRAM(S): at 05:05

## 2022-03-09 RX ADMIN — AMIODARONE HYDROCHLORIDE 400 MILLIGRAM(S): 400 TABLET ORAL at 05:06

## 2022-03-09 RX ADMIN — Medication 1 TABLET(S): at 13:35

## 2022-03-09 RX ADMIN — ONDANSETRON 4 MILLIGRAM(S): 8 TABLET, FILM COATED ORAL at 01:05

## 2022-03-09 NOTE — PROGRESS NOTE ADULT - PROVIDER SPECIALTY LIST ADULT
Rheumatology
Cardiology
Heme/Onc
Rheumatology
Rheumatology
Heme/Onc
Hospitalist
Rheumatology
Rheumatology
Cardiology
Hospitalist
Cardiology
Cardiology
Hospitalist
Cardiology
Hospitalist

## 2022-03-09 NOTE — PROGRESS NOTE ADULT - PROBLEM SELECTOR PROBLEM 1
Sickle cell disease
Knee effusion, right
Knee effusion, right
Sickle cell disease
Atrial flutter
Sickle cell disease
Atrial flutter
Sickle cell disease
Gout attack
Atrial flutter
Gout attack
Gout attack
Sickle cell disease
Sickle cell disease
Gout attack
Gout attack

## 2022-03-09 NOTE — CHART NOTE - NSCHARTNOTEFT_GEN_A_CORE
feels well. did not have a ride home last night. pain is controlled. plan for DC home today with follow up with OPT cardiology, rheumatology, and PCP DR. House d/w CHIRAG Garcia. dc time 45 min

## 2022-03-09 NOTE — PROGRESS NOTE ADULT - PROBLEM SELECTOR PROBLEM 3
Acute deep vein thrombosis (DVT)
Acute deep vein thrombosis (DVT)
Hypertension
Knee effusion, right
Knee effusion, right
Sickle cell disease
Knee effusion, right
Knee effusion, right
Hypertension
Knee effusion, right
Knee effusion, right
Sickle cell disease
Hypertension
Sickle cell disease

## 2022-03-09 NOTE — PROGRESS NOTE ADULT - TIME BILLING
Advanced care planning was discussed with patient and family.  Advanced care planning forms were reviewed and discussed as appropriate.  Differential diagnosis and plan of care discussed with patient after the evaluation.   Pain assessed and judicious use of narcotics when appropriate was discussed.  Importance of Fall prevention discussed.  Counseling on Smoking and Alcohol cessation was offered when appropriate.  Counseling on Diet, exercise, and medication compliance was done.

## 2022-03-09 NOTE — PROGRESS NOTE ADULT - REASON FOR ADMISSION
S/P discharge from [facility] with 3 weeks of RIGHT knee pain, sickle cell crisis
S/P discharge from [facility] with 3 weeks of RIGHT knee pain, sickle cell crisis
S/P discharge from [facility] with 3 weeks of RIGHT knee pain, sickle cell crisis
S/P discharge from [facility] with 3 weeks of RIGHT knee pain, sickle cell crisis
S/P discharge from [facility] with 3 weeks of RIGHT knee pain, sickle cell crisis
S/P discharge from [facility] with 3 weeks of RIGHT knee pain, sickle cell crisis
S/P discharge from [facility] with 3 weeks of RIGHT knee pain, sickle cell crisis
S/P discharge from [facility] with 3 weeks of RIGHT knee pain, sickle cell crisis
S/P discharge from [facility] with 3 weeks of RIGHT knee pain, sickle cell crisis
S/P discharge from [facility] with 3 weeks of RIGHT knee pain, sickle cell crisis
S/P discharge from [facility] with 3 weeks of RIGHT knee pain, sickle cell crisis
S/P discharge from [facility] with 3 weeks of RIGHT knee pain, sickle cell crisis
S/P discharge from [facility] with 3 weeks of RIGHT knee pain, sickle cell crisis
S/P discharge from [facility] with 3 weeks of RIGHT knee pain, sickle cell crisis
S/P discharge from [facility] with 3 weeks of RIGHT knee pain, sickle cell crisis
S/P discharge from [facility] with 3 weeks of RIGHT knee pain, sickle cell crisis
S/P discharge from [facility] with 3 weeks of RIGHT knee pain, sickle cell crisis
S/P discharge from [facility] with 3 weeks of RIGHT knee pain, sickle cell crisis
S/P discharge from [facility] with 3 weeks of RIGHT knee pain, sickle cell crisis
S/P discharge from [facility] with 3 weeks of RIGHT knee pain, sickle cell crisis
S/P discharge from [facility] with 3 weeks of RIGHT knee pain, sickle cell crisis
S/P discharge from [facility] with 3 weeks of RIGHT knee pain, sickle cell crisis
S/P discharge from [facility] with 3 weeks of RIGHT knee pain, sickle cell crisis
S/P discharge from [facility] with 3 weeks of RIGHT knee pain, sickle cell crisis
S/P discharge from [facility] with 3 weeks of RIGHT knee pain, sickle cell crisis
S/P discharge from [facility] with 3 weeks of RIGHT knee pain, sickle cell crisis
S/P discharge from [facility] with 3 weeks of RIGHT knee pain, sickle cell crisis
S/P discharge from [facility] with 3 weeks of RIGHT knee pain, sickle cell crisis
S/P discharge from [facility] with 3 weeks of RIGHT knee pain, sickle cell crisis
S/P discharge from [facility] with 3 weeks of RIGHT knee pain, sickle cell crisis
S/P discharge from [facility] with 3 weeks of RIGHT knee pain, sickle cell crisis

## 2022-03-09 NOTE — DISCHARGE NOTE NURSING/CASE MANAGEMENT/SOCIAL WORK - PATIENT PORTAL LINK FT
You can access the FollowMyHealth Patient Portal offered by  by registering at the following website: [email or web address]. By joining [employer]’s FollowMyHealth portal, you will also be able to view your health information using other applications (apps) compatible with our system.

## 2022-03-09 NOTE — DISCHARGE NOTE NURSING/CASE MANAGEMENT/SOCIAL WORK - NSDCPEFALRISK_GEN_ALL_CORE
For information on Fall & Injury Prevention, visit: [email or web address] OR  [email or web address] OR  https://www.cdc.gov/steadi/patient.html

## 2022-03-09 NOTE — PROGRESS NOTE ADULT - SUBJECTIVE AND OBJECTIVE BOX
Subjective: Patient seen and examined. No new events except as noted.   Sinus tachycardia to 130s overnight, resolved on its own with no intervention - pt was asymptomatic.  Instructed pt about importance with outpatient follow up upon discharge.     REVIEW OF SYSTEMS:    CONSTITUTIONAL: + weakness, fevers or chills  EYES/ENT: No visual changes;  No vertigo or throat pain   NECK: No pain or stiffness  RESPIRATORY: No cough, wheezing, hemoptysis; No shortness of breath  CARDIOVASCULAR: No chest pain or palpitations  GASTROINTESTINAL: No abdominal or epigastric pain. No nausea, vomiting, or hematemesis; No diarrhea or constipation. No melena or hematochezia.  GENITOURINARY: No dysuria, frequency or hematuria  NEUROLOGICAL: No numbness or weakness  SKIN: No itching, burning, rashes, or lesions   All other review of systems is negative unless indicated above.    MEDICATIONS:  MEDICATIONS  (STANDING):  aMIOdarone    Tablet 400 milliGRAM(s) Oral two times a day  aMIOdarone Infusion 0.5 mG/Min (16.7 mL/Hr) IV Continuous <Continuous>  apixaban 5 milliGRAM(s) Oral every 12 hours  colchicine 0.6 milliGRAM(s) Oral daily  folic acid 1 milliGRAM(s) Oral daily  gabapentin 300 milliGRAM(s) Oral at bedtime  hydrALAZINE 25 milliGRAM(s) Oral every 8 hours  HYDROmorphone PCA (1 mG/mL) 30 milliLiter(s) PCA Continuous PCA Continuous  hydroxyurea 1500 milliGRAM(s) Oral daily  lisinopril 10 milliGRAM(s) Oral daily  multivitamin 1 Tablet(s) Oral daily  pantoprazole    Tablet 40 milliGRAM(s) Oral before breakfast  polyethylene glycol 3350 17 Gram(s) Oral daily  senna 2 Tablet(s) Oral at bedtime  sodium chloride 0.45%. 1000 milliLiter(s) (75 mL/Hr) IV Continuous <Continuous>      PHYSICAL EXAM:  T(C): 36.8 (03-09-22 @ 11:10), Max: 36.9 (03-08-22 @ 19:54)  HR: 77 (03-09-22 @ 11:10) (72 - 91)  BP: 140/76 (03-09-22 @ 11:10) (138/75 - 191/108)  RR: 18 (03-09-22 @ 11:10) (18 - 18)  SpO2: 97% (03-09-22 @ 11:10) (95% - 98%)  Wt(kg): --  I&O's Summary    08 Mar 2022 07:01  -  09 Mar 2022 07:00  --------------------------------------------------------  IN: 1880 mL / OUT: 1000 mL / NET: 880 mL    09 Mar 2022 07:01  -  09 Mar 2022 13:04  --------------------------------------------------------  IN: 500 mL / OUT: 0 mL / NET: 500 mL      Appearance: NAD	  HEENT:   Normal oral mucosa, PERRL, EOMI	  Lymphatic: No lymphadenopathy , no edema  Cardiovascular: Normal S1 S2, No JVD, No murmurs , Peripheral pulses palpable 2+ bilaterally  Respiratory: Lungs clear to auscultation, normal effort 	  Gastrointestinal:  Soft, Non-tender, + BS	  Skin: No rashes, No ecchymoses, No cyanosis, warm to touch  Musculoskeletal: Normal range of motion, normal strength  Psychiatry:  Mood & affect appropriate  Ext: No edema      LABS:    CARDIAC MARKERS:                          8.0    8.43  )-----------( 460      ( 08 Mar 2022 06:21 )             24.6       Mg     2.2     03-08      proBNP:   Lipid Profile:   HgA1c:   TSH:     TELEMETRY: 	    ECG:  	  RADIOLOGY:   DIAGNOSTIC TESTING:  [ ] Echocardiogram:  [ ]  Catheterization:  [ ] Stress Test:    OTHER:

## 2022-03-09 NOTE — DISCHARGE NOTE NURSING/CASE MANAGEMENT/SOCIAL WORK - DATE OF LAST VACCINATION
Refill passed per [facility] protocol.     Requested Prescriptions   Pending Prescriptions Disp Refills    LEVOTHYROXINE 125 MCG Oral Tab [Pharmacy Med Name: Levothyroxine Sodium 125 Mcg Tab Lupi] 90 tablet 0     Sig: Take 1 tablet (125 mcg total) by mouth before breakfast. 30-60 min        Thyroid Medication Protocol Passed - 3/5/2022  2:31 AM        Passed - TSH in past 12 months        Passed - Last TSH value is normal     Lab Results   Component Value Date    TSH 0.480 03/05/2022    [facility] 0.86 01/16/2016                 Passed - Appointment in past 12 or next 3 months            Recent Outpatient Visits              5 days ago Pre-op examination    [address] Elsie Israel PA-C    Office Visit    1 week ago Degenerative tear of left medial meniscus    [facility] for Mk Bowen MD    Office Visit    1 month ago Degenerative tear of left medial meniscus    [facility] for Primitivo Estelle, MD    Office Visit    4 months ago Acute internal derangement of left knee    [facility], Veda Leary MD    Office Visit    4 months ago Seborrheic keratoses    [facility] Sung Rain MD    Office Visit          Future Appointments         Provider Department Appt Notes    In 4 weeks Sahil Armstrong MD [facility], Kali Mathew 1st POV Left knee, Sx 3/24/22 09-Mar-2021

## 2022-03-09 NOTE — PROGRESS NOTE ADULT - PROBLEM SELECTOR PROBLEM 2
Hypertension
Need for prophylactic measure
Sickle cell disease
Hypertension
Gout attack
Hypertension
Hypertension
Sickle cell disease
Hypertension
Gout attack
Hypertension
Gout attack

## 2022-03-09 NOTE — DISCHARGE NOTE NURSING/CASE MANAGEMENT/SOCIAL WORK - NSDCFUADDAPPT_GEN_ALL_CORE_FT
APPTS ARE READY TO BE MADE: [x] YES    Best Family or Patient Contact (if needed):    Additional Information about above appointments (if needed):    1:   2:   3:     Other comments or requests:

## 2022-03-09 NOTE — PROGRESS NOTE ADULT - PROBLEM SELECTOR PROBLEM 4
Need for prophylactic measure
Need for prophylactic measure
Tachycardia
Acute deep vein thrombosis (DVT)
Tachycardia
Acute deep vein thrombosis (DVT)
Tachycardia
Tachycardia
Acute deep vein thrombosis (DVT)
Sickle cell disease
Sickle cell disease
Tachycardia
Atrial flutter
Sickle cell disease

## 2022-03-09 NOTE — CHART NOTE - NSCHARTNOTEFT_GEN_A_CORE
Pt medically cleared for discharge by Dr. Bajwa. HTN improved. PT with nausea/vomiting overnight, tolerating po diet. Discharge Followup with Dr. House . Pt requesting dilaudid. ISTOP shows last prescription 11/19/21 for 15 day supply. 3 days prescribed, patient to follow up with outpt provider for management of pain long term.

## 2022-03-09 NOTE — PROGRESS NOTE ADULT - PROBLEM SELECTOR PLAN 4
transferred to tele for aflutter with RVR     - rate 140s    - s/p cardizem IVP x2 - no response  s/p amiodarone gtt     - c/w Amiodarone 400mg PO BID for 3 days (Started 3/6/22 - Stop date 3/9/22)   3/5 CT PE - found to have PE in RUL   AC with Eliquis   monitor Hgb   check LE venous us

## 2022-03-10 RX ORDER — AMIODARONE HYDROCHLORIDE 400 MG/1
1 TABLET ORAL
Qty: 30 | Refills: 0
Start: 2022-03-10 | End: 2022-04-08

## 2022-03-11 PROBLEM — D57.1 SICKLE-CELL DISEASE WITHOUT CRISIS: Chronic | Status: ACTIVE | Noted: 2022-02-26

## 2022-03-11 PROBLEM — M25.561 PAIN IN RIGHT KNEE: Chronic | Status: ACTIVE | Noted: 2022-02-26

## 2022-03-12 LAB
CULTURE RESULTS: SIGNIFICANT CHANGE UP
SPECIMEN SOURCE: SIGNIFICANT CHANGE UP

## 2022-03-15 ENCOUNTER — OUTPATIENT (OUTPATIENT)
Dept: OUTPATIENT SERVICES | Facility: HOSPITAL | Age: 52
LOS: 1 days | End: 2022-03-15

## 2022-03-15 ENCOUNTER — APPOINTMENT (OUTPATIENT)
Dept: INTERNAL MEDICINE | Facility: CLINIC | Age: 52
End: 2022-03-15
Payer: COMMERCIAL

## 2022-03-15 VITALS
WEIGHT: 224 LBS | SYSTOLIC BLOOD PRESSURE: 130 MMHG | OXYGEN SATURATION: 97 % | HEIGHT: 71 IN | DIASTOLIC BLOOD PRESSURE: 68 MMHG | HEART RATE: 107 BPM | BODY MASS INDEX: 31.36 KG/M2 | TEMPERATURE: 97.2 F

## 2022-03-15 DIAGNOSIS — G89.4 CHRONIC PAIN SYNDROME: ICD-10-CM

## 2022-03-15 DIAGNOSIS — Z90.49 ACQUIRED ABSENCE OF OTHER SPECIFIED PARTS OF DIGESTIVE TRACT: Chronic | ICD-10-CM

## 2022-03-15 PROCEDURE — 99214 OFFICE O/P EST MOD 30 MIN: CPT

## 2022-03-17 DIAGNOSIS — G89.4 CHRONIC PAIN SYNDROME: ICD-10-CM

## 2022-03-17 DIAGNOSIS — D57.1 SICKLE-CELL DISEASE WITHOUT CRISIS: ICD-10-CM

## 2022-03-17 DIAGNOSIS — I10 ESSENTIAL (PRIMARY) HYPERTENSION: ICD-10-CM

## 2022-03-17 DIAGNOSIS — I26.99 OTHER PULMONARY EMBOLISM WITHOUT ACUTE COR PULMONALE: ICD-10-CM

## 2022-03-17 DIAGNOSIS — M10.9 GOUT, UNSPECIFIED: ICD-10-CM

## 2022-03-17 DIAGNOSIS — I48.92 UNSPECIFIED ATRIAL FLUTTER: ICD-10-CM

## 2022-03-17 LAB
CULTURE RESULTS: SIGNIFICANT CHANGE UP
SPECIMEN SOURCE: SIGNIFICANT CHANGE UP

## 2022-03-17 NOTE — HISTORY OF PRESENT ILLNESS
[de-identified] : The patient is a 50 yo male with HGB SS, here for f/u. Taking  HU 2000 mg QD.Still taking aranesp q 3 weeks with his hematologist. No longer taking Oxbryta. He does not feel that it helped him.\par S/P hospitalization for new onset gout ( dx'd by aspiration of joint). Colchicine 6.6 QD.Still having severe pain. Has f/u with rheum 3/24/22.\par Understands some of the necessary dietary changes, but needs more guidance.\par BP meds changed. No longer taking HCTZ\par Now taking Hydralazine 25 tid. lisinopril 10 mg QD\par S/P PE- taking eliquis 5 bid\par s/p episode of a. flutter, now taking amiodarone 200mg QD- Has f/u with cardiology next week\par Says that the endari makes him ill.\par Ophtho 11/2020. No sickle cell retinopathy seen. \par Hematologist: Dr. BurciagaZxr-[phone]\par [phone]\par fully covid vaxed, and boostered\par \par PE: gen- wdwn male in nad, talkative \par vss\par mild icterus\par lungs- cta\par cor: rrr-m\par abd benign, obese\par ext; -c/c/e, scarring from past LE ulcers, both ankles\par right knee-moderate swelling, not erythematous, not warm\par flexion of knee to 90 degrees\par \par a/P- 50 yo male with HGB SS, s/p PE, newly dx'd with gout and a. flutter\par 1. HTN- hydralazine 25 tid with lisinopril- adequate control. May need more lisinopril in future for better control and proteinuria\par 2.gout- for now, continue colchicine 0.6 mg QD\par attempting to contact rheum to see if they have further recommendations\par diet counseling\par 3. HU- continue 2000 mg QD, continue aranesp \par 4. a. flutter- f/u with cardiology, continue amiodarone\par 5. patient encouraged to wear mask despite covid vax, as disease is still a risk\par 6. dilaudid- 2 mg tab- #60\par ISTOP checked\par 7. f/u 6 weeks\par \par Brittany Veloz MD\par

## 2022-03-21 LAB
ALBUMIN SERPL ELPH-MCNC: 4.1 G/DL
ALP BLD-CCNC: 85 U/L
ALT SERPL-CCNC: 19 U/L
ANION GAP SERPL CALC-SCNC: 12 MMOL/L
AST SERPL-CCNC: 20 U/L
BILIRUB SERPL-MCNC: 0.3 MG/DL
BUN SERPL-MCNC: 26 MG/DL
CALCIUM SERPL-MCNC: 9.6 MG/DL
CHLORIDE SERPL-SCNC: 109 MMOL/L
CO2 SERPL-SCNC: 19 MMOL/L
CREAT SERPL-MCNC: 1.37 MG/DL
GLUCOSE SERPL-MCNC: 106 MG/DL
POTASSIUM SERPL-SCNC: 4.1 MMOL/L
PROT SERPL-MCNC: 6.7 G/DL
SODIUM SERPL-SCNC: 140 MMOL/L

## 2022-03-24 ENCOUNTER — APPOINTMENT (OUTPATIENT)
Dept: RHEUMATOLOGY | Facility: CLINIC | Age: 52
End: 2022-03-24

## 2022-03-24 ENCOUNTER — APPOINTMENT (OUTPATIENT)
Dept: NEPHROLOGY | Facility: CLINIC | Age: 52
End: 2022-03-24
Payer: COMMERCIAL

## 2022-03-24 VITALS
TEMPERATURE: 97.6 F | HEIGHT: 71 IN | SYSTOLIC BLOOD PRESSURE: 151 MMHG | OXYGEN SATURATION: 97 % | HEART RATE: 100 BPM | BODY MASS INDEX: 31.64 KG/M2 | WEIGHT: 225.97 LBS | DIASTOLIC BLOOD PRESSURE: 80 MMHG

## 2022-03-24 DIAGNOSIS — N18.30 CHRONIC KIDNEY DISEASE, STAGE 3 UNSPECIFIED: ICD-10-CM

## 2022-03-24 PROCEDURE — 99214 OFFICE O/P EST MOD 30 MIN: CPT

## 2022-03-25 VITALS — SYSTOLIC BLOOD PRESSURE: 142 MMHG | DIASTOLIC BLOOD PRESSURE: 84 MMHG | HEART RATE: 92 BPM

## 2022-03-25 PROBLEM — N18.30 CHRONIC KIDNEY DISEASE, STAGE 3: Noted: 2021-05-12

## 2022-03-25 LAB
25(OH)D3 SERPL-MCNC: 30 NG/ML
ALBUMIN SERPL ELPH-MCNC: 3.9 G/DL
ANION GAP SERPL CALC-SCNC: 12 MMOL/L
APPEARANCE: CLEAR
BACTERIA: NEGATIVE
BILIRUBIN URINE: NEGATIVE
BLOOD URINE: NEGATIVE
BUN SERPL-MCNC: 15 MG/DL
CALCIUM SERPL-MCNC: 10.4 MG/DL
CHLORIDE SERPL-SCNC: 106 MMOL/L
CO2 SERPL-SCNC: 22 MMOL/L
COLOR: YELLOW
CREAT SERPL-MCNC: 1.57 MG/DL
CREAT SPEC-SCNC: 163 MG/DL
CREAT/PROT UR: 0.5 RATIO
EGFR: 53 ML/MIN/1.73M2
GLUCOSE QUALITATIVE U: NEGATIVE
GLUCOSE SERPL-MCNC: 116 MG/DL
HYALINE CASTS: 3 /LPF
KETONES URINE: NEGATIVE
LEUKOCYTE ESTERASE URINE: NEGATIVE
MAGNESIUM SERPL-MCNC: 1.5 MG/DL
MICROSCOPIC-UA: NORMAL
NITRITE URINE: NEGATIVE
PH URINE: 6
PHOSPHATE SERPL-MCNC: 3 MG/DL
POTASSIUM SERPL-SCNC: 4.1 MMOL/L
PROT UR-MCNC: 73 MG/DL
PROTEIN URINE: ABNORMAL
RED BLOOD CELLS URINE: 2 /HPF
SODIUM SERPL-SCNC: 140 MMOL/L
SPECIFIC GRAVITY URINE: 1.01
SQUAMOUS EPITHELIAL CELLS: 0 /HPF
UROBILINOGEN URINE: NORMAL
WHITE BLOOD CELLS URINE: 2 /HPF

## 2022-03-25 RX ORDER — HYDRALAZINE HYDROCHLORIDE 25 MG/1
25 TABLET ORAL 3 TIMES DAILY
Qty: 90 | Refills: 3 | Status: DISCONTINUED | COMMUNITY
Start: 2022-03-15 | End: 2022-03-25

## 2022-03-25 RX ORDER — HYDROCHLOROTHIAZIDE 25 MG/1
25 TABLET ORAL DAILY
Qty: 90 | Refills: 0 | Status: DISCONTINUED | COMMUNITY
Start: 2021-05-12 | End: 2022-03-25

## 2022-03-25 RX ORDER — OMEPRAZOLE 40 MG/1
40 CAPSULE, DELAYED RELEASE ORAL
Qty: 90 | Refills: 3 | Status: DISCONTINUED | COMMUNITY
Start: 2020-04-14 | End: 2022-03-25

## 2022-03-25 NOTE — PHYSICAL EXAM
[General Appearance - Alert] : alert [General Appearance - In No Acute Distress] : in no acute distress [Sclera] : the sclera and conjunctiva were normal [Outer Ear] : the ears and nose were normal in appearance [Neck Appearance] : the appearance of the neck was normal [Respiration, Rhythm And Depth] : normal respiratory rhythm and effort [Auscultation Breath Sounds / Voice Sounds] : lungs were clear to auscultation bilaterally [Heart Rate And Rhythm] : heart rate was normal and rhythm regular [Heart Sounds] : normal S1 and S2 [Murmurs] : no murmurs [Bowel Sounds] : normal bowel sounds [Abdomen Soft] : soft [No CVA Tenderness] : no ~M costovertebral angle tenderness [Abnormal Walk] : normal gait [] : no rash [No Focal Deficits] : no focal deficits [Oriented To Time, Place, And Person] : oriented to person, place, and time [FreeTextEntry1] : right knee still mildly swollen, no warmth noted

## 2022-03-25 NOTE — HISTORY OF PRESENT ILLNESS
[FreeTextEntry1] : MAXI BUCHANAN is a 51 year male with a history sickle cell disease, HTN (Dx ~2010) , CKD, minimal proteinuria here for hospital follow up.\par Hospital course: [facility] 2/26-3/9/22: presented with SS crisis, right knee swelling, s/p\par arthrocentesis in the ED with acute gouty attack found to have atrial flutter, acute R prox DVT and pulmonary embolism in the right upper lobe during admission. \par \par Creatinine trend: \par 3/7 Discharge Cr. 1.06, \par 3/2 Admission Cr. 1.17\par July 2021 Cr. 1.37\par April 2021 Cr. 1.55\par Discharge note reviewed: \par \par Post discharge: 3/17: Patient was seen by Dr. Lincoln (Hematologist) and labs drawn.\par 3/17  Cr. elevated: 2.11, K 3.2, Na 140, Hgb 9.0 (see lab scan)\par Medications reviewed with patient\par Lisinopril was increased by Dr. Lincoln last week to 20mg and hydralazine dc'd\par Received Aranesp on 3/17\par HCTZ dc'd during hospital admission\par Oxybryta on hold since admission\par Seeing Rheumatology next week\par Is currently on a medical leave from working as a . \par Fully COVID vaccinated plus booster.\par \par \par \par \par \par

## 2022-03-25 NOTE — REVIEW OF SYSTEMS
[Feeling Tired] : feeling tired [Recent Weight Loss (___ Lbs)] : recent [unfilled] ~Ulb weight loss [As noted in HPI] : as noted in HPI [Joint Swelling] : joint swelling [As Noted in HPI] : as noted in HPI [Negative] : Endocrine [Fever] : no fever [Chills] : no chills [Eyesight Problems] : no eyesight problems [Loss Of Hearing] : no hearing loss [Shortness Of Breath] : no shortness of breath [SOB on Exertion] : no shortness of breath during exertion [Abdominal Pain] : no abdominal pain [Dysuria] : no dysuria [FreeTextEntry2] : n [FreeTextEntry5] : since discharge patient denies chest pain or palpitations [de-identified] : on eliquis

## 2022-03-25 NOTE — ASSESSMENT
[FreeTextEntry1] : 52 y/o male with H/O HTN, SS and mild CKD presenting today for hospital f/u.\par \par ALFREDO on CKD\par -Last SCr. 2.11 at hematologist : ?etiology. No contrast studies while hospitalized. \par -resend Renal panel, magnesium today\par -stressed no NSAIDs\par -applauded weight loss\par \par Proteinuria\par -ACE for antiproteinuric effect  send u/a, protein/creatinine ratio\par \par HTN\par BP when rechecked was better 142/84\par Advised low Na diet\par Continue with current medications. Lisinopril 20mg PO QD\par advised home BP monitoring\par Consider addition of Ca channel blocker if BP high\par \par Vitamin D Deficiency: \par -send level\par \par ANEMIA:  in the setting of SS disease  \par On aranesp per hematologist\par \par GOUT: f/u with rheumatology next week\par Expresses understanding of Low Purine diet but feeling frustrated\par \par Follow up with cardiology for Aflutter, DVT and eliquis management\par F/U in 2 month\par \par \par ADDENDUM: \par Case discussed with Dr. Lagos\par renal function improved : closer to baseline\par Urine is bland\par Proteinuria at baseline minimal\par Vit D level acceptable\par Magnesium 1.5- cont to trend\par Pt advised to send me one weeks of home BP #s and agrees. \par f/u 2 months with Dr. Lagos\par Called patient to discuss results .  \par (c) [phone]

## 2022-03-29 ENCOUNTER — LABORATORY RESULT (OUTPATIENT)
Age: 52
End: 2022-03-29

## 2022-03-29 ENCOUNTER — APPOINTMENT (OUTPATIENT)
Dept: RHEUMATOLOGY | Facility: CLINIC | Age: 52
End: 2022-03-29
Payer: COMMERCIAL

## 2022-03-29 VITALS
HEIGHT: 71 IN | OXYGEN SATURATION: 96 % | DIASTOLIC BLOOD PRESSURE: 70 MMHG | BODY MASS INDEX: 31.47 KG/M2 | SYSTOLIC BLOOD PRESSURE: 135 MMHG | HEART RATE: 94 BPM | TEMPERATURE: 97.6 F | WEIGHT: 224.8 LBS

## 2022-03-29 PROCEDURE — 20610 DRAIN/INJ JOINT/BURSA W/O US: CPT | Mod: RT

## 2022-03-29 RX ORDER — METHYLPRED ACET/NACL,ISO-OS/PF 80 MG/ML
80 VIAL (ML) INJECTION
Qty: 1 | Refills: 0 | Status: COMPLETED | OUTPATIENT
Start: 2022-03-29

## 2022-03-29 RX ORDER — LIDOCAINE HYDROCHLORIDE 10 MG/ML
1 INJECTION, SOLUTION INFILTRATION; PERINEURAL
Refills: 0 | Status: COMPLETED | OUTPATIENT
Start: 2022-03-29

## 2022-03-29 RX ADMIN — Medication MG/ML: at 00:00

## 2022-03-29 RX ADMIN — Medication 0 %: at 00:00

## 2022-03-30 ENCOUNTER — NON-APPOINTMENT (OUTPATIENT)
Age: 52
End: 2022-03-30

## 2022-03-30 LAB
ALBUMIN SERPL ELPH-MCNC: 4 G/DL
ALP BLD-CCNC: 89 U/L
ALT SERPL-CCNC: 24 U/L
ANION GAP SERPL CALC-SCNC: 12 MMOL/L
AST SERPL-CCNC: 46 U/L
BASOPHILS # BLD AUTO: 0.03 K/UL
BASOPHILS NFR BLD AUTO: 0.6 %
BILIRUB SERPL-MCNC: 0.6 MG/DL
BUN SERPL-MCNC: 12 MG/DL
CALCIUM SERPL-MCNC: 10.5 MG/DL
CHLORIDE SERPL-SCNC: 106 MMOL/L
CK SERPL-CCNC: 75 U/L
CO2 SERPL-SCNC: 22 MMOL/L
CREAT SERPL-MCNC: 1.56 MG/DL
EGFR: 53 ML/MIN/1.73M2
EOSINOPHIL # BLD AUTO: 0.17 K/UL
EOSINOPHIL NFR BLD AUTO: 3.6 %
GLUCOSE SERPL-MCNC: 101 MG/DL
HCT VFR BLD CALC: 23.3 %
HGB BLD-MCNC: 7.8 G/DL
IMM GRANULOCYTES NFR BLD AUTO: 0.4 %
LYMPHOCYTES # BLD AUTO: 1.58 K/UL
LYMPHOCYTES NFR BLD AUTO: 33.9 %
MAN DIFF?: NORMAL
MCHC RBC-ENTMCNC: 33.5 GM/DL
MCHC RBC-ENTMCNC: 33.5 PG
MCV RBC AUTO: 100 FL
MONOCYTES # BLD AUTO: 0.58 K/UL
MONOCYTES NFR BLD AUTO: 12.4 %
NEUTROPHILS # BLD AUTO: 2.28 K/UL
NEUTROPHILS NFR BLD AUTO: 49.1 %
PLATELET # BLD AUTO: 227 K/UL
POTASSIUM SERPL-SCNC: 5.9 MMOL/L
PROT SERPL-MCNC: 7 G/DL
RBC # BLD: 2.33 M/UL
RBC # FLD: 22.6 %
SODIUM SERPL-SCNC: 140 MMOL/L
URATE SERPL-MCNC: 10.9 MG/DL
WBC # FLD AUTO: 4.66 K/UL

## 2022-04-05 ENCOUNTER — OUTPATIENT (OUTPATIENT)
Dept: OUTPATIENT SERVICES | Facility: HOSPITAL | Age: 52
LOS: 1 days | End: 2022-04-05

## 2022-04-05 ENCOUNTER — APPOINTMENT (OUTPATIENT)
Dept: INTERNAL MEDICINE | Facility: CLINIC | Age: 52
End: 2022-04-05
Payer: COMMERCIAL

## 2022-04-05 ENCOUNTER — NON-APPOINTMENT (OUTPATIENT)
Age: 52
End: 2022-04-05

## 2022-04-05 VITALS
HEIGHT: 71 IN | HEART RATE: 94 BPM | SYSTOLIC BLOOD PRESSURE: 136 MMHG | DIASTOLIC BLOOD PRESSURE: 60 MMHG | BODY MASS INDEX: 30.8 KG/M2 | OXYGEN SATURATION: 99 % | RESPIRATION RATE: 18 BRPM | TEMPERATURE: 97.3 F | WEIGHT: 220 LBS

## 2022-04-05 DIAGNOSIS — I26.99 OTHER PULMONARY EMBOLISM W/OUT ACUTE COR PULMONALE: ICD-10-CM

## 2022-04-05 DIAGNOSIS — Z90.49 ACQUIRED ABSENCE OF OTHER SPECIFIED PARTS OF DIGESTIVE TRACT: Chronic | ICD-10-CM

## 2022-04-05 PROCEDURE — 99213 OFFICE O/P EST LOW 20 MIN: CPT

## 2022-04-07 DIAGNOSIS — N18.2 CHRONIC KIDNEY DISEASE, STAGE 2 (MILD): ICD-10-CM

## 2022-04-07 DIAGNOSIS — I10 ESSENTIAL (PRIMARY) HYPERTENSION: ICD-10-CM

## 2022-04-07 DIAGNOSIS — D57.1 SICKLE-CELL DISEASE WITHOUT CRISIS: ICD-10-CM

## 2022-04-07 DIAGNOSIS — M10.9 GOUT, UNSPECIFIED: ICD-10-CM

## 2022-04-07 PROBLEM — I26.99 ACUTE PULMONARY EMBOLISM: Status: RESOLVED | Noted: 2022-03-15 | Resolved: 2022-04-07

## 2022-04-21 NOTE — HISTORY OF PRESENT ILLNESS
[de-identified] : The patient is a 52 yo male with HGB SS, here for f/u. Taking  HU 2000 mg QD.Still taking aranesp q 3 weeks with his hematologist. No longer taking Oxbryta. He does not feel that it helped him.\par S/P hospitalization for new onset gout ( dx'd by aspiration of joint). Colchicine .3 mg QD with allopurinol  50 mg QD..He has been following up with rheum,. His knee function and pain is markedly improved since his last visit. He is still having some trouble walking up stairs.\par He has been following a low-purine diet.\par His renal function has improved as well, closer to baseline.\par Last dilaudid use 3/15/22.\par \par S/P PE- taking eliquis 5 bid\par s/p episode of a. flutter, now taking amiodarone 200mg QD- Has cardiac monitor in place. He is scheduled for ablation. \par Ophtho 11/2020. No sickle cell retinopathy seen. Needs f/u\par Hematologist: Dr. BurciagaDwz-[phone]\par [phone]\par fully covid vaxed, and boostered\par \par PE: gen- wdwn male in nad, talkative \par vss- /60\par mild icterus\par lungs- cta\par cor: rrr-m\par abd benign, obese\par ext; -c/c/e, scarring from past LE ulcers, both ankles\par right knee-minimal swelling ROM almost wnl ( significant improvement since last visit, 3/22/22)\par almost full flexion of knee\par \par creat- 1.56\par \par a/P- 52 yo male with HGB SS, s/p PE, newly dx'd with gout and a. flutter\par 1. HTN- hydralazine 25 tid with lisinopril- adequate control. May need more lisinopril in future for better control and proteinuria\par 2.gout- continue colchicine .3 mg QD, allopurinol 50 QD- management as per rheum\par attempting to contact rheum to see if they have further recommendations\par diet counseling\par 3. HU- continue 2000 mg QD, continue aranesp \par 4. a. flutter- f/u with cardiology, continue amiodarone, ablation scheduled\par 5.CKD-f/u with renal\par 6. prevnar 20 at next visit\par 7. f/u 2 months\par \par Brittany Veloz MD\par

## 2022-04-24 RX ORDER — PANTOPRAZOLE 40 MG/1
40 TABLET, DELAYED RELEASE ORAL DAILY
Qty: 90 | Refills: 3 | Status: COMPLETED | COMMUNITY
Start: 2022-03-15 | End: 2022-04-24

## 2022-04-24 RX ORDER — HYDROMORPHONE HYDROCHLORIDE 2 MG/1
2 TABLET ORAL
Qty: 60 | Refills: 0 | Status: COMPLETED | COMMUNITY
Start: 2021-04-26 | End: 2022-04-24

## 2022-04-24 RX ORDER — LOPERAMIDE HYDROCHLORIDE 2 MG/1
2 CAPSULE ORAL
Qty: 30 | Refills: 6 | Status: COMPLETED | COMMUNITY
Start: 2020-12-03 | End: 2022-04-24

## 2022-04-24 RX ORDER — SIMETHICONE 80 MG/1
80 TABLET, CHEWABLE ORAL
Qty: 120 | Refills: 3 | Status: COMPLETED | COMMUNITY
Start: 2021-11-18 | End: 2022-04-24

## 2022-04-24 RX ORDER — VOXELOTOR 500 MG/1
500 TABLET, FILM COATED ORAL
Qty: 90 | Refills: 11 | Status: COMPLETED | COMMUNITY
Start: 2020-02-25 | End: 2022-04-24

## 2022-04-24 RX ORDER — METOPROLOL SUCCINATE 25 MG/1
25 TABLET, EXTENDED RELEASE ORAL DAILY
Qty: 90 | Refills: 0 | Status: COMPLETED | COMMUNITY
Start: 2022-04-24 | End: 2022-04-24

## 2022-04-24 RX ORDER — LINACLOTIDE 290 UG/1
290 CAPSULE, GELATIN COATED ORAL
Qty: 30 | Refills: 2 | Status: COMPLETED | COMMUNITY
Start: 2021-12-07 | End: 2022-04-24

## 2022-04-25 ENCOUNTER — APPOINTMENT (OUTPATIENT)
Dept: RHEUMATOLOGY | Facility: CLINIC | Age: 52
End: 2022-04-25
Payer: COMMERCIAL

## 2022-04-25 PROCEDURE — 99446 NTRPROF PH1/NTRNET/EHR 5-10: CPT

## 2022-04-26 ENCOUNTER — LABORATORY RESULT (OUTPATIENT)
Age: 52
End: 2022-04-26

## 2022-04-26 LAB
ALBUMIN SERPL ELPH-MCNC: 3.9 G/DL
ALP BLD-CCNC: 104 U/L
ALT SERPL-CCNC: 15 U/L
ANION GAP SERPL CALC-SCNC: 11 MMOL/L
AST SERPL-CCNC: 21 U/L
BILIRUB SERPL-MCNC: 0.6 MG/DL
BUN SERPL-MCNC: 25 MG/DL
CALCIUM SERPL-MCNC: 10.1 MG/DL
CHLORIDE SERPL-SCNC: 106 MMOL/L
CO2 SERPL-SCNC: 22 MMOL/L
CREAT SERPL-MCNC: 1.57 MG/DL
EGFR: 53 ML/MIN/1.73M2
GLUCOSE SERPL-MCNC: 115 MG/DL
POTASSIUM SERPL-SCNC: 4.2 MMOL/L
PROT SERPL-MCNC: 6.3 G/DL
SODIUM SERPL-SCNC: 139 MMOL/L
URATE SERPL-MCNC: 9.7 MG/DL

## 2022-04-27 ENCOUNTER — NON-APPOINTMENT (OUTPATIENT)
Age: 52
End: 2022-04-27

## 2022-04-27 LAB
BASOPHILS # BLD AUTO: 0 K/UL
BASOPHILS NFR BLD AUTO: 0 %
EOSINOPHIL # BLD AUTO: 0.18 K/UL
EOSINOPHIL NFR BLD AUTO: 4.7 %
HCT VFR BLD CALC: 23.7 %
HGB BLD-MCNC: 7.8 G/DL
LYMPHOCYTES # BLD AUTO: 1.51 K/UL
LYMPHOCYTES NFR BLD AUTO: 38.7 %
MAN DIFF?: NORMAL
MCHC RBC-ENTMCNC: 32.9 GM/DL
MCHC RBC-ENTMCNC: 34.4 PG
MCV RBC AUTO: 104.4 FL
MONOCYTES # BLD AUTO: 0 K/UL
MONOCYTES NFR BLD AUTO: 0 %
NEUTROPHILS # BLD AUTO: 2.2 K/UL
NEUTROPHILS NFR BLD AUTO: 56.6 %
PLATELET # BLD AUTO: 105 K/UL
RBC # BLD: 2.27 M/UL
RBC # FLD: 21.5 %
WBC # FLD AUTO: 3.89 K/UL

## 2022-05-16 ENCOUNTER — APPOINTMENT (OUTPATIENT)
Dept: RHEUMATOLOGY | Facility: CLINIC | Age: 52
End: 2022-05-16

## 2022-05-31 ENCOUNTER — LABORATORY RESULT (OUTPATIENT)
Age: 52
End: 2022-05-31

## 2022-06-01 ENCOUNTER — APPOINTMENT (OUTPATIENT)
Dept: NEPHROLOGY | Facility: CLINIC | Age: 52
End: 2022-06-01
Payer: COMMERCIAL

## 2022-06-01 VITALS
HEART RATE: 73 BPM | OXYGEN SATURATION: 99 % | WEIGHT: 222.66 LBS | TEMPERATURE: 97.2 F | DIASTOLIC BLOOD PRESSURE: 81 MMHG | HEIGHT: 71 IN | BODY MASS INDEX: 31.17 KG/M2 | SYSTOLIC BLOOD PRESSURE: 145 MMHG

## 2022-06-01 LAB
ALBUMIN SERPL ELPH-MCNC: 3.9 G/DL
ALP BLD-CCNC: 110 U/L
ALT SERPL-CCNC: 13 U/L
ANION GAP SERPL CALC-SCNC: 14 MMOL/L
AST SERPL-CCNC: 19 U/L
BASOPHILS # BLD AUTO: 0.01 K/UL
BASOPHILS NFR BLD AUTO: 0.2 %
BILIRUB SERPL-MCNC: 0.3 MG/DL
BUN SERPL-MCNC: 23 MG/DL
CALCIUM SERPL-MCNC: 9.8 MG/DL
CHLORIDE SERPL-SCNC: 107 MMOL/L
CO2 SERPL-SCNC: 20 MMOL/L
CREAT SERPL-MCNC: 1.75 MG/DL
EGFR: 46 ML/MIN/1.73M2
EOSINOPHIL # BLD AUTO: 0.32 K/UL
EOSINOPHIL NFR BLD AUTO: 6.9 %
GLUCOSE SERPL-MCNC: 84 MG/DL
HCT VFR BLD CALC: 20 %
HGB BLD-MCNC: 6.6 G/DL
IMM GRANULOCYTES NFR BLD AUTO: 0.4 %
LYMPHOCYTES # BLD AUTO: 1.52 K/UL
LYMPHOCYTES NFR BLD AUTO: 33 %
MAN DIFF?: NORMAL
MCHC RBC-ENTMCNC: 33 GM/DL
MCHC RBC-ENTMCNC: 33.8 PG
MCV RBC AUTO: 102.6 FL
MONOCYTES # BLD AUTO: 0.66 K/UL
MONOCYTES NFR BLD AUTO: 14.3 %
NEUTROPHILS # BLD AUTO: 2.08 K/UL
NEUTROPHILS NFR BLD AUTO: 45.2 %
PLATELET # BLD AUTO: 233 K/UL
POTASSIUM SERPL-SCNC: 4.2 MMOL/L
PROT SERPL-MCNC: 6.6 G/DL
RBC # BLD: 1.95 M/UL
RBC # FLD: 22.5 %
SODIUM SERPL-SCNC: 141 MMOL/L
URATE SERPL-MCNC: 9.6 MG/DL
WBC # FLD AUTO: 4.61 K/UL

## 2022-06-01 PROCEDURE — 99213 OFFICE O/P EST LOW 20 MIN: CPT | Mod: GC

## 2022-06-02 LAB
APPEARANCE: CLEAR
BACTERIA: NEGATIVE
BILIRUBIN URINE: NEGATIVE
BLOOD URINE: NEGATIVE
COLOR: NORMAL
CREAT SPEC-SCNC: 100 MG/DL
CREAT/PROT UR: 0.1 RATIO
GLUCOSE QUALITATIVE U: NEGATIVE
HYALINE CASTS: 0 /LPF
KETONES URINE: NEGATIVE
LEUKOCYTE ESTERASE URINE: NEGATIVE
MICROSCOPIC-UA: NORMAL
NITRITE URINE: NEGATIVE
PH URINE: 6
PROT UR-MCNC: 13 MG/DL
PROTEIN URINE: NORMAL
RED BLOOD CELLS URINE: 1 /HPF
SPECIFIC GRAVITY URINE: 1.01
SQUAMOUS EPITHELIAL CELLS: 0 /HPF
UROBILINOGEN URINE: NORMAL
WHITE BLOOD CELLS URINE: 0 /HPF

## 2022-06-03 NOTE — ASSESSMENT
[FreeTextEntry1] : 52 y/o male with H/O HTN, SS and mild CKD presenting today for hospital f/u.\par \par ALFREDO on CKD\par Suspect that his renal disease is in the  setting of his Sickle Cell disease which may have been exacerbated by his recent use of NSAIDS \par --- advised patient to avoid NSAIDs in the future \par most recent SCr of 1.75mg/dL; which has improved since discharge from [facility] however still above previous baseline \par - history of proteinuria; continue with current dose of lisinopril \par --- will repeat urine studies today \par \par HTN\par BP elevated in the office\par Advised low Na diet\par Continue with current medications. Lisinopril 10mg PO QD\par advised home BP monitoring\par explained to patient the importance of BP control to avoid any further worsening of renal disease \par \par ANEMIA:  \par in the setting of SS disease  \par On aranesp per hematologist\par \par GOUT: \par recent diagnosis of gout during hospitalization \par currently being followed by rheumatology \par on allopurinol and colchicine\par \par \par

## 2022-06-03 NOTE — PHYSICAL EXAM
[General Appearance - Alert] : alert [General Appearance - In No Acute Distress] : in no acute distress [Sclera] : the sclera and conjunctiva were normal [Outer Ear] : the ears and nose were normal in appearance [Neck Appearance] : the appearance of the neck was normal [Respiration, Rhythm And Depth] : normal respiratory rhythm and effort [Auscultation Breath Sounds / Voice Sounds] : lungs were clear to auscultation bilaterally [Heart Rate And Rhythm] : heart rate was normal and rhythm regular [Heart Sounds] : normal S1 and S2 [Murmurs] : no murmurs [Bowel Sounds] : normal bowel sounds [Abdomen Soft] : soft [No CVA Tenderness] : no ~M costovertebral angle tenderness [Abnormal Walk] : normal gait [] : no rash [No Focal Deficits] : no focal deficits [Oriented To Time, Place, And Person] : oriented to person, place, and time [General Appearance - Well Nourished] : well nourished [Jugular Venous Distention Increased] : there was no jugular-venous distention [FreeTextEntry1] : trace LE edema bilaterally

## 2022-06-03 NOTE — REVIEW OF SYSTEMS
[Feeling Tired] : feeling tired [Recent Weight Loss (___ Lbs)] : recent [unfilled] ~Ulb weight loss [As noted in HPI] : as noted in HPI [Joint Swelling] : joint swelling [As Noted in HPI] : as noted in HPI [Negative] : Endocrine [Feeling Poorly] : feeling poorly [Joint Stiffness] : joint stiffness [Limb Pain] : limb pain [Limb Swelling] : limb swelling [Anxiety] : anxiety [Fever] : no fever [Chills] : no chills [Eyesight Problems] : no eyesight problems [Loss Of Hearing] : no hearing loss [Chest Pain] : no chest pain [Palpitations] : no palpitations [Leg Claudication] : no intermittent leg claudication [Lower Ext Edema] : no extremity edema [Shortness Of Breath] : no shortness of breath [Cough] : no cough [SOB on Exertion] : no shortness of breath during exertion [Abdominal Pain] : no abdominal pain [Constipation] : no constipation [Diarrhea] : no diarrhea [Dysuria] : no dysuria [Hesitancy] : no urinary hesitancy [Nocturia] : no nocturia [Confused] : no confusion [Convulsions] : no convulsions [Dizziness] : no dizziness [Fainting] : no fainting [Depression] : no depression [de-identified] : on eliquis

## 2022-06-03 NOTE — HISTORY OF PRESENT ILLNESS
[FreeTextEntry1] : MAXI BUCHANAN is a 51 year male with a history sickle cell disease, HTN (Dx ~2010) , CKD, minimal proteinuria here for hospital follow up. The patient was diagnosed with Sickle Cell disease in 2010 and his renal function was within normal limits. Of note the patient has history of multiple hospital admissions in the past for sickle cell crisis. The most recent hospitalization was in 3/2022. The patient presents to the clinic today for a follow up appointment. \par \par The patient has been having a rising creatinine since 4/2021. Prior to that his renal function was normal. He did undergo a renal US in early 2020 with 12 cm kidneys. No stones or hydronephrosis. +Left mid pole cyst. Most recently the patient's SCr was 1.75mg/dL. He does admit that prior to his hospitalization he was at [facility] with significant joint pains. At that time he was taking Motrin for his joint pains and was prescribed meloxicam as well. He was at [facility] from 2/26-3/09 and admitted for SS crisis along with right knee swelling. He was diagnosed with atrial flutter and started on anticoagulation with eliquis. He was also found to have an acute R leg DVT and PE. He was diagnosed with gout as the etiology of his joint pains and has been taking allopurinol and colchicine since. He also has been following with rheumatology.  \par \par \par \par \par \par

## 2022-06-09 ENCOUNTER — APPOINTMENT (OUTPATIENT)
Dept: INTERNAL MEDICINE | Facility: CLINIC | Age: 52
End: 2022-06-09
Payer: COMMERCIAL

## 2022-06-09 ENCOUNTER — OUTPATIENT (OUTPATIENT)
Dept: OUTPATIENT SERVICES | Facility: HOSPITAL | Age: 52
LOS: 1 days | End: 2022-06-09

## 2022-06-09 VITALS
RESPIRATION RATE: 16 BRPM | HEIGHT: 71 IN | OXYGEN SATURATION: 98 % | HEART RATE: 84 BPM | SYSTOLIC BLOOD PRESSURE: 128 MMHG | BODY MASS INDEX: 31.08 KG/M2 | WEIGHT: 222 LBS | DIASTOLIC BLOOD PRESSURE: 64 MMHG | TEMPERATURE: 98.7 F

## 2022-06-09 DIAGNOSIS — Z23 ENCOUNTER FOR IMMUNIZATION: ICD-10-CM

## 2022-06-09 DIAGNOSIS — N18.31 CHRONIC KIDNEY DISEASE, STAGE 3A: ICD-10-CM

## 2022-06-09 DIAGNOSIS — D57.1 SICKLE-CELL DISEASE WITHOUT CRISIS: ICD-10-CM

## 2022-06-09 DIAGNOSIS — I10 ESSENTIAL (PRIMARY) HYPERTENSION: ICD-10-CM

## 2022-06-09 DIAGNOSIS — I48.92 UNSPECIFIED ATRIAL FLUTTER: ICD-10-CM

## 2022-06-09 DIAGNOSIS — M10.9 GOUT, UNSPECIFIED: ICD-10-CM

## 2022-06-09 DIAGNOSIS — Z90.49 ACQUIRED ABSENCE OF OTHER SPECIFIED PARTS OF DIGESTIVE TRACT: Chronic | ICD-10-CM

## 2022-06-09 PROCEDURE — 99214 OFFICE O/P EST MOD 30 MIN: CPT

## 2022-06-09 NOTE — HISTORY OF PRESENT ILLNESS
[de-identified] : The patient is a 52 yo male with HGB SS, here for f/u. Taking  HU 2000 mg QD.Has not had aranesp in 4 weeks. HGB decreased to 6.6 (6/1/22) No longer taking Oxbryta. He does not feel that it helped him.\par S/P hospitalization (2/26/22) for new onset gout ( dx'd by aspiration of joint). Now taking colchicine 0.6 mg with allopurinol 150 mg QD.\par The patient has been compliant with all mediication, as well as purine-free diet. \par Now taking Hydralazine 25 tid. lisinopril 10 mg QD for HTN\par S/P PE- taking eliquis 5 bid\par The patient is back at work, working FT as a  for the [employer]\par s/p episode of a. flutter, now taking amiodarone 200mg QD- Has routine f/u with cardiology\par Says that the endari makes him ill.\par \par Ophtho 11/2020. No sickle cell retinopathy seen. Needs new f/u appt\par \par Hematologist: Dr. BurciagaWcc-[phone]\par oln-[phone]\par fully covid vaxed, and boostered\par \par Creat- 6/1/22- 1.75, GFR 46\par HGB 6.6\par \par PE: gen- wdwn male in nad, talkative \par vss\par mild icterus\par lungs- cta\par cor: rrr-m\par abd benign, obese\par ext; -c/c/e, scarring from past LE ulcers, both ankles\par right knee- small effucion, full flexion\par \par a/P- 52 yo male with HGB SS, s/p PE, with gout and a. flutter\par 1. HTN- hydralazine 25 tid with lisinopril 10 mg QD\par 2.gout-  colchicine 0.6 mg QD with allopurinol 150 mg QD\par followed by rheumatology\par 3. HU- decrease HU to 1500 mg QD, advise the patient to go for his usual aranesp \par 4. a. flutter- f/u with cardiology, continue amiodarone\par 5. patient encouraged to wear mask despite covid vax, as disease is still a risk\par 6. dilaudid- 2 mg tab- #20\par ISTOP checked\par 7. f/u 3 months\par 8. prevnar 20 given today\par \par Brittany Veloz MD\par

## 2022-06-10 DIAGNOSIS — Z23 ENCOUNTER FOR IMMUNIZATION: ICD-10-CM

## 2022-06-14 ENCOUNTER — APPOINTMENT (OUTPATIENT)
Dept: INTERNAL MEDICINE | Facility: CLINIC | Age: 52
End: 2022-06-14

## 2022-06-15 ENCOUNTER — APPOINTMENT (OUTPATIENT)
Dept: NEPHROLOGY | Facility: CLINIC | Age: 52
End: 2022-06-15

## 2022-06-15 ENCOUNTER — APPOINTMENT (OUTPATIENT)
Dept: GASTROENTEROLOGY | Facility: CLINIC | Age: 52
End: 2022-06-15

## 2022-06-21 NOTE — DIETITIAN INITIAL EVALUATION ADULT. - FLUID ACCUMULATION
I called patient as he was no show for tobacco cessation appointment but had to leave a message.   +2 edema bilat knee

## 2022-07-06 ENCOUNTER — APPOINTMENT (OUTPATIENT)
Dept: GASTROENTEROLOGY | Facility: CLINIC | Age: 52
End: 2022-07-06

## 2022-07-06 VITALS
WEIGHT: 218 LBS | HEIGHT: 71 IN | BODY MASS INDEX: 30.52 KG/M2 | DIASTOLIC BLOOD PRESSURE: 68 MMHG | HEART RATE: 67 BPM | SYSTOLIC BLOOD PRESSURE: 106 MMHG | TEMPERATURE: 97.2 F | OXYGEN SATURATION: 100 %

## 2022-07-06 DIAGNOSIS — K21.9 GASTRO-ESOPHAGEAL REFLUX DISEASE W/OUT ESOPHAGITIS: ICD-10-CM

## 2022-07-06 PROCEDURE — 99205 OFFICE O/P NEW HI 60 MIN: CPT

## 2022-07-06 RX ORDER — POLYETHYLENE GLYCOL-3350 AND ELECTROLYTES 236; 6.74; 5.86; 2.97; 22.74 G/274.31G; G/274.31G; G/274.31G; G/274.31G; G/274.31G
236 POWDER, FOR SOLUTION ORAL
Qty: 1 | Refills: 0 | Status: ACTIVE | COMMUNITY
Start: 2022-07-06 | End: 1900-01-01

## 2022-07-06 RX ORDER — SODIUM SULFATE, POTASSIUM SULFATE, MAGNESIUM SULFATE 17.5; 3.13; 1.6 G/ML; G/ML; G/ML
17.5-3.13-1.6 SOLUTION, CONCENTRATE ORAL
Qty: 1 | Refills: 0 | Status: ACTIVE | COMMUNITY
Start: 2022-07-06 | End: 1900-01-01

## 2022-07-06 NOTE — PHYSICAL EXAM
[General Appearance - Alert] : alert [General Appearance - In No Acute Distress] : in no acute distress [Sclera] : the sclera and conjunctiva were normal [PERRL With Normal Accommodation] : pupils were equal in size, round, and reactive to light [Extraocular Movements] : extraocular movements were intact [Outer Ear] : the ears and nose were normal in appearance [Oropharynx] : the oropharynx was normal [Neck Appearance] : the appearance of the neck was normal [Neck Cervical Mass (___cm)] : no neck mass was observed [Jugular Venous Distention Increased] : there was no jugular-venous distention [Thyroid Diffuse Enlargement] : the thyroid was not enlarged [Thyroid Nodule] : there were no palpable thyroid nodules [] : no respiratory distress [Auscultation Breath Sounds / Voice Sounds] : lungs were clear to auscultation bilaterally [Heart Rate And Rhythm] : heart rate was normal and rhythm regular [Heart Sounds] : normal S1 and S2 [Heart Sounds Gallop] : no gallops [Murmurs] : no murmurs [Heart Sounds Pericardial Friction Rub] : no pericardial rub [Epigastric] : in the epigastric area [Cervical Lymph Nodes Enlarged Posterior Bilaterally] : posterior cervical [Cervical Lymph Nodes Enlarged Anterior Bilaterally] : anterior cervical [Supraclavicular Lymph Nodes Enlarged Bilaterally] : supraclavicular [Axillary Lymph Nodes Enlarged Bilaterally] : axillary [Femoral Lymph Nodes Enlarged Bilaterally] : femoral [Inguinal Lymph Nodes Enlarged Bilaterally] : inguinal [No CVA Tenderness] : no ~M costovertebral angle tenderness [No Spinal Tenderness] : no spinal tenderness [Abnormal Walk] : normal gait [Nail Clubbing] : no clubbing  or cyanosis of the fingernails [Musculoskeletal - Swelling] : no joint swelling seen [Motor Tone] : muscle strength and tone were normal

## 2022-07-27 ENCOUNTER — LABORATORY RESULT (OUTPATIENT)
Age: 52
End: 2022-07-27

## 2022-07-29 LAB
ALBUMIN SERPL ELPH-MCNC: 3.9 G/DL
ALP BLD-CCNC: 108 U/L
ALT SERPL-CCNC: 14 U/L
ANION GAP SERPL CALC-SCNC: 12 MMOL/L
AST SERPL-CCNC: 18 U/L
BASOPHILS # BLD AUTO: 0.08 K/UL
BASOPHILS NFR BLD AUTO: 1.8 %
BILIRUB SERPL-MCNC: 0.3 MG/DL
BUN SERPL-MCNC: 18 MG/DL
CALCIUM SERPL-MCNC: 10 MG/DL
CHLORIDE SERPL-SCNC: 107 MMOL/L
CO2 SERPL-SCNC: 22 MMOL/L
CREAT SERPL-MCNC: 1.41 MG/DL
EGFR: 60 ML/MIN/1.73M2
EOSINOPHIL # BLD AUTO: 0.35 K/UL
EOSINOPHIL NFR BLD AUTO: 7.9 %
GLUCOSE SERPL-MCNC: 99 MG/DL
HCT VFR BLD CALC: 22.1 %
HGB BLD-MCNC: 7.4 G/DL
LYMPHOCYTES # BLD AUTO: 1.34 K/UL
LYMPHOCYTES NFR BLD AUTO: 30.1 %
MAN DIFF?: NORMAL
MCHC RBC-ENTMCNC: 33.5 GM/DL
MCHC RBC-ENTMCNC: 33.5 PG
MCV RBC AUTO: 100 FL
MONOCYTES # BLD AUTO: 0.08 K/UL
MONOCYTES NFR BLD AUTO: 1.8 %
NEUTROPHILS # BLD AUTO: 2.6 K/UL
NEUTROPHILS NFR BLD AUTO: 58.4 %
PLATELET # BLD AUTO: 167 K/UL
POTASSIUM SERPL-SCNC: 3.9 MMOL/L
PROT SERPL-MCNC: 6.5 G/DL
RBC # BLD: 2.21 M/UL
RBC # FLD: 22.2 %
SODIUM SERPL-SCNC: 141 MMOL/L
URATE SERPL-MCNC: 7.3 MG/DL
WBC # FLD AUTO: 4.46 K/UL

## 2022-08-04 ENCOUNTER — APPOINTMENT (OUTPATIENT)
Dept: GASTROENTEROLOGY | Facility: CLINIC | Age: 52
End: 2022-08-04

## 2022-08-09 ENCOUNTER — APPOINTMENT (OUTPATIENT)
Dept: GASTROENTEROLOGY | Facility: AMBULATORY MEDICAL SERVICES | Age: 52
End: 2022-08-09

## 2022-09-14 ENCOUNTER — APPOINTMENT (OUTPATIENT)
Dept: NEPHROLOGY | Facility: CLINIC | Age: 52
End: 2022-09-14

## 2022-09-14 VITALS
DIASTOLIC BLOOD PRESSURE: 64 MMHG | BODY MASS INDEX: 31.33 KG/M2 | HEIGHT: 71 IN | WEIGHT: 223.77 LBS | TEMPERATURE: 97.6 F | HEART RATE: 72 BPM | OXYGEN SATURATION: 97 % | SYSTOLIC BLOOD PRESSURE: 107 MMHG

## 2022-09-14 DIAGNOSIS — N18.2 CHRONIC KIDNEY DISEASE, STAGE 2 (MILD): ICD-10-CM

## 2022-09-14 PROCEDURE — 99213 OFFICE O/P EST LOW 20 MIN: CPT | Mod: GC

## 2022-09-14 RX ORDER — ERGOCALCIFEROL 1.25 MG/1
1.25 MG CAPSULE ORAL
Qty: 12 | Refills: 3 | Status: DISCONTINUED | COMMUNITY
Start: 2018-11-15 | End: 2022-09-14

## 2022-09-14 NOTE — PHYSICAL EXAM
[General Appearance - Alert] : alert [General Appearance - In No Acute Distress] : in no acute distress [General Appearance - Well Nourished] : well nourished [Sclera] : the sclera and conjunctiva were normal [Outer Ear] : the ears and nose were normal in appearance [Neck Appearance] : the appearance of the neck was normal [Jugular Venous Distention Increased] : there was no jugular-venous distention [Respiration, Rhythm And Depth] : normal respiratory rhythm and effort [Auscultation Breath Sounds / Voice Sounds] : lungs were clear to auscultation bilaterally [Heart Rate And Rhythm] : heart rate was normal and rhythm regular [Heart Sounds] : normal S1 and S2 [Murmurs] : no murmurs [Bowel Sounds] : normal bowel sounds [Abdomen Soft] : soft [No CVA Tenderness] : no ~M costovertebral angle tenderness [Abnormal Walk] : normal gait [] : no rash [No Focal Deficits] : no focal deficits [Oriented To Time, Place, And Person] : oriented to person, place, and time

## 2022-09-15 LAB
APPEARANCE: ABNORMAL
BACTERIA: NEGATIVE
BILIRUBIN URINE: NEGATIVE
BLOOD URINE: NORMAL
COLOR: YELLOW
CREAT SPEC-SCNC: 105 MG/DL
CREAT/PROT UR: 0.2 RATIO
GLUCOSE QUALITATIVE U: NEGATIVE
HYALINE CASTS: 1 /LPF
KETONES URINE: NEGATIVE
LEUKOCYTE ESTERASE URINE: ABNORMAL
MICROSCOPIC-UA: NORMAL
NITRITE URINE: NEGATIVE
PH URINE: 6.5
PROT UR-MCNC: 20 MG/DL
PROTEIN URINE: NORMAL
RED BLOOD CELLS URINE: 1 /HPF
SPECIFIC GRAVITY URINE: 1.01
SQUAMOUS EPITHELIAL CELLS: 0 /HPF
UROBILINOGEN URINE: NORMAL
WHITE BLOOD CELLS URINE: 422 /HPF

## 2022-09-18 NOTE — END OF VISIT
[] : Fellow [Time Spent: ___ minutes] : I have spent [unfilled] minutes of time on the encounter. [FreeTextEntry3] : Per Dr. Hurtado

## 2022-09-18 NOTE — REVIEW OF SYSTEMS
[Feeling Poorly] : feeling poorly [Feeling Tired] : feeling tired [As noted in HPI] : as noted in HPI [Anxiety] : anxiety [As Noted in HPI] : as noted in HPI [Negative] : Endocrine [Joint Stiffness] : joint stiffness [Limb Pain] : limb pain [Fever] : no fever [Chills] : no chills [Eyesight Problems] : no eyesight problems [Loss Of Hearing] : no hearing loss [Chest Pain] : no chest pain [Palpitations] : no palpitations [Leg Claudication] : no intermittent leg claudication [Lower Ext Edema] : no extremity edema [Shortness Of Breath] : no shortness of breath [Cough] : no cough [SOB on Exertion] : no shortness of breath during exertion [Abdominal Pain] : no abdominal pain [Constipation] : no constipation [Diarrhea] : no diarrhea [Dysuria] : no dysuria [Hesitancy] : no urinary hesitancy [Nocturia] : no nocturia [Confused] : no confusion [Convulsions] : no convulsions [Dizziness] : no dizziness [Fainting] : no fainting [Depression] : no depression [de-identified] : on eliquis

## 2022-09-18 NOTE — ASSESSMENT
[FreeTextEntry1] : 52 y/o male with H/O HTN, SS and mild CKD presenting today for hospital f/u.\par \par ALFREDO on CKD\par Suspect that his renal disease is in the  setting of his Sickle Cell disease which may have been exacerbated by his  use of NSAIDS \par --- patient has been compliant with avoiding NSAIDs, e \par most recent SCr of 1.4mg/dL; which has improved from before\par - history of proteinuria; continue with current dose of lisinopril \par --- will repeat urine studies today and if worsening proteinuria then will consider starting the patient on an SGLT2i\par \par HTN\par BP well controlled\par Advised low Na diet\par Continue with current medications. Lisinopril 10mg PO QD\par explained to patient the importance of BP control to avoid any further worsening of renal disease \par \par ANEMIA:  \par in the setting of SS disease  \par On aranesp per hematologist\par \par GOUT:  \par currently being followed by rheumatology \par on allopurinol and colchicine\par \par \par

## 2022-09-18 NOTE — HISTORY OF PRESENT ILLNESS
[FreeTextEntry1] : MAXI BUCHANAN is a 51 year male with a history sickle cell disease, HTN (Dx ~2010) , CKD, minimal proteinuria here for hospital follow up. The patient was diagnosed with Sickle Cell disease in 2010 and his renal function was within normal limits. Of note the patient has history of multiple hospital admissions in the past for sickle cell crisis. The most recent hospitalization was in 3/2022. The patient presents to the clinic today for a follow up appointment. \par \par The patient has been having a rising creatinine since 4/2021. Prior to that his renal function was normal. He did undergo a renal US in early 2020 with 12 cm kidneys. No stones or hydronephrosis. +Left mid pole cyst. He does admit that prior to his hospitalization he was at [facility] with significant joint pains. At that time he was taking Motrin for his joint pains and was prescribed meloxicam as well. He was at [facility] from 2/26/22-3/09/22 and admitted for SS crisis along with right knee swelling. He was diagnosed with atrial flutter and started on anticoagulation with eliquis. He was also found to have an acute R leg DVT and PE. He was diagnosed with gout as the etiology of his joint pains and has been taking allopurinol and colchicine since. He also has been following with rheumatology.  \par \par Patient presents to the clinic today for a follow up visit. He states he has been doing well without any more episodes of SS crisis. he also admitted to improved BP control. He is concerned about his renal function and would like something to prevent any further deterioration. His most recent SCr was 1.4mg/dL. He denied any chest pain, shortness of breath, nausea, or vomiting. He admitted to generalized fatigue and decreased energy. \par \par \par \par \par \par

## 2022-09-19 ENCOUNTER — OUTPATIENT (OUTPATIENT)
Dept: OUTPATIENT SERVICES | Facility: HOSPITAL | Age: 52
LOS: 1 days | End: 2022-09-19

## 2022-09-19 ENCOUNTER — APPOINTMENT (OUTPATIENT)
Dept: INTERNAL MEDICINE | Facility: CLINIC | Age: 52
End: 2022-09-19

## 2022-09-19 VITALS
SYSTOLIC BLOOD PRESSURE: 132 MMHG | DIASTOLIC BLOOD PRESSURE: 70 MMHG | TEMPERATURE: 98 F | OXYGEN SATURATION: 99 % | HEIGHT: 71 IN | WEIGHT: 227 LBS | HEART RATE: 80 BPM | BODY MASS INDEX: 31.78 KG/M2

## 2022-09-19 DIAGNOSIS — Z90.49 ACQUIRED ABSENCE OF OTHER SPECIFIED PARTS OF DIGESTIVE TRACT: Chronic | ICD-10-CM

## 2022-09-19 PROCEDURE — 99214 OFFICE O/P EST MOD 30 MIN: CPT

## 2022-09-21 RX ORDER — LINACLOTIDE 290 UG/1
290 CAPSULE, GELATIN COATED ORAL
Qty: 30 | Refills: 3 | Status: COMPLETED | COMMUNITY
Start: 2022-07-06 | End: 2022-09-21

## 2022-09-26 DIAGNOSIS — K59.09 OTHER CONSTIPATION: ICD-10-CM

## 2022-09-26 DIAGNOSIS — M10.9 GOUT, UNSPECIFIED: ICD-10-CM

## 2022-09-26 DIAGNOSIS — D57.1 SICKLE-CELL DISEASE WITHOUT CRISIS: ICD-10-CM

## 2022-09-26 DIAGNOSIS — I10 ESSENTIAL (PRIMARY) HYPERTENSION: ICD-10-CM

## 2022-09-26 DIAGNOSIS — I48.92 UNSPECIFIED ATRIAL FLUTTER: ICD-10-CM

## 2022-09-27 ENCOUNTER — APPOINTMENT (OUTPATIENT)
Dept: OPHTHALMOLOGY | Facility: CLINIC | Age: 52
End: 2022-09-27

## 2022-09-27 ENCOUNTER — NON-APPOINTMENT (OUTPATIENT)
Age: 52
End: 2022-09-27

## 2022-09-27 PROCEDURE — 92014 COMPRE OPH EXAM EST PT 1/>: CPT

## 2022-09-27 PROCEDURE — 92250 FUNDUS PHOTOGRAPHY W/I&R: CPT

## 2022-10-04 ENCOUNTER — LABORATORY RESULT (OUTPATIENT)
Age: 52
End: 2022-10-04

## 2022-10-04 ENCOUNTER — APPOINTMENT (OUTPATIENT)
Dept: RHEUMATOLOGY | Facility: CLINIC | Age: 52
End: 2022-10-04

## 2022-10-04 VITALS
HEART RATE: 68 BPM | DIASTOLIC BLOOD PRESSURE: 68 MMHG | OXYGEN SATURATION: 98 % | BODY MASS INDEX: 31.5 KG/M2 | TEMPERATURE: 97.5 F | SYSTOLIC BLOOD PRESSURE: 112 MMHG | RESPIRATION RATE: 16 BRPM | HEIGHT: 71 IN | WEIGHT: 225 LBS

## 2022-10-04 PROCEDURE — 99214 OFFICE O/P EST MOD 30 MIN: CPT

## 2022-10-07 ENCOUNTER — NON-APPOINTMENT (OUTPATIENT)
Age: 52
End: 2022-10-07

## 2022-10-07 LAB
ALBUMIN SERPL ELPH-MCNC: 3.8 G/DL
ALP BLD-CCNC: 118 U/L
ALT SERPL-CCNC: 50 U/L
ANION GAP SERPL CALC-SCNC: 11 MMOL/L
AST SERPL-CCNC: 41 U/L
BASOPHILS # BLD AUTO: 0.01 K/UL
BASOPHILS NFR BLD AUTO: 0.3 %
BILIRUB SERPL-MCNC: 0.3 MG/DL
BUN SERPL-MCNC: 28 MG/DL
CALCIUM SERPL-MCNC: 9.9 MG/DL
CHLORIDE SERPL-SCNC: 107 MMOL/L
CO2 SERPL-SCNC: 21 MMOL/L
CREAT SERPL-MCNC: 1.57 MG/DL
EGFR: 53 ML/MIN/1.73M2
EOSINOPHIL # BLD AUTO: 0.06 K/UL
EOSINOPHIL NFR BLD AUTO: 1.8 %
GLUCOSE SERPL-MCNC: 106 MG/DL
HCT VFR BLD CALC: 21.8 %
HGB BLD-MCNC: 7.3 G/DL
IMM GRANULOCYTES NFR BLD AUTO: 0.3 %
LYMPHOCYTES # BLD AUTO: 1.78 K/UL
LYMPHOCYTES NFR BLD AUTO: 53.6 %
MAN DIFF?: NORMAL
MCHC RBC-ENTMCNC: 33.5 GM/DL
MCHC RBC-ENTMCNC: 35.1 PG
MCV RBC AUTO: 104.8 FL
MONOCYTES # BLD AUTO: 0.22 K/UL
MONOCYTES NFR BLD AUTO: 6.6 %
NEUTROPHILS # BLD AUTO: 1.24 K/UL
NEUTROPHILS NFR BLD AUTO: 37.4 %
PLATELET # BLD AUTO: 187 K/UL
POTASSIUM SERPL-SCNC: 4.5 MMOL/L
PROT SERPL-MCNC: 6.9 G/DL
RBC # BLD: 2.08 M/UL
RBC # FLD: 21.3 %
SODIUM SERPL-SCNC: 138 MMOL/L
URATE SERPL-MCNC: 6.4 MG/DL
WBC # FLD AUTO: 3.32 K/UL

## 2022-10-10 LAB
CK SERPL-CCNC: 98 U/L
GGT SERPL-CCNC: 34 U/L

## 2022-10-18 ENCOUNTER — APPOINTMENT (OUTPATIENT)
Dept: ORTHOPEDIC SURGERY | Facility: CLINIC | Age: 52
End: 2022-10-18

## 2022-10-18 VITALS
SYSTOLIC BLOOD PRESSURE: 138 MMHG | HEIGHT: 71 IN | DIASTOLIC BLOOD PRESSURE: 70 MMHG | WEIGHT: 225 LBS | BODY MASS INDEX: 31.5 KG/M2 | HEART RATE: 86 BPM

## 2022-10-18 DIAGNOSIS — M17.11 UNILATERAL PRIMARY OSTEOARTHRITIS, RIGHT KNEE: ICD-10-CM

## 2022-10-18 PROCEDURE — 99204 OFFICE O/P NEW MOD 45 MIN: CPT

## 2022-10-24 PROBLEM — M17.11 OSTEOARTHRITIS OF RIGHT KNEE: Status: ACTIVE | Noted: 2022-03-29

## 2022-10-24 NOTE — ADDENDUM
[FreeTextEntry1] : This note was written by Yenifer Sol on 10/18/2022 acting solely as a scribe for Dr. Maulik Westbrook.\par \par All medical record entries made by the Scribe were at my, Dr. Maulik Westbrook, direction and personally dictated by me on 10/18/2022. I have personally reviewed the chart and agree that the record accurately reflects my personal performance of the history, physical exam, assessment and plan.

## 2022-10-24 NOTE — HISTORY OF PRESENT ILLNESS
[de-identified] : 51 year old male hx of Sickle Cell disease,  for [place] transit presents today with worsening of chronic right knee pain since February 2022. He was seen in [facility] , was dx with Gout and referred Rheumatology.He was treated for his gout and received multiple steroid injection. He was placed on Allopurinol and recent Uric Acid level was 6.4 normal. Although pain has improved he continues to have pain with steps. He is not taking pain medication. Last steroid injection was in May 2022 which was helpful. He has been referred here for further treatment. \par \par The patient's past medical history, past surgical history, medications and allergies were reviewed by me today with the patient and documented accordingly. In addition, the patient's family and social history, which were noncontributory to this visit, were reviewed also.

## 2022-10-24 NOTE — DISCUSSION/SUMMARY
[de-identified] : 50 y/o male with right knee gouty arthropathy\par \par Patient presents for evaluation of right knee pain.  Symptoms are consistent with chronic gouty arthropathy with recent medical management.  There is also some underlying degenerative changes seen on x-ray imaging, however there is no consideration for any surgical intervention at this time.  Would recommend continued conservative management for gouty arthropathy.  Discussed symptomatic and supportive care with ice/NSAIDs if able/medical management.\par \par Recommendations: Begin trial of PT, Rx given. \par \par Follow-up PMD/Rheum for further treatment.

## 2022-10-24 NOTE — CONSULT LETTER
[Dear  ___] : Dear  [unfilled], [Consult Letter:] : I had the pleasure of evaluating your patient, [unfilled]. [Please see my note below.] : Please see my note below. [Consult Closing:] : Thank you very much for allowing me to participate in the care of this patient.  If you have any questions, please do not hesitate to contact me. [Sincerely,] : Sincerely, [FreeTextEntry3] : Maluik Westbrook MD\par ______________________________________________\par [facility]: Sports Medicine\par [address], [address], [place] [address]\par (t) [phone]\par (f) [phone]

## 2022-10-24 NOTE — PHYSICAL EXAM
[de-identified] : Oriented to time, place, person\par Mood: Normal\par Affect: Normal\par Appearance: Healthy, well appearing, no acute distress.\par Gait: Normal\par Assistive Devices: None\par \par Right Knee Exam:\par \par Skin: Clean, dry, intact\par Inspection: No obvious malalignment, no masses, no swelling, no effusion\par Pulses: 2+ DP/PT pulses \par ROM: 0-135 degrees of flexion. No pain with deep knee flexion/extension.\par Tenderness: No MJLT. No LJLT. No pain over the patella facets. No pain to the quadriceps tendon. No pain to the patella tendon. No posterior knee tenderness.\par Stability: Stable to varus, valgus. Negative Lachman testing. Negative anterior drawer, negative posterior drawer.\par Strength: 5/5 Q/H/TA/GS/EHL, without atrophy\par Neuro: Intact to light touch throughout, DTRs normal\par Additional Tests: Negative Nidia's test, Negative patellar grind test  [de-identified] : We independently reviewed and discussed in detail the images and the radiologic reports with the patient: 2.26.2022 \par \par 4 views of the right knee that show no acute fracture or dislocation. There is mild medial, no lateral and minimal patellofemoral degenerative changes seen. There is no significant malalignment. No significant other obvious osseous abnormality, otherwise unremarkable.

## 2022-11-01 ENCOUNTER — NON-APPOINTMENT (OUTPATIENT)
Age: 52
End: 2022-11-01

## 2022-11-02 ENCOUNTER — NON-APPOINTMENT (OUTPATIENT)
Age: 52
End: 2022-11-02

## 2022-11-02 NOTE — HISTORY OF PRESENT ILLNESS
[de-identified] : The patient is a 50 yo male with HGB SS, here for f/u. Taking  HU 2000 mg QD.Has restarted Aranesp with Heme, but it is not working as well as it had been. . He is no longer taking Oxbryta. He does not feel that it helped him. However, his HGB was much higher with the Oxbryta if his recent HGBs are indicative. He had severe diarrhea with the Oxbryta that never resolved.\par He is now having frequent transfusions with his hematologist, and is maintaining hemoglobin @ 8. His SCD is stable, and he is asymptomatic. He has rare pain, no dyspnea. \par Without the Oxbryta, he  has constipation due to IBS, and is managed with Linzess.\par C/O pain in both knees, 5/10 in intensity. This is chronic, since the onset of his gout. His allopurinol was increased to 200 mg QD, and he continues to take colchicine 0.5 mg daily. He follows with rheum. \par The patient has been compliant with all medication, as well as purine-free diet. \par Now taking Metoprolol ER 25 mg QD and lisinopril 10 mg QD for HTN\par S/P PE- taking eliquis 5 bid\par The patient is back at work, working FT as a  for the [employer]. He enjoys the work, and has no limitations regarding his work due to medical issues.\par s/p past episode of a. flutter, now taking amiodarone 200mg QD- Has routine f/u with cardiology. He is asymptomatic.\par Says that the endari makes him ill, but is taking HU 2000 mg QD.\par ophtho is scheduled for 9/27/22\par \par Ophtho 11/2020. No sickle cell retinopathy seen. Needs new f/u appt\par \par Hematologist: Dr. [phone]\par [phone]\par fully covid vaxed, and boostered\par \par PE: gen- wdwn male in nad, talkative \par vss\par mild icterus\par lungs- cta\par cor: rrr-m\par abd benign, obese\par ext; -c/c/e, scarring from past LE ulcers, both ankles\par knees: moderate effusion of right knee, pain with flexion, left knee with small effusion\par \par a/P- 50 yo male with HGB SS, s/p PE, with gout and a. flutter\par 1. HTN- hydralazine 25 tid with lisinopril 10 mg QD\par 2.gout-  colchicine 0.6 mg QD with allopurinol 150 mg QD\par followed by rheumatology\par 3. HU- continue HU to 2000 mg QD, advise the patient to go for his usual aranesp -await labs drawn by his hematologist\par 4. a. flutter, stable- f/u with cardiology, continue amiodarone\par 5. patient encouraged to wear mask despite covid vax, as disease is still a risk\par 6. HTN-controlled\par 7. dilaudid- 4 mg tab- #20\par ISTOP checked\par 8. f/u three months\par \par Brittany Veloz MD\par

## 2022-11-30 ENCOUNTER — LABORATORY RESULT (OUTPATIENT)
Age: 52
End: 2022-11-30

## 2022-12-02 NOTE — PHYSICAL THERAPY INITIAL EVALUATION ADULT - MANUAL MUSCLE TESTING RESULTS, REHAB EVAL
[FreeTextEntry1] : Mr. Keen is a 59 year old male to the office for a follow up visit for abnormal CT, acid reflux disease, asthma, SUZY.\par - he notes feeling well in general \par - he notes walking \par - he notes SOB sometimes, especially with humidity\par - he denies any visual issues\par - he denies hoarseness \par - he notes getting enough sleep, 7-8 hours \par - he notes snoring \par - he notes right shoulder issues \par - he denies taking any new medications, vitamins, or supplements \par \par \par - He  denies any visual issues, headaches, nausea, vomiting, fever, chills, sweats, chest pains, chest pressure, diarrhea, constipation, dysphagia, myalgia, dizziness, leg swelling, leg pain, itchy eyes, itchy ears, heartburn, reflux, or sour taste in the mouth.  BUE grossly assessed to 4/5, LLE grossly assessed to 3/5, RLE not formally assessed 2/2 pain/grossly assessed due to

## 2022-12-05 ENCOUNTER — APPOINTMENT (OUTPATIENT)
Dept: RHEUMATOLOGY | Facility: CLINIC | Age: 52
End: 2022-12-05

## 2022-12-05 LAB
ALBUMIN SERPL ELPH-MCNC: 3.8 G/DL
ALP BLD-CCNC: 153 U/L
ALT SERPL-CCNC: 61 U/L
ANION GAP SERPL CALC-SCNC: 10 MMOL/L
AST SERPL-CCNC: 36 U/L
BASOPHILS # BLD AUTO: 0 K/UL
BASOPHILS NFR BLD AUTO: 0 %
BILIRUB SERPL-MCNC: 0.2 MG/DL
BUN SERPL-MCNC: 32 MG/DL
CALCIUM SERPL-MCNC: 10.3 MG/DL
CHLORIDE SERPL-SCNC: 106 MMOL/L
CO2 SERPL-SCNC: 22 MMOL/L
CREAT SERPL-MCNC: 1.75 MG/DL
EGFR: 46 ML/MIN/1.73M2
EOSINOPHIL # BLD AUTO: 0.04 K/UL
EOSINOPHIL NFR BLD AUTO: 1.4 %
GLUCOSE SERPL-MCNC: 87 MG/DL
HCT VFR BLD CALC: 22 %
HGB BLD-MCNC: 7.4 G/DL
IMM GRANULOCYTES NFR BLD AUTO: 0.4 %
LYMPHOCYTES # BLD AUTO: 1.3 K/UL
LYMPHOCYTES NFR BLD AUTO: 45.6 %
MAN DIFF?: NORMAL
MCHC RBC-ENTMCNC: 33.2 PG
MCHC RBC-ENTMCNC: 33.6 GM/DL
MCV RBC AUTO: 98.7 FL
MONOCYTES # BLD AUTO: 0.25 K/UL
MONOCYTES NFR BLD AUTO: 8.8 %
NEUTROPHILS # BLD AUTO: 1.25 K/UL
NEUTROPHILS NFR BLD AUTO: 43.8 %
PLATELET # BLD AUTO: 198 K/UL
POTASSIUM SERPL-SCNC: 4 MMOL/L
PROT SERPL-MCNC: 7 G/DL
RBC # BLD: 2.23 M/UL
RBC # FLD: 19.7 %
SODIUM SERPL-SCNC: 139 MMOL/L
URATE SERPL-MCNC: 6 MG/DL
WBC # FLD AUTO: 2.85 K/UL

## 2022-12-05 PROCEDURE — 99446 NTRPROF PH1/NTRNET/EHR 5-10: CPT

## 2022-12-07 ENCOUNTER — INPATIENT (INPATIENT)
Facility: HOSPITAL | Age: 52
LOS: 4 days | Discharge: HOME | DRG: 812 | End: 2022-12-12
Attending: STUDENT IN AN ORGANIZED HEALTH CARE EDUCATION/TRAINING PROGRAM | Admitting: INTERNAL MEDICINE
Payer: COMMERCIAL

## 2022-12-07 VITALS
RESPIRATION RATE: 18 BRPM | HEIGHT: 71 IN | HEART RATE: 73 BPM | TEMPERATURE: 98 F | SYSTOLIC BLOOD PRESSURE: 131 MMHG | WEIGHT: 225.09 LBS | DIASTOLIC BLOOD PRESSURE: 75 MMHG | OXYGEN SATURATION: 97 %

## 2022-12-07 DIAGNOSIS — N17.9 ACUTE KIDNEY FAILURE, UNSPECIFIED: ICD-10-CM

## 2022-12-07 DIAGNOSIS — D57.00 HB-SS DISEASE WITH CRISIS, UNSPECIFIED: ICD-10-CM

## 2022-12-07 DIAGNOSIS — Z90.49 ACQUIRED ABSENCE OF OTHER SPECIFIED PARTS OF DIGESTIVE TRACT: Chronic | ICD-10-CM

## 2022-12-07 DIAGNOSIS — D57.1 SICKLE-CELL DISEASE WITHOUT CRISIS: ICD-10-CM

## 2022-12-07 DIAGNOSIS — R74.8 ABNORMAL LEVELS OF OTHER SERUM ENZYMES: ICD-10-CM

## 2022-12-07 LAB
ALBUMIN SERPL ELPH-MCNC: 3.9 G/DL — SIGNIFICANT CHANGE UP (ref 3.3–5)
ALP SERPL-CCNC: 143 U/L — HIGH (ref 40–120)
ALT FLD-CCNC: 90 U/L — HIGH (ref 10–45)
ANION GAP SERPL CALC-SCNC: 8 MMOL/L — SIGNIFICANT CHANGE UP (ref 5–17)
ANISOCYTOSIS BLD QL: SIGNIFICANT CHANGE UP
APPEARANCE UR: CLEAR — SIGNIFICANT CHANGE UP
APTT BLD: 30.2 SEC — SIGNIFICANT CHANGE UP (ref 27.5–35.5)
AST SERPL-CCNC: 48 U/L — HIGH (ref 10–40)
BASE EXCESS BLDV CALC-SCNC: 0.3 MMOL/L — SIGNIFICANT CHANGE UP (ref -2–3)
BASOPHILS # BLD AUTO: 0 K/UL — SIGNIFICANT CHANGE UP (ref 0–0.2)
BASOPHILS NFR BLD AUTO: 0 % — SIGNIFICANT CHANGE UP (ref 0–2)
BILIRUB SERPL-MCNC: 0.2 MG/DL — SIGNIFICANT CHANGE UP (ref 0.2–1.2)
BILIRUB UR-MCNC: NEGATIVE — SIGNIFICANT CHANGE UP
BLD GP AB SCN SERPL QL: NEGATIVE — SIGNIFICANT CHANGE UP
BUN SERPL-MCNC: 31 MG/DL — HIGH (ref 7–23)
CA-I SERPL-SCNC: 1.46 MMOL/L — HIGH (ref 1.15–1.33)
CALCIUM SERPL-MCNC: 10.3 MG/DL — SIGNIFICANT CHANGE UP (ref 8.4–10.5)
CHLORIDE BLDV-SCNC: 106 MMOL/L — SIGNIFICANT CHANGE UP (ref 96–108)
CHLORIDE SERPL-SCNC: 105 MMOL/L — SIGNIFICANT CHANGE UP (ref 96–108)
CO2 BLDV-SCNC: 28 MMOL/L — HIGH (ref 22–26)
CO2 SERPL-SCNC: 25 MMOL/L — SIGNIFICANT CHANGE UP (ref 22–31)
COLOR SPEC: COLORLESS — SIGNIFICANT CHANGE UP
CREAT SERPL-MCNC: 1.62 MG/DL — HIGH (ref 0.5–1.3)
DIFF PNL FLD: NEGATIVE — SIGNIFICANT CHANGE UP
EGFR: 50 ML/MIN/1.73M2 — LOW
ELLIPTOCYTES BLD QL SMEAR: SLIGHT — SIGNIFICANT CHANGE UP
EOSINOPHIL # BLD AUTO: 0 K/UL — SIGNIFICANT CHANGE UP (ref 0–0.5)
EOSINOPHIL NFR BLD AUTO: 0 % — SIGNIFICANT CHANGE UP (ref 0–6)
FLUAV AG NPH QL: SIGNIFICANT CHANGE UP
FLUBV AG NPH QL: SIGNIFICANT CHANGE UP
GAS PNL BLDV: 139 MMOL/L — SIGNIFICANT CHANGE UP (ref 136–145)
GAS PNL BLDV: SIGNIFICANT CHANGE UP
GAS PNL BLDV: SIGNIFICANT CHANGE UP
GLUCOSE BLDV-MCNC: 119 MG/DL — HIGH (ref 70–99)
GLUCOSE SERPL-MCNC: 94 MG/DL — SIGNIFICANT CHANGE UP (ref 70–99)
GLUCOSE UR QL: NEGATIVE — SIGNIFICANT CHANGE UP
HAPTOGLOB SERPL-MCNC: <20 MG/DL — LOW (ref 34–200)
HCO3 BLDV-SCNC: 27 MMOL/L — SIGNIFICANT CHANGE UP (ref 22–29)
HCT VFR BLD CALC: 21.1 % — LOW (ref 39–50)
HCT VFR BLDA CALC: 20 % — CRITICAL LOW (ref 39–51)
HGB BLD CALC-MCNC: 6.7 G/DL — CRITICAL LOW (ref 12.6–17.4)
HGB BLD-MCNC: 7 G/DL — CRITICAL LOW (ref 13–17)
INR BLD: 1.18 RATIO — HIGH (ref 0.88–1.16)
KETONES UR-MCNC: NEGATIVE — SIGNIFICANT CHANGE UP
LACTATE BLDV-MCNC: 2.1 MMOL/L — HIGH (ref 0.5–2)
LDH SERPL L TO P-CCNC: 237 U/L — SIGNIFICANT CHANGE UP (ref 50–242)
LEUKOCYTE ESTERASE UR-ACNC: NEGATIVE — SIGNIFICANT CHANGE UP
LYMPHOCYTES # BLD AUTO: 1.48 K/UL — SIGNIFICANT CHANGE UP (ref 1–3.3)
LYMPHOCYTES # BLD AUTO: 42.3 % — SIGNIFICANT CHANGE UP (ref 13–44)
MACROCYTES BLD QL: SLIGHT — SIGNIFICANT CHANGE UP
MANUAL SMEAR VERIFICATION: SIGNIFICANT CHANGE UP
MCHC RBC-ENTMCNC: 32.9 PG — SIGNIFICANT CHANGE UP (ref 27–34)
MCHC RBC-ENTMCNC: 33.2 GM/DL — SIGNIFICANT CHANGE UP (ref 32–36)
MCV RBC AUTO: 99.1 FL — SIGNIFICANT CHANGE UP (ref 80–100)
MICROCYTES BLD QL: SLIGHT — SIGNIFICANT CHANGE UP
MONOCYTES # BLD AUTO: 0.25 K/UL — SIGNIFICANT CHANGE UP (ref 0–0.9)
MONOCYTES NFR BLD AUTO: 7.2 % — SIGNIFICANT CHANGE UP (ref 2–14)
NEUTROPHILS # BLD AUTO: 1.76 K/UL — LOW (ref 1.8–7.4)
NEUTROPHILS NFR BLD AUTO: 50.5 % — SIGNIFICANT CHANGE UP (ref 43–77)
NITRITE UR-MCNC: NEGATIVE — SIGNIFICANT CHANGE UP
NRBC # BLD: 14 /100 — HIGH (ref 0–0)
PCO2 BLDV: 53 MMHG — SIGNIFICANT CHANGE UP (ref 42–55)
PH BLDV: 7.31 — LOW (ref 7.32–7.43)
PH UR: 6.5 — SIGNIFICANT CHANGE UP (ref 5–8)
PLAT MORPH BLD: NORMAL — SIGNIFICANT CHANGE UP
PLATELET # BLD AUTO: 192 K/UL — SIGNIFICANT CHANGE UP (ref 150–400)
PO2 BLDV: 24 MMHG — LOW (ref 25–45)
POIKILOCYTOSIS BLD QL AUTO: SLIGHT — SIGNIFICANT CHANGE UP
POTASSIUM BLDV-SCNC: 4.9 MMOL/L — SIGNIFICANT CHANGE UP (ref 3.5–5.1)
POTASSIUM SERPL-MCNC: 4.4 MMOL/L — SIGNIFICANT CHANGE UP (ref 3.5–5.3)
POTASSIUM SERPL-SCNC: 4.4 MMOL/L — SIGNIFICANT CHANGE UP (ref 3.5–5.3)
PROT SERPL-MCNC: 7.2 G/DL — SIGNIFICANT CHANGE UP (ref 6–8.3)
PROT UR-MCNC: NEGATIVE — SIGNIFICANT CHANGE UP
PROTHROM AB SERPL-ACNC: 13.7 SEC — HIGH (ref 10.5–13.4)
RBC # BLD: 2.13 M/UL — LOW (ref 4.2–5.8)
RBC # BLD: 2.13 M/UL — LOW (ref 4.2–5.8)
RBC # FLD: 20.2 % — HIGH (ref 10.3–14.5)
RBC BLD AUTO: ABNORMAL
RETICS #: 63.5 K/UL — SIGNIFICANT CHANGE UP (ref 25–125)
RETICS/RBC NFR: 3 % — HIGH (ref 0.5–2.5)
RH IG SCN BLD-IMP: POSITIVE — SIGNIFICANT CHANGE UP
RSV RNA NPH QL NAA+NON-PROBE: SIGNIFICANT CHANGE UP
SAO2 % BLDV: 35.1 % — LOW (ref 67–88)
SARS-COV-2 RNA SPEC QL NAA+PROBE: SIGNIFICANT CHANGE UP
SODIUM SERPL-SCNC: 138 MMOL/L — SIGNIFICANT CHANGE UP (ref 135–145)
SP GR SPEC: 1.01 — SIGNIFICANT CHANGE UP (ref 1.01–1.02)
TARGETS BLD QL SMEAR: SLIGHT — SIGNIFICANT CHANGE UP
UROBILINOGEN FLD QL: NEGATIVE — SIGNIFICANT CHANGE UP
WBC # BLD: 3.49 K/UL — LOW (ref 3.8–10.5)
WBC # FLD AUTO: 3.49 K/UL — LOW (ref 3.8–10.5)

## 2022-12-07 PROCEDURE — 99285 EMERGENCY DEPT VISIT HI MDM: CPT | Mod: 25

## 2022-12-07 PROCEDURE — 99223 1ST HOSP IP/OBS HIGH 75: CPT

## 2022-12-07 PROCEDURE — 36000 PLACE NEEDLE IN VEIN: CPT

## 2022-12-07 PROCEDURE — 72131 CT LUMBAR SPINE W/O DYE: CPT | Mod: 26

## 2022-12-07 PROCEDURE — 76937 US GUIDE VASCULAR ACCESS: CPT | Mod: 26

## 2022-12-07 RX ORDER — HYDROXYUREA 500 MG/1
1500 CAPSULE ORAL DAILY
Refills: 0 | Status: DISCONTINUED | OUTPATIENT
Start: 2022-12-07 | End: 2022-12-12

## 2022-12-07 RX ORDER — DIPHENHYDRAMINE HCL 50 MG
25 CAPSULE ORAL ONCE
Refills: 0 | Status: COMPLETED | OUTPATIENT
Start: 2022-12-07 | End: 2022-12-07

## 2022-12-07 RX ORDER — GABAPENTIN 400 MG/1
300 CAPSULE ORAL AT BEDTIME
Refills: 0 | Status: DISCONTINUED | OUTPATIENT
Start: 2022-12-07 | End: 2022-12-12

## 2022-12-07 RX ORDER — HYDROMORPHONE HYDROCHLORIDE 2 MG/ML
2 INJECTION INTRAMUSCULAR; INTRAVENOUS; SUBCUTANEOUS EVERY 4 HOURS
Refills: 0 | Status: DISCONTINUED | OUTPATIENT
Start: 2022-12-07 | End: 2022-12-08

## 2022-12-07 RX ORDER — HYDROMORPHONE HYDROCHLORIDE 2 MG/ML
30 INJECTION INTRAMUSCULAR; INTRAVENOUS; SUBCUTANEOUS
Refills: 0 | Status: DISCONTINUED | OUTPATIENT
Start: 2022-12-07 | End: 2022-12-07

## 2022-12-07 RX ORDER — HYDROMORPHONE HYDROCHLORIDE 2 MG/ML
1 INJECTION INTRAMUSCULAR; INTRAVENOUS; SUBCUTANEOUS ONCE
Refills: 0 | Status: DISCONTINUED | OUTPATIENT
Start: 2022-12-07 | End: 2022-12-07

## 2022-12-07 RX ORDER — SODIUM CHLORIDE 9 MG/ML
1000 INJECTION, SOLUTION INTRAVENOUS ONCE
Refills: 0 | Status: COMPLETED | OUTPATIENT
Start: 2022-12-07 | End: 2022-12-07

## 2022-12-07 RX ORDER — ONDANSETRON 8 MG/1
4 TABLET, FILM COATED ORAL EVERY 6 HOURS
Refills: 0 | Status: DISCONTINUED | OUTPATIENT
Start: 2022-12-07 | End: 2022-12-12

## 2022-12-07 RX ORDER — ACETAMINOPHEN 500 MG
650 TABLET ORAL EVERY 6 HOURS
Refills: 0 | Status: DISCONTINUED | OUTPATIENT
Start: 2022-12-07 | End: 2022-12-12

## 2022-12-07 RX ORDER — LANOLIN ALCOHOL/MO/W.PET/CERES
3 CREAM (GRAM) TOPICAL AT BEDTIME
Refills: 0 | Status: DISCONTINUED | OUTPATIENT
Start: 2022-12-07 | End: 2022-12-12

## 2022-12-07 RX ORDER — FOLIC ACID 0.8 MG
1 TABLET ORAL DAILY
Refills: 0 | Status: DISCONTINUED | OUTPATIENT
Start: 2022-12-07 | End: 2022-12-12

## 2022-12-07 RX ORDER — SODIUM CHLORIDE 9 MG/ML
1000 INJECTION, SOLUTION INTRAVENOUS
Refills: 0 | Status: DISCONTINUED | OUTPATIENT
Start: 2022-12-07 | End: 2022-12-09

## 2022-12-07 RX ORDER — MORPHINE SULFATE 50 MG/1
30 CAPSULE, EXTENDED RELEASE ORAL
Refills: 0 | Status: DISCONTINUED | OUTPATIENT
Start: 2022-12-07 | End: 2022-12-07

## 2022-12-07 RX ORDER — SENNA PLUS 8.6 MG/1
2 TABLET ORAL AT BEDTIME
Refills: 0 | Status: DISCONTINUED | OUTPATIENT
Start: 2022-12-07 | End: 2022-12-08

## 2022-12-07 RX ORDER — HYDROMORPHONE HYDROCHLORIDE 2 MG/ML
30 INJECTION INTRAMUSCULAR; INTRAVENOUS; SUBCUTANEOUS
Refills: 0 | Status: DISCONTINUED | OUTPATIENT
Start: 2022-12-07 | End: 2022-12-12

## 2022-12-07 RX ORDER — ENOXAPARIN SODIUM 100 MG/ML
40 INJECTION SUBCUTANEOUS EVERY 24 HOURS
Refills: 0 | Status: DISCONTINUED | OUTPATIENT
Start: 2022-12-07 | End: 2022-12-11

## 2022-12-07 RX ORDER — HYDROMORPHONE HYDROCHLORIDE 2 MG/ML
1.5 INJECTION INTRAMUSCULAR; INTRAVENOUS; SUBCUTANEOUS EVERY 4 HOURS
Refills: 0 | Status: DISCONTINUED | OUTPATIENT
Start: 2022-12-07 | End: 2022-12-07

## 2022-12-07 RX ORDER — HYDROMORPHONE HYDROCHLORIDE 2 MG/ML
1 INJECTION INTRAMUSCULAR; INTRAVENOUS; SUBCUTANEOUS EVERY 4 HOURS
Refills: 0 | Status: DISCONTINUED | OUTPATIENT
Start: 2022-12-07 | End: 2022-12-08

## 2022-12-07 RX ORDER — NALOXONE HYDROCHLORIDE 4 MG/.1ML
0.1 SPRAY NASAL
Refills: 0 | Status: DISCONTINUED | OUTPATIENT
Start: 2022-12-07 | End: 2022-12-12

## 2022-12-07 RX ADMIN — SENNA PLUS 2 TABLET(S): 8.6 TABLET ORAL at 21:37

## 2022-12-07 RX ADMIN — HYDROMORPHONE HYDROCHLORIDE 2 MILLIGRAM(S): 2 INJECTION INTRAMUSCULAR; INTRAVENOUS; SUBCUTANEOUS at 18:43

## 2022-12-07 RX ADMIN — Medication 25 MILLIGRAM(S): at 09:37

## 2022-12-07 RX ADMIN — HYDROMORPHONE HYDROCHLORIDE 2 MILLIGRAM(S): 2 INJECTION INTRAMUSCULAR; INTRAVENOUS; SUBCUTANEOUS at 20:30

## 2022-12-07 RX ADMIN — ENOXAPARIN SODIUM 40 MILLIGRAM(S): 100 INJECTION SUBCUTANEOUS at 18:43

## 2022-12-07 RX ADMIN — HYDROMORPHONE HYDROCHLORIDE 1 MILLIGRAM(S): 2 INJECTION INTRAMUSCULAR; INTRAVENOUS; SUBCUTANEOUS at 09:35

## 2022-12-07 RX ADMIN — SODIUM CHLORIDE 1000 MILLILITER(S): 9 INJECTION, SOLUTION INTRAVENOUS at 09:37

## 2022-12-07 RX ADMIN — SODIUM CHLORIDE 1000 MILLILITER(S): 9 INJECTION, SOLUTION INTRAVENOUS at 11:27

## 2022-12-07 RX ADMIN — Medication 25 MILLIGRAM(S): at 11:22

## 2022-12-07 RX ADMIN — SODIUM CHLORIDE 75 MILLILITER(S): 9 INJECTION, SOLUTION INTRAVENOUS at 18:43

## 2022-12-07 RX ADMIN — HYDROMORPHONE HYDROCHLORIDE 2 MILLIGRAM(S): 2 INJECTION INTRAMUSCULAR; INTRAVENOUS; SUBCUTANEOUS at 21:37

## 2022-12-07 RX ADMIN — HYDROMORPHONE HYDROCHLORIDE 1 MILLIGRAM(S): 2 INJECTION INTRAMUSCULAR; INTRAVENOUS; SUBCUTANEOUS at 17:01

## 2022-12-07 RX ADMIN — HYDROMORPHONE HYDROCHLORIDE 30 MILLILITER(S): 2 INJECTION INTRAMUSCULAR; INTRAVENOUS; SUBCUTANEOUS at 22:25

## 2022-12-07 RX ADMIN — HYDROMORPHONE HYDROCHLORIDE 1 MILLIGRAM(S): 2 INJECTION INTRAMUSCULAR; INTRAVENOUS; SUBCUTANEOUS at 11:23

## 2022-12-07 RX ADMIN — HYDROMORPHONE HYDROCHLORIDE 2 MILLIGRAM(S): 2 INJECTION INTRAMUSCULAR; INTRAVENOUS; SUBCUTANEOUS at 22:37

## 2022-12-07 NOTE — ED PROCEDURE NOTE - PROCEDURE ADDITIONAL DETAILS
Emergency Department Focused Ultrasound performed at patient's bedside.  The complete report can be found in PACS.   18g JOSE

## 2022-12-07 NOTE — H&P ADULT - NSHPLABSRESULTS_GEN_ALL_CORE
7.0    3.49  )-----------( 192      ( 07 Dec 2022 09:49 )             21.1         138  |  105  |  31<H>  ----------------------------<  94  4.4   |  25  |  1.62<H>    Ca    10.3      07 Dec 2022 09:49    TPro  7.2  /  Alb  3.9  /  TBili  0.2  /  DBili  x   /  AST  48<H>  /  ALT  90<H>  /  AlkPhos  143<H>      PT/INR - ( 07 Dec 2022 12:18 )   PT: 13.7 sec;   INR: 1.18 ratio         PTT - ( 07 Dec 2022 12:18 )  PTT:30.2 sec    pH, Venous: 7.31 (22 @ 12:19)  pCO2, Venous: 53 mmHg (22 @ 12:19)  pO2, Venous: 24 mmHg (22 @ 12:19)  HCO3, Venous: 27 mmol/L (22 @ 12:19)    Blood Gas Venous - Lactate: 2.1 mmol/L (22 @ 12:19)    Urinalysis Basic - ( 07 Dec 2022 11:00 )    Color: Colorless / Appearance: Clear / S.012 / pH: x  Gluc: x / Ketone: Negative  / Bili: Negative / Urobili: Negative   Blood: x / Protein: Negative / Nitrite: Negative   Leuk Esterase: Negative / RBC: x / WBC x   Sq Epi: x / Non Sq Epi: x / Bacteria: x          CAPILLARY BLOOD GLUCOSE 7.0    3.49  )-----------( 192      ( 07 Dec 2022 09:49 )             21.1         138  |  105  |  31<H>  ----------------------------<  94  4.4   |  25  |  1.62<H>    Ca    10.3      07 Dec 2022 09:49    TPro  7.2  /  Alb  3.9  /  TBili  0.2  /  DBili  x   /  AST  48<H>  /  ALT  90<H>  /  AlkPhos  143<H>      PT/INR - ( 07 Dec 2022 12:18 )   PT: 13.7 sec;   INR: 1.18 ratio         PTT - ( 07 Dec 2022 12:18 )  PTT:30.2 sec    pH, Venous: 7.31 (22 @ 12:19)  pCO2, Venous: 53 mmHg (22 @ 12:19)  pO2, Venous: 24 mmHg (22 @ 12:19)  HCO3, Venous: 27 mmol/L (22 @ 12:19)    Blood Gas Venous - Lactate: 2.1 mmol/L (22 @ 12:19)    Urinalysis Basic - ( 07 Dec 2022 11:00 )    Color: Colorless / Appearance: Clear / S.012 / pH: x  Gluc: x / Ketone: Negative  / Bili: Negative / Urobili: Negative   Blood: x / Protein: Negative / Nitrite: Negative   Leuk Esterase: Negative / RBC: x / WBC x   Sq Epi: x / Non Sq Epi: x / Bacteria: x      CAPILLARY BLOOD GLUCOSE    EKG personally reviewed NSR, nonspecific tw abnormalities. HR 60s.

## 2022-12-07 NOTE — CHART NOTE - NSCHARTNOTEFT_GEN_A_CORE
Called by pt  for c/o back pain.  Pt. seen and evaluated at bedside.  Denies palpitations, SOB, diaphoresis, light headedness, N/V/D, abdominal pain.  Pt. states back  pain is 8/10, slight improved after Dilaudid 2mg iv    Vital Signs Last 24 Hrs  T(C): 36.4 (07 Dec 2022 18:31), Max: 36.9 (07 Dec 2022 07:09)  T(F): 97.6 (07 Dec 2022 18:31), Max: 98.5 (07 Dec 2022 07:09)  HR: 63 (07 Dec 2022 18:31) (62 - 73)  BP: 153/87 (07 Dec 2022 18:31) (126/78 - 153/87)  BP(mean): --  RR: 18 (07 Dec 2022 18:31) (18 - 19)  SpO2: 96% (07 Dec 2022 18:31) (96% - 98%)    Parameters below as of 07 Dec 2022 18:31  Patient On (Oxygen Delivery Method): room air        Labs:                        7.0    3.49  )-----------( 192      ( 07 Dec 2022 09:49 )             21.1       12-07    138  |  105  |  31<H>  ----------------------------<  94  4.4   |  25  |  1.62<H>    Ca    10.3      07 Dec 2022 09:49    TPro  7.2  /  Alb  3.9  /  TBili  0.2  /  DBili  x   /  AST  48<H>  /  ALT  90<H>  /  AlkPhos  143<H>  12-07        Neuro: pt A & O x3, in NAD  CV:reg S1, S2  Lungs: CTA  Abd.soft + BS, ND, ND  Ext. no edema      51M SS disease, gout admitted for ALFREDO, elevated LFTs, and sickle cell pain crisis. c/o increased back pain, on Dilaudid 4 mg po q3h PRN at home, asking to be on Dilaudid 2 mg iv Q2h ATC  Continue  Dilaudid 2 mg iv Q4h ATC and 1 mg iv q4h PRN  per hospitalist   Pt to be assessed PRN for additional pain meds   Pt asked to receive PRBC for HB 7.0 today or in am-->will reassess in am  above d/w pt and he agrees with the plan         ASSESSMENT/PLAN:

## 2022-12-07 NOTE — ED PROVIDER NOTE - OBJECTIVE STATEMENT
52-year-old male with past medical history of HTN, sickle cell, gout presenting with sickle cell crisis.  Patient reports that he developed low back pain 3 to 4 days ago.  Pain is non-radiating, with no other associated symptoms.  Patient states that this is similar to his typical sickle cell pain.  Taking Dilaudid 4 mg every 4 hours at home without relief.  Patient states, "I think my blood counts are low".  Patient reports that he typically gets pain when his blood counts go low.  No recent illnesses or sick contacts.  Denies fevers/chills, chest pain, shortness of breath, abdominal pain, nausea, vomiting, diarrhea, dysuria, hematuria.

## 2022-12-07 NOTE — H&P ADULT - HISTORY OF PRESENT ILLNESS
52M with hx of SS disease, gout here with lower back pain that started over the weekend. The patient has had similar pain to this before related to sickle cell pain crises. His pain persisted throughout so he decided to come to the ED for further relief. He took his home dilaudid which did not completely reduce his pain. He describes the pain as a sharp throbbing pain, 9/10. Denies any trauma. He notes that he is also tired and weak which he attributes to symptoms of anemia. ROS also positive for polyuria and incomplete relief after bladder voiding.     In the ED, pt /75, HR 73, RR 18, SaO2 97% on RA. He was given dilaudid 1 mg with incomplete relief as well as 1L LR x 2.

## 2022-12-07 NOTE — ED ADULT NURSE REASSESSMENT NOTE - NS ED NURSE REASSESS COMMENT FT1
IVL placed BY md woo u/s guidance to right a/c,  labs drawn and sent, medicated per MD orders LR in progress, will continue to monitor pt

## 2022-12-07 NOTE — ED PROVIDER NOTE - CROS ED CONS ALL NEG
[FreeTextEntry1] : Impression\par \par I do not think his current symptoms are gastrointestinal in origin\par \par I think he needs to see a cardiologist and go to the emergency room\par \par He does have some early satiety\par \par Suggest\par \par Go to emergency room\par \par Declines\par \par Call his cardiologist today, I have sent messages to the cardiologist see the cardiologist as soon as possible\par \par Gastric emptying scan to look for the remote possibility of gastroparesis\par \par MR angiogram of the abdomen to look for the remote possibility of intestinal anemia\par \par He has my cell phone and can call me anytime\par \par Follow-up with me next week
negative...

## 2022-12-07 NOTE — H&P ADULT - NSHPREVIEWOFSYSTEMS_GEN_ALL_CORE
REVIEW OF SYSTEMS:  CONSTITUTIONAL: +fatigue, generalized malaise. No fever, chills  EYES: No eye pain, visual disturbances, or discharge  ENMT:  No difficulty hearing, No sinus or throat pain  NECK: No pain or stiffness  RESPIRATORY: No cough, wheezing, or hemoptysis; No shortness of breath  CARDIOVASCULAR: No chest pain, palpitations, dizziness, or leg swelling  GASTROINTESTINAL: No abdominal or epigastric pain. No nausea, vomiting, or hematemesis; No diarrhea or constipation. No melena or hematochezia.  GENITOURINARY: No dysuria, frequency, hematuria, or incontinence  NEUROLOGICAL: No headaches, memory loss, loss of strength, numbness, or tremors  SKIN: No itching, burning, rashes, or lesions   LYMPH NODES: No enlarged glands  ENDOCRINE: No heat or cold intolerance; No hair loss  MUSCULOSKELETAL: +back pain. No joint pain or swelling; No muscle, or extremity pain  HEME/LYMPH: No easy bruising, or bleeding gums  ALLERGY AND IMMUNOLOGIC: No hives or eczema

## 2022-12-07 NOTE — H&P ADULT - PROBLEM SELECTOR PLAN 1
Baseline Cr 1.1; elevated Cr 1.62 on admission i/s/o possible urinary retention  - Send UA, urine electrolytes, spot urine TP/CR.   - Monitor labs and urine output.   - Avoid NSAIDs, ACEI/ARBS, RCA and nephrotoxins.   - Renally dose medications   - F/u abd ultrasound/bladder scan

## 2022-12-07 NOTE — ED PROVIDER NOTE - NS ED ATTENDING STATEMENT MOD
This was a shared visit with the BRENT. I reviewed and verified the documentation and independently performed the documented:

## 2022-12-07 NOTE — ED PROVIDER NOTE - PROGRESS NOTE DETAILS
Patient seen at bedside in NAD.  VSS.  Patient resting comfortably.  Patient with persistent pain despite dilaudid.  Will redose dilaudid and admit to medicine. hgb 7, slightly lower than baseline.  ALFREDO and transaminitis noted.  no abd pain or ttp.  Will continue IVF and pain control, possible prbcs.  -Anthony Petit PA-C Attending Pamela Arriaga: pt  found to have transaminitis. no abdominal pain or ttp. pt with h/o anemia, has prefviously needed blood transfusion. will continue to monitor. no sob or h/o RV failure. no reports of ingestion or expsoure

## 2022-12-07 NOTE — ED PROVIDER NOTE - ATTENDING APP SHARED VISIT CONTRIBUTION OF CARE
Attending MD Pamela Arriaga:   I personally have seen and examined this patient.  Physician assistant note reviewed and agree on plan of care and except where noted.  See HPI, PE, and MDM for details.

## 2022-12-07 NOTE — ED PROVIDER NOTE - CLINICAL SUMMARY MEDICAL DECISION MAKING FREE TEXT BOX
Attending Pamela Arriaga: 53 yo male h/o sickle cell disease, anemia with prior transfusions presenting with body pains similar to prior sickle cells. no sob or difficulty breathing. no fevers or chills. concern for sickle cell pain. will obtain labs, reticulocytes, type and screen, pain control and fluids. no sob or difficulty breathing. no joint erythema or redness to suggest septic joint

## 2022-12-07 NOTE — H&P ADULT - PROBLEM SELECTOR PLAN 3
Severe back pain, LDH and Tbili are not elevated. Retic 3%.   - will treat with dilaudid 1/2 mg IV for mod/severe pain  - CTLS to r/o acute pathology in search for alternative explanation to pain Severe back pain, LDH and Tbili are not elevated. Retic 3%.   - will treat with dilaudid 1/2 mg IV for mod/severe pain  - CTLS to r/o acute pathology in search for alternative explanation to pain  - Gentle hydration with D5/half NS @ 75 cc/hr x 24 hrs

## 2022-12-07 NOTE — H&P ADULT - PROBLEM SELECTOR PLAN 2
Mildly elevated LFTs without abdominal pain or tenderness. S/p cholecystectomy  - Monitor LFTs  - F/u abdominal ultrasound  - If LFTs uptrending, would send hepatitis serologies

## 2022-12-07 NOTE — ED PROVIDER NOTE - PHYSICAL EXAMINATION
Gen: AAO x 3, NAD  Skin: No rashes or lesions  HEENT: NC/AT, PERRLA, EOMI, MMM  Resp: unlabored CTAB  Cardiac: rrr s1s2, no murmurs, rubs or gallops  GI: ND, +BS, Soft, NT  Ext: no pedal edema, FROM in all extremities  MSK: No midline cervical/thoracic/lumbar TTP  Neuro: no focal deficits.  Strength 5/5 BUE and BLE, sensation intact, clear speech, normal gait. Gen: AAO x 3, NAD  Skin: No rashes or lesions  HEENT: NC/AT, PERRLA, EOMI, MMM  Resp: unlabored CTAB  Cardiac: rrr s1s2, no murmurs, rubs or gallops  GI: ND, +BS, Soft, NT  Ext: no pedal edema, FROM in all extremities  MSK: No midline cervical/thoracic/lumbar TTP  Neuro: no focal deficits.  Strength 5/5 BUE and BLE, sensation intact, clear speech, normal gait.  Attending Pamela Arriaga: Gen: NAD, heent: atrauamtic, eomi, mmm, op pink neck; nttp, no nuchal rigidity, chest: nttp, no crepitus, cv: rrr, no murmurs, lungs: ctab, abd: soft, nontender, nondistended, no peritoneal signs, no guarding, ext: wwp, neg homans, skin: no rash, neuro: awake and alert, following commands, speech clear, sensation and strength intact, no focal deficits

## 2022-12-07 NOTE — ED ADULT NURSE NOTE - OBJECTIVE STATEMENT
Received pt in assigned a&ox3 pt c/o sickle cell pain, reports back pain & bodyaches x2 days, pt took prescribed PO Dilaudid & Hydroxyurea last night, denies any chest pain or sob, resps even and non labored, ambulating steady to bathroom in nad,  will continue to monitor pt, pt in nad

## 2022-12-08 ENCOUNTER — TRANSCRIPTION ENCOUNTER (OUTPATIENT)
Age: 52
End: 2022-12-08

## 2022-12-08 LAB
ALBUMIN SERPL ELPH-MCNC: 3.8 G/DL — SIGNIFICANT CHANGE UP (ref 3.3–5)
ALP SERPL-CCNC: 123 U/L — HIGH (ref 40–120)
ALT FLD-CCNC: 76 U/L — HIGH (ref 10–45)
ANION GAP SERPL CALC-SCNC: 8 MMOL/L — SIGNIFICANT CHANGE UP (ref 5–17)
APPEARANCE UR: CLEAR — SIGNIFICANT CHANGE UP
AST SERPL-CCNC: 39 U/L — SIGNIFICANT CHANGE UP (ref 10–40)
BACTERIA # UR AUTO: NEGATIVE — SIGNIFICANT CHANGE UP
BILIRUB SERPL-MCNC: 0.3 MG/DL — SIGNIFICANT CHANGE UP (ref 0.2–1.2)
BILIRUB UR-MCNC: NEGATIVE — SIGNIFICANT CHANGE UP
BUN SERPL-MCNC: 22 MG/DL — SIGNIFICANT CHANGE UP (ref 7–23)
CALCIUM SERPL-MCNC: 10.3 MG/DL — SIGNIFICANT CHANGE UP (ref 8.4–10.5)
CHLORIDE SERPL-SCNC: 104 MMOL/L — SIGNIFICANT CHANGE UP (ref 96–108)
CO2 SERPL-SCNC: 26 MMOL/L — SIGNIFICANT CHANGE UP (ref 22–31)
COLOR SPEC: SIGNIFICANT CHANGE UP
CREAT SERPL-MCNC: 1.41 MG/DL — HIGH (ref 0.5–1.3)
DIFF PNL FLD: NEGATIVE — SIGNIFICANT CHANGE UP
EGFR: 60 ML/MIN/1.73M2 — SIGNIFICANT CHANGE UP
EPI CELLS # UR: 0 /HPF — SIGNIFICANT CHANGE UP
GLUCOSE SERPL-MCNC: 116 MG/DL — HIGH (ref 70–99)
GLUCOSE UR QL: NEGATIVE — SIGNIFICANT CHANGE UP
HCT VFR BLD CALC: 20.7 % — CRITICAL LOW (ref 39–50)
HCT VFR BLD CALC: 23.6 % — LOW (ref 39–50)
HGB BLD-MCNC: 6.9 G/DL — CRITICAL LOW (ref 13–17)
HGB BLD-MCNC: 7.9 G/DL — LOW (ref 13–17)
HYALINE CASTS # UR AUTO: 0 /LPF — SIGNIFICANT CHANGE UP (ref 0–2)
KETONES UR-MCNC: NEGATIVE — SIGNIFICANT CHANGE UP
LEUKOCYTE ESTERASE UR-ACNC: NEGATIVE — SIGNIFICANT CHANGE UP
MAGNESIUM SERPL-MCNC: 2.4 MG/DL — SIGNIFICANT CHANGE UP (ref 1.6–2.6)
MCHC RBC-ENTMCNC: 32.6 PG — SIGNIFICANT CHANGE UP (ref 27–34)
MCHC RBC-ENTMCNC: 32.7 PG — SIGNIFICANT CHANGE UP (ref 27–34)
MCHC RBC-ENTMCNC: 33.3 GM/DL — SIGNIFICANT CHANGE UP (ref 32–36)
MCHC RBC-ENTMCNC: 33.5 GM/DL — SIGNIFICANT CHANGE UP (ref 32–36)
MCV RBC AUTO: 97.5 FL — SIGNIFICANT CHANGE UP (ref 80–100)
MCV RBC AUTO: 98.1 FL — SIGNIFICANT CHANGE UP (ref 80–100)
NITRITE UR-MCNC: NEGATIVE — SIGNIFICANT CHANGE UP
NRBC # BLD: 10 /100 WBCS — HIGH (ref 0–0)
NRBC # BLD: 13 /100 WBCS — HIGH (ref 0–0)
PH UR: 6.5 — SIGNIFICANT CHANGE UP (ref 5–8)
PHOSPHATE SERPL-MCNC: 4.1 MG/DL — SIGNIFICANT CHANGE UP (ref 2.5–4.5)
PLATELET # BLD AUTO: 175 K/UL — SIGNIFICANT CHANGE UP (ref 150–400)
PLATELET # BLD AUTO: 176 K/UL — SIGNIFICANT CHANGE UP (ref 150–400)
POTASSIUM SERPL-MCNC: 4.4 MMOL/L — SIGNIFICANT CHANGE UP (ref 3.5–5.3)
POTASSIUM SERPL-SCNC: 4.4 MMOL/L — SIGNIFICANT CHANGE UP (ref 3.5–5.3)
PROT SERPL-MCNC: 7 G/DL — SIGNIFICANT CHANGE UP (ref 6–8.3)
PROT UR-MCNC: SIGNIFICANT CHANGE UP
RBC # BLD: 2.11 M/UL — LOW (ref 4.2–5.8)
RBC # BLD: 2.42 M/UL — LOW (ref 4.2–5.8)
RBC # FLD: 19.3 % — HIGH (ref 10.3–14.5)
RBC # FLD: 20.6 % — HIGH (ref 10.3–14.5)
RBC CASTS # UR COMP ASSIST: 1 /HPF — SIGNIFICANT CHANGE UP (ref 0–4)
SODIUM SERPL-SCNC: 138 MMOL/L — SIGNIFICANT CHANGE UP (ref 135–145)
SP GR SPEC: 1.01 — SIGNIFICANT CHANGE UP (ref 1.01–1.02)
UROBILINOGEN FLD QL: NEGATIVE — SIGNIFICANT CHANGE UP
WBC # BLD: 3.22 K/UL — LOW (ref 3.8–10.5)
WBC # BLD: 3.3 K/UL — LOW (ref 3.8–10.5)
WBC # FLD AUTO: 3.22 K/UL — LOW (ref 3.8–10.5)
WBC # FLD AUTO: 3.3 K/UL — LOW (ref 3.8–10.5)
WBC UR QL: 1 /HPF — SIGNIFICANT CHANGE UP (ref 0–5)

## 2022-12-08 PROCEDURE — 76857 US EXAM PELVIC LIMITED: CPT | Mod: 26

## 2022-12-08 PROCEDURE — 99233 SBSQ HOSP IP/OBS HIGH 50: CPT

## 2022-12-08 PROCEDURE — 76700 US EXAM ABDOM COMPLETE: CPT | Mod: 26

## 2022-12-08 RX ORDER — DIPHENHYDRAMINE HCL 50 MG
25 CAPSULE ORAL ONCE
Refills: 0 | Status: COMPLETED | OUTPATIENT
Start: 2022-12-08 | End: 2022-12-08

## 2022-12-08 RX ORDER — PETROLATUM,WHITE
1 JELLY (GRAM) TOPICAL THREE TIMES A DAY
Refills: 0 | Status: DISCONTINUED | OUTPATIENT
Start: 2022-12-08 | End: 2022-12-12

## 2022-12-08 RX ORDER — LACTULOSE 10 G/15ML
20 SOLUTION ORAL ONCE
Refills: 0 | Status: COMPLETED | OUTPATIENT
Start: 2022-12-08 | End: 2022-12-08

## 2022-12-08 RX ORDER — POLYETHYLENE GLYCOL 3350 17 G/17G
17 POWDER, FOR SOLUTION ORAL DAILY
Refills: 0 | Status: DISCONTINUED | OUTPATIENT
Start: 2022-12-09 | End: 2022-12-12

## 2022-12-08 RX ORDER — DIPHENHYDRAMINE HCL 50 MG
25 CAPSULE ORAL EVERY 8 HOURS
Refills: 0 | Status: DISCONTINUED | OUTPATIENT
Start: 2022-12-08 | End: 2022-12-12

## 2022-12-08 RX ORDER — SENNA PLUS 8.6 MG/1
2 TABLET ORAL
Refills: 0 | Status: DISCONTINUED | OUTPATIENT
Start: 2022-12-08 | End: 2022-12-12

## 2022-12-08 RX ORDER — POLYETHYLENE GLYCOL 3350 17 G/17G
17 POWDER, FOR SOLUTION ORAL
Refills: 0 | Status: DISCONTINUED | OUTPATIENT
Start: 2022-12-08 | End: 2022-12-08

## 2022-12-08 RX ORDER — POLYETHYLENE GLYCOL 3350 17 G/17G
17 POWDER, FOR SOLUTION ORAL AT BEDTIME
Refills: 0 | Status: DISCONTINUED | OUTPATIENT
Start: 2022-12-08 | End: 2022-12-12

## 2022-12-08 RX ADMIN — SENNA PLUS 2 TABLET(S): 8.6 TABLET ORAL at 05:24

## 2022-12-08 RX ADMIN — ONDANSETRON 4 MILLIGRAM(S): 8 TABLET, FILM COATED ORAL at 14:15

## 2022-12-08 RX ADMIN — HYDROMORPHONE HYDROCHLORIDE 30 MILLILITER(S): 2 INJECTION INTRAMUSCULAR; INTRAVENOUS; SUBCUTANEOUS at 07:13

## 2022-12-08 RX ADMIN — Medication 25 MILLIGRAM(S): at 05:24

## 2022-12-08 RX ADMIN — HYDROXYUREA 1500 MILLIGRAM(S): 500 CAPSULE ORAL at 12:27

## 2022-12-08 RX ADMIN — Medication 1 MILLIGRAM(S): at 12:27

## 2022-12-08 RX ADMIN — ENOXAPARIN SODIUM 40 MILLIGRAM(S): 100 INJECTION SUBCUTANEOUS at 17:11

## 2022-12-08 RX ADMIN — LACTULOSE 20 GRAM(S): 10 SOLUTION ORAL at 17:12

## 2022-12-08 RX ADMIN — SENNA PLUS 2 TABLET(S): 8.6 TABLET ORAL at 17:12

## 2022-12-08 RX ADMIN — Medication 1 APPLICATION(S): at 05:33

## 2022-12-08 RX ADMIN — HYDROMORPHONE HYDROCHLORIDE 30 MILLILITER(S): 2 INJECTION INTRAMUSCULAR; INTRAVENOUS; SUBCUTANEOUS at 19:25

## 2022-12-08 RX ADMIN — SODIUM CHLORIDE 75 MILLILITER(S): 9 INJECTION, SOLUTION INTRAVENOUS at 05:24

## 2022-12-08 RX ADMIN — POLYETHYLENE GLYCOL 3350 17 GRAM(S): 17 POWDER, FOR SOLUTION ORAL at 20:24

## 2022-12-08 RX ADMIN — SODIUM CHLORIDE 75 MILLILITER(S): 9 INJECTION, SOLUTION INTRAVENOUS at 12:27

## 2022-12-08 RX ADMIN — Medication 25 MILLIGRAM(S): at 15:22

## 2022-12-08 RX ADMIN — Medication 1 APPLICATION(S): at 21:56

## 2022-12-08 RX ADMIN — Medication 3 MILLIGRAM(S): at 20:24

## 2022-12-08 RX ADMIN — Medication 1 TABLET(S): at 12:27

## 2022-12-08 NOTE — PROGRESS NOTE ADULT - PROBLEM SELECTOR PLAN 2
Mildly elevated LFTs without abdominal pain or tenderness. S/p cholecystectomy   Monitor LFTs  F/u abdominal ultrasound   If LFTs uptrending, would send hepatitis serologies Mildly elevated LFTs without abdominal pain or tenderness. S/p cholecystectomy  Improving LFT's    Monitor LFTs  F/u abdominal ultrasound   If LFTs uptrending, would send hepatitis serologies

## 2022-12-08 NOTE — PROGRESS NOTE ADULT - PROBLEM SELECTOR PLAN 3
Severe back pain, LDH and Tbili are not elevated. Retic 3%--> will repeat   - CW PCA pump   - CTLS to r/o acute pathology in search for alternative explanation to pain  - CW IVF   F/up U/A Severe back pain, LDH and Tbili are not elevated. Retic 3%--> will repeat   - CW PCA pump   - CTLS with no acute pathology in search for alternative explanation to pain  - CW IVF   -Neg U/A  - PRBCs transfusion as pt states that H/H are below his baseline   f/up post transfusion CBC

## 2022-12-08 NOTE — DISCHARGE NOTE PROVIDER - NSDCMRMEDTOKEN_GEN_ALL_CORE_FT
Dilaudid 2 mg oral tablet: 1 tab(s) orally every 6 hours MDD:max 4 tabs daily  folic acid 1 mg oral tablet: 1 tab(s) orally once a day  gabapentin 300 mg oral capsule: 1 cap(s) orally once a day (at bedtime), As Needed  Hydrea 500 mg oral capsule: 3 cap(s) orally once a day    Note:Pharmacy has 4 caps once a day  Linzess 290 mcg oral capsule: 1 cap(s) orally , As Needed  lisinopril 10 mg oral tablet: 1 tab(s) orally 2 times a day    Note:Pt unsure of dose  metoprolol: 3 tab(s) orally once a day    Note&quot;Pt unsure of dose.  Multiple Vitamins oral tablet: 1 tab(s) orally once a day  pantoprazole 40 mg oral delayed release tablet: 1 tab(s) orally once a day (before a meal)  senna oral tablet: 2 tab(s) orally once a day (at bedtime)   acetaminophen 325 mg oral tablet: 2 tab(s) orally every 6 hours, As needed, Temp greater or equal to 38C (100.4F), Mild Pain (1 - 3)  allopurinol 100 mg oral tablet: 2 tab(s) orally once a day  apixaban 5 mg oral tablet: 1 tab(s) orally 2 times a day  Dilaudid 2 mg oral tablet: 1 tab(s) orally every 6 hours MDD:max 4 tabs daily  folic acid 1 mg oral tablet: 1 tab(s) orally once a day  gabapentin 300 mg oral capsule: 1 cap(s) orally once a day (at bedtime), As needed, pain  Hydrea 500 mg oral capsule: 3 cap(s) orally once a day    Note:Pharmacy has 4 caps once a day  Linzess 290 mcg oral capsule: 1 cap(s) orally once a day, As Needed  lisinopril 20 mg oral tablet: 1 tab(s) orally 2 times a day  metoprolol succinate 25 mg oral tablet, extended release: 3 tab(s) orally once a day  Multiple Vitamins oral tablet: 1 tab(s) orally once a day  pantoprazole 40 mg oral delayed release tablet: 1 tab(s) orally once a day (before a meal)  senna oral tablet: 2 tab(s) orally once a day (at bedtime)

## 2022-12-08 NOTE — DISCHARGE NOTE PROVIDER - NSDCCPCAREPLAN_GEN_ALL_CORE_FT
PRINCIPAL DISCHARGE DIAGNOSIS  Diagnosis: Sickle cell crisis  Assessment and Plan of Treatment: You were admitted with sickle cell crisis, which was treated with fluids and dilaudid PCA pump. Your pain improved and you were taken off the pump. Take dilaudid 2mg PO every 6hours as needed for ongoing pain. Please follow-up with Dr Veloz withint 1-2 weeks of discharge.      SECONDARY DISCHARGE DIAGNOSES  Diagnosis: ALFREDO (acute kidney injury)  Assessment and Plan of Treatment: Youre kidney level was elevated on admission, which was generally improving throughout admissino with IV fluids. Please follow-up with PMD withint 1-2 weeks of diccharge to have repeat labs.    Diagnosis: Hypertension  Assessment and Plan of Treatment: You had uncontrolled BP on admission - your regimen was adjusted. On discharge, you should continue taking lisnopril 20mg two times daily, metoprolol 75mg daily. Your BP on day of discharge was much better. Please follow up with Dr Cruz after discharge    Diagnosis: Atrial fibrillation  Assessment and Plan of Treatment: Continue to take Metoprolol 75mg daily and eliquis 5mg two times daily. Please f/u with Dr Cruz on discharge

## 2022-12-08 NOTE — PROGRESS NOTE ADULT - PROBLEM SELECTOR PLAN 1
Baseline Cr 1.1; elevated Cr 1.62 on admission i/s/o possible urinary retention Vs volume depletion   Improving   F/UP UA, urine electrolytes, spot urine TP/CR.    Monitor labs and urine output--> pos urinary retention s/p straight cath and cont bladder scan  Avoid NSAIDs, ACEI/ARBS, RCA and nephrotoxins.   Renally dose medications    F/u abd ultrasound/bladder scan

## 2022-12-08 NOTE — DISCHARGE NOTE PROVIDER - NSDCFUSCHEDAPPT_GEN_ALL_CORE_FT
Brittany Veloz  [facility]  INTMED OP [id number]  Scheduled Appointment: 12/19/2022    Bassem Lagos  [facility]  NEPHRO [address]  Scheduled Appointment: 01/18/2023

## 2022-12-08 NOTE — DISCHARGE NOTE PROVIDER - CARE PROVIDERS DIRECT ADDRESSES
[email or web address]

## 2022-12-08 NOTE — DISCHARGE NOTE PROVIDER - CARE PROVIDER_API CALL
Brittany Veloz)  Internal Medicine  [address] [place] [address]  Phone: [phone]  Fax: [phone]  Follow Up Time:     Mukund Cruz ()  Cardiology; Internal Medicine  [address] [place] [address], [facility] [address]  [place] [address]  Phone: [phone]  Fax: [phone]  Follow Up Time:

## 2022-12-08 NOTE — PATIENT PROFILE ADULT - FALL HARM RISK - HARM RISK INTERVENTIONS
Assistance with ambulation/Assistance OOB with selected safe patient handling equipment/Communicate Risk of Fall with Harm to all staff/Discuss with provider need for PT consult/Monitor for mental status changes/Monitor gait and stability/Provide patient with walking aids - walker, cane, crutches/Reinforce activity limits and safety measures with patient and family/Reorient to person, place and time as needed/Review medications for side effects contributing to fall risk/Sit up slowly, dangle for a short time, stand at bedside before walking/Tailored Fall Risk Interventions/Toileting schedule using arm’s reach rule for commode and bathroom/Visual Cue: Yellow wristband and red socks/Bed in lowest position, wheels locked, appropriate side rails in place/Call bell, personal items and telephone in reach/Instruct patient to call for assistance before getting out of bed or chair/Non-slip footwear when patient is out of bed/[place] to call system/Physically safe environment - no spills, clutter or unnecessary equipment/Purposeful Proactive Rounding/Room/bathroom lighting operational, light cord in reach Assistance with ambulation/Assistance OOB with selected safe patient handling equipment/Communicate Risk of Fall with Harm to all staff/Discuss with provider need for PT consult/Monitor gait and stability/Reinforce activity limits and safety measures with patient and family/Review medications for side effects contributing to fall risk/Sit up slowly, dangle for a short time, stand at bedside before walking/Tailored Fall Risk Interventions/Toileting schedule using arm’s reach rule for commode and bathroom/Visual Cue: Yellow wristband and red socks/Bed in lowest position, wheels locked, appropriate side rails in place/Call bell, personal items and telephone in reach/Instruct patient to call for assistance before getting out of bed or chair/Non-slip footwear when patient is out of bed/[place] to call system/Physically safe environment - no spills, clutter or unnecessary equipment/Purposeful Proactive Rounding/Room/bathroom lighting operational, light cord in reach

## 2022-12-08 NOTE — DISCHARGE NOTE PROVIDER - HOSPITAL COURSE
52M with hx of SS disease, gout here with lower back pain that started over the weekend. The patient has had similar pain to this before related to sickle cell pain crises. His pain persisted throughout so he decided to come to the ED for further relief. He took his home dilaudid which did not completely reduce his pain. He describes the pain as a sharp throbbing pain, 9/10. Denies any trauma. He notes that he is also tired and weak which he attributes to symptoms of anemia. ROS also positive for polyuria and incomplete relief after bladder voiding.     In the ED, pt /75, HR 73, RR 18, SaO2 97% on RA. He was given dilaudid 1 mg with incomplete relief as well as 1L LR x 2.   Pt was treated with dilaudid  2 mg iv q4h ATC and 1 mg IV  q4hrs PRN for mod/severe pain, switched to Dilaudid PCA pump for better pain control.   CTLS to r/o acute pathology in search for alternative explanation to pain, reveals no acute fracture or subluxation. 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 Mild spondylitic changes without focal disc herniation or spinal stenosis.  Pt was found to have ALFREDO on admission (Baseline Cr 1.1; elevated Cr 1.62 on admission), he received gentle hydration with D5/half NS @ 75 cc/hr x 24 hrs. with improvement of creatinine   Abdominal ultrasound performed_____. Pt c/o urinary burning, initial UA(-), bladder scan 771 ml, s/p straight cath, UA/UC sent on 12/8______  Pt has mildly elevated LFTs without abdominal pain or tenderness. S/p cholecystectomy, ______  Pt received PRBC on 12/8 for increased anemia.   Pt's bowel regimens were adjusted for narcotic induced constipation.   ---incomplete      52M with hx of SS disease, gout here with lower back pain that started over the weekend. The patient has had similar pain to this before related to sickle cell pain crises. His pain persisted throughout so he decided to come to the ED for further relief. He took his home dilaudid which did not completely reduce his pain. He describes the pain as a sharp throbbing pain, 9/10. Denies any trauma. He notes that he is also tired and weak which he attributes to symptoms of anemia. ROS also positive for polyuria and incomplete relief after bladder voiding.     In the ED, pt /75, HR 73, RR 18, SaO2 97% on RA. He was given dilaudid 1 mg with incomplete relief as well as 1L LR x 2.   Pt was treated with dilaudid  2 mg iv q4h ATC and 1 mg IV  q4hrs PRN for mod/severe pain, switched to Dilaudid PCA pump for better pain control.   CTLS to r/o acute pathology in search for alternative explanation to pain, reveals no acute fracture or subluxation. 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 Mild spondylitic changes without focal disc herniation or spinal stenosis.  Pt was found to have ALFREDO on admission (Baseline Cr 1.1; elevated Cr 1.62 on admission), he received gentle hydration with hald NS while admitted with overall improvement of creatinine   Abdominal ultrasound performed showed no acute pathology. Pt c/o urinary burning, initial UA(-), bladder scan 771 ml, s/p straight cath, UA/UC sent on 12/8 were negative for infectious etiology.   Pt has mildly elevated LFTs without abdominal pain or tenderness. S/p cholecystectomy, all work-up negative, and labs improving.   Pt received PRBC on 12/8 for increased anemia with subsequent H/H stable/   Pt's bowel regimens were adjusted for narcotic induced constipation.     Pt remained on PCA pump with improving pain. Pt noted with HTN, cardiology following with regimen adjusted. Overall, his Cr and Ca improved from on admission. On 12/12, pt with improved pain and able to tolerate diet and ambulating around room. Pt agreeable to discharge home.     Pt should c/t hydrate aggressively. Pt should f/u with PMD and Dr Veloz within1-2 weeks of discharge. Pt given dilaudid 2mg PO q6h for ongoing pain as needed.

## 2022-12-08 NOTE — DISCHARGE NOTE PROVIDER - NSDCCPTREATMENT_GEN_ALL_CORE_FT
PRINCIPAL PROCEDURE  Procedure: CT lumbar spine  Findings and Treatment: FINDINGS:  VERTEBRAL BODIES AND DISCS:  Mild central endplate depressions of L2 and   L3 vertebral bodies. Mild diffuse sclerosis of the [id number] body.   Patchy sclerosis of the sacrum. Otherwise diffuse osteopenia is seen.  ALIGNMENT:  No subluxations.  L1-L2 LEVEL:  Normal.  L2-L3 LEVEL:  Normal.  L3-L4 LEVEL:  Normal.  L4-L5 LEVEL:  Mild disc bulge. Degenerative changes of the facet joints   greater on the right with surrounding cystic change. Left-sided vacuum   phenomenon.  L5-S1 LEVEL:  Mild disc bulge.  SPINAL CANAL:  No other intradural or extradural defects are seen.  MISCELLANEOUS:  Mild aortic atherosclerosis. Sclerotic changes are also   seen involving the iliac bones.  IMPRESSION:  No acute fracture or subluxation.  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  Mild spondylitic changes without focal disc herniation or spinal stenosis.        SECONDARY PROCEDURE  Procedure: US urinary bladder  Findings and Treatment: FINDINGS:  Liver: Within normal limits.  Bile ducts: Normal caliber. Common bile duct measures 4 mm.  Gallbladder: Cholecystectomy.  Pancreas: Poorly visualized.  Spleen: Not visualized.  Right kidney: 11.4 cm. No hydronephrosis. Increased renal parenchymal   echogenicity.  Left kidney: 11.6 cm. No hydronephrosis. A 1.6 cm cyst in the mid left   kidney. Increased renal parenchymal echogenicity.  Bladder: Within normal limits.  Ascites: None.  Aorta and IVC: Visualized portions are within normal limits.  IMPRESSION:  No hydronephrosis.  Increased renal parenchymal echogenicity bilaterally which may reflect   medical renal disease.

## 2022-12-08 NOTE — PROGRESS NOTE ADULT - SUBJECTIVE AND OBJECTIVE BOX
Patient is a 52y old  Male who presents with a chief complaint of ALFREDO, elevated LFTs, sickle cell pain crisis (08 Dec 2022 11:18)      SUBJECTIVE / OVERNIGHT EVENTS:    52M with hx of SS disease, gout here with lower back pain that started over the weekend. The patient has had similar pain to this before related to sickle cell pain crises. His pain persisted throughout so he decided to come to the ED for further relief. He took his home dilaudid which did not completely reduce his pain. He describes the pain as a sharp throbbing pain, 9/10. Denies any trauma. He notes that he is also tired and weak which he attributes to symptoms of anemia. ROS also positive for polyuria and incomplete relief after bladder voiding.   In the ED, pt /75, HR 73, RR 18, SaO2 97% on RA. He was given dilaudid 1 mg with incomplete relief as well as 1L LR x 2.   Patient was then admitted for further evaluation and treatment .          ADDITIONAL REVIEW OF SYSTEMS: Negative except for above    MEDICATIONS  (STANDING):  dextrose 5% + sodium chloride 0.45%. 1000 milliLiter(s) (75 mL/Hr) IV Continuous <Continuous>  enoxaparin Injectable 40 milliGRAM(s) SubCutaneous every 24 hours  folic acid 1 milliGRAM(s) Oral daily  HYDROmorphone PCA (5 mG/mL) 30 milliLiter(s) PCA Continuous PCA Continuous  hydroxyurea 1500 milliGRAM(s) Oral daily  lactulose Syrup 20 Gram(s) Oral once  multivitamin 1 Tablet(s) Oral daily  petrolatum white Ointment 1 Application(s) Topical three times a day  senna 2 Tablet(s) Oral two times a day    MEDICATIONS  (PRN):  acetaminophen     Tablet .. 650 milliGRAM(s) Oral every 6 hours PRN Temp greater or equal to 38C (100.4F), Mild Pain (1 - 3)  gabapentin 300 milliGRAM(s) Oral at bedtime PRN pain  melatonin 3 milliGRAM(s) Oral at bedtime PRN Insomnia  naloxone Injectable 0.1 milliGRAM(s) IV Push every 3 minutes PRN For ANY of the following changes in patient status:  A. RR LESS THAN 10 breaths per minute, B. Oxygen saturation LESS THAN 90%, C. Sedation score of 6  ondansetron Injectable 4 milliGRAM(s) IV Push every 6 hours PRN Nausea  polyethylene glycol 3350 17 Gram(s) Oral at bedtime PRN Constipation      CAPILLARY BLOOD GLUCOSE        I&O's Summary    07 Dec 2022 07:01  -  08 Dec 2022 07:00  --------------------------------------------------------  IN: 477 mL / OUT: 300 mL / NET: 177 mL    08 Dec 2022 07:01  -  08 Dec 2022 12:25  --------------------------------------------------------  IN: 0 mL / OUT: 1300 mL / NET: -1300 mL        PHYSICAL EXAM:  Vital Signs Last 24 Hrs  T(C): 36.6 (08 Dec 2022 05:28), Max: 36.7 (07 Dec 2022 13:20)  T(F): 97.8 (08 Dec 2022 05:28), Max: 98.1 (08 Dec 2022 00:59)  HR: 59 (08 Dec 2022 05:28) (59 - 65)  BP: 154/73 (08 Dec 2022 05:28) (135/84 - 154/73)  BP(mean): --  RR: 18 (08 Dec 2022 05:28) (18 - 19)  SpO2: 93% (08 Dec 2022 05:28) (93% - 98%)    Parameters below as of 08 Dec 2022 05:28  Patient On (Oxygen Delivery Method): room air        PHYSICAL EXAM:  GENERAL: NAD, well-developed  HEAD:  Atraumatic, Normocephalic  EYES:  conjunctiva and sclera clear  NECK: Supple, No JVD  CHEST/LUNG: Clear to auscultation bilaterally; No wheeze  HEART: Regular rate and rhythm; No murmurs, rubs, or gallops  ABDOMEN: Soft, Nontender, Nondistended; Bowel sounds present  EXTREMITIES:  2+ Peripheral Pulses, No clubbing, cyanosis, or edema  PSYCH: AAOx3  NEUROLOGY: non-focal        LABS:                        6.9    3.22  )-----------( 175      ( 08 Dec 2022 09:39 )             20.7     12-    138  |  104  |  22  ----------------------------<  116<H>  4.4   |  26  |  1.41<H>    Ca    10.3      08 Dec 2022 09:39  Phos  4.1     12  Mg     2.4     12    TPro  7.0  /  Alb  3.8  /  TBili  0.3  /  DBili  x   /  AST  39  /  ALT  76<H>  /  AlkPhos  123<H>  12-08    PT/INR - ( 07 Dec 2022 12:18 )   PT: 13.7 sec;   INR: 1.18 ratio         PTT - ( 07 Dec 2022 12:18 )  PTT:30.2 sec      Urinalysis Basic - ( 07 Dec 2022 11:00 )    Color: Colorless / Appearance: Clear / S.012 / pH: x  Gluc: x / Ketone: Negative  / Bili: Negative / Urobili: Negative   Blood: x / Protein: Negative / Nitrite: Negative   Leuk Esterase: Negative / RBC: x / WBC x   Sq Epi: x / Non Sq Epi: x / Bacteria: x          RADIOLOGY & ADDITIONAL TESTS:    Imaging Personally Reviewed:    Electrocardiogram Personally Reviewed:    COORDINATION OF CARE:  Care Discussed with Consultants/Other Providers [Y/N]:  Prior or Outpatient Records Reviewed [Y/N]:     Patient is a 52y old  Male who presents with a chief complaint of ALFREDO, elevated LFTs, sickle cell pain crisis (08 Dec 2022 11:18)      SUBJECTIVE / OVERNIGHT EVENTS:    52M with hx of SS disease, gout here with lower back pain that started over the weekend. The patient has had similar pain to this before related to sickle cell pain crises. His pain persisted throughout so he decided to come to the ED for further relief. He took his home dilaudid which did not completely reduce his pain. He describes the pain as a sharp throbbing pain, 9/10. Denies any trauma. He notes that he is also tired and weak which he attributes to symptoms of anemia. ROS also positive for polyuria and incomplete relief after bladder voiding.   In the ED, pt /75, HR 73, RR 18, SaO2 97% on RA. He was given dilaudid 1 mg with incomplete relief as well as 1L LR x 2.   Patient was then admitted for further evaluation and treatment .    Patient was seen while his wife was on the phone .  I answered to both of their questions.  The back pain is similar to his previous Sickle cell crisis , no fever, no chest pain or SOB .  Pt is sleepy but he does not want changes to be made on the PCA setting.          ADDITIONAL REVIEW OF SYSTEMS: Negative except for above    MEDICATIONS  (STANDING):  dextrose 5% + sodium chloride 0.45%. 1000 milliLiter(s) (75 mL/Hr) IV Continuous <Continuous>  enoxaparin Injectable 40 milliGRAM(s) SubCutaneous every 24 hours  folic acid 1 milliGRAM(s) Oral daily  HYDROmorphone PCA (5 mG/mL) 30 milliLiter(s) PCA Continuous PCA Continuous  hydroxyurea 1500 milliGRAM(s) Oral daily  lactulose Syrup 20 Gram(s) Oral once  multivitamin 1 Tablet(s) Oral daily  petrolatum white Ointment 1 Application(s) Topical three times a day  senna 2 Tablet(s) Oral two times a day    MEDICATIONS  (PRN):  acetaminophen     Tablet .. 650 milliGRAM(s) Oral every 6 hours PRN Temp greater or equal to 38C (100.4F), Mild Pain (1 - 3)  gabapentin 300 milliGRAM(s) Oral at bedtime PRN pain  melatonin 3 milliGRAM(s) Oral at bedtime PRN Insomnia  naloxone Injectable 0.1 milliGRAM(s) IV Push every 3 minutes PRN For ANY of the following changes in patient status:  A. RR LESS THAN 10 breaths per minute, B. Oxygen saturation LESS THAN 90%, C. Sedation score of 6  ondansetron Injectable 4 milliGRAM(s) IV Push every 6 hours PRN Nausea  polyethylene glycol 3350 17 Gram(s) Oral at bedtime PRN Constipation      CAPILLARY BLOOD GLUCOSE        I&O's Summary    07 Dec 2022 07:01  -  08 Dec 2022 07:00  --------------------------------------------------------  IN: 477 mL / OUT: 300 mL / NET: 177 mL    08 Dec 2022 07:01  -  08 Dec 2022 12:25  --------------------------------------------------------  IN: 0 mL / OUT: 1300 mL / NET: -1300 mL        PHYSICAL EXAM:  Vital Signs Last 24 Hrs  T(C): 36.6 (08 Dec 2022 05:28), Max: 36.7 (07 Dec 2022 13:20)  T(F): 97.8 (08 Dec 2022 05:28), Max: 98.1 (08 Dec 2022 00:59)  HR: 59 (08 Dec 2022 05:28) (59 - 65)  BP: 154/73 (08 Dec 2022 05:28) (135/84 - 154/73)  BP(mean): --  RR: 18 (08 Dec 2022 05:28) (18 - 19)  SpO2: 93% (08 Dec 2022 05:28) (93% - 98%)    Parameters below as of 08 Dec 2022 05:28  Patient On (Oxygen Delivery Method): room air        PHYSICAL EXAM:  GENERAL: NAD, well-developed  HEAD:  Atraumatic, Normocephalic  EYES:  conjunctiva and sclera clear  NECK: Supple, No JVD  CHEST/LUNG: Clear to auscultation bilaterally; No wheeze  HEART: Regular rate and rhythm; No murmurs, rubs, or gallops  ABDOMEN: Soft, Nontender, Nondistended; Bowel sounds present  EXTREMITIES:  2+ Peripheral Pulses, No clubbing, cyanosis, or edema  PSYCH: AAOx3  NEUROLOGY: non-focal        LABS:                        6.9    3.22  )-----------( 175      ( 08 Dec 2022 09:39 )             20.7     12-08    138  |  104  |  22  ----------------------------<  116<H>  4.4   |  26  |  1.41<H>    Ca    10.3      08 Dec 2022 09:39  Phos  4.1     12-  Mg     2.4     12-08    TPro  7.0  /  Alb  3.8  /  TBili  0.3  /  DBili  x   /  AST  39  /  ALT  76<H>  /  AlkPhos  123<H>  12-08    PT/INR - ( 07 Dec 2022 12:18 )   PT: 13.7 sec;   INR: 1.18 ratio         PTT - ( 07 Dec 2022 12:18 )  PTT:30.2 sec      Urinalysis Basic - ( 07 Dec 2022 11:00 )    Color: Colorless / Appearance: Clear / S.012 / pH: x  Gluc: x / Ketone: Negative  / Bili: Negative / Urobili: Negative   Blood: x / Protein: Negative / Nitrite: Negative   Leuk Esterase: Negative / RBC: x / WBC x   Sq Epi: x / Non Sq Epi: x / Bacteria: x          RADIOLOGY & ADDITIONAL TESTS:    Imaging Personally Reviewed:    Electrocardiogram Personally Reviewed:    COORDINATION OF CARE:  Care Discussed with Consultants/Other Providers [Y/N]:  Prior or Outpatient Records Reviewed [Y/N]:

## 2022-12-08 NOTE — PATIENT PROFILE ADULT - FUNCTIONAL ASSESSMENT - BASIC MOBILITY 6.
3-calculated by average/Not able to assess (calculate score using [facility] averaging method)

## 2022-12-08 NOTE — PROGRESS NOTE ADULT - NSPROGADDITIONALINFOA_GEN_ALL_CORE
Teresa Lindquist   Hospitalist    /TEAMS Discussed with ACP     Teresa Lindquist   Hospitalist   [phone] /TEAMS

## 2022-12-09 DIAGNOSIS — Z79.899 OTHER LONG TERM (CURRENT) DRUG THERAPY: ICD-10-CM

## 2022-12-09 LAB
ALBUMIN SERPL ELPH-MCNC: 4 G/DL — SIGNIFICANT CHANGE UP (ref 3.3–5)
ALP SERPL-CCNC: 135 U/L — HIGH (ref 40–120)
ALT FLD-CCNC: 71 U/L — HIGH (ref 10–45)
ANION GAP SERPL CALC-SCNC: 11 MMOL/L — SIGNIFICANT CHANGE UP (ref 5–17)
ANION GAP SERPL CALC-SCNC: 9 MMOL/L — SIGNIFICANT CHANGE UP (ref 5–17)
AST SERPL-CCNC: 34 U/L — SIGNIFICANT CHANGE UP (ref 10–40)
BASOPHILS # BLD AUTO: 0 K/UL — SIGNIFICANT CHANGE UP (ref 0–0.2)
BASOPHILS NFR BLD AUTO: 0 % — SIGNIFICANT CHANGE UP (ref 0–2)
BILIRUB SERPL-MCNC: 0.3 MG/DL — SIGNIFICANT CHANGE UP (ref 0.2–1.2)
BLD GP AB SCN SERPL QL: NEGATIVE — SIGNIFICANT CHANGE UP
BUN SERPL-MCNC: 19 MG/DL — SIGNIFICANT CHANGE UP (ref 7–23)
BUN SERPL-MCNC: 20 MG/DL — SIGNIFICANT CHANGE UP (ref 7–23)
CALCIUM SERPL-MCNC: 10.6 MG/DL — HIGH (ref 8.4–10.5)
CALCIUM SERPL-MCNC: 10.8 MG/DL — HIGH (ref 8.4–10.5)
CHLORIDE SERPL-SCNC: 104 MMOL/L — SIGNIFICANT CHANGE UP (ref 96–108)
CHLORIDE SERPL-SCNC: 107 MMOL/L — SIGNIFICANT CHANGE UP (ref 96–108)
CO2 SERPL-SCNC: 24 MMOL/L — SIGNIFICANT CHANGE UP (ref 22–31)
CO2 SERPL-SCNC: 24 MMOL/L — SIGNIFICANT CHANGE UP (ref 22–31)
CREAT SERPL-MCNC: 1.4 MG/DL — HIGH (ref 0.5–1.3)
CREAT SERPL-MCNC: 1.53 MG/DL — HIGH (ref 0.5–1.3)
CULTURE RESULTS: NO GROWTH — SIGNIFICANT CHANGE UP
EGFR: 54 ML/MIN/1.73M2 — LOW
EGFR: 60 ML/MIN/1.73M2 — SIGNIFICANT CHANGE UP
EOSINOPHIL # BLD AUTO: 0.04 K/UL — SIGNIFICANT CHANGE UP (ref 0–0.5)
EOSINOPHIL NFR BLD AUTO: 1.1 % — SIGNIFICANT CHANGE UP (ref 0–6)
GLUCOSE SERPL-MCNC: 102 MG/DL — HIGH (ref 70–99)
GLUCOSE SERPL-MCNC: 92 MG/DL — SIGNIFICANT CHANGE UP (ref 70–99)
HAPTOGLOB SERPL-MCNC: <20 MG/DL — LOW (ref 34–200)
HCT VFR BLD CALC: 25.1 % — LOW (ref 39–50)
HGB BLD-MCNC: 8.4 G/DL — LOW (ref 13–17)
IMM GRANULOCYTES NFR BLD AUTO: 0.3 % — SIGNIFICANT CHANGE UP (ref 0–0.9)
LACTATE SERPL-SCNC: 1.3 MMOL/L — SIGNIFICANT CHANGE UP (ref 0.5–2)
LDH SERPL L TO P-CCNC: 321 U/L — HIGH (ref 50–242)
LYMPHOCYTES # BLD AUTO: 1.6 K/UL — SIGNIFICANT CHANGE UP (ref 1–3.3)
LYMPHOCYTES # BLD AUTO: 43.7 % — SIGNIFICANT CHANGE UP (ref 13–44)
MCHC RBC-ENTMCNC: 32.6 PG — SIGNIFICANT CHANGE UP (ref 27–34)
MCHC RBC-ENTMCNC: 33.5 GM/DL — SIGNIFICANT CHANGE UP (ref 32–36)
MCV RBC AUTO: 97.3 FL — SIGNIFICANT CHANGE UP (ref 80–100)
MONOCYTES # BLD AUTO: 0.56 K/UL — SIGNIFICANT CHANGE UP (ref 0–0.9)
MONOCYTES NFR BLD AUTO: 15.3 % — HIGH (ref 2–14)
NEUTROPHILS # BLD AUTO: 1.45 K/UL — LOW (ref 1.8–7.4)
NEUTROPHILS NFR BLD AUTO: 39.6 % — LOW (ref 43–77)
NRBC # BLD: 13 /100 WBCS — HIGH (ref 0–0)
PLATELET # BLD AUTO: 168 K/UL — SIGNIFICANT CHANGE UP (ref 150–400)
POTASSIUM SERPL-MCNC: 4.3 MMOL/L — SIGNIFICANT CHANGE UP (ref 3.5–5.3)
POTASSIUM SERPL-MCNC: 4.7 MMOL/L — SIGNIFICANT CHANGE UP (ref 3.5–5.3)
POTASSIUM SERPL-SCNC: 4.3 MMOL/L — SIGNIFICANT CHANGE UP (ref 3.5–5.3)
POTASSIUM SERPL-SCNC: 4.7 MMOL/L — SIGNIFICANT CHANGE UP (ref 3.5–5.3)
PROT SERPL-MCNC: 7.6 G/DL — SIGNIFICANT CHANGE UP (ref 6–8.3)
RBC # BLD: 2.58 M/UL — LOW (ref 4.2–5.8)
RBC # BLD: 2.58 M/UL — LOW (ref 4.2–5.8)
RBC # FLD: 20.1 % — HIGH (ref 10.3–14.5)
RETICS #: 47.5 K/UL — SIGNIFICANT CHANGE UP (ref 25–125)
RETICS/RBC NFR: 1.8 % — SIGNIFICANT CHANGE UP (ref 0.5–2.5)
RH IG SCN BLD-IMP: POSITIVE — SIGNIFICANT CHANGE UP
SODIUM SERPL-SCNC: 139 MMOL/L — SIGNIFICANT CHANGE UP (ref 135–145)
SODIUM SERPL-SCNC: 140 MMOL/L — SIGNIFICANT CHANGE UP (ref 135–145)
SPECIMEN SOURCE: SIGNIFICANT CHANGE UP
WBC # BLD: 3.66 K/UL — LOW (ref 3.8–10.5)
WBC # FLD AUTO: 3.66 K/UL — LOW (ref 3.8–10.5)

## 2022-12-09 PROCEDURE — 99233 SBSQ HOSP IP/OBS HIGH 50: CPT

## 2022-12-09 RX ORDER — LACTULOSE 10 G/15ML
10 SOLUTION ORAL ONCE
Refills: 0 | Status: COMPLETED | OUTPATIENT
Start: 2022-12-09 | End: 2022-12-09

## 2022-12-09 RX ORDER — AMIODARONE HYDROCHLORIDE 400 MG/1
200 TABLET ORAL DAILY
Refills: 0 | Status: DISCONTINUED | OUTPATIENT
Start: 2022-12-09 | End: 2022-12-10

## 2022-12-09 RX ORDER — AMLODIPINE BESYLATE 2.5 MG/1
2.5 TABLET ORAL DAILY
Refills: 0 | Status: DISCONTINUED | OUTPATIENT
Start: 2022-12-09 | End: 2022-12-09

## 2022-12-09 RX ORDER — MULTIVIT WITH MIN/MFOLATE/K2 340-15/3 G
1 POWDER (GRAM) ORAL ONCE
Refills: 0 | Status: DISCONTINUED | OUTPATIENT
Start: 2022-12-09 | End: 2022-12-09

## 2022-12-09 RX ORDER — HYDRALAZINE HCL 50 MG
25 TABLET ORAL EVERY 8 HOURS
Refills: 0 | Status: DISCONTINUED | OUTPATIENT
Start: 2022-12-09 | End: 2022-12-10

## 2022-12-09 RX ORDER — SODIUM CHLORIDE 9 MG/ML
1000 INJECTION, SOLUTION INTRAVENOUS
Refills: 0 | Status: DISCONTINUED | OUTPATIENT
Start: 2022-12-09 | End: 2022-12-09

## 2022-12-09 RX ORDER — METOPROLOL TARTRATE 50 MG
25 TABLET ORAL DAILY
Refills: 0 | Status: DISCONTINUED | OUTPATIENT
Start: 2022-12-09 | End: 2022-12-10

## 2022-12-09 RX ORDER — SODIUM CHLORIDE 9 MG/ML
1000 INJECTION, SOLUTION INTRAVENOUS
Refills: 0 | Status: DISCONTINUED | OUTPATIENT
Start: 2022-12-09 | End: 2022-12-10

## 2022-12-09 RX ORDER — HYDRALAZINE HCL 50 MG
2.5 TABLET ORAL ONCE
Refills: 0 | Status: COMPLETED | OUTPATIENT
Start: 2022-12-09 | End: 2022-12-09

## 2022-12-09 RX ADMIN — POLYETHYLENE GLYCOL 3350 17 GRAM(S): 17 POWDER, FOR SOLUTION ORAL at 05:22

## 2022-12-09 RX ADMIN — HYDROXYUREA 1500 MILLIGRAM(S): 500 CAPSULE ORAL at 12:22

## 2022-12-09 RX ADMIN — Medication 1 MILLIGRAM(S): at 12:22

## 2022-12-09 RX ADMIN — ENOXAPARIN SODIUM 40 MILLIGRAM(S): 100 INJECTION SUBCUTANEOUS at 17:34

## 2022-12-09 RX ADMIN — HYDROMORPHONE HYDROCHLORIDE 30 MILLILITER(S): 2 INJECTION INTRAMUSCULAR; INTRAVENOUS; SUBCUTANEOUS at 19:23

## 2022-12-09 RX ADMIN — Medication 1 TABLET(S): at 12:22

## 2022-12-09 RX ADMIN — POLYETHYLENE GLYCOL 3350 17 GRAM(S): 17 POWDER, FOR SOLUTION ORAL at 12:22

## 2022-12-09 RX ADMIN — Medication 1 APPLICATION(S): at 05:21

## 2022-12-09 RX ADMIN — Medication 1 APPLICATION(S): at 13:43

## 2022-12-09 RX ADMIN — HYDROMORPHONE HYDROCHLORIDE 30 MILLILITER(S): 2 INJECTION INTRAMUSCULAR; INTRAVENOUS; SUBCUTANEOUS at 07:06

## 2022-12-09 RX ADMIN — SENNA PLUS 2 TABLET(S): 8.6 TABLET ORAL at 05:22

## 2022-12-09 RX ADMIN — AMLODIPINE BESYLATE 2.5 MILLIGRAM(S): 2.5 TABLET ORAL at 12:26

## 2022-12-09 RX ADMIN — Medication 1 APPLICATION(S): at 21:50

## 2022-12-09 RX ADMIN — LACTULOSE 10 GRAM(S): 10 SOLUTION ORAL at 10:23

## 2022-12-09 RX ADMIN — SENNA PLUS 2 TABLET(S): 8.6 TABLET ORAL at 17:33

## 2022-12-09 RX ADMIN — Medication 2.5 MILLIGRAM(S): at 17:32

## 2022-12-09 RX ADMIN — SODIUM CHLORIDE 100 MILLILITER(S): 9 INJECTION, SOLUTION INTRAVENOUS at 10:24

## 2022-12-09 NOTE — PROGRESS NOTE ADULT - PROBLEM SELECTOR PLAN 3
Severe back pain, LDH and Tbili are not elevated. Retic 3%--> will repeat   - CW PCA pump   - CTLS with no acute pathology in search for alternative explanation to pain  - CW IVF   -Neg U/A  - PRBCs transfusion as pt states that H/H are below his baseline   f/up post transfusion CBC Severe back pain which is usually his presentation when having crisis, LDH and Tbili are not elevated. Retic 3%--> will repeat   - CW PCA pump   - CTLS with no acute pathology in search for alternative explanation to pain  - CW IVF   -Neg U/A  - stable H/H post transfusion CBC

## 2022-12-09 NOTE — PROGRESS NOTE ADULT - NSPROGADDITIONALINFOA_GEN_ALL_CORE
Discussed with ACP     Teresa Lindquist   Hospitalist   [phone] /TEAMS A flutter from last admission and is on amiodarone and had DVT was on Eliquis which he states was DC by his hematologist , but is not on AC and his CHADS2 vasc score is 1 .  So will cont with Amiodarone, Metoprolol and will need to f/up with his PCP and cardiologist       Discussed with ACP     Teresa Lindquist   [facility] Hospitalist   [phone] /TEAMS

## 2022-12-09 NOTE — PROGRESS NOTE ADULT - PROBLEM SELECTOR PLAN 1
Baseline Cr 1.1; elevated Cr 1.62 on admission i/s/o possible urinary retention Vs volume depletion   Improving   F/UP UA, urine electrolytes, spot urine TP/CR.    Monitor labs and urine output--> pos urinary retention s/p straight cath and cont bladder scan  Avoid NSAIDs, ACEI/ARBS, RCA and nephrotoxins.   Renally dose medications    F/u abd ultrasound/bladder scan Baseline Cr 1.1; elevated Cr 1.62 on admission i/s/o possible urinary retention Vs volume depletion   Improving   F/UP UA, urine electrolytes, spot urine TP/CR.    Monitor labs and urine output--> pos urinary retention s/p straight cath and cont bladder scan  Avoid NSAIDs, ACEI/ARBS, RCA and nephrotoxins.   Renally dose medications    F/u abd ultrasound/bladder scan--> no acute findings   cont IVF

## 2022-12-09 NOTE — PROGRESS NOTE ADULT - SUBJECTIVE AND OBJECTIVE BOX
Patient is a 52y old  Male who presents with a chief complaint of ALFREDO, elevated LFTs, sickle cell pain crisis (08 Dec 2022 12:18)      SUBJECTIVE / OVERNIGHT EVENTS:    Tele reviewed:       ADDITIONAL REVIEW OF SYSTEMS: Negative except for above    MEDICATIONS  (STANDING):  amLODIPine   Tablet 2.5 milliGRAM(s) Oral daily  enoxaparin Injectable 40 milliGRAM(s) SubCutaneous every 24 hours  folic acid 1 milliGRAM(s) Oral daily  hydrALAZINE 25 milliGRAM(s) Oral every 8 hours  HYDROmorphone PCA (5 mG/mL) 30 milliLiter(s) PCA Continuous PCA Continuous  hydroxyurea 1500 milliGRAM(s) Oral daily  multivitamin 1 Tablet(s) Oral daily  petrolatum white Ointment 1 Application(s) Topical three times a day  polyethylene glycol 3350 17 Gram(s) Oral daily  senna 2 Tablet(s) Oral two times a day  sodium chloride 0.45%. 1000 milliLiter(s) (100 mL/Hr) IV Continuous <Continuous>    MEDICATIONS  (PRN):  acetaminophen     Tablet .. 650 milliGRAM(s) Oral every 6 hours PRN Temp greater or equal to 38C (100.4F), Mild Pain (1 - 3)  diphenhydrAMINE Injectable 25 milliGRAM(s) IV Push every 8 hours PRN Rash and/or Itching  gabapentin 300 milliGRAM(s) Oral at bedtime PRN pain  melatonin 3 milliGRAM(s) Oral at bedtime PRN Insomnia  naloxone Injectable 0.1 milliGRAM(s) IV Push every 3 minutes PRN For ANY of the following changes in patient status:  A. RR LESS THAN 10 breaths per minute, B. Oxygen saturation LESS THAN 90%, C. Sedation score of 6  ondansetron Injectable 4 milliGRAM(s) IV Push every 6 hours PRN Nausea  polyethylene glycol 3350 17 Gram(s) Oral at bedtime PRN Constipation      CAPILLARY BLOOD GLUCOSE        I&O's Summary    08 Dec 2022 07:01  -  09 Dec 2022 07:00  --------------------------------------------------------  IN: 1099 mL / OUT: 2550 mL / NET: -1451 mL    09 Dec 2022 07:01  -  09 Dec 2022 17:42  --------------------------------------------------------  IN: 240 mL / OUT: 1050 mL / NET: -810 mL        PHYSICAL EXAM:  Vital Signs Last 24 Hrs  T(C): 36.8 (09 Dec 2022 16:54), Max: 37.1 (08 Dec 2022 21:02)  T(F): 98.2 (09 Dec 2022 16:54), Max: 98.7 (08 Dec 2022 21:02)  HR: 83 (09 Dec 2022 16:54) (62 - 83)  BP: 180/92 (09 Dec 2022 16:54) (138/74 - 180/92)  BP(mean): --  RR: 18 (09 Dec 2022 16:54) (18 - 18)  SpO2: 96% (09 Dec 2022 16:54) (95% - 98%)    Parameters below as of 09 Dec 2022 16:54  Patient On (Oxygen Delivery Method): room air        PHYSICAL EXAM:  GENERAL: NAD, well-developed  HEAD:  Atraumatic, Normocephalic  EYES:  conjunctiva and sclera clear  NECK: Supple, No JVD  CHEST/LUNG: Clear to auscultation bilaterally; No wheeze  HEART: Regular rate and rhythm; No murmurs, rubs, or gallops  ABDOMEN: Soft, Nontender, Nondistended; Bowel sounds present  EXTREMITIES:  2+ Peripheral Pulses, No clubbing, cyanosis, or edema  PSYCH: AAOx3  NEUROLOGY: non-focal  SKIN: No rashes or lesions      LABS:                        8.4    3.66  )-----------( 168      ( 09 Dec 2022 08:33 )             25.1     12-    139  |  104  |  19  ----------------------------<  102<H>  4.3   |  24  |  1.40<H>    Ca    10.8<H>      09 Dec 2022 15:11  Phos  4.1     12-08  Mg     2.4     12-08    TPro  7.6  /  Alb  4.0  /  TBili  0.3  /  DBili  x   /  AST  34  /  ALT  71<H>  /  AlkPhos  135<H>            Urinalysis Basic - ( 08 Dec 2022 11:39 )    Color: Light Yellow / Appearance: Clear / S.011 / pH: x  Gluc: x / Ketone: Negative  / Bili: Negative / Urobili: Negative   Blood: x / Protein: Trace / Nitrite: Negative   Leuk Esterase: Negative / RBC: 1 /hpf / WBC 1 /HPF   Sq Epi: x / Non Sq Epi: 0 /hpf / Bacteria: Negative        Culture - Urine (collected 08 Dec 2022 11:39)  Source: Catheterized Catheterized  Final Report (09 Dec 2022 13:43):    No growth        RADIOLOGY & ADDITIONAL TESTS:    Imaging Personally Reviewed:    Electrocardiogram Personally Reviewed:    COORDINATION OF CARE:  Care Discussed with Consultants/Other Providers [Y/N]:  Prior or Outpatient Records Reviewed [Y/N]:     Patient is a 52y old  Male who presents with a chief complaint of ALFREDO, elevated LFTs, sickle cell pain crisis (08 Dec 2022 12:18)      SUBJECTIVE / OVERNIGHT EVENTS:   pain is controlled as per patient , no chest pain       ADDITIONAL REVIEW OF SYSTEMS: Negative except for above    MEDICATIONS  (STANDING):  amLODIPine   Tablet 2.5 milliGRAM(s) Oral daily  enoxaparin Injectable 40 milliGRAM(s) SubCutaneous every 24 hours  folic acid 1 milliGRAM(s) Oral daily  hydrALAZINE 25 milliGRAM(s) Oral every 8 hours  HYDROmorphone PCA (5 mG/mL) 30 milliLiter(s) PCA Continuous PCA Continuous  hydroxyurea 1500 milliGRAM(s) Oral daily  multivitamin 1 Tablet(s) Oral daily  petrolatum white Ointment 1 Application(s) Topical three times a day  polyethylene glycol 3350 17 Gram(s) Oral daily  senna 2 Tablet(s) Oral two times a day  sodium chloride 0.45%. 1000 milliLiter(s) (100 mL/Hr) IV Continuous <Continuous>    MEDICATIONS  (PRN):  acetaminophen     Tablet .. 650 milliGRAM(s) Oral every 6 hours PRN Temp greater or equal to 38C (100.4F), Mild Pain (1 - 3)  diphenhydrAMINE Injectable 25 milliGRAM(s) IV Push every 8 hours PRN Rash and/or Itching  gabapentin 300 milliGRAM(s) Oral at bedtime PRN pain  melatonin 3 milliGRAM(s) Oral at bedtime PRN Insomnia  naloxone Injectable 0.1 milliGRAM(s) IV Push every 3 minutes PRN For ANY of the following changes in patient status:  A. RR LESS THAN 10 breaths per minute, B. Oxygen saturation LESS THAN 90%, C. Sedation score of 6  ondansetron Injectable 4 milliGRAM(s) IV Push every 6 hours PRN Nausea  polyethylene glycol 3350 17 Gram(s) Oral at bedtime PRN Constipation      CAPILLARY BLOOD GLUCOSE        I&O's Summary    08 Dec 2022 07:01  -  09 Dec 2022 07:00  --------------------------------------------------------  IN: 1099 mL / OUT: 2550 mL / NET: -1451 mL    09 Dec 2022 07:01  -  09 Dec 2022 17:42  --------------------------------------------------------  IN: 240 mL / OUT: 1050 mL / NET: -810 mL        PHYSICAL EXAM:  Vital Signs Last 24 Hrs  T(C): 36.8 (09 Dec 2022 16:54), Max: 37.1 (08 Dec 2022 21:02)  T(F): 98.2 (09 Dec 2022 16:54), Max: 98.7 (08 Dec 2022 21:02)  HR: 83 (09 Dec 2022 16:54) (62 - 83)  BP: 180/92 (09 Dec 2022 16:54) (138/74 - 180/92)  BP(mean): --  RR: 18 (09 Dec 2022 16:54) (18 - 18)  SpO2: 96% (09 Dec 2022 16:54) (95% - 98%)    Parameters below as of 09 Dec 2022 16:54  Patient On (Oxygen Delivery Method): room air        PHYSICAL EXAM:  GENERAL: NAD, well-developed  HEAD:  Atraumatic, Normocephalic  EYES:  conjunctiva and sclera clear  NECK: Supple, No JVD  CHEST/LUNG: Clear to auscultation bilaterally; No wheeze  HEART: Regular rate and rhythm; No murmurs, rubs, or gallops  ABDOMEN: Soft, Nontender, Nondistended; Bowel sounds present  EXTREMITIES:  2+ Peripheral Pulses, No clubbing, cyanosis, or edema  PSYCH: AAOx3  NEUROLOGY: non-focal        LABS:                        8.4    3.66  )-----------( 168      ( 09 Dec 2022 08:33 )             25.1     12    139  |  104  |  19  ----------------------------<  102<H>  4.3   |  24  |  1.40<H>    Ca    10.8<H>      09 Dec 2022 15:11  Phos  4.1     12-  Mg     2.4     12-08    TPro  7.6  /  Alb  4.0  /  TBili  0.3  /  DBili  x   /  AST  34  /  ALT  71<H>  /  AlkPhos  135<H>            Urinalysis Basic - ( 08 Dec 2022 11:39 )    Color: Light Yellow / Appearance: Clear / S.011 / pH: x  Gluc: x / Ketone: Negative  / Bili: Negative / Urobili: Negative   Blood: x / Protein: Trace / Nitrite: Negative   Leuk Esterase: Negative / RBC: 1 /hpf / WBC 1 /HPF   Sq Epi: x / Non Sq Epi: 0 /hpf / Bacteria: Negative        Culture - Urine (collected 08 Dec 2022 11:39)  Source: Catheterized Catheterized  Final Report (09 Dec 2022 13:43):    No growth        RADIOLOGY & ADDITIONAL TESTS:    Imaging Personally Reviewed:    Electrocardiogram Personally Reviewed:    COORDINATION OF CARE:  Care Discussed with Consultants/Other Providers [Y/N]:  Prior or Outpatient Records Reviewed [Y/N]:

## 2022-12-09 NOTE — PROGRESS NOTE ADULT - PROBLEM SELECTOR PLAN 2
Mildly elevated LFTs without abdominal pain or tenderness. S/p cholecystectomy  Improving LFT's    Monitor LFTs  F/u abdominal ultrasound   If LFTs uptrending, would send hepatitis serologies Mildly elevated LFTs without abdominal pain or tenderness. S/p cholecystectomy  Improving LFT's    Monitor LFTs  stable  abdominal ultrasound   If LFTs uptrending, would send hepatitis serologies

## 2022-12-10 LAB
ALBUMIN SERPL ELPH-MCNC: 4 G/DL — SIGNIFICANT CHANGE UP (ref 3.3–5)
ALP SERPL-CCNC: 137 U/L — HIGH (ref 40–120)
ALT FLD-CCNC: 62 U/L — HIGH (ref 10–45)
ANION GAP SERPL CALC-SCNC: 12 MMOL/L — SIGNIFICANT CHANGE UP (ref 5–17)
ANION GAP SERPL CALC-SCNC: 9 MMOL/L — SIGNIFICANT CHANGE UP (ref 5–17)
AST SERPL-CCNC: 34 U/L — SIGNIFICANT CHANGE UP (ref 10–40)
BASOPHILS # BLD AUTO: 0 K/UL — SIGNIFICANT CHANGE UP (ref 0–0.2)
BASOPHILS NFR BLD AUTO: 0 % — SIGNIFICANT CHANGE UP (ref 0–2)
BILIRUB SERPL-MCNC: 0.4 MG/DL — SIGNIFICANT CHANGE UP (ref 0.2–1.2)
BUN SERPL-MCNC: 14 MG/DL — SIGNIFICANT CHANGE UP (ref 7–23)
BUN SERPL-MCNC: 17 MG/DL — SIGNIFICANT CHANGE UP (ref 7–23)
CALCIUM SERPL-MCNC: 10.8 MG/DL — HIGH (ref 8.4–10.5)
CALCIUM SERPL-MCNC: 11.2 MG/DL — HIGH (ref 8.4–10.5)
CHLORIDE SERPL-SCNC: 101 MMOL/L — SIGNIFICANT CHANGE UP (ref 96–108)
CHLORIDE SERPL-SCNC: 103 MMOL/L — SIGNIFICANT CHANGE UP (ref 96–108)
CO2 SERPL-SCNC: 24 MMOL/L — SIGNIFICANT CHANGE UP (ref 22–31)
CO2 SERPL-SCNC: 25 MMOL/L — SIGNIFICANT CHANGE UP (ref 22–31)
CREAT SERPL-MCNC: 1.21 MG/DL — SIGNIFICANT CHANGE UP (ref 0.5–1.3)
CREAT SERPL-MCNC: 1.32 MG/DL — HIGH (ref 0.5–1.3)
EGFR: 64 ML/MIN/1.73M2 — SIGNIFICANT CHANGE UP
EGFR: 72 ML/MIN/1.73M2 — SIGNIFICANT CHANGE UP
EOSINOPHIL # BLD AUTO: 0.07 K/UL — SIGNIFICANT CHANGE UP (ref 0–0.5)
EOSINOPHIL NFR BLD AUTO: 1.7 % — SIGNIFICANT CHANGE UP (ref 0–6)
GLUCOSE SERPL-MCNC: 91 MG/DL — SIGNIFICANT CHANGE UP (ref 70–99)
GLUCOSE SERPL-MCNC: 95 MG/DL — SIGNIFICANT CHANGE UP (ref 70–99)
HAPTOGLOB SERPL-MCNC: <20 MG/DL — LOW (ref 34–200)
HCT VFR BLD CALC: 25.6 % — LOW (ref 39–50)
HGB BLD-MCNC: 8.5 G/DL — LOW (ref 13–17)
LYMPHOCYTES # BLD AUTO: 1.18 K/UL — SIGNIFICANT CHANGE UP (ref 1–3.3)
LYMPHOCYTES # BLD AUTO: 30.7 % — SIGNIFICANT CHANGE UP (ref 13–44)
MCHC RBC-ENTMCNC: 32.9 PG — SIGNIFICANT CHANGE UP (ref 27–34)
MCHC RBC-ENTMCNC: 33.2 GM/DL — SIGNIFICANT CHANGE UP (ref 32–36)
MCV RBC AUTO: 99.2 FL — SIGNIFICANT CHANGE UP (ref 80–100)
MONOCYTES # BLD AUTO: 0.07 K/UL — SIGNIFICANT CHANGE UP (ref 0–0.9)
MONOCYTES NFR BLD AUTO: 1.8 % — LOW (ref 2–14)
NEUTROPHILS # BLD AUTO: 2.53 K/UL — SIGNIFICANT CHANGE UP (ref 1.8–7.4)
NEUTROPHILS NFR BLD AUTO: 65.8 % — SIGNIFICANT CHANGE UP (ref 43–77)
PLATELET # BLD AUTO: 173 K/UL — SIGNIFICANT CHANGE UP (ref 150–400)
POTASSIUM SERPL-MCNC: 4.3 MMOL/L — SIGNIFICANT CHANGE UP (ref 3.5–5.3)
POTASSIUM SERPL-MCNC: 4.3 MMOL/L — SIGNIFICANT CHANGE UP (ref 3.5–5.3)
POTASSIUM SERPL-SCNC: 4.3 MMOL/L — SIGNIFICANT CHANGE UP (ref 3.5–5.3)
POTASSIUM SERPL-SCNC: 4.3 MMOL/L — SIGNIFICANT CHANGE UP (ref 3.5–5.3)
PROT SERPL-MCNC: 7.7 G/DL — SIGNIFICANT CHANGE UP (ref 6–8.3)
RBC # BLD: 2.58 M/UL — LOW (ref 4.2–5.8)
RBC # BLD: 2.58 M/UL — LOW (ref 4.2–5.8)
RBC # FLD: 19.9 % — HIGH (ref 10.3–14.5)
RETICS #: 82.8 K/UL — SIGNIFICANT CHANGE UP (ref 25–125)
RETICS/RBC NFR: 3.2 % — HIGH (ref 0.5–2.5)
SODIUM SERPL-SCNC: 137 MMOL/L — SIGNIFICANT CHANGE UP (ref 135–145)
SODIUM SERPL-SCNC: 137 MMOL/L — SIGNIFICANT CHANGE UP (ref 135–145)
WBC # BLD: 3.85 K/UL — SIGNIFICANT CHANGE UP (ref 3.8–10.5)
WBC # FLD AUTO: 3.85 K/UL — SIGNIFICANT CHANGE UP (ref 3.8–10.5)

## 2022-12-10 PROCEDURE — 99233 SBSQ HOSP IP/OBS HIGH 50: CPT

## 2022-12-10 RX ORDER — SODIUM CHLORIDE 9 MG/ML
1000 INJECTION, SOLUTION INTRAVENOUS
Refills: 0 | Status: DISCONTINUED | OUTPATIENT
Start: 2022-12-10 | End: 2022-12-10

## 2022-12-10 RX ORDER — METOPROLOL TARTRATE 50 MG
75 TABLET ORAL DAILY
Refills: 0 | Status: DISCONTINUED | OUTPATIENT
Start: 2022-12-11 | End: 2022-12-12

## 2022-12-10 RX ORDER — SODIUM CHLORIDE 9 MG/ML
1000 INJECTION, SOLUTION INTRAVENOUS
Refills: 0 | Status: DISCONTINUED | OUTPATIENT
Start: 2022-12-10 | End: 2022-12-11

## 2022-12-10 RX ORDER — ALLOPURINOL 300 MG
200 TABLET ORAL DAILY
Refills: 0 | Status: DISCONTINUED | OUTPATIENT
Start: 2022-12-10 | End: 2022-12-12

## 2022-12-10 RX ORDER — HYDRALAZINE HCL 50 MG
50 TABLET ORAL THREE TIMES A DAY
Refills: 0 | Status: DISCONTINUED | OUTPATIENT
Start: 2022-12-10 | End: 2022-12-11

## 2022-12-10 RX ORDER — METOPROLOL TARTRATE 50 MG
50 TABLET ORAL ONCE
Refills: 0 | Status: COMPLETED | OUTPATIENT
Start: 2022-12-10 | End: 2022-12-10

## 2022-12-10 RX ORDER — METOPROLOL TARTRATE 50 MG
75 TABLET ORAL DAILY
Refills: 0 | Status: DISCONTINUED | OUTPATIENT
Start: 2022-12-10 | End: 2022-12-10

## 2022-12-10 RX ADMIN — Medication 50 MILLIGRAM(S): at 17:30

## 2022-12-10 RX ADMIN — Medication 1 TABLET(S): at 11:31

## 2022-12-10 RX ADMIN — Medication 1 APPLICATION(S): at 22:00

## 2022-12-10 RX ADMIN — HYDROXYUREA 1500 MILLIGRAM(S): 500 CAPSULE ORAL at 15:02

## 2022-12-10 RX ADMIN — SENNA PLUS 2 TABLET(S): 8.6 TABLET ORAL at 05:57

## 2022-12-10 RX ADMIN — AMIODARONE HYDROCHLORIDE 200 MILLIGRAM(S): 400 TABLET ORAL at 05:59

## 2022-12-10 RX ADMIN — Medication 50 MILLIGRAM(S): at 21:39

## 2022-12-10 RX ADMIN — POLYETHYLENE GLYCOL 3350 17 GRAM(S): 17 POWDER, FOR SOLUTION ORAL at 06:00

## 2022-12-10 RX ADMIN — HYDROMORPHONE HYDROCHLORIDE 30 MILLILITER(S): 2 INJECTION INTRAMUSCULAR; INTRAVENOUS; SUBCUTANEOUS at 19:13

## 2022-12-10 RX ADMIN — Medication 200 MILLIGRAM(S): at 15:01

## 2022-12-10 RX ADMIN — Medication 1 APPLICATION(S): at 06:09

## 2022-12-10 RX ADMIN — Medication 50 MILLIGRAM(S): at 15:01

## 2022-12-10 RX ADMIN — ENOXAPARIN SODIUM 40 MILLIGRAM(S): 100 INJECTION SUBCUTANEOUS at 17:30

## 2022-12-10 RX ADMIN — Medication 25 MILLIGRAM(S): at 09:20

## 2022-12-10 RX ADMIN — Medication 1 MILLIGRAM(S): at 11:31

## 2022-12-10 RX ADMIN — Medication 25 MILLIGRAM(S): at 05:58

## 2022-12-10 RX ADMIN — Medication 1 APPLICATION(S): at 13:46

## 2022-12-10 RX ADMIN — SODIUM CHLORIDE 110 MILLILITER(S): 9 INJECTION, SOLUTION INTRAVENOUS at 15:02

## 2022-12-10 RX ADMIN — Medication 25 MILLIGRAM(S): at 02:40

## 2022-12-10 RX ADMIN — SENNA PLUS 2 TABLET(S): 8.6 TABLET ORAL at 17:30

## 2022-12-10 RX ADMIN — HYDROMORPHONE HYDROCHLORIDE 30 MILLILITER(S): 2 INJECTION INTRAMUSCULAR; INTRAVENOUS; SUBCUTANEOUS at 07:01

## 2022-12-10 NOTE — PROVIDER CONTACT NOTE (CHANGE IN STATUS NOTIFICATION) - ASSESSMENT
NAD-pt denies SOB, Chest pain and has no signs of active bleeding. Patient denies complaints of headache, dizzniess

## 2022-12-10 NOTE — PROGRESS NOTE ADULT - SUBJECTIVE AND OBJECTIVE BOX
Patient is a 52y old  Male who presents with a chief complaint of ALFREDO, elevated LFTs, sickle cell pain crisis (09 Dec 2022 17:42)      SUBJECTIVE / OVERNIGHT EVENTS:  Patient was very upset that I did not see him early in AM and I saw him at 2 pm and he wanted to sign out AM to go home with his wife .  The wife finally convinced him to stay to complete care .  Pain is better today .  Wife has the list of his medications which I updated.        ADDITIONAL REVIEW OF SYSTEMS: Negative except for above    MEDICATIONS  (STANDING):  allopurinol 200 milliGRAM(s) Oral daily  enoxaparin Injectable 40 milliGRAM(s) SubCutaneous every 24 hours  folic acid 1 milliGRAM(s) Oral daily  hydrALAZINE 50 milliGRAM(s) Oral three times a day  HYDROmorphone PCA (5 mG/mL) 30 milliLiter(s) PCA Continuous PCA Continuous  hydroxyurea 1500 milliGRAM(s) Oral daily  multivitamin 1 Tablet(s) Oral daily  petrolatum white Ointment 1 Application(s) Topical three times a day  polyethylene glycol 3350 17 Gram(s) Oral daily  senna 2 Tablet(s) Oral two times a day  sodium chloride 0.45%. 1000 milliLiter(s) (110 mL/Hr) IV Continuous <Continuous>    MEDICATIONS  (PRN):  acetaminophen     Tablet .. 650 milliGRAM(s) Oral every 6 hours PRN Temp greater or equal to 38C (100.4F), Mild Pain (1 - 3)  diphenhydrAMINE Injectable 25 milliGRAM(s) IV Push every 8 hours PRN Rash and/or Itching  gabapentin 300 milliGRAM(s) Oral at bedtime PRN pain  melatonin 3 milliGRAM(s) Oral at bedtime PRN Insomnia  naloxone Injectable 0.1 milliGRAM(s) IV Push every 3 minutes PRN For ANY of the following changes in patient status:  A. RR LESS THAN 10 breaths per minute, B. Oxygen saturation LESS THAN 90%, C. Sedation score of 6  ondansetron Injectable 4 milliGRAM(s) IV Push every 6 hours PRN Nausea  polyethylene glycol 3350 17 Gram(s) Oral at bedtime PRN Constipation      CAPILLARY BLOOD GLUCOSE        I&O's Summary    09 Dec 2022 07:01  -  10 Dec 2022 07:00  --------------------------------------------------------  IN: 2896 mL / OUT: 3250 mL / NET: -354 mL    10 Dec 2022 07:01  -  10 Dec 2022 17:49  --------------------------------------------------------  IN: 360 mL / OUT: 225 mL / NET: 135 mL        PHYSICAL EXAM:  Vital Signs Last 24 Hrs  T(C): 36.6 (10 Dec 2022 17:36), Max: 37.1 (10 Dec 2022 14:00)  T(F): 97.9 (10 Dec 2022 17:36), Max: 98.8 (10 Dec 2022 14:00)  HR: 78 (10 Dec 2022 17:36) (64 - 81)  BP: 165/87 (10 Dec 2022 17:36) (151/77 - 180/100)  BP(mean): --  RR: 18 (10 Dec 2022 17:36) (16 - 18)  SpO2: 95% (10 Dec 2022 17:36) (95% - 96%)    Parameters below as of 10 Dec 2022 17:36  Patient On (Oxygen Delivery Method): room air        PHYSICAL EXAM:  GENERAL: NAD, well-developed  HEAD:  Atraumatic, Normocephalic  EYES:  conjunctiva and sclera clear  NECK: Supple, No JVD  CHEST/LUNG: Clear to auscultation bilaterally; No wheeze  HEART: Regular rate and rhythm; No murmurs, rubs, or gallops  ABDOMEN: Soft, Nontender, Nondistended; Bowel sounds present  EXTREMITIES:  2+ Peripheral Pulses, No clubbing, cyanosis, or edema  PSYCH: AAOx3  NEUROLOGY: non-focal        LABS:                        8.5    3.85  )-----------( 173      ( 10 Dec 2022 10:35 )             25.6     12-10    137  |  103  |  17  ----------------------------<  95  4.3   |  25  |  1.32<H>    Ca    11.2<H>      10 Dec 2022 10:35    TPro  7.7  /  Alb  4.0  /  TBili  0.4  /  DBili  x   /  AST  34  /  ALT  62<H>  /  AlkPhos  137<H>  12-10              Culture - Urine (collected 08 Dec 2022 11:39)  Source: Catheterized Catheterized  Final Report (09 Dec 2022 13:43):    No growth        RADIOLOGY & ADDITIONAL TESTS:    Imaging Personally Reviewed:    Electrocardiogram Personally Reviewed:    COORDINATION OF CARE:  Care Discussed with Consultants/Other Providers [Y/N]:  Prior or Outpatient Records Reviewed [Y/N]:

## 2022-12-10 NOTE — PROGRESS NOTE ADULT - PROBLEM SELECTOR PLAN 1
Baseline Cr 1.1; elevated Cr 1.62 on admission i/s/o possible urinary retention Vs volume depletion   Improving    Monitor labs and urine output--> pos urinary retention s/p straight cath and cont bladder scan  Avoid NSAIDs, ACEI/ARBS, RCA and nephrotoxins.   Renally dose medications    F/u abd ultrasound/bladder scan--> no acute findings   cont IVF

## 2022-12-10 NOTE — PROGRESS NOTE ADULT - PROBLEM SELECTOR PLAN 3
Severe back pain which is usually his presentation when having crisis, LDH and Tbili are not elevated. Retic 3%--> will repeat   - CW PCA pump   - CTLS with no acute pathology in search for alternative explanation to pain  - CW IVF   -Neg U/A  - stable H/H post transfusion CBC

## 2022-12-10 NOTE — PROGRESS NOTE ADULT - PROBLEM SELECTOR PLAN 2
Mildly elevated LFTs without abdominal pain or tenderness. S/p cholecystectomy  Improving LFT's    Monitor LFTs  stable  abdominal ultrasound

## 2022-12-10 NOTE — PROGRESS NOTE ADULT - NSPROGADDITIONALINFOA_GEN_ALL_CORE
A flutter from last admission and is on amiodarone and had DVT was on Eliquis which he states was DC by his hematologist ( but wife states that the hem told pt to stop the elequis but still send the Rx for the eliquis) , but is not on AC and his CHADS2 vasc score is 1 .  Cardio consult with his cardiologist, Dr Cruz is called     Also INCREASE Hydralazine to 50mg oral 3X/DAY       Discussed with MARYLOU Lindquist   Hospitalist   [phone] /TEAMS

## 2022-12-11 DIAGNOSIS — E83.52 HYPERCALCEMIA: ICD-10-CM

## 2022-12-11 DIAGNOSIS — I48.91 UNSPECIFIED ATRIAL FIBRILLATION: ICD-10-CM

## 2022-12-11 DIAGNOSIS — I10 ESSENTIAL (PRIMARY) HYPERTENSION: ICD-10-CM

## 2022-12-11 LAB
ANION GAP SERPL CALC-SCNC: 9 MMOL/L — SIGNIFICANT CHANGE UP (ref 5–17)
BASOPHILS # BLD AUTO: 0.01 K/UL — SIGNIFICANT CHANGE UP (ref 0–0.2)
BASOPHILS NFR BLD AUTO: 0.3 % — SIGNIFICANT CHANGE UP (ref 0–2)
BUN SERPL-MCNC: 12 MG/DL — SIGNIFICANT CHANGE UP (ref 7–23)
CA-I BLD-SCNC: 1.41 MMOL/L — HIGH (ref 1.15–1.33)
CALCIUM SERPL-MCNC: 10.6 MG/DL — HIGH (ref 8.4–10.5)
CHLORIDE SERPL-SCNC: 100 MMOL/L — SIGNIFICANT CHANGE UP (ref 96–108)
CO2 SERPL-SCNC: 26 MMOL/L — SIGNIFICANT CHANGE UP (ref 22–31)
CREAT SERPL-MCNC: 1.15 MG/DL — SIGNIFICANT CHANGE UP (ref 0.5–1.3)
EGFR: 76 ML/MIN/1.73M2 — SIGNIFICANT CHANGE UP
EOSINOPHIL # BLD AUTO: 0.03 K/UL — SIGNIFICANT CHANGE UP (ref 0–0.5)
EOSINOPHIL NFR BLD AUTO: 0.8 % — SIGNIFICANT CHANGE UP (ref 0–6)
GLUCOSE SERPL-MCNC: 102 MG/DL — HIGH (ref 70–99)
HCT VFR BLD CALC: 24.2 % — LOW (ref 39–50)
HGB BLD-MCNC: 8.1 G/DL — LOW (ref 13–17)
IMM GRANULOCYTES NFR BLD AUTO: 0.3 % — SIGNIFICANT CHANGE UP (ref 0–0.9)
LYMPHOCYTES # BLD AUTO: 0.7 K/UL — LOW (ref 1–3.3)
LYMPHOCYTES # BLD AUTO: 18.1 % — SIGNIFICANT CHANGE UP (ref 13–44)
MCHC RBC-ENTMCNC: 32.5 PG — SIGNIFICANT CHANGE UP (ref 27–34)
MCHC RBC-ENTMCNC: 33.5 GM/DL — SIGNIFICANT CHANGE UP (ref 32–36)
MCV RBC AUTO: 97.2 FL — SIGNIFICANT CHANGE UP (ref 80–100)
MONOCYTES # BLD AUTO: 0.68 K/UL — SIGNIFICANT CHANGE UP (ref 0–0.9)
MONOCYTES NFR BLD AUTO: 17.6 % — HIGH (ref 2–14)
NEUTROPHILS # BLD AUTO: 2.44 K/UL — SIGNIFICANT CHANGE UP (ref 1.8–7.4)
NEUTROPHILS NFR BLD AUTO: 62.9 % — SIGNIFICANT CHANGE UP (ref 43–77)
NRBC # BLD: 18 /100 WBCS — HIGH (ref 0–0)
PLATELET # BLD AUTO: 166 K/UL — SIGNIFICANT CHANGE UP (ref 150–400)
POTASSIUM SERPL-MCNC: 4.4 MMOL/L — SIGNIFICANT CHANGE UP (ref 3.5–5.3)
POTASSIUM SERPL-SCNC: 4.4 MMOL/L — SIGNIFICANT CHANGE UP (ref 3.5–5.3)
RBC # BLD: 2.49 M/UL — LOW (ref 4.2–5.8)
RBC # FLD: 19 % — HIGH (ref 10.3–14.5)
SODIUM SERPL-SCNC: 135 MMOL/L — SIGNIFICANT CHANGE UP (ref 135–145)
WBC # BLD: 3.87 K/UL — SIGNIFICANT CHANGE UP (ref 3.8–10.5)
WBC # FLD AUTO: 3.87 K/UL — SIGNIFICANT CHANGE UP (ref 3.8–10.5)

## 2022-12-11 PROCEDURE — 99233 SBSQ HOSP IP/OBS HIGH 50: CPT

## 2022-12-11 RX ORDER — LINACLOTIDE 145 UG/1
290 CAPSULE, GELATIN COATED ORAL DAILY
Refills: 0 | Status: DISCONTINUED | OUTPATIENT
Start: 2022-12-11 | End: 2022-12-12

## 2022-12-11 RX ORDER — PANTOPRAZOLE SODIUM 20 MG/1
40 TABLET, DELAYED RELEASE ORAL DAILY
Refills: 0 | Status: DISCONTINUED | OUTPATIENT
Start: 2022-12-11 | End: 2022-12-12

## 2022-12-11 RX ORDER — HYDRALAZINE HCL 50 MG
75 TABLET ORAL THREE TIMES A DAY
Refills: 0 | Status: DISCONTINUED | OUTPATIENT
Start: 2022-12-11 | End: 2022-12-12

## 2022-12-11 RX ORDER — APIXABAN 2.5 MG/1
5 TABLET, FILM COATED ORAL
Refills: 0 | Status: DISCONTINUED | OUTPATIENT
Start: 2022-12-11 | End: 2022-12-12

## 2022-12-11 RX ORDER — SODIUM CHLORIDE 9 MG/ML
1000 INJECTION, SOLUTION INTRAVENOUS
Refills: 0 | Status: DISCONTINUED | OUTPATIENT
Start: 2022-12-11 | End: 2022-12-12

## 2022-12-11 RX ADMIN — POLYETHYLENE GLYCOL 3350 17 GRAM(S): 17 POWDER, FOR SOLUTION ORAL at 11:22

## 2022-12-11 RX ADMIN — Medication 1 APPLICATION(S): at 07:10

## 2022-12-11 RX ADMIN — Medication 1 APPLICATION(S): at 21:57

## 2022-12-11 RX ADMIN — LINACLOTIDE 290 MICROGRAM(S): 145 CAPSULE, GELATIN COATED ORAL at 18:23

## 2022-12-11 RX ADMIN — PANTOPRAZOLE SODIUM 40 MILLIGRAM(S): 20 TABLET, DELAYED RELEASE ORAL at 13:54

## 2022-12-11 RX ADMIN — Medication 50 MILLIGRAM(S): at 05:33

## 2022-12-11 RX ADMIN — Medication 1 TABLET(S): at 11:22

## 2022-12-11 RX ADMIN — HYDROXYUREA 1500 MILLIGRAM(S): 500 CAPSULE ORAL at 11:22

## 2022-12-11 RX ADMIN — Medication 25 MILLIGRAM(S): at 10:07

## 2022-12-11 RX ADMIN — Medication 200 MILLIGRAM(S): at 11:22

## 2022-12-11 RX ADMIN — Medication 1 APPLICATION(S): at 13:13

## 2022-12-11 RX ADMIN — Medication 75 MILLIGRAM(S): at 05:30

## 2022-12-11 RX ADMIN — Medication 1 MILLIGRAM(S): at 11:22

## 2022-12-11 RX ADMIN — HYDROMORPHONE HYDROCHLORIDE 30 MILLILITER(S): 2 INJECTION INTRAMUSCULAR; INTRAVENOUS; SUBCUTANEOUS at 19:10

## 2022-12-11 RX ADMIN — SENNA PLUS 2 TABLET(S): 8.6 TABLET ORAL at 05:33

## 2022-12-11 RX ADMIN — HYDROMORPHONE HYDROCHLORIDE 30 MILLILITER(S): 2 INJECTION INTRAMUSCULAR; INTRAVENOUS; SUBCUTANEOUS at 07:10

## 2022-12-11 RX ADMIN — ONDANSETRON 4 MILLIGRAM(S): 8 TABLET, FILM COATED ORAL at 10:01

## 2022-12-11 RX ADMIN — SODIUM CHLORIDE 100 MILLILITER(S): 9 INJECTION, SOLUTION INTRAVENOUS at 15:33

## 2022-12-11 RX ADMIN — Medication 75 MILLIGRAM(S): at 13:54

## 2022-12-11 RX ADMIN — SENNA PLUS 2 TABLET(S): 8.6 TABLET ORAL at 18:22

## 2022-12-11 RX ADMIN — APIXABAN 5 MILLIGRAM(S): 2.5 TABLET, FILM COATED ORAL at 18:22

## 2022-12-11 RX ADMIN — Medication 75 MILLIGRAM(S): at 21:59

## 2022-12-11 RX ADMIN — SODIUM CHLORIDE 110 MILLILITER(S): 9 INJECTION, SOLUTION INTRAVENOUS at 05:33

## 2022-12-11 NOTE — PROGRESS NOTE ADULT - PROBLEM SELECTOR PLAN 6
POorly controlled   cw Metoprolol , Hydralazine   DASH diet  patient is non compliant POorly controlled   cw Metoprolol , Hydralazine  ( titrate as needed)  DASH diet  patient is non compliant

## 2022-12-11 NOTE — CONSULT NOTE ADULT - SUBJECTIVE AND OBJECTIVE BOX
CHIEF COMPLAINT:  Back pain     HISTORY OF PRESENT ILLNESS:  52M with hx of SS disease, gout here with lower back pain that started over the weekend. The patient has had similar pain to this before related to sickle cell pain crises. His pain persisted throughout so he decided to come to the ED for further relief. He took his home dilaudid which did not completely reduce his pain. He describes the pain as a sharp throbbing pain, 9/10. Denies any trauma. He notes that he is also tired and weak which he attributes to symptoms of anemia. ROS also positive for polyuria and incomplete relief after bladder voiding.     In the ED, pt /75, HR 73, RR 18, SaO2 97% on RA. He was given dilaudid 1 mg with incomplete relief as well as 1L LR x 2.     Cardiology consulted for cardiac management.  Pt known to Dr. Cruz.  Pt denies chest pain, SOB, palpitations.     PAST MEDICAL & SURGICAL HISTORY:  Sickle cell disease    Right knee pain    History of cholecystectomy    MEDICATIONS:  enoxaparin Injectable 40 milliGRAM(s) SubCutaneous every 24 hours  hydrALAZINE 50 milliGRAM(s) Oral three times a day  metoprolol succinate ER 75 milliGRAM(s) Oral daily    diphenhydrAMINE Injectable 25 milliGRAM(s) IV Push every 8 hours PRN    acetaminophen     Tablet .. 650 milliGRAM(s) Oral every 6 hours PRN  gabapentin 300 milliGRAM(s) Oral at bedtime PRN  HYDROmorphone PCA (5 mG/mL) 30 milliLiter(s) PCA Continuous PCA Continuous  melatonin 3 milliGRAM(s) Oral at bedtime PRN  ondansetron Injectable 4 milliGRAM(s) IV Push every 6 hours PRN    polyethylene glycol 3350 17 Gram(s) Oral daily  polyethylene glycol 3350 17 Gram(s) Oral at bedtime PRN  senna 2 Tablet(s) Oral two times a day    allopurinol 200 milliGRAM(s) Oral daily    folic acid 1 milliGRAM(s) Oral daily  hydroxyurea 1500 milliGRAM(s) Oral daily  multivitamin 1 Tablet(s) Oral daily  petrolatum white Ointment 1 Application(s) Topical three times a day  sodium chloride 0.45%. 1000 milliLiter(s) IV Continuous <Continuous>    FAMILY HISTORY:  Family history of essential hypertension    Family history of sickle cell trait    SOCIAL HISTORY:    [ ] Non-smoker  [ ] Smoker  [ ] Alcohol    Allergies    [This allergen will not trigger allergy alert] No Known Drug Allergies (Unknown)  shellfish (Short breath)    Intolerances    REVIEW OF SYSTEMS:  CONSTITUTIONAL: No fever, weight loss, + fatigue  EYES: No eye pain, visual disturbances, or discharge  ENMT:  No difficulty hearing, tinnitus, vertigo; No sinus or throat pain  NECK: No pain or stiffness  RESPIRATORY: No cough, wheezing, chills or hemoptysis; No Shortness of Breath  CARDIOVASCULAR: No chest pain, palpitations, passing out, dizziness, or leg swelling  GASTROINTESTINAL: No abdominal or epigastric pain. No nausea, vomiting, or hematemesis; No diarrhea or constipation. No melena or hematochezia.  GENITOURINARY: No dysuria, frequency, hematuria, or incontinence  NEUROLOGICAL: No headaches, memory loss, loss of strength, numbness, or tremors  SKIN: No itching, burning, rashes, or lesions   LYMPH Nodes: No enlarged glands  ENDOCRINE: No heat or cold intolerance; No hair loss  MUSCULOSKELETAL: No joint pain or swelling; No muscle, back, or extremity pain  PSYCHIATRIC: No depression, anxiety, mood swings, or difficulty sleeping  HEME/LYMPH: No easy bruising, or bleeding gums  ALLERY AND IMMUNOLOGIC: No hives or eczema	    [ ] All others negative	  [ ] Unable to obtain    PHYSICAL EXAM:  T(C): 37.2 (12-11-22 @ 10:15), Max: 37.2 (12-11-22 @ 10:15)  HR: 84 (12-11-22 @ 10:15) (64 - 84)  BP: 174/89 (12-11-22 @ 10:15) (150/81 - 174/89)  RR: 18 (12-11-22 @ 10:15) (16 - 18)  SpO2: 95% (12-11-22 @ 10:15) (95% - 99%)  Wt(kg): --  I&O's Summary    10 Dec 2022 07:01  -  11 Dec 2022 07:00  --------------------------------------------------------  IN: 1920 mL / OUT: 2375 mL / NET: -455 mL    11 Dec 2022 07:01  -  11 Dec 2022 11:14  --------------------------------------------------------  IN: 0 mL / OUT: 800 mL / NET: -800 mL    Appearance: Normal	  HEENT:   Normal oral mucosa, PERRL, EOMI	  Lymphatic: No lymphadenopathy  Cardiovascular: Normal S1 S2, No JVD, No murmurs, No edema  Respiratory: Lungs clear to auscultation	  Psychiatry: A & O x 3, Mood & affect appropriate  Gastrointestinal:  Soft, Non-tender, + BS	  Skin: No rashes, No ecchymoses, No cyanosis	  Neurologic: Non-focal  Extremities: Normal range of motion, No clubbing, cyanosis or edema  Vascular: Peripheral pulses palpable 2+ bilaterally    TELEMETRY: 	    ECG:  	SR - no acute ischemic STT changes  < from: Transthoracic Echocardiogram (03.06.22 @ 10:48) >  Patient name: MAXI BUCHANAN  YOB: 1970   Age: 51 (M)   MR#: [id number]  Study Date: 3/6/2022  Location: [address] [place], [place] [id number]: Melina Salinas RDCS  Study quality: Technically fair  Referring Physician: Ines Drew MD  Blood Pressure: 150/104 mmHg  Height: 180 cm  Weight: 102 kg  BSA: 2.2 m2  ------------------------------------------------------------------------  PROCEDURE: Transthoracic echocardiogram with 2-D, M-Mode  and complete spectral and color flow Doppler.  INDICATION: Cardiac murmur, unspecified (R01.1)  ------------------------------------------------------------------------  Dimensions:    Normal Values:  LA:     3.9    2.0 - 4.0 cm  Ao:     4.2    2.0 - 3.8 cm  SEPTUM: 1.3    0.6 - 1.2 cm  PWT:    1.0    0.6 - 1.1 cm  LVIDd:  5.6    3.0 - 5.6 cm  LVIDs:  4.0    1.8 - 4.0 cm  Derived variables:  LVMI: 119 g/m2  RWT: 0.35  Fractional short: 29 %  EF (Suazo Rule): 72 %Doppler Peak Velocity (m/sec):  AoV=1.2  ------------------------------------------------------------------------  Observations:  Mitral Valve: Normal mitral valve. Mild mitral  regurgitation.  Aortic Valve/Aorta: Normal trileaflet aortic valve. Peak  transaortic valve gradient equals 6 mm Hg, mean transaortic  valve gradient equals 3 mm Hg, aortic valve velocity time  integral equals 23 cm, estimated aortic valve area equals  4.3 sqcm. Minimal aortic regurgitation.  Peak left  ventricular outflow tract gradient equals 3 mm Hg, mean  gradient is equal to 2 mm Hg, LVOT velocity time integral  equals 20 cm.  Aortic Root: 4.2 cm.  LVOT diameter: 2.5 cm.  Left Atrium: Severely dilated left atrium.  LA volume index  = 63 cc/m2.  Left Ventricle: Normal left ventricular systolic function.  No segmental wall motion abnormalities. Normal left  ventricular internal dimensions and wall thicknesses.  Moderate diastolic dysfunction (Stage II).  Right Heart: Right atrial enlargement. Normal right  ventricular size and function. Normal tricuspid valve. Mild  tricuspid regurgitation. Normal pulmonic valve. Minimal  pulmonic regurgitation.  Pericardium/Pleura: Normal pericardium with no pericardial  effusion.  Hemodynamic: Estimated right atrial pressure is 8 mm Hg.  Estimated right ventricular systolic pressure equals 42 mm  Hg, assuming right atrial pressure equals 8 mm Hg,  consistent with mild pulmonary hypertension.  ------------------------------------------------------------------------    < end of copied text >    RADIOLOGY: < from: US Urinary Bladder (12.08.22 @ 14:33) >  ACC: [id number] EXAM:  US URINARY BLADDER                        ACC: [id number] EXAM:  US ABDOMEN COMPLETE                          PROCEDURE DATE:  12/08/2022          INTERPRETATION:  CLINICAL INFORMATION: Transaminitis. Acute kidney injury.    COMPARISON: Abdominal ultrasound 2/18/2020    TECHNIQUE: Sonography of the abdomen and urinary bladder.    FINDINGS:  Liver: Within normal limits.  Bile ducts: Normal caliber. Common bile duct measures 4 mm.  Gallbladder: Cholecystectomy.  Pancreas: Poorly visualized.  Spleen: Not visualized.  Right kidney: 11.4 cm. No hydronephrosis. Increased renal parenchymal   echogenicity.  Left kidney: 11.6 cm. No hydronephrosis. A 1.6 cm cyst in the mid left   kidney. Increased renal parenchymal echogenicity.  Bladder: Within normal limits.    Ascites: None.  Aorta and IVC: Visualized portions are within normal limits.    IMPRESSION:  No hydronephrosis.    Increased renal parenchymal echogenicity bilaterally which may reflect   medical renal disease.    ---End of Report ---    < end of copied text >  < from: US Abdomen Complete (US Abdomen Complete .) (12.08.22 @ 14:33) >    ACC: [id number] EXAM:  US URINARY BLADDER                        ACC: [id number] EXAM:  US ABDOMEN COMPLETE                          PROCEDURE DATE:  12/08/2022          INTERPRETATION:  CLINICAL INFORMATION: Transaminitis. Acute kidney injury.    COMPARISON: Abdominal ultrasound 2/18/2020    TECHNIQUE: Sonography of the abdomen and urinary bladder.    FINDINGS:  Liver: Within normal limits.  Bile ducts: Normal caliber. Common bile duct measures 4 mm.  Gallbladder: Cholecystectomy.  Pancreas: Poorly visualized.  Spleen: Not visualized.  Right kidney: 11.4 cm. No hydronephrosis. Increased renal parenchymal   echogenicity.  Left kidney: 11.6 cm. No hydronephrosis. A 1.6 cm cyst in the mid left   kidney. Increased renal parenchymal echogenicity.  Bladder: Within normal limits.    Ascites: None.  Aorta and IVC: Visualized portions are within normal limits.    IMPRESSION:  No hydronephrosis.    Increased renal parenchymal echogenicity bilaterally which may reflect   medical renal disease.    ---End of Report ---    < end of copied text >  < from: CT Lumbar Spine No Cont (12.07.22 @ 17:31) >  ACC: [id number] EXAM:  CT LUMBAR SPINE                          PROCEDURE DATE:  12/07/2022          INTERPRETATION:  INDICATIONS:  Lower back pain, history of sickle cell   disease    TECHNIQUE:  Serial axial images were obtained using multi slice helical   technique. Sagittal and coronal reformatted images were performed.    COMPARISON EXAMINATION: None.    FINDINGS:  VERTEBRAL BODIES AND DISCS:  Mild central endplate depressions of L2 and   L3 vertebral bodies. Mild diffuse sclerosis of the M4msveymdye body.   Patchy sclerosis of the sacrum. Otherwise diffuse osteopenia is seen.  ALIGNMENT:  No subluxations.  L1-L2 LEVEL:  Normal.  L2-L3 LEVEL:  Normal.  L3-L4 LEVEL:  Normal.  L4-L5 LEVEL:  Mild disc bulge. Degenerative changes of the facet joints   greater on the right with surrounding cystic change. Left-sided vacuum   phenomenon.  L5-S1 LEVEL:  Mild disc bulge.  SPINAL CANAL:  No other intradural or extradural defects are seen.  MISCELLANEOUS:  Mild aortic atherosclerosis. Sclerotic changes are also   seen involving the iliac bones.    IMPRESSION:  No acute fracture or subluxation.    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    Mild spondylitic changes without focal disc herniation or spinal stenosis.    --- End of Report ---    < end of copied text >    OTHER: 	  	  LABS:	 	    CARDIAC MARKERS:                        8.1    3.87  )-----------( 166      ( 11 Dec 2022 09:55 )             24.2     12-11    135  |  100  |  12  ----------------------------<  102<H>  4.4   |  26  |  1.15    Ca    10.6<H>      11 Dec 2022 09:55    TPro  7.7  /  Alb  4.0  /  TBili  0.4  /  DBili  x   /  AST  34  /  ALT  62<H>  /  AlkPhos  137<H>  12-10    proBNP:   Lipid Profile:   HgA1c:   TSH:

## 2022-12-11 NOTE — PROGRESS NOTE ADULT - PROBLEM SELECTOR PLAN 7
A fib /Flutter from last admission   CHADSVASC is 1   Cardio rec is appreciated   will restart home Eliquis A fib /Flutter from last admission   CHADSVASC is 1   Cardio rec is appreciated   will restart home Eliquis  cont Metoprolol

## 2022-12-11 NOTE — PROVIDER CONTACT NOTE (OTHER) - ASSESSMENT
pt c/o itchiness. pt states he takes benadryl at home for itchiness r/t Dilaudid.  requesting senna BID
PT AXOX4, denies any distress, /81, HR 61. Pt and his wife at bedside states that cardiologist told them yesterday to stop taking hydralazine

## 2022-12-11 NOTE — PROGRESS NOTE ADULT - SUBJECTIVE AND OBJECTIVE BOX
Patient is a 52y old  Male who presents with a chief complaint of ALFREDO, elevated LFTs, sickle cell pain crisis (10 Dec 2022 17:48)      SUBJECTIVE / OVERNIGHT EVENTS:    Tele reviewed:       ADDITIONAL REVIEW OF SYSTEMS: Negative except for above    MEDICATIONS  (STANDING):  allopurinol 200 milliGRAM(s) Oral daily  enoxaparin Injectable 40 milliGRAM(s) SubCutaneous every 24 hours  folic acid 1 milliGRAM(s) Oral daily  hydrALAZINE 50 milliGRAM(s) Oral three times a day  HYDROmorphone PCA (5 mG/mL) 30 milliLiter(s) PCA Continuous PCA Continuous  hydroxyurea 1500 milliGRAM(s) Oral daily  metoprolol succinate ER 75 milliGRAM(s) Oral daily  multivitamin 1 Tablet(s) Oral daily  petrolatum white Ointment 1 Application(s) Topical three times a day  polyethylene glycol 3350 17 Gram(s) Oral daily  senna 2 Tablet(s) Oral two times a day  sodium chloride 0.45%. 1000 milliLiter(s) (110 mL/Hr) IV Continuous <Continuous>    MEDICATIONS  (PRN):  acetaminophen     Tablet .. 650 milliGRAM(s) Oral every 6 hours PRN Temp greater or equal to 38C (100.4F), Mild Pain (1 - 3)  diphenhydrAMINE Injectable 25 milliGRAM(s) IV Push every 8 hours PRN Rash and/or Itching  gabapentin 300 milliGRAM(s) Oral at bedtime PRN pain  melatonin 3 milliGRAM(s) Oral at bedtime PRN Insomnia  naloxone Injectable 0.1 milliGRAM(s) IV Push every 3 minutes PRN For ANY of the following changes in patient status:  A. RR LESS THAN 10 breaths per minute, B. Oxygen saturation LESS THAN 90%, C. Sedation score of 6  ondansetron Injectable 4 milliGRAM(s) IV Push every 6 hours PRN Nausea  polyethylene glycol 3350 17 Gram(s) Oral at bedtime PRN Constipation      CAPILLARY BLOOD GLUCOSE        I&O's Summary    10 Dec 2022 07:01  -  11 Dec 2022 07:00  --------------------------------------------------------  IN: 1920 mL / OUT: 2375 mL / NET: -455 mL        PHYSICAL EXAM:  Vital Signs Last 24 Hrs  T(C): 37 (11 Dec 2022 05:09), Max: 37.1 (10 Dec 2022 14:00)  T(F): 98.6 (11 Dec 2022 05:09), Max: 98.8 (10 Dec 2022 14:00)  HR: 67 (11 Dec 2022 05:09) (64 - 78)  BP: 158/84 (11 Dec 2022 05:20) (150/81 - 180/100)  BP(mean): --  RR: 18 (11 Dec 2022 05:09) (16 - 18)  SpO2: 95% (11 Dec 2022 05:09) (95% - 99%)    Parameters below as of 11 Dec 2022 05:09  Patient On (Oxygen Delivery Method): room air        PHYSICAL EXAM:  GENERAL: NAD, well-developed  HEAD:  Atraumatic, Normocephalic  EYES:  conjunctiva and sclera clear  NECK: Supple, No JVD  CHEST/LUNG: Clear to auscultation bilaterally; No wheeze  HEART: Regular rate and rhythm; No murmurs, rubs, or gallops  ABDOMEN: Soft, Nontender, Nondistended; Bowel sounds present  EXTREMITIES:  2+ Peripheral Pulses, No clubbing, cyanosis, or edema  PSYCH: AAOx3  NEUROLOGY: non-focal  SKIN: No rashes or lesions      LABS:                        8.5    3.85  )-----------( 173      ( 10 Dec 2022 10:35 )             25.6     12-10    137  |  101  |  14  ----------------------------<  91  4.3   |  24  |  1.21    Ca    10.8<H>      10 Dec 2022 22:01    TPro  7.7  /  Alb  4.0  /  TBili  0.4  /  DBili  x   /  AST  34  /  ALT  62<H>  /  AlkPhos  137<H>  12-10              Culture - Urine (collected 08 Dec 2022 11:39)  Source: Catheterized Catheterized  Final Report (09 Dec 2022 13:43):    No growth        RADIOLOGY & ADDITIONAL TESTS:    Imaging Personally Reviewed:    Electrocardiogram Personally Reviewed:    COORDINATION OF CARE:  Care Discussed with Consultants/Other Providers [Y/N]:  Prior or Outpatient Records Reviewed [Y/N]:     Patient is a 52y old  Male who presents with a chief complaint of ALFREDO, elevated LFTs, sickle cell pain crisis (10 Dec 2022 17:48)      SUBJECTIVE / OVERNIGHT EVENTS:  Pt feels better today and states to have one episode of vomiting which has resolved when I saw him.  NO chest pain       ADDITIONAL REVIEW OF SYSTEMS: Negative except for above    MEDICATIONS  (STANDING):  allopurinol 200 milliGRAM(s) Oral daily  enoxaparin Injectable 40 milliGRAM(s) SubCutaneous every 24 hours  folic acid 1 milliGRAM(s) Oral daily  hydrALAZINE 50 milliGRAM(s) Oral three times a day  HYDROmorphone PCA (5 mG/mL) 30 milliLiter(s) PCA Continuous PCA Continuous  hydroxyurea 1500 milliGRAM(s) Oral daily  metoprolol succinate ER 75 milliGRAM(s) Oral daily  multivitamin 1 Tablet(s) Oral daily  petrolatum white Ointment 1 Application(s) Topical three times a day  polyethylene glycol 3350 17 Gram(s) Oral daily  senna 2 Tablet(s) Oral two times a day  sodium chloride 0.45%. 1000 milliLiter(s) (110 mL/Hr) IV Continuous <Continuous>    MEDICATIONS  (PRN):  acetaminophen     Tablet .. 650 milliGRAM(s) Oral every 6 hours PRN Temp greater or equal to 38C (100.4F), Mild Pain (1 - 3)  diphenhydrAMINE Injectable 25 milliGRAM(s) IV Push every 8 hours PRN Rash and/or Itching  gabapentin 300 milliGRAM(s) Oral at bedtime PRN pain  melatonin 3 milliGRAM(s) Oral at bedtime PRN Insomnia  naloxone Injectable 0.1 milliGRAM(s) IV Push every 3 minutes PRN For ANY of the following changes in patient status:  A. RR LESS THAN 10 breaths per minute, B. Oxygen saturation LESS THAN 90%, C. Sedation score of 6  ondansetron Injectable 4 milliGRAM(s) IV Push every 6 hours PRN Nausea  polyethylene glycol 3350 17 Gram(s) Oral at bedtime PRN Constipation      CAPILLARY BLOOD GLUCOSE        I&O's Summary    10 Dec 2022 07:01  -  11 Dec 2022 07:00  --------------------------------------------------------  IN: 1920 mL / OUT: 2375 mL / NET: -455 mL        PHYSICAL EXAM:  Vital Signs Last 24 Hrs  T(C): 37 (11 Dec 2022 05:09), Max: 37.1 (10 Dec 2022 14:00)  T(F): 98.6 (11 Dec 2022 05:09), Max: 98.8 (10 Dec 2022 14:00)  HR: 67 (11 Dec 2022 05:09) (64 - 78)  BP: 158/84 (11 Dec 2022 05:20) (150/81 - 180/100)  BP(mean): --  RR: 18 (11 Dec 2022 05:09) (16 - 18)  SpO2: 95% (11 Dec 2022 05:09) (95% - 99%)    Parameters below as of 11 Dec 2022 05:09  Patient On (Oxygen Delivery Method): room air        PHYSICAL EXAM:  GENERAL: NAD, well-developed  HEAD:  Atraumatic, Normocephalic  EYES:  conjunctiva and sclera clear  NECK: Supple, No JVD  CHEST/LUNG: Clear to auscultation bilaterally; No wheeze  HEART: Regular rate and rhythm; No murmurs, rubs, or gallops  ABDOMEN: Soft, Nontender, Nondistended; Bowel sounds present  EXTREMITIES:  2+ Peripheral Pulses, No clubbing, cyanosis, or edema  PSYCH: AAOx3  NEUROLOGY: non-focal      LABS:                        8.5    3.85  )-----------( 173      ( 10 Dec 2022 10:35 )             25.6     12-10    137  |  101  |  14  ----------------------------<  91  4.3   |  24  |  1.21    Ca    10.8<H>      10 Dec 2022 22:01    TPro  7.7  /  Alb  4.0  /  TBili  0.4  /  DBili  x   /  AST  34  /  ALT  62<H>  /  AlkPhos  137<H>  12-10              Culture - Urine (collected 08 Dec 2022 11:39)  Source: Catheterized Catheterized  Final Report (09 Dec 2022 13:43):    No growth        RADIOLOGY & ADDITIONAL TESTS:    Imaging Personally Reviewed:    Electrocardiogram Personally Reviewed:    COORDINATION OF CARE:  Care Discussed with Consultants/Other Providers [Y/N]:  Prior or Outpatient Records Reviewed [Y/N]:

## 2022-12-11 NOTE — PROGRESS NOTE ADULT - PROBLEM SELECTOR PLAN 3
Severe back pain which is usually his presentation when having crisis, LDH and Tbili are not elevated. Retic 3%--> will repeat   - CW PCA pump   - CTLS with no acute pathology in search for alternative explanation to pain  - CW IVF   -Neg U/A  - stable H/H post transfusion CBC Severe back pain which is usually his presentation when having crisis  CW PCA pump   CTLS with no acute pathology in search for alternative explanation to pain  CW IVF   Cw with folic acid, MVI   stable H/H  .  Pt received one unit of PRBC with stable post transfusion CBC

## 2022-12-11 NOTE — PROGRESS NOTE ADULT - NSPROGADDITIONALINFOA_GEN_ALL_CORE
A flutter from last admission and is on amiodarone and had DVT was on Eliquis which he states was DC by his hematologist ( but wife states that the hem told pt to stop the elequis but still send the Rx for the eliquis) , but is not on AC and his CHADS2 vasc score is 1 .  Cardio consult with his cardiologist, Dr Cruz is appreciated           Discussed with MARYLOU [facility]   [phone] /TEAMS If pain is stable , BP is stable and no vomiting , then can be dc home     Discussed with ACP     Teresa Lindquist   Hospitalist   [phone] /TEAMS

## 2022-12-11 NOTE — PROVIDER CONTACT NOTE (OTHER) - ACTION/TREATMENT ORDERED:
benadryl x1 dose. senna changed to BID
Pt continues to refuse despite RN education importance of medication compliance, provider aware, will continue to monitor.

## 2022-12-11 NOTE — PROGRESS NOTE ADULT - PROBLEM SELECTOR PLAN 2
Mildly elevated LFTs without abdominal pain or tenderness. S/p cholecystectomy  Improving LFT's    Monitor LFTs  stable  abdominal ultrasound Mildly elevated LFTs without abdominal pain or tenderness. S/p cholecystectomy  Improved  LFT's   Monitor LFTs  stable  abdominal ultrasound

## 2022-12-11 NOTE — CONSULT NOTE ADULT - PROBLEM SELECTOR RECOMMENDATION 9
w/ low back pain   CTLS with no acute pathology in search for alternative explanation to pain  s/p PRBC transfusion  c/w IVF  pain management  management as per primary team

## 2022-12-11 NOTE — CONSULT NOTE ADULT - PROBLEM SELECTOR RECOMMENDATION 4
afib/aflutter last hospital admission   CHADSVASC1  - low-moderate risk  - AC with Eliquis if no other contraindications  SR - HR controlled right now  c/w BB

## 2022-12-12 ENCOUNTER — TRANSCRIPTION ENCOUNTER (OUTPATIENT)
Age: 52
End: 2022-12-12

## 2022-12-12 VITALS
RESPIRATION RATE: 18 BRPM | SYSTOLIC BLOOD PRESSURE: 166 MMHG | DIASTOLIC BLOOD PRESSURE: 86 MMHG | HEART RATE: 84 BPM | OXYGEN SATURATION: 96 % | TEMPERATURE: 98 F

## 2022-12-12 LAB
ANION GAP SERPL CALC-SCNC: 10 MMOL/L — SIGNIFICANT CHANGE UP (ref 5–17)
BASOPHILS # BLD AUTO: 0.01 K/UL — SIGNIFICANT CHANGE UP (ref 0–0.2)
BASOPHILS NFR BLD AUTO: 0.3 % — SIGNIFICANT CHANGE UP (ref 0–2)
BUN SERPL-MCNC: 16 MG/DL — SIGNIFICANT CHANGE UP (ref 7–23)
CALCIUM SERPL-MCNC: 10.9 MG/DL — HIGH (ref 8.4–10.5)
CHLORIDE SERPL-SCNC: 103 MMOL/L — SIGNIFICANT CHANGE UP (ref 96–108)
CO2 SERPL-SCNC: 24 MMOL/L — SIGNIFICANT CHANGE UP (ref 22–31)
CREAT SERPL-MCNC: 1.35 MG/DL — HIGH (ref 0.5–1.3)
EGFR: 63 ML/MIN/1.73M2 — SIGNIFICANT CHANGE UP
EOSINOPHIL # BLD AUTO: 0.04 K/UL — SIGNIFICANT CHANGE UP (ref 0–0.5)
EOSINOPHIL NFR BLD AUTO: 1.1 % — SIGNIFICANT CHANGE UP (ref 0–6)
GLUCOSE SERPL-MCNC: 94 MG/DL — SIGNIFICANT CHANGE UP (ref 70–99)
HCT VFR BLD CALC: 25.4 % — LOW (ref 39–50)
HGB BLD-MCNC: 8.4 G/DL — LOW (ref 13–17)
IMM GRANULOCYTES NFR BLD AUTO: 0.8 % — SIGNIFICANT CHANGE UP (ref 0–0.9)
LYMPHOCYTES # BLD AUTO: 0.88 K/UL — LOW (ref 1–3.3)
LYMPHOCYTES # BLD AUTO: 23.5 % — SIGNIFICANT CHANGE UP (ref 13–44)
MCHC RBC-ENTMCNC: 32.3 PG — SIGNIFICANT CHANGE UP (ref 27–34)
MCHC RBC-ENTMCNC: 33.1 GM/DL — SIGNIFICANT CHANGE UP (ref 32–36)
MCV RBC AUTO: 97.7 FL — SIGNIFICANT CHANGE UP (ref 80–100)
MONOCYTES # BLD AUTO: 0.4 K/UL — SIGNIFICANT CHANGE UP (ref 0–0.9)
MONOCYTES NFR BLD AUTO: 10.7 % — SIGNIFICANT CHANGE UP (ref 2–14)
NEUTROPHILS # BLD AUTO: 2.38 K/UL — SIGNIFICANT CHANGE UP (ref 1.8–7.4)
NEUTROPHILS NFR BLD AUTO: 63.6 % — SIGNIFICANT CHANGE UP (ref 43–77)
NRBC # BLD: 18 /100 WBCS — HIGH (ref 0–0)
PLATELET # BLD AUTO: 169 K/UL — SIGNIFICANT CHANGE UP (ref 150–400)
POTASSIUM SERPL-MCNC: 4 MMOL/L — SIGNIFICANT CHANGE UP (ref 3.5–5.3)
POTASSIUM SERPL-SCNC: 4 MMOL/L — SIGNIFICANT CHANGE UP (ref 3.5–5.3)
RBC # BLD: 2.6 M/UL — LOW (ref 4.2–5.8)
RBC # BLD: 2.6 M/UL — LOW (ref 4.2–5.8)
RBC # FLD: 19.1 % — HIGH (ref 10.3–14.5)
RETICS #: 76.4 K/UL — SIGNIFICANT CHANGE UP (ref 25–125)
RETICS/RBC NFR: 2.9 % — HIGH (ref 0.5–2.5)
SODIUM SERPL-SCNC: 137 MMOL/L — SIGNIFICANT CHANGE UP (ref 135–145)
WBC # BLD: 3.74 K/UL — LOW (ref 3.8–10.5)
WBC # FLD AUTO: 3.74 K/UL — LOW (ref 3.8–10.5)

## 2022-12-12 PROCEDURE — 96375 TX/PRO/DX INJ NEW DRUG ADDON: CPT

## 2022-12-12 PROCEDURE — 84132 ASSAY OF SERUM POTASSIUM: CPT

## 2022-12-12 PROCEDURE — 86901 BLOOD TYPING SEROLOGIC RH(D): CPT

## 2022-12-12 PROCEDURE — 86923 COMPATIBILITY TEST ELECTRIC: CPT

## 2022-12-12 PROCEDURE — 85610 PROTHROMBIN TIME: CPT

## 2022-12-12 PROCEDURE — 86902 BLOOD TYPE ANTIGEN DONOR EA: CPT

## 2022-12-12 PROCEDURE — 86850 RBC ANTIBODY SCREEN: CPT

## 2022-12-12 PROCEDURE — 82803 BLOOD GASES ANY COMBINATION: CPT

## 2022-12-12 PROCEDURE — 83735 ASSAY OF MAGNESIUM: CPT

## 2022-12-12 PROCEDURE — 84100 ASSAY OF PHOSPHORUS: CPT

## 2022-12-12 PROCEDURE — 76937 US GUIDE VASCULAR ACCESS: CPT

## 2022-12-12 PROCEDURE — 80048 BASIC METABOLIC PNL TOTAL CA: CPT

## 2022-12-12 PROCEDURE — 99239 HOSP IP/OBS DSCHRG MGMT >30: CPT

## 2022-12-12 PROCEDURE — 96376 TX/PRO/DX INJ SAME DRUG ADON: CPT

## 2022-12-12 PROCEDURE — 86900 BLOOD TYPING SEROLOGIC ABO: CPT

## 2022-12-12 PROCEDURE — 36415 COLL VENOUS BLD VENIPUNCTURE: CPT

## 2022-12-12 PROCEDURE — 82947 ASSAY GLUCOSE BLOOD QUANT: CPT

## 2022-12-12 PROCEDURE — 99285 EMERGENCY DEPT VISIT HI MDM: CPT

## 2022-12-12 PROCEDURE — 84295 ASSAY OF SERUM SODIUM: CPT

## 2022-12-12 PROCEDURE — 72131 CT LUMBAR SPINE W/O DYE: CPT

## 2022-12-12 PROCEDURE — 85045 AUTOMATED RETICULOCYTE COUNT: CPT

## 2022-12-12 PROCEDURE — 85025 COMPLETE CBC W/AUTO DIFF WBC: CPT

## 2022-12-12 PROCEDURE — 36430 TRANSFUSION BLD/BLD COMPNT: CPT

## 2022-12-12 PROCEDURE — 83615 LACTATE (LD) (LDH) ENZYME: CPT

## 2022-12-12 PROCEDURE — 81003 URINALYSIS AUTO W/O SCOPE: CPT

## 2022-12-12 PROCEDURE — 85027 COMPLETE CBC AUTOMATED: CPT

## 2022-12-12 PROCEDURE — 82330 ASSAY OF CALCIUM: CPT

## 2022-12-12 PROCEDURE — P9040: CPT

## 2022-12-12 PROCEDURE — 82652 VIT D 1 25-DIHYDROXY: CPT

## 2022-12-12 PROCEDURE — 80053 COMPREHEN METABOLIC PANEL: CPT

## 2022-12-12 PROCEDURE — 87086 URINE CULTURE/COLONY COUNT: CPT

## 2022-12-12 PROCEDURE — 83605 ASSAY OF LACTIC ACID: CPT

## 2022-12-12 PROCEDURE — 82435 ASSAY OF BLOOD CHLORIDE: CPT

## 2022-12-12 PROCEDURE — 87637 SARSCOV2&INF A&B&RSV AMP PRB: CPT

## 2022-12-12 PROCEDURE — 85730 THROMBOPLASTIN TIME PARTIAL: CPT

## 2022-12-12 PROCEDURE — 85014 HEMATOCRIT: CPT

## 2022-12-12 PROCEDURE — 76857 US EXAM PELVIC LIMITED: CPT

## 2022-12-12 PROCEDURE — 81001 URINALYSIS AUTO W/SCOPE: CPT

## 2022-12-12 PROCEDURE — 76700 US EXAM ABDOM COMPLETE: CPT

## 2022-12-12 PROCEDURE — 96374 THER/PROPH/DIAG INJ IV PUSH: CPT

## 2022-12-12 PROCEDURE — 83010 ASSAY OF HAPTOGLOBIN QUANT: CPT

## 2022-12-12 PROCEDURE — 85018 HEMOGLOBIN: CPT

## 2022-12-12 RX ORDER — APIXABAN 2.5 MG/1
1 TABLET, FILM COATED ORAL
Qty: 60 | Refills: 0
Start: 2022-12-12 | End: 2023-01-10

## 2022-12-12 RX ORDER — ACETAMINOPHEN 500 MG
2 TABLET ORAL
Qty: 0 | Refills: 0 | DISCHARGE
Start: 2022-12-12

## 2022-12-12 RX ORDER — METOPROLOL TARTRATE 50 MG
3 TABLET ORAL
Qty: 0 | Refills: 0 | DISCHARGE
Start: 2022-12-12

## 2022-12-12 RX ORDER — HYDROMORPHONE HYDROCHLORIDE 2 MG/ML
4 INJECTION INTRAMUSCULAR; INTRAVENOUS; SUBCUTANEOUS EVERY 6 HOURS
Refills: 0 | Status: DISCONTINUED | OUTPATIENT
Start: 2022-12-12 | End: 2022-12-12

## 2022-12-12 RX ORDER — ALLOPURINOL 300 MG
2 TABLET ORAL
Qty: 60 | Refills: 0
Start: 2022-12-12 | End: 2023-01-10

## 2022-12-12 RX ORDER — SODIUM CHLORIDE 9 MG/ML
1000 INJECTION, SOLUTION INTRAVENOUS
Refills: 0 | Status: DISCONTINUED | OUTPATIENT
Start: 2022-12-12 | End: 2022-12-12

## 2022-12-12 RX ORDER — METOPROLOL TARTRATE 50 MG
3 TABLET ORAL
Qty: 0 | Refills: 0 | DISCHARGE

## 2022-12-12 RX ORDER — LISINOPRIL 2.5 MG/1
1 TABLET ORAL
Qty: 60 | Refills: 0
Start: 2022-12-12 | End: 2023-01-10

## 2022-12-12 RX ORDER — HYDROMORPHONE HYDROCHLORIDE 2 MG/ML
2 INJECTION INTRAMUSCULAR; INTRAVENOUS; SUBCUTANEOUS EVERY 6 HOURS
Refills: 0 | Status: DISCONTINUED | OUTPATIENT
Start: 2022-12-12 | End: 2022-12-12

## 2022-12-12 RX ORDER — GABAPENTIN 400 MG/1
1 CAPSULE ORAL
Qty: 0 | Refills: 0 | DISCHARGE
Start: 2022-12-12

## 2022-12-12 RX ORDER — LISINOPRIL 2.5 MG/1
20 TABLET ORAL
Refills: 0 | Status: DISCONTINUED | OUTPATIENT
Start: 2022-12-12 | End: 2022-12-12

## 2022-12-12 RX ORDER — LINACLOTIDE 145 UG/1
1 CAPSULE, GELATIN COATED ORAL
Qty: 0 | Refills: 0 | DISCHARGE

## 2022-12-12 RX ADMIN — PANTOPRAZOLE SODIUM 40 MILLIGRAM(S): 20 TABLET, DELAYED RELEASE ORAL at 11:53

## 2022-12-12 RX ADMIN — HYDROMORPHONE HYDROCHLORIDE 30 MILLILITER(S): 2 INJECTION INTRAMUSCULAR; INTRAVENOUS; SUBCUTANEOUS at 07:22

## 2022-12-12 RX ADMIN — Medication 1 APPLICATION(S): at 14:06

## 2022-12-12 RX ADMIN — SENNA PLUS 2 TABLET(S): 8.6 TABLET ORAL at 05:59

## 2022-12-12 RX ADMIN — HYDROXYUREA 1500 MILLIGRAM(S): 500 CAPSULE ORAL at 11:54

## 2022-12-12 RX ADMIN — LINACLOTIDE 290 MICROGRAM(S): 145 CAPSULE, GELATIN COATED ORAL at 11:52

## 2022-12-12 RX ADMIN — Medication 75 MILLIGRAM(S): at 06:00

## 2022-12-12 RX ADMIN — Medication 1 TABLET(S): at 11:53

## 2022-12-12 RX ADMIN — HYDROMORPHONE HYDROCHLORIDE 30 MILLILITER(S): 2 INJECTION INTRAMUSCULAR; INTRAVENOUS; SUBCUTANEOUS at 05:37

## 2022-12-12 RX ADMIN — Medication 200 MILLIGRAM(S): at 11:53

## 2022-12-12 RX ADMIN — SODIUM CHLORIDE 100 MILLILITER(S): 9 INJECTION, SOLUTION INTRAVENOUS at 06:45

## 2022-12-12 RX ADMIN — APIXABAN 5 MILLIGRAM(S): 2.5 TABLET, FILM COATED ORAL at 06:03

## 2022-12-12 RX ADMIN — Medication 1 APPLICATION(S): at 06:53

## 2022-12-12 RX ADMIN — Medication 1 MILLIGRAM(S): at 11:54

## 2022-12-12 RX ADMIN — POLYETHYLENE GLYCOL 3350 17 GRAM(S): 17 POWDER, FOR SOLUTION ORAL at 11:52

## 2022-12-12 NOTE — PROGRESS NOTE ADULT - PROBLEM SELECTOR PROBLEM 1
Sickle cell disease
ALFREDO (acute kidney injury)

## 2022-12-12 NOTE — PROGRESS NOTE ADULT - PROBLEM SELECTOR PROBLEM 2
Elevated liver enzymes
Elevated liver enzymes
ALFREDO (acute kidney injury)
Elevated liver enzymes

## 2022-12-12 NOTE — PROGRESS NOTE ADULT - PROBLEM SELECTOR PLAN 3
Severe back pain which is usually his presentation when having crisis  minimal PCA requirements - will d/c PCA with plan to d/c home today   CTLS with no acute pathology in search for alternative explanation to pain  CW IVF   Cw with folic acid, MVI   stable H/H - s/p one unit of PRBC with stable post transfusion CBC

## 2022-12-12 NOTE — PROGRESS NOTE ADULT - PROBLEM SELECTOR PLAN 4
most likely sec to dehydration   CW IVF   monitor Ca in AM - overall improving since admission
Patient was seen by his PCP , Vani Veloz with the following meds :   amiodarone 200 mg oral tablet:  one tab daily --> DC as per wife   Hydrea 500 mg oral capsule: 4 cap(s) orally once a day--> pt prefers 3 tabs instead of 4  colchicine---> DC as per wife   Gbapentin 300mg BID--> decreased to daily due to inc creat   folic acid  lisinopril 10 mg oral tablet: 1 tab(s) orally once a day--> not taking as per wife   Metoprolol ER 25 mg oral daily --> patient is on Metoprolol 75mg oral daily as per wife   Multiple Vitamins oral tablet: 1 tab(s) orally once a day    The Eliquis was DC about one month ago as per patient by his hematologist, Salazar Santos in [place], [place]
most likely sec to dehydration   CW IVF   monitor Ca in AM
Patient was seen by his PCP , Vani Veloz with the following meds :   amiodarone 200 mg oral tablet:  one tab daily   Hydrea 500 mg oral capsule: 4 cap(s) orally once a day  colchicine  Gbapentin 300mg BID , folic acid , Linzess  lisinopril 10 mg oral tablet: 1 tab(s) orally once a day  Metoprolol ER 25 mg oral daily   Multiple Vitamins oral tablet: 1 tab(s) orally once a day    The Eliquis was DC about one month ago as per patient by his hematologist, Salazar Santos in [place], [place]
afib/aflutter last hospital admission   c/w eliquis  c/w MY

## 2022-12-12 NOTE — PROGRESS NOTE ADULT - PROBLEM SELECTOR PLAN 6
Better this AM, nearing goal   - cardio following, siwtching to lisinrpilr 20mg BID from hydralazine (does not tolerate hydralazine well).   - c/w Metoprolol   DASH diet  patient is non compliant

## 2022-12-12 NOTE — PROGRESS NOTE ADULT - REASON FOR ADMISSION
ALFREDO, elevated LFTs, sickle cell pain crisis

## 2022-12-12 NOTE — PROGRESS NOTE ADULT - PROBLEM SELECTOR PLAN 7
A fib /Flutter from last admission   CHADSVASC is 1   Cardio rec is appreciated   will restart home Eliquis  cont Metoprolol

## 2022-12-12 NOTE — PROGRESS NOTE ADULT - PROBLEM SELECTOR PROBLEM 3
Sickle cell disease
Hypertension

## 2022-12-12 NOTE — DISCHARGE NOTE NURSING/CASE MANAGEMENT/SOCIAL WORK - PATIENT PORTAL LINK FT
You can access the FollowMyHealth Patient Portal offered by [facility] by registering at the following website: [email or web address]. By joining [employer]’s FollowMyHealth portal, you will also be able to view your health information using other applications (apps) compatible with our system.

## 2022-12-12 NOTE — PROGRESS NOTE ADULT - SUBJECTIVE AND OBJECTIVE BOX
Subjective: Patient seen and examined. No new events except as noted.     REVIEW OF SYSTEMS:    CONSTITUTIONAL: + weakness, fevers or chills  EYES/ENT: No visual changes;  No vertigo or throat pain   NECK: No pain or stiffness  RESPIRATORY: No cough, wheezing, hemoptysis; No shortness of breath  CARDIOVASCULAR: No chest pain or palpitations  GASTROINTESTINAL: No abdominal or epigastric pain. No nausea, vomiting, or hematemesis; No diarrhea or constipation. No melena or hematochezia.  GENITOURINARY: No dysuria, frequency or hematuria  NEUROLOGICAL: No numbness or weakness  SKIN: No itching, burning, rashes, or lesions   All other review of systems is negative unless indicated above.    MEDICATIONS:  MEDICATIONS  (STANDING):  allopurinol 200 milliGRAM(s) Oral daily  apixaban 5 milliGRAM(s) Oral two times a day  folic acid 1 milliGRAM(s) Oral daily  hydrALAZINE 75 milliGRAM(s) Oral three times a day  HYDROmorphone PCA (5 mG/mL) 30 milliLiter(s) PCA Continuous PCA Continuous  hydroxyurea 1500 milliGRAM(s) Oral daily  linaclotide 290 MICROGram(s) Oral daily  metoprolol succinate ER 75 milliGRAM(s) Oral daily  multivitamin 1 Tablet(s) Oral daily  pantoprazole    Tablet 40 milliGRAM(s) Oral daily  petrolatum white Ointment 1 Application(s) Topical three times a day  polyethylene glycol 3350 17 Gram(s) Oral daily  senna 2 Tablet(s) Oral two times a day  sodium chloride 0.45%. 1000 milliLiter(s) (150 mL/Hr) IV Continuous <Continuous>    PHYSICAL EXAM:  T(C): 36.8 (12-12-22 @ 09:00), Max: 37.5 (12-12-22 @ 00:38)  HR: 80 (12-12-22 @ 09:00) (66 - 84)  BP: 142/80 (12-12-22 @ 09:00) (141/76 - 156/79)  RR: 18 (12-12-22 @ 09:00) (18 - 18)  SpO2: 94% (12-12-22 @ 09:00) (94% - 96%)  Wt(kg): --  I&O's Summary    11 Dec 2022 07:01  -  12 Dec 2022 07:00  --------------------------------------------------------  IN: 3010 mL / OUT: 3360 mL / NET: -350 mL    12 Dec 2022 07:01  -  12 Dec 2022 11:46  --------------------------------------------------------  IN: 230 mL / OUT: 800 mL / NET: -570 mL    Appearance: Normal	  HEENT: Normal oral mucosa, PERRL, EOMI	  Lymphatic: No lymphadenopathy , no edema  Cardiovascular: Normal S1 S2, No JVD, No murmurs , Peripheral pulses palpable 2+ bilaterally  Respiratory: Lungs clear to auscultation, normal effort 	  Gastrointestinal:  Soft, Non-tender, + BS	  Skin: No rashes, No ecchymoses, No cyanosis, warm to touch  Musculoskeletal: Normal range of motion, normal strength  Psychiatry:  Mood & affect appropriate  Ext: No edema    LABS:    CARDIAC MARKERS:                      8.4    3.74  )-----------( 169      ( 12 Dec 2022 09:06 )             25.4     12-12    137  |  103  |  16  ----------------------------<  94  4.0   |  24  |  1.35<H>    Ca    10.9<H>      12 Dec 2022 09:06    proBNP:   Lipid Profile:   HgA1c:   TSH:     TELEMETRY: 	    ECG:  	  RADIOLOGY:   DIAGNOSTIC TESTING:  [ ] Echocardiogram:  [ ]  Catheterization:  [ ] Stress Test:    OTHER:

## 2022-12-12 NOTE — PROGRESS NOTE ADULT - NSPROGADDITIONALINFOA_GEN_ALL_CORE
pain better, tolerating diet, ambulating, with minimal PCA requirements.   Overall, ALFREDO and Ca are improving    will d/c PCA and d/c home today, pt and wife agreeable   encouraged PO hydration     plan d/w ACP - 40 minutes spent coordinating discharge

## 2022-12-12 NOTE — PROGRESS NOTE ADULT - PROBLEM SELECTOR PLAN 3
c/w meds as ordered    - d/c'd hydralazine, does not make the pt feel well, makes him dizzy/nauseous    - RESTART home LISINOPRIL 20mg PO BID - can start first dose tonight, since received hydralazine this am  continue to monitor  discussed with pt and wife follow up with dr. pelaez upon discharge

## 2022-12-12 NOTE — PROGRESS NOTE ADULT - SUBJECTIVE AND OBJECTIVE BOX
[facility] Division of Hospital Medicine  Flynn Epstein MD  Available via MS Teams    SUBJECTIVE / OVERNIGHT EVENTS:  endorsing some mild nausea, but no vomiting, tolerated breakfast well  able to get out of bed and walk around this morning   back pain is improved from prior, minimal PCA requirements     ADDITIONAL REVIEW OF SYSTEMS:  14 point ROS negative except as noted above     MEDICATIONS  (STANDING):  allopurinol 200 milliGRAM(s) Oral daily  apixaban 5 milliGRAM(s) Oral two times a day  folic acid 1 milliGRAM(s) Oral daily  hydroxyurea 1500 milliGRAM(s) Oral daily  linaclotide 290 MICROGram(s) Oral daily  lisinopril 20 milliGRAM(s) Oral two times a day  metoprolol succinate ER 75 milliGRAM(s) Oral daily  multivitamin 1 Tablet(s) Oral daily  pantoprazole    Tablet 40 milliGRAM(s) Oral daily  petrolatum white Ointment 1 Application(s) Topical three times a day  polyethylene glycol 3350 17 Gram(s) Oral daily  senna 2 Tablet(s) Oral two times a day  sodium chloride 0.45%. 1000 milliLiter(s) (150 mL/Hr) IV Continuous <Continuous>    MEDICATIONS  (PRN):  acetaminophen     Tablet .. 650 milliGRAM(s) Oral every 6 hours PRN Temp greater or equal to 38C (100.4F), Mild Pain (1 - 3)  diphenhydrAMINE Injectable 25 milliGRAM(s) IV Push every 8 hours PRN Rash and/or Itching  gabapentin 300 milliGRAM(s) Oral at bedtime PRN pain  HYDROmorphone   Tablet 2 milliGRAM(s) Oral every 6 hours PRN moderate and severe pain  melatonin 3 milliGRAM(s) Oral at bedtime PRN Insomnia  naloxone Injectable 0.1 milliGRAM(s) IV Push every 3 minutes PRN For ANY of the following changes in patient status:  A. RR LESS THAN 10 breaths per minute, B. Oxygen saturation LESS THAN 90%, C. Sedation score of 6  ondansetron Injectable 4 milliGRAM(s) IV Push every 6 hours PRN Nausea  polyethylene glycol 3350 17 Gram(s) Oral at bedtime PRN Constipation      I&O's Summary    11 Dec 2022 07:01  -  12 Dec 2022 07:00  --------------------------------------------------------  IN: 3010 mL / OUT: 3360 mL / NET: -350 mL    12 Dec 2022 07:01  -  12 Dec 2022 14:00  --------------------------------------------------------  IN: 230 mL / OUT: 925 mL / NET: -695 mL        PHYSICAL EXAM:  Vital Signs Last 24 Hrs  T(C): 36.9 (12 Dec 2022 13:00), Max: 37.5 (12 Dec 2022 00:38)  T(F): 98.4 (12 Dec 2022 13:00), Max: 99.5 (12 Dec 2022 00:38)  HR: 84 (12 Dec 2022 13:00) (66 - 84)  BP: 166/86 (12 Dec 2022 13:00) (141/76 - 166/86)  BP(mean): --  RR: 18 (12 Dec 2022 13:00) (18 - 18)  SpO2: 96% (12 Dec 2022 13:00) (94% - 96%)    Parameters below as of 12 Dec 2022 13:00  Patient On (Oxygen Delivery Method): room air      PHYSICAL EXAM:  GENERAL: NAD, well-developed  HEAD:  Atraumatic, normocephalic  EYES: EOMI, conjunctiva and sclera clear  NECK: Supple, no JVD  CHEST/LUNG: Clear to auscultation bilaterally; no wheezing or rales  HEART: Regular rate and rhythm; no murmurs, no LE edema   ABDOMEN: Soft, nontender, nondistended; bowel sounds present  EXTREMITIES:  2+ Peripheral Pulses, no clubbing, cyanosis  PSYCH: AAOx3, calm affect, not anxious      LABS:                        8.4    3.74  )-----------( 169      ( 12 Dec 2022 09:06 )             25.4     12-12    137  |  103  |  16  ----------------------------<  94  4.0   |  24  |  1.35<H>    Ca    10.9<H>      12 Dec 2022 09:06                    RADIOLOGY & ADDITIONAL TESTS:  New Imaging Personally Reviewed Today:  New Electrocardiogram Personally Reviewed Today:  Other Results Reviewed Today:   Prior or Outpatient Records Reviewed Today with Summary:    COORDINATION OF CARE:  Consultant Communication and Details of Discussion (where applicable):

## 2022-12-12 NOTE — PROGRESS NOTE ADULT - PROBLEM SELECTOR PLAN 1
Baseline Cr 1.1; elevated Cr 1.62 on admission i/s/o possible urinary retention Vs volume depletion   overall improving, encouraged PO hydration   Avoid NSAIDs, ACEI/ARBS, RCA and nephrotoxins.   Renally dose medications   Abd ultrasound/bladder scan--> no acute findings   cont IVF and cont to monitor Cr which has improved

## 2022-12-12 NOTE — PROGRESS NOTE ADULT - PROBLEM SELECTOR PLAN 2
Mildly elevated LFTs without abdominal pain or tenderness. S/p cholecystectomy  Improved  LFT's   Monitor LFTs  stable  abdominal ultrasound

## 2022-12-12 NOTE — PROGRESS NOTE ADULT - PROBLEM SELECTOR PLAN 5
Patient was seen by his PCP , Vani Veloz with the following meds :   amiodarone 200 mg oral tablet:  one tab daily --> DC as per wife   Hydrea 500 mg oral capsule: 4 cap(s) orally once a day--> pt prefers 3 tabs instead of 4  colchicine---> DC as per wife   Gbapentin 300mg BID--> decreased to daily due to inc creat   folic acid  lisinopril 10 mg oral tablet: 1 tab(s) orally once a day--> not taking as per wife   Metoprolol ER 25 mg oral daily --> patient is on Metoprolol 75mg oral daily as per wife   Multiple Vitamins oral tablet: 1 tab(s) orally once a day    The Eliquis was DC about one month ago as per patient by his hematologist, Salazar Santos in [place], [place]
Patient was seen by his PCP , Vani Veloz with the following meds :   amiodarone 200 mg oral tablet:  one tab daily --> DC as per wife   Hydrea 500 mg oral capsule: 4 cap(s) orally once a day--> pt prefers 3 tabs instead of 4  colchicine---> DC as per wife   Gbapentin 300mg BID--> decreased to daily due to inc creat   folic acid  lisinopril 10 mg oral tablet: 1 tab(s) orally once a day--> not taking as per wife   Metoprolol ER 25 mg oral daily --> patient is on Metoprolol 75mg oral daily as per wife   Multiple Vitamins oral tablet: 1 tab(s) orally once a day    The Eliquis was DC about one month ago as per patient by his hematologist, Salazar Santos in [place], [place]

## 2022-12-12 NOTE — PROGRESS NOTE ADULT - ASSESSMENT
51M SS disease, gout admitted for ALFREDO, elevated LFTs, and sickle cell pain crisis. 
52M with hx of SS disease, gout here with lower back pain that started over the weekend.
51M SS disease, gout admitted for ALFREDO, elevated LFTs, and sickle cell pain crisis. 
51M SS disease, gout admitted for ALFREDO, elevated LFTs, and sickle cell pain crisis.

## 2022-12-13 LAB — VIT D25+D1,25 OH+D1,25 PNL SERPL-MCNC: 55.3 PG/ML — SIGNIFICANT CHANGE UP (ref 19.9–79.3)

## 2022-12-19 ENCOUNTER — APPOINTMENT (OUTPATIENT)
Dept: INTERNAL MEDICINE | Facility: CLINIC | Age: 52
End: 2022-12-19

## 2022-12-19 ENCOUNTER — OUTPATIENT (OUTPATIENT)
Dept: OUTPATIENT SERVICES | Facility: HOSPITAL | Age: 52
LOS: 1 days | End: 2022-12-19

## 2022-12-19 VITALS
SYSTOLIC BLOOD PRESSURE: 140 MMHG | WEIGHT: 225 LBS | DIASTOLIC BLOOD PRESSURE: 78 MMHG | HEART RATE: 78 BPM | HEIGHT: 61 IN | BODY MASS INDEX: 42.48 KG/M2 | OXYGEN SATURATION: 98 % | RESPIRATION RATE: 16 BRPM

## 2022-12-19 DIAGNOSIS — K59.09 OTHER CONSTIPATION: ICD-10-CM

## 2022-12-19 PROCEDURE — 99214 OFFICE O/P EST MOD 30 MIN: CPT

## 2022-12-20 DIAGNOSIS — E83.19 OTHER DISORDERS OF IRON METABOLISM: ICD-10-CM

## 2022-12-20 DIAGNOSIS — D57.1 SICKLE-CELL DISEASE WITHOUT CRISIS: ICD-10-CM

## 2022-12-20 DIAGNOSIS — N18.31 CHRONIC KIDNEY DISEASE, STAGE 3A: ICD-10-CM

## 2022-12-20 DIAGNOSIS — I10 ESSENTIAL (PRIMARY) HYPERTENSION: ICD-10-CM

## 2022-12-20 DIAGNOSIS — K59.09 OTHER CONSTIPATION: ICD-10-CM

## 2022-12-20 LAB — FERRITIN SERPL-MCNC: 3233 NG/ML

## 2022-12-20 NOTE — HISTORY OF PRESENT ILLNESS
[de-identified] : The patient is a 53 yo male with HGB SS, here for f/u. Taking  HU 2000 mg QD.Has restarted Aranesp with Heme, but it is not working as well as it had been. . He is no longer taking Oxbryta due to severe diarrhea with the Oxbryta that never resolved.\par He is now having frequent transfusions with his hematologist, and is maintaining hemoglobin @ 8. He is requiring @ 2 units PRBCs per month to maintin HGB of 8.His SCD is stable, and he is asymptomatic. He has rare pain, no dyspnea. \par Without the Oxbryta, he  has constipation due to IBS, and is managed with Linzess.\par He is following with rheum for gout. His colchicine has been discontinued, and now he is taking prednisone 2.5 mg QD, with excellent reduction of kneee swelling and pain.  \par The patient has been compliant with all medication, as well as purine-free diet. \par Now taking Metoprolol ER 25 mg QD and lisinopril 10 mg QD for HTN\par S/P PE- taking eliquis 5 bid\par The patient is back at work, working FT as a  for the [employer]. He enjoys the work, and has no limitations regarding his work due to medical issues.\par s/p past episode of a. flutter, now taking amiodarone 200mg QD- Has routine f/u with cardiology. He is asymptomatic.\par Taking HU 2000 mg QD.\par \par Hematologist: Dr. BurciagaLub-[phone]\par [phone]\par fully covid vaxed, and boostered\par \par HGB 12/12/22\par 8.4\par creat: 1.35\par GFR 63\par ferritin- 12/19/22- 3233\par \par PE: gen- wdwn male in nad, talkative \par vss\par mild icterus\par lungs- cta\par cor: rrr-m\par abd benign, obese\par ext; -c/c/e, scarring from past LE ulcers, both ankles\par knees: moderate effusion of right knee, pain with flexion, left knee with small effusion\par \par a/P- 53 yo male with HGB SS, s/p PE, with gout and a. flutter\par 1. HTN- metoprolol 25 mg QD with lisinopril 10 mg QD\par 2.gout-   allopurinol 150 mg QD, now taking prednisone 2.5 mg QD - now no longer taking colchicine\par followed by rheumatology\par 3. HU- continue HU to 2000 mg QD, advise the patient to go for his usual aranesp -await labs drawn by his hematologist\par 4. a. flutter, stable- f/u with cardiology, continue amiodarone\par 5. patient encouraged to wear mask despite covid vax, as disease is still a risk\par 6. dilaudid- 4 mg tab- #20\par ISTOP checked\par 7. iron overload- will start Jadenu, 720 mg QD, 7mg /kg, and follow GFR carefully- \par \par Brittany Veloz MD\par

## 2023-01-24 ENCOUNTER — LABORATORY RESULT (OUTPATIENT)
Age: 53
End: 2023-01-24

## 2023-01-24 LAB
ALBUMIN SERPL ELPH-MCNC: 3.8 G/DL
ALP BLD-CCNC: 144 U/L
ALT SERPL-CCNC: 25 U/L
ANION GAP SERPL CALC-SCNC: 9 MMOL/L
AST SERPL-CCNC: 23 U/L
BILIRUB SERPL-MCNC: 0.4 MG/DL
BUN SERPL-MCNC: 29 MG/DL
CALCIUM SERPL-MCNC: 10 MG/DL
CHLORIDE SERPL-SCNC: 104 MMOL/L
CO2 SERPL-SCNC: 23 MMOL/L
CREAT SERPL-MCNC: 1.43 MG/DL
EGFR: 59 ML/MIN/1.73M2
GLUCOSE SERPL-MCNC: 123 MG/DL
POTASSIUM SERPL-SCNC: 4.2 MMOL/L
PROT SERPL-MCNC: 6.5 G/DL
SODIUM SERPL-SCNC: 136 MMOL/L
URATE SERPL-MCNC: 7.2 MG/DL

## 2023-01-25 LAB
BASOPHILS # BLD AUTO: 0 K/UL
BASOPHILS NFR BLD AUTO: 0 %
EOSINOPHIL # BLD AUTO: 0.01 K/UL
EOSINOPHIL NFR BLD AUTO: 0.3 %
HCT VFR BLD CALC: 24.7 %
HGB BLD-MCNC: 8.3 G/DL
IMM GRANULOCYTES NFR BLD AUTO: 0.6 %
LYMPHOCYTES # BLD AUTO: 0.87 K/UL
LYMPHOCYTES NFR BLD AUTO: 26.9 %
MAN DIFF?: NORMAL
MCHC RBC-ENTMCNC: 33.2 PG
MCHC RBC-ENTMCNC: 33.6 GM/DL
MCV RBC AUTO: 98.8 FL
MONOCYTES # BLD AUTO: 0.31 K/UL
MONOCYTES NFR BLD AUTO: 9.6 %
NEUTROPHILS # BLD AUTO: 2.02 K/UL
NEUTROPHILS NFR BLD AUTO: 62.6 %
PLATELET # BLD AUTO: 296 K/UL
RBC # BLD: 2.5 M/UL
RBC # FLD: 20.8 %
WBC # FLD AUTO: 3.23 K/UL

## 2023-02-16 ENCOUNTER — LABORATORY RESULT (OUTPATIENT)
Age: 53
End: 2023-02-16

## 2023-02-16 ENCOUNTER — APPOINTMENT (OUTPATIENT)
Dept: INTERNAL MEDICINE | Facility: CLINIC | Age: 53
End: 2023-02-16
Payer: COMMERCIAL

## 2023-02-16 ENCOUNTER — OUTPATIENT (OUTPATIENT)
Dept: OUTPATIENT SERVICES | Facility: HOSPITAL | Age: 53
LOS: 1 days | End: 2023-02-16

## 2023-02-16 VITALS
OXYGEN SATURATION: 98 % | WEIGHT: 238 LBS | DIASTOLIC BLOOD PRESSURE: 80 MMHG | SYSTOLIC BLOOD PRESSURE: 140 MMHG | HEART RATE: 74 BPM | BODY MASS INDEX: 33.32 KG/M2 | HEIGHT: 71 IN

## 2023-02-16 DIAGNOSIS — L85.3 XEROSIS CUTIS: ICD-10-CM

## 2023-02-16 DIAGNOSIS — Z90.49 ACQUIRED ABSENCE OF OTHER SPECIFIED PARTS OF DIGESTIVE TRACT: Chronic | ICD-10-CM

## 2023-02-16 PROCEDURE — 99214 OFFICE O/P EST MOD 30 MIN: CPT

## 2023-02-16 RX ORDER — GABAPENTIN 300 MG/1
300 CAPSULE ORAL
Qty: 60 | Refills: 2 | Status: COMPLETED | COMMUNITY
Start: 2022-03-15 | End: 2023-02-16

## 2023-02-16 RX ORDER — DIPHENHYDRAMINE HCL 25 MG/1
CAPSULE, LIQUID FILLED ORAL
Qty: 1 | Refills: 0 | Status: ACTIVE | COMMUNITY
Start: 2023-02-16 | End: 1900-01-01

## 2023-02-16 RX ORDER — HYDROCORTISONE 25 MG/G
2.5 CREAM TOPICAL 3 TIMES DAILY
Qty: 1 | Refills: 3 | Status: ACTIVE | COMMUNITY
Start: 2023-02-16 | End: 1900-01-01

## 2023-02-17 DIAGNOSIS — E83.19 OTHER DISORDERS OF IRON METABOLISM: ICD-10-CM

## 2023-02-17 DIAGNOSIS — I48.92 UNSPECIFIED ATRIAL FLUTTER: ICD-10-CM

## 2023-02-17 DIAGNOSIS — D57.1 SICKLE-CELL DISEASE WITHOUT CRISIS: ICD-10-CM

## 2023-02-17 DIAGNOSIS — Z87.19 PERSONAL HISTORY OF OTHER DISEASES OF THE DIGESTIVE SYSTEM: ICD-10-CM

## 2023-02-17 DIAGNOSIS — I10 ESSENTIAL (PRIMARY) HYPERTENSION: ICD-10-CM

## 2023-02-17 DIAGNOSIS — M10.9 GOUT, UNSPECIFIED: ICD-10-CM

## 2023-02-17 DIAGNOSIS — L85.3 XEROSIS CUTIS: ICD-10-CM

## 2023-02-17 LAB
25(OH)D3 SERPL-MCNC: 37.1 NG/ML
ALBUMIN SERPL ELPH-MCNC: 3.9 G/DL
ALP BLD-CCNC: 151 U/L
ALT SERPL-CCNC: 27 U/L
ANION GAP SERPL CALC-SCNC: 12 MMOL/L
AST SERPL-CCNC: 25 U/L
BASOPHILS # BLD AUTO: 0.02 K/UL
BASOPHILS NFR BLD AUTO: 0.3 %
BILIRUB SERPL-MCNC: 0.3 MG/DL
BUN SERPL-MCNC: 29 MG/DL
CALCIUM SERPL-MCNC: 10 MG/DL
CHLORIDE SERPL-SCNC: 105 MMOL/L
CO2 SERPL-SCNC: 22 MMOL/L
CREAT SERPL-MCNC: 1.39 MG/DL
EGFR: 61 ML/MIN/1.73M2
EOSINOPHIL # BLD AUTO: 0.09 K/UL
EOSINOPHIL NFR BLD AUTO: 1.5 %
FERRITIN SERPL-MCNC: 2788 NG/ML
GLUCOSE SERPL-MCNC: 88 MG/DL
HCT VFR BLD CALC: 27.8 %
HGB BLD-MCNC: 9.1 G/DL
IMM GRANULOCYTES NFR BLD AUTO: 0.3 %
LYMPHOCYTES # BLD AUTO: 1.49 K/UL
LYMPHOCYTES NFR BLD AUTO: 25.3 %
MAN DIFF?: NORMAL
MCHC RBC-ENTMCNC: 32.5 PG
MCHC RBC-ENTMCNC: 32.7 GM/DL
MCV RBC AUTO: 99.3 FL
MONOCYTES # BLD AUTO: 0.68 K/UL
MONOCYTES NFR BLD AUTO: 11.5 %
NEUTROPHILS # BLD AUTO: 3.6 K/UL
NEUTROPHILS NFR BLD AUTO: 61.1 %
PLATELET # BLD AUTO: 230 K/UL
POTASSIUM SERPL-SCNC: 4.6 MMOL/L
PROT SERPL-MCNC: 6.6 G/DL
RBC # BLD: 2.8 M/UL
RBC # BLD: 2.8 M/UL
RBC # FLD: 24.1 %
RETICS # AUTO: 4.8 %
RETICS AGGREG/RBC NFR: 133.6 K/UL
SODIUM SERPL-SCNC: 139 MMOL/L
WBC # FLD AUTO: 5.9 K/UL

## 2023-02-17 NOTE — HISTORY OF PRESENT ILLNESS
[de-identified] : The patient is a 51 yo male with HGB SS, here for f/u. Taking  HU 2000 mg QD.Has restarted Aranesp with Heme, but it is not working as well as it had been . He is no longer taking Oxbryta due to severe diarrhea with the Oxbryta that never resolved.\par The patient was seen by [facility] [place]. he is interested in being part of a study. He is currently not a candidate because he is taking eliquis, prednisone, and Arinesp. He is willing to go up to [facility] to see what other study medications/procedures might be available to him. \par He is now having frequent transfusions with his hematologist, and is maintaining hemoglobin @ 8. He is requiring @ 2 units PRBCs per month to maintain HGB of 8.His SCD is stable, and he is asymptomatic. He has rare pain, no dyspnea. \par He is following with rheum for gout. His colchicine has been restarted at 0.3 mg QD. He is also taking prednisone 2.5 mg QD, with excellent reduction of knee swelling and pain.  \par The patient has been compliant with all medication. He has been less compliant with the purine free diet. \par Now taking Metoprolol ER 25 mg QD and lisinopril 10 mg QD for HTN\par S/P PE- taking eliquis 5 bid- PE was in 3/22. 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 \par The patient is back at work, working FT as a  for the [employer]. He enjoys the work, and has no limitations regarding his work due to medical issues.\par s/p past episode of a. flutter, now taking amiodarone 200mg QD- Has routine f/u with cardiology. He is asymptomatic.\par Taking HU 2000 mg QD.\par Taking Jadenu 720 mg QD for iron overload\par \par Hematologist: Dr. BurciagaWlu-[phone]\par [phone]\par fully covid vaxed, and boostered\par \par HGB 12/22/22\par 8.4\par creat: 1.35\par GFR 63\par ferritin- 12/19/22- 3233\par \par PE: gen- wdwn male in nad, talkative \par vss\par mild icterus\par lungs- cta\par cor: rrr-m\par abd benign, obese\par ext; -c/c/e, scarring from past LE ulcers, both ankles\par effusions of knees have completely resolved, full ROM both knees\par \par a/P- 51 yo male with HGB SS, s/p PE, with gout and a. flutter\par 1. HTN- metoprolol 25 mg QD with lisinopril 10 mg QD\par 2.gout-   allopurinol 200 mg QD, now taking prednisone 2.5 mg QD with colchicine 0.3 mg QD\par followed by rheumatology\par 3.SCD- now transfusion-dependent-\par to continue with monthly transfusions, with Jadenu for iron overload\par  HU- continue HU to 2000 mg QD\par patient to schedule appt with [facility]  to see if there are other trials that he qualifies for\par 4. a. flutter, stable- f/u with cardiology, continue amiodarone 200 mg QD\par 5. 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 f/u  2 months\par \par Brittany Veloz MD\par

## 2023-02-21 LAB
HGB A MFR BLD: 62.5 %
HGB A2 MFR BLD: 2.4 %
HGB F MFR BLD: 12.3 %
HGB FRACT BLD-IMP: NORMAL
HGB S MFR BLD: 22.8 %

## 2023-03-15 ENCOUNTER — APPOINTMENT (OUTPATIENT)
Dept: NEPHROLOGY | Facility: CLINIC | Age: 53
End: 2023-03-15

## 2023-04-11 ENCOUNTER — LABORATORY RESULT (OUTPATIENT)
Age: 53
End: 2023-04-11

## 2023-04-12 ENCOUNTER — NON-APPOINTMENT (OUTPATIENT)
Age: 53
End: 2023-04-12

## 2023-04-12 LAB
ALBUMIN SERPL ELPH-MCNC: 3.8 G/DL
ALP BLD-CCNC: 141 U/L
ALT SERPL-CCNC: 30 U/L
ANION GAP SERPL CALC-SCNC: 12 MMOL/L
AST SERPL-CCNC: 31 U/L
BASOPHILS # BLD AUTO: 0.02 K/UL
BASOPHILS NFR BLD AUTO: 0.4 %
BILIRUB SERPL-MCNC: 0.4 MG/DL
BUN SERPL-MCNC: 22 MG/DL
CALCIUM SERPL-MCNC: 9.9 MG/DL
CHLORIDE SERPL-SCNC: 106 MMOL/L
CO2 SERPL-SCNC: 21 MMOL/L
CREAT SERPL-MCNC: 1.29 MG/DL
EGFR: 66 ML/MIN/1.73M2
EOSINOPHIL # BLD AUTO: 0.04 K/UL
EOSINOPHIL NFR BLD AUTO: 0.8 %
GLUCOSE SERPL-MCNC: 114 MG/DL
HCT VFR BLD CALC: 23 %
HGB BLD-MCNC: 7.5 G/DL
IMM GRANULOCYTES NFR BLD AUTO: 0.6 %
LYMPHOCYTES # BLD AUTO: 1.01 K/UL
LYMPHOCYTES NFR BLD AUTO: 20 %
MAN DIFF?: NORMAL
MCHC RBC-ENTMCNC: 32.6 GM/DL
MCHC RBC-ENTMCNC: 34.4 PG
MCV RBC AUTO: 105.5 FL
MONOCYTES # BLD AUTO: 0.47 K/UL
MONOCYTES NFR BLD AUTO: 9.3 %
NEUTROPHILS # BLD AUTO: 3.48 K/UL
NEUTROPHILS NFR BLD AUTO: 68.9 %
PLATELET # BLD AUTO: 239 K/UL
POTASSIUM SERPL-SCNC: 4.9 MMOL/L
PROT SERPL-MCNC: 6.4 G/DL
RBC # BLD: 2.18 M/UL
RBC # FLD: 29.1 %
SODIUM SERPL-SCNC: 139 MMOL/L
URATE SERPL-MCNC: 6.7 MG/DL
WBC # FLD AUTO: 5.05 K/UL

## 2023-04-14 ENCOUNTER — NON-APPOINTMENT (OUTPATIENT)
Age: 53
End: 2023-04-14

## 2023-04-17 ENCOUNTER — APPOINTMENT (OUTPATIENT)
Dept: RHEUMATOLOGY | Facility: CLINIC | Age: 53
End: 2023-04-17

## 2023-04-20 ENCOUNTER — LABORATORY RESULT (OUTPATIENT)
Age: 53
End: 2023-04-20

## 2023-04-20 ENCOUNTER — OUTPATIENT (OUTPATIENT)
Dept: OUTPATIENT SERVICES | Facility: HOSPITAL | Age: 53
LOS: 1 days | End: 2023-04-20

## 2023-04-20 ENCOUNTER — APPOINTMENT (OUTPATIENT)
Dept: INTERNAL MEDICINE | Facility: CLINIC | Age: 53
End: 2023-04-20
Payer: COMMERCIAL

## 2023-04-20 DIAGNOSIS — Z90.49 ACQUIRED ABSENCE OF OTHER SPECIFIED PARTS OF DIGESTIVE TRACT: Chronic | ICD-10-CM

## 2023-04-20 PROCEDURE — 99214 OFFICE O/P EST MOD 30 MIN: CPT

## 2023-04-21 LAB
ALBUMIN SERPL ELPH-MCNC: 4 G/DL
ALP BLD-CCNC: 155 U/L
ALT SERPL-CCNC: 70 U/L
ANION GAP SERPL CALC-SCNC: 13 MMOL/L
AST SERPL-CCNC: 37 U/L
BASOPHILS # BLD AUTO: 0.01 K/UL
BASOPHILS NFR BLD AUTO: 0.2 %
BILIRUB SERPL-MCNC: 0.3 MG/DL
BUN SERPL-MCNC: 31 MG/DL
CALCIUM SERPL-MCNC: 10.3 MG/DL
CHLORIDE SERPL-SCNC: 107 MMOL/L
CO2 SERPL-SCNC: 17 MMOL/L
CREAT SERPL-MCNC: 1.63 MG/DL
EGFR: 50 ML/MIN/1.73M2
EOSINOPHIL # BLD AUTO: 0.03 K/UL
EOSINOPHIL NFR BLD AUTO: 0.6 %
FERRITIN SERPL-MCNC: 2833 NG/ML
GLUCOSE SERPL-MCNC: 136 MG/DL
HCT VFR BLD CALC: 22.2 %
HGB BLD-MCNC: 7.4 G/DL
IMM GRANULOCYTES NFR BLD AUTO: 0.4 %
LYMPHOCYTES # BLD AUTO: 1.7 K/UL
LYMPHOCYTES NFR BLD AUTO: 33.5 %
MAN DIFF?: NORMAL
MCHC RBC-ENTMCNC: 33.3 GM/DL
MCHC RBC-ENTMCNC: 35.7 PG
MCV RBC AUTO: 107.2 FL
MONOCYTES # BLD AUTO: 0.51 K/UL
MONOCYTES NFR BLD AUTO: 10.1 %
NEUTROPHILS # BLD AUTO: 2.8 K/UL
NEUTROPHILS NFR BLD AUTO: 55.2 %
PLATELET # BLD AUTO: 298 K/UL
POTASSIUM SERPL-SCNC: 3.9 MMOL/L
PROT SERPL-MCNC: 6.6 G/DL
RBC # BLD: 2.07 M/UL
RBC # BLD: 2.07 M/UL
RBC # FLD: NORMAL
RETICS # AUTO: 7.5 %
RETICS AGGREG/RBC NFR: 155 K/UL
SODIUM SERPL-SCNC: 137 MMOL/L
WBC # FLD AUTO: 5.07 K/UL

## 2023-04-21 NOTE — HISTORY OF PRESENT ILLNESS
[de-identified] : The patient is a 53 yo male with HGB SS, here for f/u. Taking  HU 2000 mg QD.Has restarted Aranesp with Heme, but it is not working as well as it had been . He is no longer taking Oxbryta due to severe diarrhea with the Oxbryta that never resolved. \par He is now having frequent transfusions with his hematologist, and is maintaining hemoglobin @ 8. He is requiring @ 2 units PRBCs per month to maintain HGB of 8.His SCD is stable, and he is asymptomatic. He has rare pain, no dyspnea. \par He is following with rheum for gout. His colchicine has been restarted at 0.3 mg QD. He is also taking prednisone 2.5 mg QD, with excellent reduction of knee swelling and pain.  \par The patient has been compliant with all medication. He has been less compliant with the purine free diet. \par Now taking Metoprolol ER 75 mg QD and lisinopril 10 mg QD for HTN\par S/P PE- taking eliquis 5 bid- PE was in 3/22. 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 \par The patient is back at work, working FT as a  for the [employer]. He enjoys the work, and has no limitations regarding his work due to medical issues.\par s/p past episode of a. flutter, now taking Multaq 400 mg QD- Has routine f/u with cardiology. He is asymptomatic.\par Taking HU 2000 mg QD.\par Taking Jadenu 720 mg QD for iron overload\par \par Hematologist: Dr. BurciagaLqg-[phone]\par Fxhic-[phone]\par [phone]\par fully covid vaxed, and boostered\par \par HGB 4/20/22\par 7.4\par creat: 1.67\par GFR 50\par ferritin- 4/20/22- 2833\par \par PE: gen- wdwn male in nad, talkative \par vss-140/80\par mild icterus\par lungs- cta\par cor: rrr-m\par abd benign, obese\par ext; -c/c/e, scarring from past LE ulcers, both ankles\par effusions of knees have completely resolved, full ROM both knees\par \par a/P- 53 yo male with HGB SS, s/p PE, with gout and a. flutter\par 1. HTN- metoprolol 75 mg QD with lisinopril 10 mg QD\par 2.gout-   allopurinol 200 mg QD, now taking prednisone 2.5 mg QD with colchicine 0.3 mg QD\par followed by rheumatology\par 3.SCD- now transfusion-dependent-\par to continue with monthly transfusions, with Jadenu for iron overload\par  HU- continue HU to 2000 mg QD\par 4. a. flutter, stable- f/u with cardiology, continue Multaq 400 mg QD\par 5. 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 f/u  2 months\par 8. occasional pain- dilaudid 2 mg tab- #30\par ISTOP checked\par \par Brittany Veloz MD\par

## 2023-04-24 DIAGNOSIS — I10 ESSENTIAL (PRIMARY) HYPERTENSION: ICD-10-CM

## 2023-04-24 DIAGNOSIS — E83.19 OTHER DISORDERS OF IRON METABOLISM: ICD-10-CM

## 2023-04-24 DIAGNOSIS — D57.1 SICKLE-CELL DISEASE WITHOUT CRISIS: ICD-10-CM

## 2023-04-24 DIAGNOSIS — M10.9 GOUT, UNSPECIFIED: ICD-10-CM

## 2023-04-24 DIAGNOSIS — N18.31 CHRONIC KIDNEY DISEASE, STAGE 3A: ICD-10-CM

## 2023-04-24 DIAGNOSIS — I48.92 UNSPECIFIED ATRIAL FLUTTER: ICD-10-CM

## 2023-04-24 RX ORDER — MOMETASONE FUROATE 1 MG/G
0.1 OINTMENT TOPICAL
Qty: 1 | Refills: 6 | Status: ACTIVE | COMMUNITY
Start: 2023-04-24 | End: 1900-01-01

## 2023-04-25 LAB
HGB A MFR BLD: 36.3 %
HGB A2 MFR BLD: 2.5 %
HGB F MFR BLD: 19.9 %
HGB FRACT BLD-IMP: NORMAL
HGB S MFR BLD: 41.3 %

## 2023-05-10 ENCOUNTER — RESULT REVIEW (OUTPATIENT)
Age: 53
End: 2023-05-10

## 2023-05-10 ENCOUNTER — LABORATORY RESULT (OUTPATIENT)
Age: 53
End: 2023-05-10

## 2023-05-10 ENCOUNTER — APPOINTMENT (OUTPATIENT)
Dept: RHEUMATOLOGY | Facility: CLINIC | Age: 53
End: 2023-05-10
Payer: COMMERCIAL

## 2023-05-10 VITALS
OXYGEN SATURATION: 98 % | TEMPERATURE: 97.1 F | HEART RATE: 88 BPM | RESPIRATION RATE: 16 BRPM | SYSTOLIC BLOOD PRESSURE: 132 MMHG | HEIGHT: 71 IN | WEIGHT: 240 LBS | BODY MASS INDEX: 33.6 KG/M2 | DIASTOLIC BLOOD PRESSURE: 73 MMHG

## 2023-05-10 PROCEDURE — 99214 OFFICE O/P EST MOD 30 MIN: CPT

## 2023-05-11 LAB
ALBUMIN SERPL ELPH-MCNC: 3.8 G/DL
ALP BLD-CCNC: 145 U/L
ALT SERPL-CCNC: 39 U/L
ANION GAP SERPL CALC-SCNC: 11 MMOL/L
AST SERPL-CCNC: 24 U/L
BILIRUB SERPL-MCNC: 0.4 MG/DL
BUN SERPL-MCNC: 34 MG/DL
CALCIUM SERPL-MCNC: 10 MG/DL
CHLORIDE SERPL-SCNC: 115 MMOL/L
CO2 SERPL-SCNC: 18 MMOL/L
CREAT SERPL-MCNC: 1.67 MG/DL
EGFR: 49 ML/MIN/1.73M2
GLUCOSE SERPL-MCNC: 132 MG/DL
POTASSIUM SERPL-SCNC: 4.8 MMOL/L
PROT SERPL-MCNC: 6.6 G/DL
SODIUM SERPL-SCNC: 144 MMOL/L
URATE SERPL-MCNC: 5.6 MG/DL

## 2023-05-12 LAB
BASOPHILS # BLD AUTO: 0 K/UL
BASOPHILS NFR BLD AUTO: 0 %
EOSINOPHIL # BLD AUTO: 0.12 K/UL
EOSINOPHIL NFR BLD AUTO: 3.5 %
HCT VFR BLD CALC: 22.1 %
HGB BLD-MCNC: 7.3 G/DL
LYMPHOCYTES # BLD AUTO: 1.05 K/UL
LYMPHOCYTES NFR BLD AUTO: 29.6 %
MAN DIFF?: NORMAL
MCHC RBC-ENTMCNC: 33 GM/DL
MCHC RBC-ENTMCNC: 36 PG
MCV RBC AUTO: 108.9 FL
MONOCYTES # BLD AUTO: 0.19 K/UL
MONOCYTES NFR BLD AUTO: 5.2 %
NEUTROPHILS # BLD AUTO: 2.2 K/UL
NEUTROPHILS NFR BLD AUTO: 61.7 %
PLATELET # BLD AUTO: 171 K/UL
RBC # BLD: 2.03 M/UL
RBC # FLD: 27.5 %
WBC # FLD AUTO: 3.56 K/UL

## 2023-05-15 NOTE — ASSESSMENT
Called patient and chart review was done. Patient stated that the [facility] Emergency Room wanted patient to get additional testing. Patient wanted to get provider Dr. Inga Bradley opinion on it. 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 Diagnostic arthrocentesis of right knee monoarthritis demonstrated MSU crystals consistent with gout.\par s/p 2 x IACI \par on allopurinol and colchicine 0.3mg daily\par no gout flares\par last uric acid 6.7\par \par Plan:\par # Gout\par -Check uric acid today, goal serum uric acid less than 6. HLA  negative. uptitrate allopurinol cw  colchicine to 0.3 mg daily. Steroids should be avoided as they can trigger/exacerbate sickle cell crisis. Doses of colchicine higher than 0.6 mg daily should be avoided due to increased toxicity in patients on amiodarone\par \par # Knee pain\par - PT referral for knee OA and deconditioning\par -Ortho follow up of ON knees\par -repeat knee xrays\par -if there is evidence of knee OA, can consider gel injections\par \par -Follow-up in 3 months

## 2023-05-15 NOTE — PHYSICAL EXAM
[General Appearance - Alert] : alert [General Appearance - In No Acute Distress] : in no acute distress [General Appearance - Well Nourished] : well nourished [FreeTextEntry1] : mild ttp right knee no swelling warmth, full ROM [] : no rash [Sensation] : the sensory exam was normal to light touch and pinprick [Motor Exam] : the motor exam was normal [Oriented To Time, Place, And Person] : oriented to person, place, and time

## 2023-05-16 ENCOUNTER — OUTPATIENT (OUTPATIENT)
Dept: OUTPATIENT SERVICES | Facility: HOSPITAL | Age: 53
LOS: 1 days | End: 2023-05-16
Payer: COMMERCIAL

## 2023-05-16 ENCOUNTER — APPOINTMENT (OUTPATIENT)
Dept: RADIOLOGY | Facility: CLINIC | Age: 53
End: 2023-05-16
Payer: COMMERCIAL

## 2023-05-16 DIAGNOSIS — M87.9 OSTEONECROSIS, UNSPECIFIED: ICD-10-CM

## 2023-05-16 DIAGNOSIS — Z90.49 ACQUIRED ABSENCE OF OTHER SPECIFIED PARTS OF DIGESTIVE TRACT: Chronic | ICD-10-CM

## 2023-05-16 PROCEDURE — 73562 X-RAY EXAM OF KNEE 3: CPT | Mod: 26,50

## 2023-05-16 PROCEDURE — 73562 X-RAY EXAM OF KNEE 3: CPT

## 2023-05-30 ENCOUNTER — APPOINTMENT (OUTPATIENT)
Dept: ORTHOPEDIC SURGERY | Facility: CLINIC | Age: 53
End: 2023-05-30

## 2023-06-04 ENCOUNTER — INPATIENT (INPATIENT)
Facility: HOSPITAL | Age: 53
LOS: 1 days | Discharge: HOME | DRG: 812 | End: 2023-06-06
Attending: INTERNAL MEDICINE | Admitting: HOSPITALIST
Payer: COMMERCIAL

## 2023-06-04 VITALS
RESPIRATION RATE: 20 BRPM | SYSTOLIC BLOOD PRESSURE: 139 MMHG | DIASTOLIC BLOOD PRESSURE: 76 MMHG | HEART RATE: 73 BPM | TEMPERATURE: 98 F | WEIGHT: 250 LBS | HEIGHT: 71 IN | OXYGEN SATURATION: 98 %

## 2023-06-04 DIAGNOSIS — Z29.9 ENCOUNTER FOR PROPHYLACTIC MEASURES, UNSPECIFIED: ICD-10-CM

## 2023-06-04 DIAGNOSIS — D57.00 HB-SS DISEASE WITH CRISIS, UNSPECIFIED: ICD-10-CM

## 2023-06-04 DIAGNOSIS — I82.409 ACUTE EMBOLISM AND THROMBOSIS OF UNSPECIFIED DEEP VEINS OF UNSPECIFIED LOWER EXTREMITY: ICD-10-CM

## 2023-06-04 DIAGNOSIS — Z90.49 ACQUIRED ABSENCE OF OTHER SPECIFIED PARTS OF DIGESTIVE TRACT: Chronic | ICD-10-CM

## 2023-06-04 DIAGNOSIS — I10 ESSENTIAL (PRIMARY) HYPERTENSION: ICD-10-CM

## 2023-06-04 DIAGNOSIS — I48.91 UNSPECIFIED ATRIAL FIBRILLATION: ICD-10-CM

## 2023-06-04 DIAGNOSIS — N17.9 ACUTE KIDNEY FAILURE, UNSPECIFIED: ICD-10-CM

## 2023-06-04 DIAGNOSIS — K21.9 GASTRO-ESOPHAGEAL REFLUX DISEASE WITHOUT ESOPHAGITIS: ICD-10-CM

## 2023-06-04 DIAGNOSIS — M10.9 GOUT, UNSPECIFIED: ICD-10-CM

## 2023-06-04 LAB
ALBUMIN SERPL ELPH-MCNC: 3.8 G/DL — SIGNIFICANT CHANGE UP (ref 3.3–5)
ALP SERPL-CCNC: 128 U/L — HIGH (ref 40–120)
ALT FLD-CCNC: 42 U/L — SIGNIFICANT CHANGE UP (ref 10–45)
ANION GAP SERPL CALC-SCNC: 8 MMOL/L — SIGNIFICANT CHANGE UP (ref 5–17)
ANISOCYTOSIS BLD QL: SIGNIFICANT CHANGE UP
APPEARANCE UR: CLEAR — SIGNIFICANT CHANGE UP
APTT BLD: 26.4 SEC — LOW (ref 27.5–35.5)
AST SERPL-CCNC: 26 U/L — SIGNIFICANT CHANGE UP (ref 10–40)
BACTERIA # UR AUTO: NEGATIVE — SIGNIFICANT CHANGE UP
BASOPHILS # BLD AUTO: 0 K/UL — SIGNIFICANT CHANGE UP (ref 0–0.2)
BASOPHILS NFR BLD AUTO: 0 % — SIGNIFICANT CHANGE UP (ref 0–2)
BILIRUB DIRECT SERPL-MCNC: 0.1 MG/DL — SIGNIFICANT CHANGE UP (ref 0–0.3)
BILIRUB INDIRECT FLD-MCNC: 0.3 MG/DL — SIGNIFICANT CHANGE UP (ref 0.2–1)
BILIRUB SERPL-MCNC: 0.4 MG/DL — SIGNIFICANT CHANGE UP (ref 0.2–1.2)
BILIRUB SERPL-MCNC: 0.4 MG/DL — SIGNIFICANT CHANGE UP (ref 0.2–1.2)
BILIRUB UR-MCNC: NEGATIVE — SIGNIFICANT CHANGE UP
BUN SERPL-MCNC: 22 MG/DL — SIGNIFICANT CHANGE UP (ref 7–23)
CALCIUM SERPL-MCNC: 10 MG/DL — SIGNIFICANT CHANGE UP (ref 8.4–10.5)
CHLORIDE SERPL-SCNC: 109 MMOL/L — HIGH (ref 96–108)
CO2 SERPL-SCNC: 24 MMOL/L — SIGNIFICANT CHANGE UP (ref 22–31)
COLOR SPEC: SIGNIFICANT CHANGE UP
CREAT ?TM UR-MCNC: 68 MG/DL — SIGNIFICANT CHANGE UP
CREAT SERPL-MCNC: 1.59 MG/DL — HIGH (ref 0.5–1.3)
DIFF PNL FLD: NEGATIVE — SIGNIFICANT CHANGE UP
EGFR: 52 ML/MIN/1.73M2 — LOW
EOSINOPHIL # BLD AUTO: 0 K/UL — SIGNIFICANT CHANGE UP (ref 0–0.5)
EOSINOPHIL NFR BLD AUTO: 0 % — SIGNIFICANT CHANGE UP (ref 0–6)
EPI CELLS # UR: 0 /HPF — SIGNIFICANT CHANGE UP
GIANT PLATELETS BLD QL SMEAR: PRESENT — SIGNIFICANT CHANGE UP
GLUCOSE BLDC GLUCOMTR-MCNC: 112 MG/DL — HIGH (ref 70–99)
GLUCOSE SERPL-MCNC: 116 MG/DL — HIGH (ref 70–99)
GLUCOSE UR QL: NEGATIVE — SIGNIFICANT CHANGE UP
HCT VFR BLD CALC: 24.6 % — LOW (ref 39–50)
HGB BLD-MCNC: 8.3 G/DL — LOW (ref 13–17)
INR BLD: 1.18 RATIO — HIGH (ref 0.88–1.16)
KETONES UR-MCNC: NEGATIVE — SIGNIFICANT CHANGE UP
LDH SERPL L TO P-CCNC: 381 U/L — HIGH (ref 50–242)
LEUKOCYTE ESTERASE UR-ACNC: NEGATIVE — SIGNIFICANT CHANGE UP
LYMPHOCYTES # BLD AUTO: 0.92 K/UL — LOW (ref 1–3.3)
LYMPHOCYTES # BLD AUTO: 18.9 % — SIGNIFICANT CHANGE UP (ref 13–44)
MACROCYTES BLD QL: SIGNIFICANT CHANGE UP
MANUAL SMEAR VERIFICATION: SIGNIFICANT CHANGE UP
MCHC RBC-ENTMCNC: 33.7 GM/DL — SIGNIFICANT CHANGE UP (ref 32–36)
MCHC RBC-ENTMCNC: 35.2 PG — HIGH (ref 27–34)
MCV RBC AUTO: 104.2 FL — HIGH (ref 80–100)
MONOCYTES # BLD AUTO: 0.32 K/UL — SIGNIFICANT CHANGE UP (ref 0–0.9)
MONOCYTES NFR BLD AUTO: 6.6 % — SIGNIFICANT CHANGE UP (ref 2–14)
NEUTROPHILS # BLD AUTO: 3.61 K/UL — SIGNIFICANT CHANGE UP (ref 1.8–7.4)
NEUTROPHILS NFR BLD AUTO: 74.5 % — SIGNIFICANT CHANGE UP (ref 43–77)
NITRITE UR-MCNC: NEGATIVE — SIGNIFICANT CHANGE UP
NRBC # BLD: 108 /100 — HIGH (ref 0–0)
OSMOLALITY UR: 388 MOS/KG — SIGNIFICANT CHANGE UP (ref 300–900)
PH UR: 5.5 — SIGNIFICANT CHANGE UP (ref 5–8)
PLAT MORPH BLD: ABNORMAL
PLATELET # BLD AUTO: 229 K/UL — SIGNIFICANT CHANGE UP (ref 150–400)
POIKILOCYTOSIS BLD QL AUTO: SLIGHT — SIGNIFICANT CHANGE UP
POTASSIUM SERPL-MCNC: 4.2 MMOL/L — SIGNIFICANT CHANGE UP (ref 3.5–5.3)
POTASSIUM SERPL-SCNC: 4.2 MMOL/L — SIGNIFICANT CHANGE UP (ref 3.5–5.3)
POTASSIUM UR-SCNC: 26 MMOL/L — SIGNIFICANT CHANGE UP
PROT ?TM UR-MCNC: 23 MG/DL — HIGH (ref 0–12)
PROT SERPL-MCNC: 6.7 G/DL — SIGNIFICANT CHANGE UP (ref 6–8.3)
PROT UR-MCNC: ABNORMAL
PROT/CREAT UR-RTO: 0.3 RATIO — HIGH (ref 0–0.2)
PROTHROM AB SERPL-ACNC: 13.6 SEC — HIGH (ref 10.5–13.4)
RBC # BLD: 2.36 M/UL — LOW (ref 4.2–5.8)
RBC # BLD: 2.36 M/UL — LOW (ref 4.2–5.8)
RBC # FLD: 27.9 % — HIGH (ref 10.3–14.5)
RBC BLD AUTO: ABNORMAL
RBC CASTS # UR COMP ASSIST: 0 /HPF — SIGNIFICANT CHANGE UP (ref 0–4)
RETICS #: 116.8 K/UL — SIGNIFICANT CHANGE UP (ref 25–125)
RETICS/RBC NFR: 5 % — HIGH (ref 0.5–2.5)
SCHISTOCYTES BLD QL AUTO: SLIGHT — SIGNIFICANT CHANGE UP
SODIUM SERPL-SCNC: 141 MMOL/L — SIGNIFICANT CHANGE UP (ref 135–145)
SODIUM UR-SCNC: 57 MMOL/L — SIGNIFICANT CHANGE UP
SP GR SPEC: 1.03 — HIGH (ref 1.01–1.02)
TROPONIN T, HIGH SENSITIVITY RESULT: 16 NG/L — SIGNIFICANT CHANGE UP (ref 0–51)
UROBILINOGEN FLD QL: NEGATIVE — SIGNIFICANT CHANGE UP
WBC # BLD: 4.85 K/UL — SIGNIFICANT CHANGE UP (ref 3.8–10.5)
WBC # FLD AUTO: 4.85 K/UL — SIGNIFICANT CHANGE UP (ref 3.8–10.5)
WBC UR QL: 0 /HPF — SIGNIFICANT CHANGE UP (ref 0–5)

## 2023-06-04 PROCEDURE — 99223 1ST HOSP IP/OBS HIGH 75: CPT

## 2023-06-04 PROCEDURE — 36000 PLACE NEEDLE IN VEIN: CPT

## 2023-06-04 PROCEDURE — 71046 X-RAY EXAM CHEST 2 VIEWS: CPT | Mod: 26

## 2023-06-04 PROCEDURE — 83020 HEMOGLOBIN ELECTROPHORESIS: CPT | Mod: 26

## 2023-06-04 PROCEDURE — 71275 CT ANGIOGRAPHY CHEST: CPT | Mod: 26,MH

## 2023-06-04 PROCEDURE — 99285 EMERGENCY DEPT VISIT HI MDM: CPT | Mod: 25

## 2023-06-04 RX ORDER — HYDROMORPHONE HYDROCHLORIDE 2 MG/ML
1 INJECTION INTRAMUSCULAR; INTRAVENOUS; SUBCUTANEOUS ONCE
Refills: 0 | Status: DISCONTINUED | OUTPATIENT
Start: 2023-06-04 | End: 2023-06-04

## 2023-06-04 RX ORDER — HYDROXYUREA 500 MG/1
3 CAPSULE ORAL
Qty: 0 | Refills: 0 | DISCHARGE

## 2023-06-04 RX ORDER — MAGNESIUM HYDROXIDE 400 MG/1
30 TABLET, CHEWABLE ORAL DAILY
Refills: 0 | Status: DISCONTINUED | OUTPATIENT
Start: 2023-06-04 | End: 2023-06-06

## 2023-06-04 RX ORDER — FOLIC ACID 0.8 MG
1 TABLET ORAL DAILY
Refills: 0 | Status: DISCONTINUED | OUTPATIENT
Start: 2023-06-04 | End: 2023-06-06

## 2023-06-04 RX ORDER — PANTOPRAZOLE SODIUM 20 MG/1
40 TABLET, DELAYED RELEASE ORAL
Refills: 0 | Status: DISCONTINUED | OUTPATIENT
Start: 2023-06-04 | End: 2023-06-04

## 2023-06-04 RX ORDER — SODIUM CHLORIDE 9 MG/ML
1000 INJECTION, SOLUTION INTRAVENOUS ONCE
Refills: 0 | Status: COMPLETED | OUTPATIENT
Start: 2023-06-04 | End: 2023-06-04

## 2023-06-04 RX ORDER — HYDROMORPHONE HYDROCHLORIDE 2 MG/ML
30 INJECTION INTRAMUSCULAR; INTRAVENOUS; SUBCUTANEOUS
Refills: 0 | Status: DISCONTINUED | OUTPATIENT
Start: 2023-06-04 | End: 2023-06-04

## 2023-06-04 RX ORDER — APIXABAN 2.5 MG/1
5 TABLET, FILM COATED ORAL EVERY 12 HOURS
Refills: 0 | Status: DISCONTINUED | OUTPATIENT
Start: 2023-06-04 | End: 2023-06-06

## 2023-06-04 RX ORDER — HYDROMORPHONE HYDROCHLORIDE 2 MG/ML
30 INJECTION INTRAMUSCULAR; INTRAVENOUS; SUBCUTANEOUS
Refills: 0 | Status: DISCONTINUED | OUTPATIENT
Start: 2023-06-04 | End: 2023-06-05

## 2023-06-04 RX ORDER — ACETAMINOPHEN 500 MG
975 TABLET ORAL ONCE
Refills: 0 | Status: COMPLETED | OUTPATIENT
Start: 2023-06-04 | End: 2023-06-04

## 2023-06-04 RX ORDER — LANOLIN ALCOHOL/MO/W.PET/CERES
3 CREAM (GRAM) TOPICAL AT BEDTIME
Refills: 0 | Status: DISCONTINUED | OUTPATIENT
Start: 2023-06-04 | End: 2023-06-06

## 2023-06-04 RX ORDER — HYDROMORPHONE HYDROCHLORIDE 2 MG/ML
2 INJECTION INTRAMUSCULAR; INTRAVENOUS; SUBCUTANEOUS ONCE
Refills: 0 | Status: DISCONTINUED | OUTPATIENT
Start: 2023-06-04 | End: 2023-06-04

## 2023-06-04 RX ORDER — SODIUM CHLORIDE 9 MG/ML
1000 INJECTION, SOLUTION INTRAVENOUS
Refills: 0 | Status: DISCONTINUED | OUTPATIENT
Start: 2023-06-04 | End: 2023-06-04

## 2023-06-04 RX ORDER — SENNA PLUS 8.6 MG/1
2 TABLET ORAL AT BEDTIME
Refills: 0 | Status: DISCONTINUED | OUTPATIENT
Start: 2023-06-04 | End: 2023-06-06

## 2023-06-04 RX ORDER — DRONEDARONE 400 MG/1
1 TABLET, FILM COATED ORAL
Refills: 0 | DISCHARGE

## 2023-06-04 RX ORDER — DRONEDARONE 400 MG/1
400 TABLET, FILM COATED ORAL
Refills: 0 | Status: DISCONTINUED | OUTPATIENT
Start: 2023-06-04 | End: 2023-06-06

## 2023-06-04 RX ORDER — SODIUM CHLORIDE 9 MG/ML
1000 INJECTION, SOLUTION INTRAVENOUS
Refills: 0 | Status: DISCONTINUED | OUTPATIENT
Start: 2023-06-04 | End: 2023-06-06

## 2023-06-04 RX ORDER — POLYETHYLENE GLYCOL 3350 17 G/17G
17 POWDER, FOR SOLUTION ORAL DAILY
Refills: 0 | Status: DISCONTINUED | OUTPATIENT
Start: 2023-06-04 | End: 2023-06-05

## 2023-06-04 RX ORDER — DIPHENHYDRAMINE HCL 50 MG
25 CAPSULE ORAL ONCE
Refills: 0 | Status: COMPLETED | OUTPATIENT
Start: 2023-06-04 | End: 2023-06-04

## 2023-06-04 RX ORDER — DIPHENHYDRAMINE HCL 50 MG
25 CAPSULE ORAL ONCE
Refills: 0 | Status: DISCONTINUED | OUTPATIENT
Start: 2023-06-04 | End: 2023-06-04

## 2023-06-04 RX ORDER — HYDROXYUREA 500 MG/1
2000 CAPSULE ORAL DAILY
Refills: 0 | Status: DISCONTINUED | OUTPATIENT
Start: 2023-06-04 | End: 2023-06-06

## 2023-06-04 RX ORDER — ACETAMINOPHEN 500 MG
650 TABLET ORAL EVERY 6 HOURS
Refills: 0 | Status: DISCONTINUED | OUTPATIENT
Start: 2023-06-04 | End: 2023-06-06

## 2023-06-04 RX ADMIN — HYDROMORPHONE HYDROCHLORIDE 2 MILLIGRAM(S): 2 INJECTION INTRAMUSCULAR; INTRAVENOUS; SUBCUTANEOUS at 17:35

## 2023-06-04 RX ADMIN — APIXABAN 5 MILLIGRAM(S): 2.5 TABLET, FILM COATED ORAL at 17:04

## 2023-06-04 RX ADMIN — Medication 25 MILLIGRAM(S): at 17:36

## 2023-06-04 RX ADMIN — HYDROMORPHONE HYDROCHLORIDE 2 MILLIGRAM(S): 2 INJECTION INTRAMUSCULAR; INTRAVENOUS; SUBCUTANEOUS at 15:35

## 2023-06-04 RX ADMIN — MAGNESIUM HYDROXIDE 30 MILLILITER(S): 400 TABLET, CHEWABLE ORAL at 21:29

## 2023-06-04 RX ADMIN — HYDROMORPHONE HYDROCHLORIDE 30 MILLILITER(S): 2 INJECTION INTRAMUSCULAR; INTRAVENOUS; SUBCUTANEOUS at 19:05

## 2023-06-04 RX ADMIN — HYDROMORPHONE HYDROCHLORIDE 2 MILLIGRAM(S): 2 INJECTION INTRAMUSCULAR; INTRAVENOUS; SUBCUTANEOUS at 12:37

## 2023-06-04 RX ADMIN — POLYETHYLENE GLYCOL 3350 17 GRAM(S): 17 POWDER, FOR SOLUTION ORAL at 17:35

## 2023-06-04 RX ADMIN — SODIUM CHLORIDE 100 MILLILITER(S): 9 INJECTION, SOLUTION INTRAVENOUS at 17:11

## 2023-06-04 RX ADMIN — DRONEDARONE 400 MILLIGRAM(S): 400 TABLET, FILM COATED ORAL at 18:40

## 2023-06-04 RX ADMIN — SENNA PLUS 2 TABLET(S): 8.6 TABLET ORAL at 21:19

## 2023-06-04 RX ADMIN — HYDROMORPHONE HYDROCHLORIDE 30 MILLILITER(S): 2 INJECTION INTRAMUSCULAR; INTRAVENOUS; SUBCUTANEOUS at 17:11

## 2023-06-04 RX ADMIN — Medication 975 MILLIGRAM(S): at 12:37

## 2023-06-04 RX ADMIN — SODIUM CHLORIDE 1000 MILLILITER(S): 9 INJECTION, SOLUTION INTRAVENOUS at 12:52

## 2023-06-04 NOTE — H&P ADULT - PROBLEM SELECTOR PLAN 5
DVT PPx: Improve 0, patient active/ambulates  Diet: Regular  Code: Full Code  Dispo: Patient active prior to admission  Communication:     Discharge information:  Pharmacy -   PCP - Hx DVT R calf early 2022.  - s/p Eliquis 6 months (now on Eliquis for AFib) Hx DVT R calf early 2022.  - s/p Eliquis 6 months (now on Eliquis for AFib)  > clarify why needs lifelong AC per Dr. Veloz's note

## 2023-06-04 NOTE — H&P ADULT - NSHPLABSRESULTS_GEN_ALL_CORE
LABS:                        8.3    4.85  )-----------( 229      ( 04 Jun 2023 12:09 )             24.6     06-04    141  |  109<H>  |  22  ----------------------------<  116<H>  4.2   |  24  |  1.59<H>    Ca    10.0      04 Jun 2023 12:09    TPro  6.7  /  Alb  3.8  /  TBili  0.4  /  DBili  0.1  /  AST  26  /  ALT  42  /  AlkPhos  128<H>  06-04    PT/INR - ( 04 Jun 2023 12:09 )   PT: 13.6 sec;   INR: 1.18 ratio         PTT - ( 04 Jun 2023 12:09 )  PTT:26.4 sec            IMAGING & OTHER TESTS:    < from: CT Angio Chest PE Protocol w/ IV Cont (06.04.23 @ 13:51) >    IMPRESSION:  No pulmonary embolism.    < end of copied text >

## 2023-06-04 NOTE — H&P ADULT - PROBLEM SELECTOR PLAN 4
*** > hold home Lisinopril 10mg qD i/s/o Bryan  > cont home HCTZ 12.5mg qD > hold home Lisinopril 10mg qD i/s/o ALFREDO  > clarify with pharmacy if on HCTZ 12.5mg qD; hold for now

## 2023-06-04 NOTE — ED PROVIDER NOTE - OBJECTIVE STATEMENT
52-year-old male history of sickle cell, dvt presenting to the emergency department with typical back pain as well as shortness of breath for several days.  Patient reports that his dyspnea on exertion began about 10 days ago.  Patient went to [facility] last week and was advised to be admitted however was unable to take time off of work so got 2 units of blood transfused and was discharged.  He was given Dilaudid for his pain and states that he was also prescribed it but is unable to take it due to his job as a .  Patient is now presenting for worsening of symptoms.  States that his symptoms are typical for his sickle cell crisis pain.  Patient is also on Eliquis for prior history of clot.  Reports compliance with this medication.  Denies fever, chills, cough, chest pain.

## 2023-06-04 NOTE — ED PROCEDURE NOTE - PROCEDURE ADDITIONAL DETAILS
18g R     Emergency Department Focused Ultrasound performed at patient's bedside for educational purposes. The study will have a follow up study performed or was performed in the direct supervision of an ultrasound trained attending.

## 2023-06-04 NOTE — H&P ADULT - NSHPPHYSICALEXAM_GEN_ALL_CORE
T(C): 36.6 (06-04-23 @ 10:52), Max: 36.7 (06-04-23 @ 09:56)  HR: 75 (06-04-23 @ 12:52) (73 - 78)  BP: 132/73 (06-04-23 @ 12:52) (128/64 - 139/76)  RR: 15 (06-04-23 @ 12:52) (15 - 21)  SpO2: 100% (06-04-23 @ 12:52) (98% - 100%)    GENERAL: well-appearing, NAD, appears comfortable  HEENT: NC/AT, EOMI, no conjunctival icterus, moist mucus membranes  NECK: supple, non-tender, no JVD, no LAD  LUNGS: non-labored breathing, CTAB, no wheezing/rales/rhonchi  CV: RRR, no m/r/g, 2+ palpable peripheral pulses bi/l, cap refill <2 secs  ABD: non-distended, soft, non-tender, no rebound tenderness or guarding  : no CVA tenderness  MSK: full ROM, strength 5/5 bi/l  EXT: warm and well-perfused, no peripheral edema  SKIN: no rashes or lesions  NEURO: AAOx3, no focal deficits T(C): 36.6 (06-04-23 @ 10:52), Max: 36.7 (06-04-23 @ 09:56)  HR: 75 (06-04-23 @ 12:52) (73 - 78)  BP: 132/73 (06-04-23 @ 12:52) (128/64 - 139/76)  RR: 15 (06-04-23 @ 12:52) (15 - 21)  SpO2: 100% (06-04-23 @ 12:52) (98% - 100%)    GENERAL: well-appearing, NAD, appears comfortable  HEENT: NC/AT, EOMI, no conjunctival icterus, dry mucus membranes  NECK: supple, non-tender, no LAD  LUNGS: non-labored breathing, CTAB, no wheezing/rales/rhonchi  CV: RRR, no m/r/g, 2+ palpable peripheral pulses bi/l, cap refill <2 secs  ABD: non-distended, soft, non-tender, no rebound tenderness or guarding  : no CVA tenderness  MSK: no vertebral tenderness. full ROM, strength 5/5 bi/l  EXT: warm and well-perfused, trace bilateral LE edema  SKIN: xerosis of LEs  NEURO: AAOx3, no focal deficits

## 2023-06-04 NOTE — H&P ADULT - PROBLEM SELECTOR PLAN 2
Presents with typical back pain classic for his crises. Prescribed dilaudid at home but unable to take due to work requirements. Follows with Dr. Veloz. Hgb 8.3, macrocytic with RDW 28. No signs of active bleeding noted.  - LDH elevated 381 but Tbili wnl; retic 5.0%  - CTA chest no PEs, lungs clear  > Dilaudid 0.5mg IV for mod/severe pain  > mIVF as above Presents with typical back pain classic for his crises. Prescribed dilaudid at home but unable to take due to work requirements. Follows with Dr. Veloz. Hgb 8.3, macrocytic with RDW 28. No signs of active bleeding noted.  - Outpatient transfusions 1x/month; crises/pain varies 1/week to 1/month, usually self-medicates with tylenol at home  - LDH elevated 381 but Tbili wnl; retic 5.0%  - CTA chest no PEs, lungs clear  > Jadenu non-formulary  > cont home Hydroxyurea 2000mg qD, Folate 1mg qD, MVI  > f/u Hgb Electrophoresis  > Dilaudid PCA pump  > mIVF as above Presents with typical back pain classic for his crises. Prescribed dilaudid at home but unable to take due to work requirements. Follows with Dr. Veloz. Hgb 8.3, macrocytic with RDW 28. No signs of active bleeding noted.  - Outpatient transfusions 2x/month with goal Hgb ~8; crises/pain varies 1/week to 1/month, usually self-medicates with tylenol at home  - LDH elevated 381 but Tbili wnl; retic 5.0%  - CTA chest no PEs, lungs clear  > Jadenu non-formulary  > cont home Hydroxyurea 2000mg qD, Folate 1mg qD, MVI  > f/u Hgb Electrophoresis  > Dilaudid PCA pump  > mIVF as above

## 2023-06-04 NOTE — H&P ADULT - NSHPREVIEWOFSYSTEMS_GEN_ALL_CORE
Constitutional: denies weight loss, fatigue, fevers, chills  Skin: denies rashes or wounds  HEENT: no vision or hearing changes  CV: denies ORTIZ or GABINO  Resp: denies SOB or cough  GI: denies diarrhea, constipation, N/V, or changes to appetite  Urinary: denies urgency, dysuria  Neurologic: denies headache or pain  MSK: denies arthralgias or myalgias  Hematologic: denies bleeding or easy bruising  Lymphatics: denies LAD or recent infection Constitutional: denies weight loss, fatigue, fevers, chills  Skin: denies rashes or wounds  HEENT: no vision or hearing changes  CV: denies ORTIZ or GABINO  Resp: +SOB, completes full sentences. denies cough  GI: denies diarrhea, constipation, N/V, or changes to appetite  Urinary: denies urgency, dysuria  Neurologic: denies headache or pain  MSK: +arthralgias of hips, +low back pain  Hematologic: denies bleeding or easy bruising  Lymphatics: denies LAD or recent infection

## 2023-06-04 NOTE — H&P ADULT - PROBLEM SELECTOR PLAN 1
Baseline Cr 1.1, elevated SCr 1.59 on admission i/s/o possible urinary retention vs. SS crisis.  > Send UA, urine electrolytes, spot urine TP/CR  > Monitor labs and urine output  > Avoid NSAIDs, ACEI/ARBS, RCA and nephrotoxins; Renally dose medications   > f/u bladder scan  > mIVF with D5/0.45NS @ 75 cc/hr x 13 hrs Baseline Cr 1.1, elevated SCr 1.59 on admission i/s/o possible urinary retention vs. SS crisis.  > ordered UA, urine electrolytes  > Monitor labs and urine output  > Avoid NSAIDs, ACEI/ARBS, RCA and nephrotoxins; Renally dose medications   > mIVF with 0.45NS @ 100 cc/hr x 15 hrs Baseline Cr 1.1, elevated SCr 1.59 on admission i/s/o possible urinary retention vs. SS crisis.  > ordered UA, urine electrolytes  > Monitor labs and urine output  > Avoid NSAIDs, ACEI/ARBS, RCA and nephrotoxins; Renally dose medications   > mIVF with 0.45NS @ 100 cc/hr

## 2023-06-04 NOTE — H&P ADULT - PROBLEM SELECTOR PLAN 3
> cont home Eliquis CHADSVASC 1  > cont home Eliquis 2.5mg BID  > cont home Multaq 400mg qD CHADSVASC 1. Unclear why on Eliquis if for Afib.  > cont home Eliquis 2.5mg BID  > cont home Multaq 400mg qD

## 2023-06-04 NOTE — ED ADULT NURSE NOTE - NSFALLHARMRISKINTERV_ED_ALL_ED

## 2023-06-04 NOTE — ED ADULT NURSE NOTE - OBJECTIVE STATEMENT
51 y/o M with PMH of sickle cell, HTN, DVT on eliquis presents to ED complaining of shortness of breath. Pt reports over past few weeks worsening shortness of breath associated with weakness, back pain and fatigue. Pt reports he needed 2 blood transfusions this past wednesday and thursday. Pt takes Tylenol at home to manage pain. Pt has prescription for Dilaudid but doesn't like to take it due to his occupation. Upon arrival, pt is slightly tachypneic, satting well on RA. Pt afebrile. A&Ox4. Denies headache, dizziness, vision changes, chest pain, abdominal pain, nausea, vomiting, diarrhea, fevers, chills, dysuria, hematuria, recent travel or fall.

## 2023-06-04 NOTE — H&P ADULT - ATTENDING COMMENTS
52M hx SS disease, DVT (on Eliquis), gout here with back pain admitted for acute sickle cell crisis and ALFREDO  -Dilaudid PCA when pt on the floor- pt in transport for a bed on 5monti   -Can give dilaudid 1mg q4 prn while in ED  -1/2 NS @100cc/hr   -Bowel regimen with senna, miralax, pt requesting milk of magnesia   -Check haptoglobin, ldh, retic count in am   -Start folic acid, mv  -Trend hgb   Rest per above 52M hx SS disease, DVT, Afib (on Eliquis), gout here with back pain admitted for acute sickle cell crisis and ALFREDO  -Dilaudid PCA when pt on the floor- pt in transport for a bed on 5monti   -Can give dilaudid 1mg q4 prn while in ED  -1/2 NS @100cc/hr   -Bowel regimen with senna, miralax, pt requesting milk of magnesia   -Check haptoglobin, ldh, retic count in am   -Start folic acid, mv  -Trend hgb   Rest per above

## 2023-06-04 NOTE — H&P ADULT - HISTORY OF PRESENT ILLNESS
52M hx SS disease, DVT (on Eliquis), gout here with typical back pain and SOB for several days. ORTIZ started 10 days ago. Initially went to [facility] with recommendation for admission but unable to take time off work. Received 2U pRBC an dwas discharged with pain mendication (Dilaudid). He also was prescribed Dilaudid previously but unable to take it due to his job as a . Presenting to [facility] now for worsening of dyspnea/SOB/ORTIZ and back pain. He had similar presentation 12/2022, and these symptoms are typical for his sickle cell crisis pain. Patient adherent with Eliquis for his prior DVT. Negative ROS.    In the ED, patient afebrile, hemodynamically stable, saturating well on room air. Labs remarkable for SCr 1.59, , Hgb 8.3, . Imaging with CTA chest showing no PE. Received IV tylenol, Dilaudid 2mg IV x1, 1L LR IVF. 52M hx SS disease, Afib (on Eliquis), DVT, GERD, HTN, and gout presented to [facility] ED with typical back pain and SOB for several days. ORTIZ started 10 days ago. Initially went to [facility] with recommendation for admission but unable to take time off work. Received 2U pRBC and was discharged with pain medication (Dilaudid). He also was prescribed Dilaudid previously but unable to take it due to his job as a . Presenting to [facility] now for worsening of dyspnea/SOB/ORTIZ and back pain. He had similar presentation 12/2022, and these symptoms are typical for his sickle cell crisis pain. Patient adherent with Eliquis for his prior DVT/Afib.    In the ED, patient afebrile, hemodynamically stable, saturating well on room air. Labs remarkable for SCr 1.59, , Hgb 8.3, . Imaging with CTA chest showing no PE. Received IV tylenol, Dilaudid 2mg IV x1, 1L LR IVF.

## 2023-06-04 NOTE — ED PROVIDER NOTE - CLINICAL SUMMARY MEDICAL DECISION MAKING FREE TEXT BOX
Keith London MD (Attending Physician): 52M with SCC, DVT on Eliquis present for back pain and SOB a few days ago. Also reports Ray 10 days ago. Patient was seen at [facility] 3-4 days ago, was anemia and given 2 u prbc, advising admission but patient refused at that time. He is compliant with medication. Denies blood in vomit or stool. Has had transfusions in the past.   On exam,  Const: Cooperative, in NAD  HEENT: Head is normocephalic, atraumatic. PERRLA. EOMI. Sclera and conjunctiva normal  Lungs:clear to auscultation bilaterally. No wheezes, crackles, rhonchi   Cardiovascular: RRR, no murmurs, rubs or gallops.  Abdomen: No scars. No distension. Soft and nontender. No rebound, guarding or masses noted.  MSK: No pitting edema, erythema, or cyanosis. Full ROM in all extremities   Neuro:Awake, AOx3, follows commands    Differentials include but are not limited to: anemia, viral illness, fluid overload, acute chest syndrome, ACS. Plan for cardiac workup including, labs, EKG, CXR.

## 2023-06-04 NOTE — H&P ADULT - ASSESSMENT
52M hx SS disease, DVT (on Eliquis), gout presented with typical back pain and SOB for several days typical of his sickle cell crisis episodes, last episode 12/2022. Admitted for ongoing mgmt of sickle cell crisis. Also found to have ALFREDO. 52M hx SS disease, Afib (on Eliquis), DVT, GERD, HTN, and gout presented with typical back pain and SOB for several days typical of his sickle cell crisis episodes, last episode 12/2022. Admitted for ongoing mgmt of sickle cell crisis. Also found to have ALFREDO.

## 2023-06-04 NOTE — H&P ADULT - PROBLEM SELECTOR PLAN 8
DVT PPx: Eliquis as above  Diet: Regular  Code: Full Code  Dispo: Patient active prior to admission  Communication:     Discharge information:  Pharmacy -   PCP - DVT PPx: Eliquis as above  Diet: Regular  Code: Full Code  Dispo: Patient active prior to admission  Communication: Jacki (wife, [phone])    Discharge information:  Pharmacy -   PCP -

## 2023-06-04 NOTE — PATIENT PROFILE ADULT - FALL HARM RISK - UNIVERSAL INTERVENTIONS
Bed in lowest position, wheels locked, appropriate side rails in place/Call bell, personal items and telephone in reach/Instruct patient to call for assistance before getting out of bed or chair/Non-slip footwear when patient is out of bed/[place] to call system/Physically safe environment - no spills, clutter or unnecessary equipment/Purposeful Proactive Rounding/Room/bathroom lighting operational, light cord in reach

## 2023-06-04 NOTE — H&P ADULT - NSHPSOCIALHISTORY_GEN_ALL_CORE
Home: Domiciled   ADL: Full ADL   Alcohol: Denies  Smoking: Denies   Drugs: Denies Home: Domiciled with wife  ADL: Full ADLs and IADLs  Alcohol: Denies  Smoking: Denies  Drugs: Denies

## 2023-06-05 LAB
A1C WITH ESTIMATED AVERAGE GLUCOSE RESULT: 5.2 % — SIGNIFICANT CHANGE UP (ref 4–5.6)
ALBUMIN SERPL ELPH-MCNC: 4 G/DL — SIGNIFICANT CHANGE UP (ref 3.3–5)
ALP SERPL-CCNC: 123 U/L — HIGH (ref 40–120)
ALT FLD-CCNC: 42 U/L — SIGNIFICANT CHANGE UP (ref 10–45)
ANION GAP SERPL CALC-SCNC: 17 MMOL/L — SIGNIFICANT CHANGE UP (ref 5–17)
APTT BLD: 27.4 SEC — LOW (ref 27.5–35.5)
AST SERPL-CCNC: 26 U/L — SIGNIFICANT CHANGE UP (ref 10–40)
BASOPHILS # BLD AUTO: 0.01 K/UL — SIGNIFICANT CHANGE UP (ref 0–0.2)
BASOPHILS NFR BLD AUTO: 0.2 % — SIGNIFICANT CHANGE UP (ref 0–2)
BILIRUB SERPL-MCNC: 0.4 MG/DL — SIGNIFICANT CHANGE UP (ref 0.2–1.2)
BLD GP AB SCN SERPL QL: NEGATIVE — SIGNIFICANT CHANGE UP
BUN SERPL-MCNC: 26 MG/DL — HIGH (ref 7–23)
CALCIUM SERPL-MCNC: 9.7 MG/DL — SIGNIFICANT CHANGE UP (ref 8.4–10.5)
CHLORIDE SERPL-SCNC: 102 MMOL/L — SIGNIFICANT CHANGE UP (ref 96–108)
CHOLEST SERPL-MCNC: 150 MG/DL — SIGNIFICANT CHANGE UP
CO2 SERPL-SCNC: 20 MMOL/L — LOW (ref 22–31)
CREAT SERPL-MCNC: 1.66 MG/DL — HIGH (ref 0.5–1.3)
EGFR: 49 ML/MIN/1.73M2 — LOW
EOSINOPHIL # BLD AUTO: 0.01 K/UL — SIGNIFICANT CHANGE UP (ref 0–0.5)
EOSINOPHIL NFR BLD AUTO: 0.2 % — SIGNIFICANT CHANGE UP (ref 0–6)
ESTIMATED AVERAGE GLUCOSE: 103 MG/DL — SIGNIFICANT CHANGE UP (ref 68–114)
GLUCOSE SERPL-MCNC: 157 MG/DL — HIGH (ref 70–99)
HAPTOGLOB SERPL-MCNC: <20 MG/DL — LOW (ref 34–200)
HCT VFR BLD CALC: 25.2 % — LOW (ref 39–50)
HDLC SERPL-MCNC: 54 MG/DL — SIGNIFICANT CHANGE UP
HGB BLD-MCNC: 8.3 G/DL — LOW (ref 13–17)
IMM GRANULOCYTES NFR BLD AUTO: 0.6 % — SIGNIFICANT CHANGE UP (ref 0–0.9)
INR BLD: 1.26 RATIO — HIGH (ref 0.88–1.16)
LDH SERPL L TO P-CCNC: 401 U/L — HIGH (ref 50–242)
LIPID PNL WITH DIRECT LDL SERPL: 70 MG/DL — SIGNIFICANT CHANGE UP
LYMPHOCYTES # BLD AUTO: 0.66 K/UL — LOW (ref 1–3.3)
LYMPHOCYTES # BLD AUTO: 12.1 % — LOW (ref 13–44)
MAGNESIUM SERPL-MCNC: 2.3 MG/DL — SIGNIFICANT CHANGE UP (ref 1.6–2.6)
MCHC RBC-ENTMCNC: 32.9 GM/DL — SIGNIFICANT CHANGE UP (ref 32–36)
MCHC RBC-ENTMCNC: 35 PG — HIGH (ref 27–34)
MCV RBC AUTO: 106.3 FL — HIGH (ref 80–100)
MONOCYTES # BLD AUTO: 0.53 K/UL — SIGNIFICANT CHANGE UP (ref 0–0.9)
MONOCYTES NFR BLD AUTO: 9.7 % — SIGNIFICANT CHANGE UP (ref 2–14)
NEUTROPHILS # BLD AUTO: 4.21 K/UL — SIGNIFICANT CHANGE UP (ref 1.8–7.4)
NEUTROPHILS NFR BLD AUTO: 77.2 % — HIGH (ref 43–77)
NON HDL CHOLESTEROL: 96 MG/DL — SIGNIFICANT CHANGE UP
NRBC # BLD: 71 /100 WBCS — HIGH (ref 0–0)
PHOSPHATE SERPL-MCNC: 3.6 MG/DL — SIGNIFICANT CHANGE UP (ref 2.5–4.5)
PLATELET # BLD AUTO: 228 K/UL — SIGNIFICANT CHANGE UP (ref 150–400)
POTASSIUM SERPL-MCNC: 4.4 MMOL/L — SIGNIFICANT CHANGE UP (ref 3.5–5.3)
POTASSIUM SERPL-SCNC: 4.4 MMOL/L — SIGNIFICANT CHANGE UP (ref 3.5–5.3)
PROT SERPL-MCNC: 6.9 G/DL — SIGNIFICANT CHANGE UP (ref 6–8.3)
PROTHROM AB SERPL-ACNC: 14.6 SEC — HIGH (ref 10.5–13.4)
RBC # BLD: 2.37 M/UL — LOW (ref 4.2–5.8)
RBC # FLD: 27.9 % — HIGH (ref 10.3–14.5)
RH IG SCN BLD-IMP: POSITIVE — SIGNIFICANT CHANGE UP
SODIUM SERPL-SCNC: 139 MMOL/L — SIGNIFICANT CHANGE UP (ref 135–145)
TRIGL SERPL-MCNC: 129 MG/DL — SIGNIFICANT CHANGE UP
UUN UR-MCNC: 405 MG/DL — SIGNIFICANT CHANGE UP
WBC # BLD: 5.45 K/UL — SIGNIFICANT CHANGE UP (ref 3.8–10.5)
WBC # FLD AUTO: 5.45 K/UL — SIGNIFICANT CHANGE UP (ref 3.8–10.5)

## 2023-06-05 PROCEDURE — 99233 SBSQ HOSP IP/OBS HIGH 50: CPT | Mod: GC

## 2023-06-05 RX ORDER — LACTULOSE 10 G/15ML
15 SOLUTION ORAL ONCE
Refills: 0 | Status: COMPLETED | OUTPATIENT
Start: 2023-06-05 | End: 2023-06-05

## 2023-06-05 RX ORDER — DIPHENHYDRAMINE HCL 50 MG
25 CAPSULE ORAL ONCE
Refills: 0 | Status: COMPLETED | OUTPATIENT
Start: 2023-06-05 | End: 2023-06-05

## 2023-06-05 RX ORDER — POLYETHYLENE GLYCOL 3350 17 G/17G
17 POWDER, FOR SOLUTION ORAL
Refills: 0 | Status: DISCONTINUED | OUTPATIENT
Start: 2023-06-05 | End: 2023-06-06

## 2023-06-05 RX ORDER — HYDROMORPHONE HYDROCHLORIDE 2 MG/ML
30 INJECTION INTRAMUSCULAR; INTRAVENOUS; SUBCUTANEOUS
Refills: 0 | Status: DISCONTINUED | OUTPATIENT
Start: 2023-06-05 | End: 2023-06-06

## 2023-06-05 RX ORDER — CHLORHEXIDINE GLUCONATE 213 G/1000ML
1 SOLUTION TOPICAL DAILY
Refills: 0 | Status: DISCONTINUED | OUTPATIENT
Start: 2023-06-05 | End: 2023-06-06

## 2023-06-05 RX ORDER — POLYETHYLENE GLYCOL 3350 17 G/17G
17 POWDER, FOR SOLUTION ORAL
Refills: 0 | Status: COMPLETED | OUTPATIENT
Start: 2023-06-05 | End: 2023-06-05

## 2023-06-05 RX ADMIN — APIXABAN 5 MILLIGRAM(S): 2.5 TABLET, FILM COATED ORAL at 06:36

## 2023-06-05 RX ADMIN — SODIUM CHLORIDE 100 MILLILITER(S): 9 INJECTION, SOLUTION INTRAVENOUS at 21:47

## 2023-06-05 RX ADMIN — POLYETHYLENE GLYCOL 3350 17 GRAM(S): 17 POWDER, FOR SOLUTION ORAL at 09:44

## 2023-06-05 RX ADMIN — HYDROMORPHONE HYDROCHLORIDE 30 MILLILITER(S): 2 INJECTION INTRAMUSCULAR; INTRAVENOUS; SUBCUTANEOUS at 10:53

## 2023-06-05 RX ADMIN — HYDROXYUREA 2000 MILLIGRAM(S): 500 CAPSULE ORAL at 06:42

## 2023-06-05 RX ADMIN — Medication 1 MILLIGRAM(S): at 12:01

## 2023-06-05 RX ADMIN — DRONEDARONE 400 MILLIGRAM(S): 400 TABLET, FILM COATED ORAL at 06:36

## 2023-06-05 RX ADMIN — HYDROMORPHONE HYDROCHLORIDE 30 MILLILITER(S): 2 INJECTION INTRAMUSCULAR; INTRAVENOUS; SUBCUTANEOUS at 17:55

## 2023-06-05 RX ADMIN — SODIUM CHLORIDE 100 MILLILITER(S): 9 INJECTION, SOLUTION INTRAVENOUS at 02:50

## 2023-06-05 RX ADMIN — POLYETHYLENE GLYCOL 3350 17 GRAM(S): 17 POWDER, FOR SOLUTION ORAL at 17:06

## 2023-06-05 RX ADMIN — APIXABAN 5 MILLIGRAM(S): 2.5 TABLET, FILM COATED ORAL at 17:06

## 2023-06-05 RX ADMIN — CHLORHEXIDINE GLUCONATE 1 APPLICATION(S): 213 SOLUTION TOPICAL at 17:05

## 2023-06-05 RX ADMIN — HYDROMORPHONE HYDROCHLORIDE 30 MILLILITER(S): 2 INJECTION INTRAMUSCULAR; INTRAVENOUS; SUBCUTANEOUS at 07:22

## 2023-06-05 RX ADMIN — Medication 25 MILLIGRAM(S): at 08:57

## 2023-06-05 RX ADMIN — DRONEDARONE 400 MILLIGRAM(S): 400 TABLET, FILM COATED ORAL at 17:10

## 2023-06-05 RX ADMIN — Medication 1 TABLET(S): at 12:02

## 2023-06-05 RX ADMIN — SENNA PLUS 2 TABLET(S): 8.6 TABLET ORAL at 21:47

## 2023-06-05 RX ADMIN — HYDROMORPHONE HYDROCHLORIDE 30 MILLILITER(S): 2 INJECTION INTRAMUSCULAR; INTRAVENOUS; SUBCUTANEOUS at 19:22

## 2023-06-05 RX ADMIN — MAGNESIUM HYDROXIDE 30 MILLILITER(S): 400 TABLET, CHEWABLE ORAL at 09:46

## 2023-06-05 RX ADMIN — LACTULOSE 15 GRAM(S): 10 SOLUTION ORAL at 16:27

## 2023-06-05 NOTE — PROGRESS NOTE ADULT - PROBLEM SELECTOR PLAN 5
Hx DVT R calf early 2022.  - s/p Eliquis 6 months (now on Eliquis for AFib)  > clarify why needs lifelong AC per Dr. Veloz's note

## 2023-06-05 NOTE — PROGRESS NOTE ADULT - PROBLEM SELECTOR PLAN 1
Baseline Cr 1.1, elevated SCr 1.59 on admission i/s/o possible urinary retention vs. SS crisis.  - ordered UA, urine electrolytes c/w pre-renal (0.9%)  > Monitor labs and urine output  > Avoid NSAIDs, ACEI/ARBS, RCA and nephrotoxins; Renally dose medications   > mIVF with 0.45NS @ 100 cc/hr

## 2023-06-05 NOTE — PROGRESS NOTE ADULT - PROBLEM SELECTOR PLAN 4
> hold home Lisinopril 10mg qD i/s/o ALFREDO  > clarify with pharmacy if on HCTZ 12.5mg qD; hold for now

## 2023-06-05 NOTE — PROGRESS NOTE ADULT - ATTENDING COMMENTS
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

## 2023-06-05 NOTE — PROGRESS NOTE ADULT - PROBLEM SELECTOR PLAN 8
DVT PPx: Eliquis as above  Diet: Regular  Code: Full Code  Dispo: Patient active prior to admission  Communication: Jacki (wife, [phone])    Discharge information:  Pharmacy -   PCP - DVT PPx: Eliquis as above  Diet: Regular  Code: Full Code  Dispo: Patient active prior to admission  Communication: Jacki (wife, [phone])    Discharge information:  Pharmacy - [facility] mail order ([phone])...[facility] for in-person med pick-up  PCP - Dr. Brittany Srinivasan ([facility]) - Dr. Lincoln

## 2023-06-05 NOTE — PROGRESS NOTE ADULT - ASSESSMENT
52M hx SS disease, Afib (on Eliquis), DVT, GERD, HTN, and gout presented with typical back pain and SOB for several days typical of his sickle cell crisis episodes, last episode 12/2022. Admitted for ongoing mgmt of sickle cell crisis. Also found to have ALFREDO.

## 2023-06-05 NOTE — PROGRESS NOTE ADULT - SUBJECTIVE AND OBJECTIVE BOX
************************  Jordan Medina MD-PhD  Internal Medicine PGY-1  ************************    Patient is a 52y old  Male who presents with a chief complaint of back pain c/f sickle cell crisis (2023 15:08)    No events overnight, no acute complaints this morning. Patient with appropriate PO intake and urinating well with BM(s). Denies CP, SOB, fevers/chills, N/V, or abdominal pain. Patient reminded of ongoing careplan.    MEDICATIONS  (STANDING):  apixaban 5 milliGRAM(s) Oral every 12 hours  dronedarone 400 milliGRAM(s) Oral two times a day  folic acid 1 milliGRAM(s) Oral daily  HYDROmorphone PCA (1 mG/mL) 30 milliLiter(s) PCA Continuous PCA Continuous  hydroxyurea 2000 milliGRAM(s) Oral daily  multivitamin 1 Tablet(s) Oral daily  senna 2 Tablet(s) Oral at bedtime  sodium chloride 0.45%. 1000 milliLiter(s) (100 mL/Hr) IV Continuous <Continuous>    MEDICATIONS  (PRN):  acetaminophen     Tablet .. 650 milliGRAM(s) Oral every 6 hours PRN Temp greater or equal to 38C (100.4F), Mild Pain (1 - 3)  magnesium hydroxide Suspension 30 milliLiter(s) Oral daily PRN Constipation  melatonin 3 milliGRAM(s) Oral at bedtime PRN Insomnia  polyethylene glycol 3350 17 Gram(s) Oral daily PRN Constipation      CAPILLARY BLOOD GLUCOSE      POCT Blood Glucose.: 112 mg/dL (2023 21:49)    I&O's Summary    2023 07:01  -  2023 07:00  --------------------------------------------------------  IN: 0 mL / OUT: 450 mL / NET: -450 mL        PHYSICAL EXAM:  Vital Signs Last 24 Hrs  T(C): 36.9 (2023 03:06), Max: 36.9 (2023 22:57)  T(F): 98.4 (2023 03:06), Max: 98.5 (2023 22:57)  HR: 67 (2023 03:06) (54 - 78)  BP: 137/75 (2023 03:06) (128/64 - 155/80)  BP(mean): 83 (2023 12:52) (80 - 83)  RR: 18 (2023 03:06) (15 - 21)  SpO2: 95% (2023 03:06) (94% - 100%)    Parameters below as of 2023 03:06  Patient On (Oxygen Delivery Method): room air        T(C): 36.9 (23 @ 03:06), Max: 36.9 (23 @ 22:57)  HR: 67 (23 @ 03:06) (54 - 78)  BP: 137/75 (23 @ 03:06) (128/64 - 155/80)  RR: 18 (23 @ 03:06) (15 - 21)  SpO2: 95% (23 @ 03:06) (94% - 100%)    GENERAL: well-appearing, NAD, appears comfortable  HEENT: NC/AT, EOMI, no conjunctival icterus, dry mucus membranes  NECK: supple, non-tender, no LAD  LUNGS: non-labored breathing, CTAB, no wheezing/rales/rhonchi  CV: RRR, no m/r/g, 2+ palpable peripheral pulses bi/l, cap refill <2 secs  ABD: non-distended, soft, non-tender, no rebound tenderness or guarding  : no CVA tenderness  MSK: no vertebral tenderness. full ROM, strength 5/5 bi/l  EXT: warm and well-perfused, trace bilateral LE edema  SKIN: xerosis of LEs  NEURO: AAOx3, no focal deficits    LABS:                        8.3    4.85  )-----------( 229      ( 2023 12:09 )             24.6     06-04    141  |  109<H>  |  22  ----------------------------<  116<H>  4.2   |  24  |  1.59<H>    Ca    10.0      2023 12:09    TPro  6.7  /  Alb  3.8  /  TBili  0.4  /  DBili  0.1  /  AST  26  /  ALT  42  /  AlkPhos  128<H>  06-04    PT/INR - ( 2023 12:09 )   PT: 13.6 sec;   INR: 1.18 ratio         PTT - ( 2023 12:09 )  PTT:26.4 sec      Urinalysis Basic - ( 2023 20:34 )    Color: Light Yellow / Appearance: Clear / S.033 / pH: x  Gluc: x / Ketone: Negative  / Bili: Negative / Urobili: Negative   Blood: x / Protein: Trace / Nitrite: Negative   Leuk Esterase: Negative / RBC: 0 /hpf / WBC 0 /HPF   Sq Epi: x / Non Sq Epi: x / Bacteria: Negative          IMAGING & OTHER TESTS:  NNI.

## 2023-06-06 ENCOUNTER — TRANSCRIPTION ENCOUNTER (OUTPATIENT)
Age: 53
End: 2023-06-06

## 2023-06-06 VITALS
OXYGEN SATURATION: 98 % | SYSTOLIC BLOOD PRESSURE: 117 MMHG | DIASTOLIC BLOOD PRESSURE: 85 MMHG | TEMPERATURE: 98 F | HEART RATE: 72 BPM | RESPIRATION RATE: 18 BRPM

## 2023-06-06 LAB
ALBUMIN SERPL ELPH-MCNC: 3.6 G/DL — SIGNIFICANT CHANGE UP (ref 3.3–5)
ALP SERPL-CCNC: 124 U/L — HIGH (ref 40–120)
ALT FLD-CCNC: 42 U/L — SIGNIFICANT CHANGE UP (ref 10–45)
ANION GAP SERPL CALC-SCNC: 10 MMOL/L — SIGNIFICANT CHANGE UP (ref 5–17)
AST SERPL-CCNC: 29 U/L — SIGNIFICANT CHANGE UP (ref 10–40)
BILIRUB SERPL-MCNC: 0.5 MG/DL — SIGNIFICANT CHANGE UP (ref 0.2–1.2)
BUN SERPL-MCNC: 27 MG/DL — HIGH (ref 7–23)
CALCIUM SERPL-MCNC: 9.4 MG/DL — SIGNIFICANT CHANGE UP (ref 8.4–10.5)
CHLORIDE SERPL-SCNC: 103 MMOL/L — SIGNIFICANT CHANGE UP (ref 96–108)
CO2 SERPL-SCNC: 23 MMOL/L — SIGNIFICANT CHANGE UP (ref 22–31)
CREAT SERPL-MCNC: 1.65 MG/DL — HIGH (ref 0.5–1.3)
EGFR: 49 ML/MIN/1.73M2 — LOW
GLUCOSE SERPL-MCNC: 114 MG/DL — HIGH (ref 70–99)
HAPTOGLOB SERPL-MCNC: <20 MG/DL — LOW (ref 34–200)
HCT VFR BLD CALC: 23.4 % — LOW (ref 39–50)
HGB BLD-MCNC: 7.9 G/DL — LOW (ref 13–17)
LDH SERPL L TO P-CCNC: 403 U/L — HIGH (ref 50–242)
MAGNESIUM SERPL-MCNC: 2.7 MG/DL — HIGH (ref 1.6–2.6)
MCHC RBC-ENTMCNC: 33.8 GM/DL — SIGNIFICANT CHANGE UP (ref 32–36)
MCHC RBC-ENTMCNC: 35.6 PG — HIGH (ref 27–34)
MCV RBC AUTO: 105.4 FL — HIGH (ref 80–100)
MRSA PCR RESULT.: SIGNIFICANT CHANGE UP
NRBC # BLD: 72 /100 WBCS — HIGH (ref 0–0)
PHOSPHATE SERPL-MCNC: 4 MG/DL — SIGNIFICANT CHANGE UP (ref 2.5–4.5)
PLATELET # BLD AUTO: 210 K/UL — SIGNIFICANT CHANGE UP (ref 150–400)
POTASSIUM SERPL-MCNC: 4.6 MMOL/L — SIGNIFICANT CHANGE UP (ref 3.5–5.3)
POTASSIUM SERPL-SCNC: 4.6 MMOL/L — SIGNIFICANT CHANGE UP (ref 3.5–5.3)
PROT SERPL-MCNC: 6.7 G/DL — SIGNIFICANT CHANGE UP (ref 6–8.3)
RBC # BLD: 2.22 M/UL — LOW (ref 4.2–5.8)
RBC # FLD: 27.5 % — HIGH (ref 10.3–14.5)
S AUREUS DNA NOSE QL NAA+PROBE: SIGNIFICANT CHANGE UP
SODIUM SERPL-SCNC: 136 MMOL/L — SIGNIFICANT CHANGE UP (ref 135–145)
WBC # BLD: 4.53 K/UL — SIGNIFICANT CHANGE UP (ref 3.8–10.5)
WBC # FLD AUTO: 4.53 K/UL — SIGNIFICANT CHANGE UP (ref 3.8–10.5)

## 2023-06-06 PROCEDURE — 81001 URINALYSIS AUTO W/SCOPE: CPT

## 2023-06-06 PROCEDURE — 83935 ASSAY OF URINE OSMOLALITY: CPT

## 2023-06-06 PROCEDURE — 86850 RBC ANTIBODY SCREEN: CPT

## 2023-06-06 PROCEDURE — 86900 BLOOD TYPING SEROLOGIC ABO: CPT

## 2023-06-06 PROCEDURE — 84156 ASSAY OF PROTEIN URINE: CPT

## 2023-06-06 PROCEDURE — 84540 ASSAY OF URINE/UREA-N: CPT

## 2023-06-06 PROCEDURE — 85045 AUTOMATED RETICULOCYTE COUNT: CPT

## 2023-06-06 PROCEDURE — 85610 PROTHROMBIN TIME: CPT

## 2023-06-06 PROCEDURE — 36415 COLL VENOUS BLD VENIPUNCTURE: CPT

## 2023-06-06 PROCEDURE — 83036 HEMOGLOBIN GLYCOSYLATED A1C: CPT

## 2023-06-06 PROCEDURE — 80061 LIPID PANEL: CPT

## 2023-06-06 PROCEDURE — 85027 COMPLETE CBC AUTOMATED: CPT

## 2023-06-06 PROCEDURE — 83615 LACTATE (LD) (LDH) ENZYME: CPT

## 2023-06-06 PROCEDURE — 71275 CT ANGIOGRAPHY CHEST: CPT | Mod: MH

## 2023-06-06 PROCEDURE — 71046 X-RAY EXAM CHEST 2 VIEWS: CPT

## 2023-06-06 PROCEDURE — 99233 SBSQ HOSP IP/OBS HIGH 50: CPT | Mod: GC

## 2023-06-06 PROCEDURE — 85730 THROMBOPLASTIN TIME PARTIAL: CPT

## 2023-06-06 PROCEDURE — 80053 COMPREHEN METABOLIC PANEL: CPT

## 2023-06-06 PROCEDURE — 96375 TX/PRO/DX INJ NEW DRUG ADDON: CPT

## 2023-06-06 PROCEDURE — 84484 ASSAY OF TROPONIN QUANT: CPT

## 2023-06-06 PROCEDURE — 83010 ASSAY OF HAPTOGLOBIN QUANT: CPT

## 2023-06-06 PROCEDURE — 82962 GLUCOSE BLOOD TEST: CPT

## 2023-06-06 PROCEDURE — 87641 MR-STAPH DNA AMP PROBE: CPT

## 2023-06-06 PROCEDURE — 83020 HEMOGLOBIN ELECTROPHORESIS: CPT

## 2023-06-06 PROCEDURE — 82248 BILIRUBIN DIRECT: CPT

## 2023-06-06 PROCEDURE — 82570 ASSAY OF URINE CREATININE: CPT

## 2023-06-06 PROCEDURE — 84100 ASSAY OF PHOSPHORUS: CPT

## 2023-06-06 PROCEDURE — 84300 ASSAY OF URINE SODIUM: CPT

## 2023-06-06 PROCEDURE — 84133 ASSAY OF URINE POTASSIUM: CPT

## 2023-06-06 PROCEDURE — 87640 STAPH A DNA AMP PROBE: CPT

## 2023-06-06 PROCEDURE — 82247 BILIRUBIN TOTAL: CPT

## 2023-06-06 PROCEDURE — 86901 BLOOD TYPING SEROLOGIC RH(D): CPT

## 2023-06-06 PROCEDURE — 96374 THER/PROPH/DIAG INJ IV PUSH: CPT

## 2023-06-06 PROCEDURE — 85025 COMPLETE CBC W/AUTO DIFF WBC: CPT

## 2023-06-06 PROCEDURE — 83735 ASSAY OF MAGNESIUM: CPT

## 2023-06-06 PROCEDURE — 99285 EMERGENCY DEPT VISIT HI MDM: CPT | Mod: 25

## 2023-06-06 RX ORDER — DRONEDARONE 400 MG/1
1 TABLET, FILM COATED ORAL
Refills: 0 | DISCHARGE

## 2023-06-06 RX ORDER — LISINOPRIL 2.5 MG/1
10 TABLET ORAL DAILY
Refills: 0 | Status: DISCONTINUED | OUTPATIENT
Start: 2023-06-06 | End: 2023-06-06

## 2023-06-06 RX ORDER — POLYETHYLENE GLYCOL 3350 17 G/17G
17 POWDER, FOR SOLUTION ORAL
Refills: 0 | Status: DISCONTINUED | OUTPATIENT
Start: 2023-06-06 | End: 2023-06-06

## 2023-06-06 RX ORDER — LACTULOSE 10 G/15ML
15 SOLUTION ORAL
Refills: 0 | Status: DISCONTINUED | OUTPATIENT
Start: 2023-06-06 | End: 2023-06-06

## 2023-06-06 RX ORDER — COLCHICINE 0.6 MG
0.3 TABLET ORAL DAILY
Refills: 0 | Status: DISCONTINUED | OUTPATIENT
Start: 2023-06-06 | End: 2023-06-06

## 2023-06-06 RX ORDER — DRONEDARONE 400 MG/1
1 TABLET, FILM COATED ORAL
Qty: 0 | Refills: 0 | DISCHARGE
Start: 2023-06-06

## 2023-06-06 RX ORDER — ONDANSETRON 8 MG/1
4 TABLET, FILM COATED ORAL ONCE
Refills: 0 | Status: COMPLETED | OUTPATIENT
Start: 2023-06-06 | End: 2023-06-06

## 2023-06-06 RX ORDER — SODIUM,POTASSIUM PHOSPHATES 278-250MG
1 POWDER IN PACKET (EA) ORAL EVERY 4 HOURS
Refills: 0 | Status: COMPLETED | OUTPATIENT
Start: 2023-06-06 | End: 2023-06-06

## 2023-06-06 RX ORDER — LACTULOSE 10 G/15ML
15 SOLUTION ORAL ONCE
Refills: 0 | Status: COMPLETED | OUTPATIENT
Start: 2023-06-06 | End: 2023-06-06

## 2023-06-06 RX ORDER — ALLOPURINOL 300 MG
300 TABLET ORAL DAILY
Refills: 0 | Status: DISCONTINUED | OUTPATIENT
Start: 2023-06-06 | End: 2023-06-06

## 2023-06-06 RX ADMIN — HYDROMORPHONE HYDROCHLORIDE 30 MILLILITER(S): 2 INJECTION INTRAMUSCULAR; INTRAVENOUS; SUBCUTANEOUS at 07:14

## 2023-06-06 RX ADMIN — DRONEDARONE 400 MILLIGRAM(S): 400 TABLET, FILM COATED ORAL at 17:36

## 2023-06-06 RX ADMIN — Medication 0.3 MILLIGRAM(S): at 17:35

## 2023-06-06 RX ADMIN — APIXABAN 5 MILLIGRAM(S): 2.5 TABLET, FILM COATED ORAL at 17:34

## 2023-06-06 RX ADMIN — POLYETHYLENE GLYCOL 3350 17 GRAM(S): 17 POWDER, FOR SOLUTION ORAL at 10:32

## 2023-06-06 RX ADMIN — Medication 1 TABLET(S): at 12:28

## 2023-06-06 RX ADMIN — SODIUM CHLORIDE 100 MILLILITER(S): 9 INJECTION, SOLUTION INTRAVENOUS at 16:11

## 2023-06-06 RX ADMIN — Medication 1 PACKET(S): at 10:32

## 2023-06-06 RX ADMIN — CHLORHEXIDINE GLUCONATE 1 APPLICATION(S): 213 SOLUTION TOPICAL at 12:29

## 2023-06-06 RX ADMIN — Medication 5 MILLIGRAM(S): at 03:17

## 2023-06-06 RX ADMIN — LACTULOSE 15 GRAM(S): 10 SOLUTION ORAL at 12:28

## 2023-06-06 RX ADMIN — DRONEDARONE 400 MILLIGRAM(S): 400 TABLET, FILM COATED ORAL at 05:54

## 2023-06-06 RX ADMIN — POLYETHYLENE GLYCOL 3350 17 GRAM(S): 17 POWDER, FOR SOLUTION ORAL at 17:34

## 2023-06-06 RX ADMIN — LACTULOSE 15 GRAM(S): 10 SOLUTION ORAL at 17:35

## 2023-06-06 RX ADMIN — MAGNESIUM HYDROXIDE 30 MILLILITER(S): 400 TABLET, CHEWABLE ORAL at 10:31

## 2023-06-06 RX ADMIN — Medication 300 MILLIGRAM(S): at 17:35

## 2023-06-06 RX ADMIN — Medication 1 PACKET(S): at 13:17

## 2023-06-06 RX ADMIN — APIXABAN 5 MILLIGRAM(S): 2.5 TABLET, FILM COATED ORAL at 05:54

## 2023-06-06 RX ADMIN — LISINOPRIL 10 MILLIGRAM(S): 2.5 TABLET ORAL at 15:15

## 2023-06-06 RX ADMIN — LACTULOSE 15 GRAM(S): 10 SOLUTION ORAL at 10:31

## 2023-06-06 RX ADMIN — HYDROXYUREA 2000 MILLIGRAM(S): 500 CAPSULE ORAL at 12:28

## 2023-06-06 RX ADMIN — Medication 1 MILLIGRAM(S): at 12:28

## 2023-06-06 RX ADMIN — Medication 650 MILLIGRAM(S): at 15:15

## 2023-06-06 RX ADMIN — ONDANSETRON 4 MILLIGRAM(S): 8 TABLET, FILM COATED ORAL at 12:28

## 2023-06-06 RX ADMIN — POLYETHYLENE GLYCOL 3350 17 GRAM(S): 17 POWDER, FOR SOLUTION ORAL at 05:55

## 2023-06-06 NOTE — PROGRESS NOTE ADULT - PROBLEM SELECTOR PLAN 4
> hold home Lisinopril 10mg qD i/s/o ALFREDO  > clarify with pharmacy if on HCTZ 12.5mg qD; hold for now >c/w home Lisinopril 10mg

## 2023-06-06 NOTE — PROGRESS NOTE ADULT - ASSESSMENT
52M hx SS disease, Afib (on Eliquis), DVT, GERD, HTN, and gout presented with typical back pain and SOB for several days typical of his sickle cell crisis episodes, last episode 12/2022. Admitted for ongoing mgmt of sickle cell crisis pain, biochemical evidence of ongoing hemolysis but hemoglobin stable and above outpatient goal (>8), no current need for transfusion. Also found to have ALFREDO, resolving. 52M hx SS disease, Afib (on Eliquis), DVT, GERD, HTN, and gout presented with typical back pain and SOB for several days typical of his sickle cell crisis episodes, last episode 12/2022. Admitted for ongoing mgmt of sickle cell crisis pain, biochemical evidence of ongoing hemolysis but hemoglobin stable and above outpatient goal (>8), no current need for transfusion. Also found to have ALFREDO vs. CKD, stable.

## 2023-06-06 NOTE — PROGRESS NOTE ADULT - SUBJECTIVE AND OBJECTIVE BOX
************************  Jordan Medina MD-PhD  Internal Medicine PGY-1  ************************    Patient is a 52y old  Male who presents with a chief complaint of back pain c/f sickle cell crisis (2023 07:11)    No events overnight, no acute complaints this morning. On PCA pump, asking for additional bowel regimen. Patient with appropriate PO intake and urinating well with BM(s). Denies CP, SOB, fevers/chills, N/V, or abdominal pain. Patient reminded of ongoing careplan.    MEDICATIONS  (STANDING):  apixaban 5 milliGRAM(s) Oral every 12 hours  chlorhexidine 4% Liquid 1 Application(s) Topical daily  dronedarone 400 milliGRAM(s) Oral two times a day  folic acid 1 milliGRAM(s) Oral daily  HYDROmorphone PCA (5 mG/mL) 30 milliLiter(s) PCA Continuous PCA Continuous  hydroxyurea 2000 milliGRAM(s) Oral daily  multivitamin 1 Tablet(s) Oral daily  polyethylene glycol 3350 17 Gram(s) Oral two times a day  senna 2 Tablet(s) Oral at bedtime  sodium chloride 0.45%. 1000 milliLiter(s) (100 mL/Hr) IV Continuous <Continuous>    MEDICATIONS  (PRN):  acetaminophen     Tablet .. 650 milliGRAM(s) Oral every 6 hours PRN Temp greater or equal to 38C (100.4F), Mild Pain (1 - 3)  bisacodyl 5 milliGRAM(s) Oral every 12 hours PRN Constipation  magnesium hydroxide Suspension 30 milliLiter(s) Oral daily PRN Constipation  melatonin 3 milliGRAM(s) Oral at bedtime PRN Insomnia      CAPILLARY BLOOD GLUCOSE        I&O's Summary    2023 07:  -  2023 07:00  --------------------------------------------------------  IN: 0 mL / OUT: 450 mL / NET: -450 mL    2023 07:01  -  2023 06:54  --------------------------------------------------------  IN: 1440 mL / OUT: 900 mL / NET: 540 mL        PHYSICAL EXAM:  Vital Signs Last 24 Hrs  T(C): 37.1 (2023 04:39), Max: 37.1 (2023 11:06)  T(F): 98.8 (2023 04:39), Max: 98.8 (2023 11:06)  HR: 77 (2023 04:39) (67 - 77)  BP: 145/81 (2023 04:39) (116/77 - 169/80)  BP(mean): --  RR: 18 (2023 04:39) (17 - 18)  SpO2: 96% (2023 04:39) (95% - 97%)    Parameters below as of 2023 04:39  Patient On (Oxygen Delivery Method): room air        T(C): 37.1 (23 @ 04:39), Max: 37.1 (23 @ 11:06)  HR: 77 (23 @ 04:39) (67 - 77)  BP: 145/81 (23 @ 04:39) (116/77 - 169/80)  RR: 18 (23 @ 04:39) (17 - 18)  SpO2: 96% (23 @ 04:39) (95% - 97%)    GENERAL: well-appearing, NAD, appears comfortable  HEENT: NC/AT, EOMI, no conjunctival icterus, dry mucus membranes  NECK: supple, non-tender, no LAD  LUNGS: non-labored breathing, CTAB, no wheezing/rales/rhonchi  CV: RRR, no m/r/g, 2+ palpable peripheral pulses bi/l, cap refill <2 secs  ABD: non-distended, soft, non-tender, no rebound tenderness or guarding  : no CVA tenderness  MSK: no vertebral tenderness. full ROM, strength 5/5 bi/l  EXT: warm and well-perfused, trace bilateral LE edema  SKIN: xerosis of LEs  NEURO: AAOx3, no focal deficits    LABS:                        8.3    5.45  )-----------( 228      ( 2023 12:11 )             25.2     06-05    139  |  102  |  26<H>  ----------------------------<  157<H>  4.4   |  20<L>  |  1.66<H>    Ca    9.7      2023 12:08  Phos  3.6     06-05  Mg     2.3     06-05    TPro  6.9  /  Alb  4.0  /  TBili  0.4  /  DBili  x   /  AST  26  /  ALT  42  /  AlkPhos  123<H>  06-05    PT/INR - ( 2023 12:13 )   PT: 14.6 sec;   INR: 1.26 ratio         PTT - ( 2023 12:13 )  PTT:27.4 sec      Urinalysis Basic - ( 2023 20:34 )    Color: Light Yellow / Appearance: Clear / S.033 / pH: x  Gluc: x / Ketone: Negative  / Bili: Negative / Urobili: Negative   Blood: x / Protein: Trace / Nitrite: Negative   Leuk Esterase: Negative / RBC: 0 /hpf / WBC 0 /HPF   Sq Epi: x / Non Sq Epi: x / Bacteria: Negative          IMAGING & OTHER TESTS:  NNI. ************************  Jordan Medina MD-PhD  Internal Medicine PGY-1  ************************    Patient is a 52y old  Male who presents with a chief complaint of back pain c/f sickle cell crisis (2023 07:11)    No events overnight, no acute complaints this morning. On PCA pump, asking for additional bowel regimen. Vomited during the day (due to "stacking" his bowel regimen meds?). Patient would like to continue to try additional bowel regimens. Patient with appropriate PO intake and urinating well with BM(s). Denies CP, SOB, fevers/chills, N/V, or abdominal pain. Patient reminded of ongoing careplan.    MEDICATIONS  (STANDING):  apixaban 5 milliGRAM(s) Oral every 12 hours  chlorhexidine 4% Liquid 1 Application(s) Topical daily  dronedarone 400 milliGRAM(s) Oral two times a day  folic acid 1 milliGRAM(s) Oral daily  HYDROmorphone PCA (5 mG/mL) 30 milliLiter(s) PCA Continuous PCA Continuous  hydroxyurea 2000 milliGRAM(s) Oral daily  multivitamin 1 Tablet(s) Oral daily  polyethylene glycol 3350 17 Gram(s) Oral two times a day  senna 2 Tablet(s) Oral at bedtime  sodium chloride 0.45%. 1000 milliLiter(s) (100 mL/Hr) IV Continuous <Continuous>    MEDICATIONS  (PRN):  acetaminophen     Tablet .. 650 milliGRAM(s) Oral every 6 hours PRN Temp greater or equal to 38C (100.4F), Mild Pain (1 - 3)  bisacodyl 5 milliGRAM(s) Oral every 12 hours PRN Constipation  magnesium hydroxide Suspension 30 milliLiter(s) Oral daily PRN Constipation  melatonin 3 milliGRAM(s) Oral at bedtime PRN Insomnia      CAPILLARY BLOOD GLUCOSE        I&O's Summary    2023 07:01  -  2023 07:00  --------------------------------------------------------  IN: 0 mL / OUT: 450 mL / NET: -450 mL    2023 07:01  -  2023 06:54  --------------------------------------------------------  IN: 1440 mL / OUT: 900 mL / NET: 540 mL        PHYSICAL EXAM:  Vital Signs Last 24 Hrs  T(C): 37.1 (2023 04:39), Max: 37.1 (2023 11:06)  T(F): 98.8 (:39), Max: 98.8 (2023 11:06)  HR: 77 (2023 04:39) ( - )  BP: 145/81 (2023 04:39) (116/77 - 169/80)  BP(mean): --  RR: 18 (2023 04:39) (17 - 18)  SpO2: 96% (2023 04:39) (95% - 97%)    Parameters below as of 2023 04:39  Patient On (Oxygen Delivery Method): room air        T(C): 37.1 (23 @ 04:39), Max: 37.1 (23 @ 11:06)  HR: 77 (23 @ 04:39) ( - )  BP: 145/81 (23 @ 04:39) (116/77 - 169/80)  RR: 18 (23 @ 04:39) (17 - 18)  SpO2: 96% (23 @ 04:39) (95% - 97%)    GENERAL: well-appearing, NAD, appears comfortable  HEENT: NC/AT, EOMI, no conjunctival icterus, dry mucus membranes  NECK: supple, non-tender, no LAD  LUNGS: non-labored breathing, CTAB, no wheezing/rales/rhonchi  CV: RRR, no m/r/g, 2+ palpable peripheral pulses bi/l, cap refill <2 secs  ABD: non-distended, soft, non-tender, no rebound tenderness or guarding  : no CVA tenderness  MSK: no vertebral tenderness. full ROM, strength 5/5 bi/l  EXT: warm and well-perfused, trace bilateral LE edema  SKIN: xerosis of LEs  NEURO: AAOx3, no focal deficits    LABS:                        8.3    5.45  )-----------( 228      ( 2023 12:11 )             25.2     06-05    139  |  102  |  26<H>  ----------------------------<  157<H>  4.4   |  20<L>  |  1.66<H>    Ca    9.7      2023 12:08  Phos  3.6     06-05  Mg     2.3     06-05    TPro  6.9  /  Alb  4.0  /  TBili  0.4  /  DBili  x   /  AST  26  /  ALT  42  /  AlkPhos  123<H>  06-05    PT/INR - ( 2023 12:13 )   PT: 14.6 sec;   INR: 1.26 ratio         PTT - ( 2023 12:13 )  PTT:27.4 sec      Urinalysis Basic - ( 2023 20:34 )    Color: Light Yellow / Appearance: Clear / S.033 / pH: x  Gluc: x / Ketone: Negative  / Bili: Negative / Urobili: Negative   Blood: x / Protein: Trace / Nitrite: Negative   Leuk Esterase: Negative / RBC: 0 /hpf / WBC 0 /HPF   Sq Epi: x / Non Sq Epi: x / Bacteria: Negative          IMAGING & OTHER TESTS:  NNI.

## 2023-06-06 NOTE — DISCHARGE NOTE PROVIDER - HOSPITAL COURSE
For full details, please see H&P, progress notes, consult notes and ancillary notes.    52M hx SS disease, Afib (on Eliquis), DVT, GERD, HTN, and gout presented with typical back pain and SOB for several days typical of his sickle cell crisis episodes, last episode 12/2022. Admitted for ongoing mgmt of sickle cell crisis pain.    Patient admitted to Internal Medicine for further management.    Hospital Course:  Patient admitted for typical SS crisis pain, no evidence of significant hemolysis on admission. Patient started on fluids and received IV dilaudid for pain control, transitioned to PCA pump. Provided additional fluids for ALFREDO with SCr elevated to 1.59, unclear baseline though possibly 1.1. Urine studies unremarkable except for pre-renal FeNa 0.9%. Patient did not require transfusions during admission, and was restarted on his home medications for sickle cell, gout, DVT, and Afib. Jadenu not on formulary. Paitient's pain resolved slowly over a three day period. Patient perseverated during admission on having a bowel movement and was given senna, miralax, lactulose, and dulcolax.    On day of discharge, patient is clinically stable with no new exam findings or acute symptoms compared to prior. The patient was seen by the attending physician on the date of discharge and deemed stable and acceptable for discharge. The patient's chronic medical conditions were treated accordingly per the patient's home medication regimen. The patient's medication reconciliation (with changes made to chronic medications), follow up appointments, discharge orders, instructions, and significant lab and diagnostic studies are as noted.     Discharge follow up action items:     1. Follow up with PCP in 1-2 weeks. Follow up with hematology in 2 weeks.  2. Follow up labs: pending Hgb electrophoresis  3. Medication changes: held ARB on admission over concern for ALFREDO, can resume on d/c  4. On hold medications: none  5. Incidental findings: none    Patient's ordered code status: full code  Patient disposition: to home without needs    Patient will be discharged to home by family with close follow up.

## 2023-06-06 NOTE — DISCHARGE NOTE PROVIDER - NSDCFUSCHEDAPPT_GEN_ALL_CORE_FT
Brittany Veloz Physician [facility]  INTMED OP [id number] [facility]  Scheduled Appointment: 06/13/2023    Morro Ontiveros Physician [facility]  UROLOGY [address]  Scheduled Appointment: 06/28/2023    Iwona Zapata  [place] Phys[facility] [address]  Scheduled Appointment: 08/09/2023

## 2023-06-06 NOTE — PROGRESS NOTE ADULT - PROBLEM SELECTOR PLAN 1
Baseline Cr 1.1, elevated SCr 1.59 on admission i/s/o possible urinary retention vs. SS crisis.  - ordered UA, urine electrolytes c/w pre-renal (0.9%)  > Monitor labs and urine output  > Avoid NSAIDs, ACEI/ARBS, RCA and nephrotoxins; Renally dose medications   > mIVF with 0.45NS @ 100 cc/hr Baseline Cr 1.1, elevated SCr 1.59 on admission i/s/o possible urinary retention vs. SS crisis.  - urine studies c/w pre-renal (0.9%)  > Monitor labs and urine output  > Avoid NSAIDs, ACEI/ARBS, RCA and nephrotoxins; Renally dose medications   > mIVF with 0.45NS @ 100 cc/hr

## 2023-06-06 NOTE — DISCHARGE NOTE PROVIDER - NSDCCPCAREPLAN_GEN_ALL_CORE_FT
PRINCIPAL DISCHARGE DIAGNOSIS  Diagnosis: Sickle cell crisis  Assessment and Plan of Treatment: You were admitted for sickle cell crisis associated with pain. There was no concerning changes to your blood counts, no significant hemolysis. You pain was controlled with IV pain medications that were stopped when you felt like your pain was decreased. You were able to be safely discharged to resume your home pain medication regimen.  Medication Changes: NONE  - We had held your blood pressure medications during admission for fear of an acute kidney injury, but they were resumed during your admission and can be continued at home.  Please make sure to follow up with all the clinics listed in the "Follow-up" section of your discharge paperwork. If an appointment has been made for you, please make sure to attend the appointment, and call the clinic if you would like to reschedule. If an appointment has not bee made for you, please call the clinic in the number listed to make the appointment.  Please make sure to follow up with your primary care physician within the next 1-2 weeks to follow up on your condition.  Please return to the emergency room if you experience persistent fever, chills, nausea, vomiting, shortness of breath, lightheadedness/syncope.      SECONDARY DISCHARGE DIAGNOSES  Diagnosis: Back pain  Assessment and Plan of Treatment:

## 2023-06-06 NOTE — DISCHARGE NOTE PROVIDER - NSDCFUADDAPPT_GEN_ALL_CORE_FT
APPTS ARE READY TO BE MADE: [X] YES    Best Family or Patient Contact (if needed):    Additional Information about above appointments (if needed):    1: PCP - Dr. Brittany Veloz  2: Heme - Dr. Lincoln ([facility])  3:     Other comments or requests:    APPTS ARE READY TO BE MADE: [X] YES    Best Family or Patient Contact (if needed):    Additional Information about above appointments (if needed):    1: PCP - Dr. Brittany Veloz  2: Heme - Dr. Lincoln ([facility])  3:     Other comments or requests:     Patient is scheduled with Dr. Veloz 6/13/23 10:30AM [id number]

## 2023-06-06 NOTE — DISCHARGE NOTE PROVIDER - NSDCMRMEDTOKEN_GEN_ALL_CORE_FT
acetaminophen 325 mg oral tablet: 2 tab(s) orally every 6 hours, As needed, Temp greater or equal to 38C (100.4F), Mild Pain (1 - 3)  allopurinol 200 mg oral tablet: 1 orally once a day  colchicine 0.6 mg oral tablet: 0.5 tab(s) orally once a day  Dilaudid 2 mg oral tablet: 1 tab(s) orally every 6 hours MDD:max 4 tabs daily  Eliquis 2.5 mg oral tablet: 1 tab(s) orally 2 times a day  folic acid 1 mg oral tablet: 1 tab(s) orally once a day  hydroxyurea 500 mg oral capsule: 4 tab(s) orally once a day  Jadenu 360 mg oral tablet: 2 tab(s) orally once a day  Linzess 290 mcg oral capsule: 1 cap(s) orally once a day, As Needed  lisinopril 10 mg oral tablet: 1 tab(s) orally once a day  Multaq 400 mg oral tablet: 1 orally once a day  Multiple Vitamins oral tablet: 1 tab(s) orally once a day  pantoprazole 40 mg oral delayed release tablet: 1 tab(s) orally once a day (before a meal)  predniSONE 2.5 mg oral tablet: 1 tab(s) orally once a day  senna oral tablet: 2 tab(s) orally once a day (at bedtime)   acetaminophen 325 mg oral tablet: 2 tab(s) orally every 6 hours, As needed, Temp greater or equal to 38C (100.4F), Mild Pain (1 - 3)  allopurinol 200 mg oral tablet: 1 orally once a day  colchicine 0.6 mg oral tablet: 0.5 tab(s) orally once a day  Dilaudid 2 mg oral tablet: 1 tab(s) orally every 6 hours MDD:max 4 tabs daily  Eliquis 2.5 mg oral tablet: 1 tab(s) orally 2 times a day  folic acid 1 mg oral tablet: 1 tab(s) orally once a day  hydroxyurea 500 mg oral capsule: 4 tab(s) orally once a day  Jadenu 360 mg oral tablet: 2 tab(s) orally once a day  Linzess 290 mcg oral capsule: 1 cap(s) orally once a day, As Needed  lisinopril 10 mg oral tablet: 1 tab(s) orally once a day  Multaq 400 mg oral tablet: 1 tab(s) orally 2 times a day  Multiple Vitamins oral tablet: 1 tab(s) orally once a day  pantoprazole 40 mg oral delayed release tablet: 1 tab(s) orally once a day (before a meal)  predniSONE 2.5 mg oral tablet: 1 tab(s) orally once a day  senna oral tablet: 2 tab(s) orally once a day (at bedtime)

## 2023-06-06 NOTE — DISCHARGE NOTE PROVIDER - NSDCACTIVITY_GEN_ALL_CORE
[Subsequent Evaluation] : a subsequent evaluation for [FreeTextEntry2] : nasal swelling  No restrictions

## 2023-06-06 NOTE — DISCHARGE NOTE NURSING/CASE MANAGEMENT/SOCIAL WORK - PATIENT PORTAL LINK FT
You can access the FollowMyHealth Patient Portal offered by [facility] by registering at the following website: [email or web address]. By joining [employer]’s FollowMyHealth portal, you will also be able to view your health information using other applications (apps) compatible with our system.

## 2023-06-06 NOTE — PROGRESS NOTE ADULT - PROBLEM SELECTOR PLAN 3
CHADSVASC 1. Unclear why on Eliquis if for Afib.  > cont home Eliquis 2.5mg BID  > cont home Multaq 400mg qD
CHADSVASC 1. Unclear why on Eliquis if for Afib.  > cont home Eliquis 2.5mg BID  > cont home Multaq 400mg qD

## 2023-06-06 NOTE — PROGRESS NOTE ADULT - PROBLEM SELECTOR PLAN 8
DVT PPx: Eliquis as above  Diet: Regular  Code: Full Code  Dispo: Patient active prior to admission  Communication: Jacki (wife, [phone])    Discharge information:  Pharmacy - [facility] mail order ([phone])...[facility] for in-person med pick-up  PCP - Dr. Brittany Srinivasan ([facility]) - Dr. Lincoln

## 2023-06-06 NOTE — PROGRESS NOTE ADULT - PROBLEM SELECTOR PLAN 2
Presents with typical back pain classic for his crises. Prescribed dilaudid at home but unable to take due to work requirements. Follows with Dr. Veloz. Hgb 8.3, macrocytic with RDW 28. No signs of active bleeding noted.  - Outpatient transfusions 2x/month with goal Hgb ~8; crises/pain varies 1/week to 1/month, usually self-medicates with tylenol at home  - LDH elevated 381 but Tbili wnl; retic 5.0%  - CTA chest no PEs, lungs clear  > Jadenu non-formulary  > cont home Hydroxyurea 2000mg qD, Folate 1mg qD, MVI  > f/u Hgb Electrophoresis  > Dilaudid PCA pump  > mIVF as above
Presents with typical back pain classic for his crises. Prescribed dilaudid at home but unable to take due to work requirements. Follows with Dr. Veloz. Hgb 8.3, macrocytic with RDW 28. No signs of active bleeding noted.  - Outpatient transfusions 2x/month with goal Hgb ~8; crises/pain varies 1/week to 1/month, usually self-medicates with tylenol at home  - LDH elevated 381 but Tbili wnl; retic 5.0%  - CTA chest no PEs, lungs clear  > Jadenu non-formulary  > cont home Hydroxyurea 2000mg qD, Folate 1mg qD, MVI  > f/u Hgb Electrophoresis  > Dilaudid PCA pump  > mIVF as above

## 2023-06-06 NOTE — PROGRESS NOTE ADULT - ATTENDING COMMENTS
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 subsegmental PE- also with paoxsymal atrial fibrillatiion/ flutter on multaq  - restart home lisionpril, toprol XL  - CBC daily  Rest per above  dc time 45 min 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

## 2023-06-06 NOTE — PROGRESS NOTE ADULT - PROBLEM SELECTOR PLAN 5
Hx DVT R calf early 2022.  - s/p Eliquis 6 months (now on Eliquis for AFib)  > clarify why needs lifelong AC per Dr. Veloz's note Hx DVT R calf early 2022.  - s/p Eliquis 6

## 2023-06-06 NOTE — DISCHARGE NOTE PROVIDER - CARE PROVIDER_API CALL
Brittany Veloz  Internal Medicine  [phone] [place], [place] [address]  Phone: [phone]  Fax: [phone]  Established Patient  Follow Up Time: 2 weeks

## 2023-06-06 NOTE — PROGRESS NOTE ADULT - PROBLEM SELECTOR PLAN 7
> hold allopurinol 200mg qD and Colchicine 0.3mg qD i/s/o ALFREDO  > hold home Prednisone 2.5mg qD
> hold allopurinol 200mg qD and Colchicine 0.3mg qD i/s/o ALFREDO  > hold home Prednisone 2.5mg qD

## 2023-06-07 LAB
HEMOGLOBIN INTERPRETATION: SIGNIFICANT CHANGE UP
HGB A MFR BLD: 44.8 % — LOW (ref 95.8–98)
HGB A2 MFR BLD: 2.4 % — SIGNIFICANT CHANGE UP (ref 2–3.2)
HGB F MFR BLD: 17.6 % — HIGH (ref 0–1)
HGB S MFR BLD: 35.2 % — HIGH

## 2023-06-13 ENCOUNTER — OUTPATIENT (OUTPATIENT)
Dept: OUTPATIENT SERVICES | Facility: HOSPITAL | Age: 53
LOS: 1 days | End: 2023-06-13

## 2023-06-13 ENCOUNTER — LABORATORY RESULT (OUTPATIENT)
Age: 53
End: 2023-06-13

## 2023-06-13 ENCOUNTER — APPOINTMENT (OUTPATIENT)
Dept: INTERNAL MEDICINE | Facility: CLINIC | Age: 53
End: 2023-06-13
Payer: COMMERCIAL

## 2023-06-13 VITALS
RESPIRATION RATE: 16 BRPM | SYSTOLIC BLOOD PRESSURE: 120 MMHG | OXYGEN SATURATION: 98 % | HEIGHT: 71 IN | HEART RATE: 73 BPM | DIASTOLIC BLOOD PRESSURE: 72 MMHG | WEIGHT: 239 LBS | BODY MASS INDEX: 33.46 KG/M2

## 2023-06-13 PROCEDURE — 99214 OFFICE O/P EST MOD 30 MIN: CPT

## 2023-06-13 RX ORDER — ACETAMINOPHEN 500 MG/1
500 TABLET ORAL EVERY 8 HOURS
Qty: 180 | Refills: 6 | Status: ACTIVE | COMMUNITY
Start: 2023-06-13 | End: 1900-01-01

## 2023-06-13 NOTE — HISTORY OF PRESENT ILLNESS
[de-identified] : The patient is a 53 yo male with HGB SS, here for f/u. Taking  HU 2000 mg QD.Has restarted Aranesp with Heme, but it is not working as well as it had been . He is no longer taking Oxbryta due to severe diarrhea with the Oxbryta that never resolved. S/P hospitalization 6/4 -6/6. Today feels weak, mild back pain. He has been managing his pain with extra strength tylenol. \par He is now having frequent transfusions with his hematologist, and is maintaining hemoglobin @ 8. He is requiring @ 2 units PRBCs per month to maintain HGB of 8.His SCD is stable, and he is asymptomatic. He has rare pain, no dyspnea. \par He is following with rheum for gout. He is taking allopurinol 200 mg QD, 0.3 mg QD. He is also taking prednisone 2.5 mg QD, with excellent reduction of knee swelling and pain.  He still c/o "cracking" of his knees, and was told by rheum to see ortho.\par The patient has been compliant with all medication.\par Now taking Metoprolol ER 75 mg QD and lisinopril 10 mg QD for HTN\par S/P PE- taking eliquis 5 bid- PE was in 3/22. 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 \par The patient is back at work, working FT as a  for the [employer]. He enjoys the work, and has no limitations regarding his work due to medical issues.\par s/p past episode of a. flutter, now taking Multaq 400 mg QD- Has routine f/u with cardiology. He is asymptomatic.\par Taking HU 2000 mg QD.\par Taking Jadenu 720 mg QD for iron overload\par \par Hematologist: Dr. [phone]\par Oraua-[phone]\par [phone]\par fully covid vaxed, and boostered\par \par HGB 4/20/22\par 7.4\par creat: 1.67\par GFR 50\par ferritin- 4/20/22- 2833\par \par PE: gen- wdwn male in nad, talkative \par vss-120/72\par mild icterus\par lungs- cta\par cor: rrr-m\par abd benign, obese\par ext; -c/c/e, scarring from past LE ulcers, both ankles\par effusions of knees have completely resolved, full ROM both knees\par \par a/P- 53 yo male with HGB SS, s/p PE, with gout and a. flutter\par 1. HTN- metoprolol 75 mg QD with lisinopril 10 mg QD\par 2.gout-   allopurinol 200 mg QD, now taking prednisone 2.5 mg QD with colchicine 0.3 mg QD\par followed by rheumatology\par 3.SCD- now transfusion-dependent-\par to continue with monthly transfusions, with Jadenu for iron overload\par  HU- continue HU to 2000 mg QD\par 4. a. flutter, stable- f/u with cardiology, continue Multaq 400 mg QD\par 5. 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 f/u  3 months\par 8. routine labs today\par \par Brittany Veloz MD\par

## 2023-06-14 DIAGNOSIS — M10.9 GOUT, UNSPECIFIED: ICD-10-CM

## 2023-06-14 DIAGNOSIS — D57.1 SICKLE-CELL DISEASE WITHOUT CRISIS: ICD-10-CM

## 2023-06-14 DIAGNOSIS — E83.19 OTHER DISORDERS OF IRON METABOLISM: ICD-10-CM

## 2023-06-14 DIAGNOSIS — I10 ESSENTIAL (PRIMARY) HYPERTENSION: ICD-10-CM

## 2023-06-15 LAB
25(OH)D3 SERPL-MCNC: 30 NG/ML
ALBUMIN SERPL ELPH-MCNC: 4.2 G/DL
ALP BLD-CCNC: 136 U/L
ALT SERPL-CCNC: 55 U/L
ANION GAP SERPL CALC-SCNC: 12 MMOL/L
AST SERPL-CCNC: 53 U/L
BILIRUB SERPL-MCNC: 0.4 MG/DL
BUN SERPL-MCNC: 23 MG/DL
CALCIUM SERPL-MCNC: 10 MG/DL
CHLORIDE SERPL-SCNC: 106 MMOL/L
CO2 SERPL-SCNC: 22 MMOL/L
CREAT SERPL-MCNC: 1.51 MG/DL
EGFR: 55 ML/MIN/1.73M2
FERRITIN SERPL-MCNC: 3054 NG/ML
GLUCOSE SERPL-MCNC: 111 MG/DL
POTASSIUM SERPL-SCNC: 5.2 MMOL/L
PROT SERPL-MCNC: 6.9 G/DL
RBC # BLD: 2.43 M/UL
RETICS # AUTO: 6.3 %
RETICS AGGREG/RBC NFR: 153.1 K/UL
SODIUM SERPL-SCNC: 140 MMOL/L

## 2023-06-19 ENCOUNTER — NON-APPOINTMENT (OUTPATIENT)
Age: 53
End: 2023-06-19

## 2023-06-20 ENCOUNTER — APPOINTMENT (OUTPATIENT)
Dept: ORTHOPEDIC SURGERY | Facility: CLINIC | Age: 53
End: 2023-06-20
Payer: COMMERCIAL

## 2023-06-20 LAB
HGB A MFR BLD: 40.5 %
HGB A2 MFR BLD: 2.5 %
HGB F MFR BLD: 18.7 %
HGB FRACT BLD-IMP: NORMAL
HGB S MFR BLD: 38.3 %

## 2023-06-20 PROCEDURE — 99213 OFFICE O/P EST LOW 20 MIN: CPT

## 2023-06-20 RX ORDER — HYALURONATE SODIUM 20 MG/2 ML
20 SYRINGE (ML) INTRAARTICULAR
Qty: 3 | Refills: 1 | Status: ACTIVE | COMMUNITY
Start: 2023-06-20 | End: 1900-01-01

## 2023-06-25 ENCOUNTER — FORM ENCOUNTER (OUTPATIENT)
Age: 53
End: 2023-06-25

## 2023-06-27 NOTE — ADDENDUM
[FreeTextEntry1] : This note was written by Yenifer Sol on 06/20/2023 acting solely as a scribe for Dr. Maulik Westbrook.\par \par All medical record entries made by the Scribe were at my, Dr. Maulik Westbrook, direction and personally dictated by me on 06/20/2023. I have personally reviewed the chart and agree that the record accurately reflects my personal performance of the history, physical exam, assessment and plan.

## 2023-06-27 NOTE — HISTORY OF PRESENT ILLNESS
[de-identified] : 52 year old male presents today with bilateral knee pain. He was seen in 2022 for gouty arthropathy/OA of right knee. He has been under the care of Dr. Zapata for gout and has been referred here for further treatment of OA. Here inquiring about steroid vs HA injection. Rates his pain 7/10 and is currently taking prednisone.  Prednisone provides great relief. His last uric acid was 6.7 May 10, 2023 taking allopurinol and colchicine 0.3mg daily.

## 2023-06-27 NOTE — DISCUSSION/SUMMARY
[de-identified] : 51 y/o male with bilateral knee OA/gouty arthropathy\par \par Patient presents for further evaluation of bilateral knee pain. Patient has PMHx of chronic gouty arthropathy with subsequent medical management. There is also some early underlying degenerative changes seen on x-ray imaging on bilateral knees. I discussed the treatment of degenerative arthritis with the patient at length today, as well as the chronic degenerative process and likely future progression of the disease. Pain may be related to the bony degenerative changes seen on XR imaging, or the associated degeneration to the soft tissues within the knee joint, including cartilage, meniscus, or ligamentous structures.  I described the spectrum of treatment options available including nonoperative modalities to total joint arthroplasty. Noninvasive and nonoperative treatment modalities include weight reduction, activity modification with low impact exercise, PRN use of acetaminophen or anti-inflammatory medication, natural anti-inflammatory supplements, glucosamine/chondroitin, and physical therapy (for strengthening and conditioning). More invasive treatments can include injection therapies; including cortisone, hyaluronic acid (visco-supplementation), or platelet-rich-plasma (PRP) injections. Surgical intervention is not warranted at this time.\par \par The risks and benefits of each treatment option was discussed and all questions were answered. At this time recommendations are for conservative and symptomatic care as detailed above with impact loading activity restriction, low impact exercise and avoidance of strenuous activity. \par \par Recommend trial of Visco supplementation injection therapy. We'll obtain preauthorization prior to injection. Patient is not a candidate for intra-articular steroid injection or arthroplasty at this time. \par \par Followup: Once approved for HA injection therapy.

## 2023-06-27 NOTE — PHYSICAL EXAM
[de-identified] : Oriented to time, place, person\par Mood: Normal\par Affect: Normal\par Appearance: Healthy, well appearing, no acute distress.\par Gait: Normal\par Assistive Devices: None\par \par Right Knee Exam:\par \par Skin: Clean, dry, intact\par Inspection: No obvious malalignment, no masses, no swelling, no effusion\par Pulses: 2+ DP/PT pulses \par ROM: 0-135 degrees of flexion. No pain with deep knee flexion/extension.\par Tenderness: No MJLT. No LJLT. No pain over the patella facets. No pain to the quadriceps tendon. No pain to the patella tendon. No posterior knee tenderness.\par Stability: Stable to varus, valgus. Negative Lachman testing. Negative anterior drawer, negative posterior drawer.\par Strength: 5/5 Q/H/TA/GS/EHL, without atrophy\par Neuro: Intact to light touch throughout, DTRs normal\par Additional Tests: Negative Nidia's test, Negative patellar grind test\par \par Left Knee Exam:\par \par Skin: Clean, dry, intact\par Inspection: No obvious malalignment, no masses, no swelling, no effusion\par Pulses: 2+ DP/PT pulses \par ROM: 0-135 degrees of flexion. No pain with deep knee flexion/extension.\par Tenderness: No MJLT. No LJLT. No pain over the patella facets. No pain to the quadriceps tendon. No pain to the patella tendon. No posterior knee tenderness.\par Stability: Stable to varus, valgus. Negative Lachman testing. Negative anterior drawer, negative posterior drawer.\par Strength: 5/5 Q/H/TA/GS/EHL, without atrophy\par Neuro: Intact to light touch throughout, DTRs normal\par Additional Tests: Negative Nidia's test, Negative patellar grind test  [de-identified] : Images were reviewed dated 5/16/2023\par \par 4 views of the right knee that show no acute fracture or dislocation. There is mild medial, mild lateral and mild patellofemoral degenerative changes seen. There is mild patella malorie\par \par 4 views of the left knee that show no acute fracture or dislocation. There is mild medial, mild lateral and mild patellofemoral degenerative changes seen. There is patella malorie. Bone spur within the inferior pole of the left knee.

## 2023-06-28 ENCOUNTER — APPOINTMENT (OUTPATIENT)
Dept: UROLOGY | Facility: CLINIC | Age: 53
End: 2023-06-28
Payer: COMMERCIAL

## 2023-06-28 VITALS — HEART RATE: 73 BPM | TEMPERATURE: 97.7 F | DIASTOLIC BLOOD PRESSURE: 82 MMHG | SYSTOLIC BLOOD PRESSURE: 142 MMHG

## 2023-06-28 DIAGNOSIS — N40.0 BENIGN PROSTATIC HYPERPLASIA WITHOUT LOWER URINARY TRACT SYMPMS: ICD-10-CM

## 2023-06-28 DIAGNOSIS — Z82.49 FAMILY HISTORY OF ISCHEMIC HEART DISEASE AND OTHER DISEASES OF THE CIRCULATORY SYSTEM: ICD-10-CM

## 2023-06-28 PROCEDURE — 51741 ELECTRO-UROFLOWMETRY FIRST: CPT

## 2023-06-28 PROCEDURE — 51798 US URINE CAPACITY MEASURE: CPT | Mod: 59

## 2023-06-28 PROCEDURE — 99205 OFFICE O/P NEW HI 60 MIN: CPT | Mod: 25

## 2023-06-28 RX ORDER — SILDENAFIL 20 MG/1
20 TABLET ORAL
Qty: 30 | Refills: 5 | Status: ACTIVE | COMMUNITY
Start: 2023-06-28 | End: 1900-01-01

## 2023-06-28 RX ORDER — TADALAFIL 5 MG/1
5 TABLET ORAL
Qty: 90 | Refills: 3 | Status: ACTIVE | COMMUNITY
Start: 2023-06-28 | End: 1900-01-01

## 2023-06-29 ENCOUNTER — FORM ENCOUNTER (OUTPATIENT)
Age: 53
End: 2023-06-29

## 2023-06-29 PROBLEM — N40.0 BENIGN PROSTATE HYPERPLASIA: Status: ACTIVE | Noted: 2023-06-28

## 2023-06-29 LAB — PSA SERPL-MCNC: 3.69 NG/ML

## 2023-07-10 ENCOUNTER — NON-APPOINTMENT (OUTPATIENT)
Age: 53
End: 2023-07-10

## 2023-07-10 LAB
TESTOST FREE SERPL-MCNC: 1.9 PG/ML
TESTOST SERPL-MCNC: 76.5 NG/DL

## 2023-07-10 NOTE — LETTER BODY
[Dear  ___] : Dear  [unfilled], [Consult Letter:] : I had the pleasure of evaluating your patient, [unfilled]. [Please see my note below.] : Please see my note below. [Consult Closing:] : Thank you very much for allowing me to participate in the care of this patient.  If you have any questions, please do not hesitate to contact me. [Sincerely,] : Sincerely, [FreeTextEntry3] : Morro Ontiveros MD.

## 2023-07-10 NOTE — PHYSICAL EXAM
[Urethral Meatus] : meatus normal [Urinary Bladder Findings] : the bladder was normal on palpation [Scrotum] : the scrotum was normal [Testes Mass (___cm)] : there were no testicular masses [No Prostate Nodules] : no prostate nodules [Prostate Size ___ (0-4)] : prostate size [unfilled] (scale: 0-4) [General Appearance - Well Developed] : well developed [General Appearance - Well Nourished] : well nourished [Normal Appearance] : normal appearance [Well Groomed] : well groomed [Edema] : no peripheral edema [General Appearance - In No Acute Distress] : no acute distress [Respiration, Rhythm And Depth] : normal respiratory rhythm and effort [Exaggerated Use Of Accessory Muscles For Inspiration] : no accessory muscle use [Abdomen Soft] : soft [Abdomen Tenderness] : non-tender [Costovertebral Angle Tenderness] : no ~M costovertebral angle tenderness [Epididymis] : the epididymides were normal [Testes Tenderness] : no tenderness of the testes [Normal Station and Gait] : the gait and station were normal for the patient's age [] : no rash [No Focal Deficits] : no focal deficits [Oriented To Time, Place, And Person] : oriented to person, place, and time [Affect] : the affect was normal [Mood] : the mood was normal [Not Anxious] : not anxious [No Palpable Adenopathy] : no palpable adenopathy

## 2023-07-10 NOTE — PHYSICAL EXAM
[Urethral Meatus] : meatus normal [Urinary Bladder Findings] : the bladder was normal on palpation [Scrotum] : the scrotum was normal [Testes Mass (___cm)] : there were no testicular masses [No Prostate Nodules] : no prostate nodules [Prostate Size ___ (0-4)] : prostate size [unfilled] (scale: 0-4) [General Appearance - Well Developed] : well developed [General Appearance - Well Nourished] : well nourished [Normal Appearance] : normal appearance [Well Groomed] : well groomed [Edema] : no peripheral edema [General Appearance - In No Acute Distress] : no acute distress [Respiration, Rhythm And Depth] : normal respiratory rhythm and effort [Exaggerated Use Of Accessory Muscles For Inspiration] : no accessory muscle use [Abdomen Soft] : soft [Abdomen Tenderness] : non-tender [Costovertebral Angle Tenderness] : no ~M costovertebral angle tenderness [Epididymis] : the epididymides were normal [Testes Tenderness] : no tenderness of the testes [Normal Station and Gait] : the gait and station were normal for the patient's age [] : no rash [No Focal Deficits] : no focal deficits [Oriented To Time, Place, And Person] : oriented to person, place, and time [Mood] : the mood was normal [Affect] : the affect was normal [Not Anxious] : not anxious [No Palpable Adenopathy] : no palpable adenopathy

## 2023-07-10 NOTE — ADDENDUM
[FreeTextEntry1] : Next Visit: PVR, Uroflow\par \par I, Malgorzata Ybarra documented this note as a scribe on behalf of Dr. Morro Ontiveros MD on 06/29/2023 \par \par The documentation recorded by the scribe accuracy reflects the service I personally performed and the decisions made by me.\par

## 2023-07-10 NOTE — ASSESSMENT
[FreeTextEntry1] : Patient presents with bothersome erectile dysfunction and moderate post void residual. Physical exam revealed an enlarged prostate that is likely the cause of his mild lower urinary tract symptoms. He will restart daily tadalafil 5 mg with sildenafil  mg as needed. Patient's serum was collected at today's visit for PSA and testosterone level determination. If unremarkable, patient will follow up in 3 months.

## 2023-07-10 NOTE — HISTORY OF PRESENT ILLNESS
[FreeTextEntry1] : Patient is a 52 year male who presents after many years with erectile dysfunction. He was previously prescribed daily tadalafil; he reports an increase in his dosage by another physician with suboptimal results. Patient presents today for a wellness check and to explore further treatment options for ED. He is voiding with occasional mild hesitancy, nocturia x 3-5, no dysuria or gross hematuria.\par

## 2023-07-12 RX ORDER — HYALURONATE SODIUM 20 MG/2 ML
20 SYRINGE (ML) INTRAARTICULAR
Qty: 1 | Refills: 0 | Status: ACTIVE | COMMUNITY
Start: 2023-07-12

## 2023-07-20 ENCOUNTER — APPOINTMENT (OUTPATIENT)
Dept: ORTHOPEDIC SURGERY | Facility: CLINIC | Age: 53
End: 2023-07-20
Payer: COMMERCIAL

## 2023-07-20 PROCEDURE — 20610 DRAIN/INJ JOINT/BURSA W/O US: CPT | Mod: RT

## 2023-07-20 RX ORDER — HYALURONATE SODIUM 20 MG/2 ML
20 SYRINGE (ML) INTRAARTICULAR
Qty: 1 | Refills: 0 | Status: ACTIVE | COMMUNITY
Start: 2023-07-20

## 2023-07-24 NOTE — ADDENDUM
[FreeTextEntry1] : This note was written by Yenifer Sol on 07/20/2023 acting solely as a scribe for Dr. Maulik Westbrook.\par \par All medical record entries made by the Scribe were at my, Dr. Maulik Westbrook, direction and personally dictated by me on 07/20/2023. I have personally reviewed the chart and agree that the record accurately reflects my personal performance of the history, physical exam, assessment and plan.

## 2023-07-24 NOTE — END OF VISIT
[FreeTextEntry3] : 53 y/o male with right knee osteoarthritis. \par \par The first of three Euflexxa injections was given today under sterile conditions into the right knee joint without complication. The patient was instructed on modification of activities over the next 48-72 hours. I advised the patient to ice the knee as needed for control of local irritation from the injection. I advised that some patients have immediate benefit from the initial injection therapy, however it usually takes the medication a number of weeks (~8wks) to provide significant relief of osteoarthritic symptoms. \par \par We will see the patient back for the second injection in a weeks time.

## 2023-07-24 NOTE — PROCEDURE
[de-identified] : Injection: Right knee joint.\par Indication: Osteoarthritis. \par \par A discussion was had with the patient regarding this procedure and all questions were answered. All risks, benefits and alternatives were discussed. These included but were not limited to bleeding, infection, and allergic reaction. Alcohol was used to clean the skin, and betadine was used to sterilize and prep the area in the supero-lateral aspect of the right knee. Ethyl chloride spray was then used as a topical anesthetic. A 21-gauge needle was used to inject Euflexxa into the knee. A sterile bandage was then applied. The patient tolerated the procedure well and there were no complications.

## 2023-07-27 ENCOUNTER — APPOINTMENT (OUTPATIENT)
Dept: ORTHOPEDIC SURGERY | Facility: CLINIC | Age: 53
End: 2023-07-27
Payer: COMMERCIAL

## 2023-07-27 PROCEDURE — 20610 DRAIN/INJ JOINT/BURSA W/O US: CPT | Mod: 50

## 2023-08-07 ENCOUNTER — APPOINTMENT (OUTPATIENT)
Dept: ORTHOPEDIC SURGERY | Facility: CLINIC | Age: 53
End: 2023-08-07
Payer: COMMERCIAL

## 2023-08-07 PROCEDURE — 20610 DRAIN/INJ JOINT/BURSA W/O US: CPT | Mod: 50

## 2023-08-09 ENCOUNTER — APPOINTMENT (OUTPATIENT)
Dept: RHEUMATOLOGY | Facility: CLINIC | Age: 53
End: 2023-08-09
Payer: COMMERCIAL

## 2023-08-09 VITALS
HEART RATE: 80 BPM | BODY MASS INDEX: 35 KG/M2 | OXYGEN SATURATION: 98 % | TEMPERATURE: 97.1 F | HEIGHT: 71 IN | RESPIRATION RATE: 16 BRPM | WEIGHT: 250 LBS | DIASTOLIC BLOOD PRESSURE: 76 MMHG | SYSTOLIC BLOOD PRESSURE: 150 MMHG

## 2023-08-09 DIAGNOSIS — M87.9 OSTEONECROSIS, UNSPECIFIED: ICD-10-CM

## 2023-08-09 PROCEDURE — 99213 OFFICE O/P EST LOW 20 MIN: CPT

## 2023-08-09 NOTE — END OF VISIT
[FreeTextEntry3] : 51 y/o male with bilateral knee osteoarthritis.   The third of three Euflexxa injections was given today under sterile conditions into the right knee joint without  complication. I again discussed the role of activity modification/icing following the injection to treat any local irritation  from the injection. The second of three Euflexxa injections was given today under sterile conditions into the left knee joint without  complication. The patient was instructed on modification of activities over the next 48-72 hours.  Follow up next week for final left knee injection.

## 2023-08-09 NOTE — PROCEDURE
[de-identified] : Injection: right knee joint. Indication: Osteoarthritis.  A discussion was had with the patient regarding this procedure and all questions were answered. All risks, benefits and alternatives were discussed. These included but were not limited to bleeding, infection, and allergic reaction. Alcohol was used to clean the skin, and betadine was used to sterilize and prep the area in the supero-lateral aspect of the right knee. Ethyl chloride spray was then used as a topical anesthetic. A 21-gauge needle was used to inject 2 cc of Euflexxa into the knee. A sterile bandage was then applied. The patient tolerated the procedure well and there were no complications.  Injection: left knee joint. Indication: Osteoarthritis.  A discussion was had with the patient regarding this procedure and all questions were answered. All risks, benefits and alternatives were discussed. These included but were not limited to bleeding, infection, and allergic reaction. Alcohol was used to clean the skin, and betadine was used to sterilize and prep the area in the supero-lateral aspect of the left knee. Ethyl chloride spray was then used as a topical anesthetic. A 21-gauge needle was used to inject 2 cc of Euflexxa into the knee. A sterile bandage was then applied. The patient tolerated the procedure well and there were no complications.

## 2023-08-16 ENCOUNTER — EMERGENCY (EMERGENCY)
Facility: HOSPITAL | Age: 53
LOS: 1 days | Discharge: HOME | End: 2023-08-16
Attending: EMERGENCY MEDICINE
Payer: COMMERCIAL

## 2023-08-16 VITALS
TEMPERATURE: 98 F | DIASTOLIC BLOOD PRESSURE: 78 MMHG | HEIGHT: 71 IN | SYSTOLIC BLOOD PRESSURE: 143 MMHG | WEIGHT: 244.93 LBS | OXYGEN SATURATION: 98 % | RESPIRATION RATE: 20 BRPM | HEART RATE: 75 BPM

## 2023-08-16 DIAGNOSIS — Z90.49 ACQUIRED ABSENCE OF OTHER SPECIFIED PARTS OF DIGESTIVE TRACT: Chronic | ICD-10-CM

## 2023-08-16 LAB
ALBUMIN SERPL ELPH-MCNC: 3.7 G/DL — SIGNIFICANT CHANGE UP (ref 3.3–5)
ALP SERPL-CCNC: 132 U/L — HIGH (ref 40–120)
ALT FLD-CCNC: 55 U/L — HIGH (ref 10–45)
ANION GAP SERPL CALC-SCNC: 10 MMOL/L — SIGNIFICANT CHANGE UP (ref 5–17)
AST SERPL-CCNC: 51 U/L — HIGH (ref 10–40)
BILIRUB SERPL-MCNC: 0.4 MG/DL — SIGNIFICANT CHANGE UP (ref 0.2–1.2)
BUN SERPL-MCNC: 26 MG/DL — HIGH (ref 7–23)
CALCIUM SERPL-MCNC: 10 MG/DL — SIGNIFICANT CHANGE UP (ref 8.4–10.5)
CHLORIDE SERPL-SCNC: 109 MMOL/L — HIGH (ref 96–108)
CO2 SERPL-SCNC: 20 MMOL/L — LOW (ref 22–31)
CREAT SERPL-MCNC: 1.28 MG/DL — SIGNIFICANT CHANGE UP (ref 0.5–1.3)
EGFR: 67 ML/MIN/1.73M2 — SIGNIFICANT CHANGE UP
GLUCOSE SERPL-MCNC: 97 MG/DL — SIGNIFICANT CHANGE UP (ref 70–99)
HCT VFR BLD CALC: 23.6 % — LOW (ref 39–50)
HGB BLD-MCNC: 8 G/DL — LOW (ref 13–17)
MCHC RBC-ENTMCNC: 33.9 GM/DL — SIGNIFICANT CHANGE UP (ref 32–36)
MCHC RBC-ENTMCNC: 38.3 PG — HIGH (ref 27–34)
MCV RBC AUTO: 112.9 FL — HIGH (ref 80–100)
NRBC # BLD: 28 /100 WBCS — HIGH (ref 0–0)
PLATELET # BLD AUTO: 247 K/UL — SIGNIFICANT CHANGE UP (ref 150–400)
POTASSIUM SERPL-MCNC: 4.8 MMOL/L — SIGNIFICANT CHANGE UP (ref 3.5–5.3)
POTASSIUM SERPL-SCNC: 4.8 MMOL/L — SIGNIFICANT CHANGE UP (ref 3.5–5.3)
PROT SERPL-MCNC: 6.8 G/DL — SIGNIFICANT CHANGE UP (ref 6–8.3)
RBC # BLD: 2.09 M/UL — LOW (ref 4.2–5.8)
RBC # BLD: 2.09 M/UL — LOW (ref 4.2–5.8)
RBC # FLD: 27.7 % — HIGH (ref 10.3–14.5)
RETICS #: 191.4 K/UL — HIGH (ref 25–125)
RETICS/RBC NFR: 9.2 % — HIGH (ref 0.5–2.5)
SODIUM SERPL-SCNC: 139 MMOL/L — SIGNIFICANT CHANGE UP (ref 135–145)
WBC # BLD: 5.4 K/UL — SIGNIFICANT CHANGE UP (ref 3.8–10.5)
WBC # FLD AUTO: 5.4 K/UL — SIGNIFICANT CHANGE UP (ref 3.8–10.5)

## 2023-08-16 PROCEDURE — 80053 COMPREHEN METABOLIC PANEL: CPT

## 2023-08-16 PROCEDURE — 36000 PLACE NEEDLE IN VEIN: CPT

## 2023-08-16 PROCEDURE — 85045 AUTOMATED RETICULOCYTE COUNT: CPT

## 2023-08-16 PROCEDURE — 36415 COLL VENOUS BLD VENIPUNCTURE: CPT

## 2023-08-16 PROCEDURE — 76937 US GUIDE VASCULAR ACCESS: CPT

## 2023-08-16 PROCEDURE — 85027 COMPLETE CBC AUTOMATED: CPT

## 2023-08-16 PROCEDURE — 99284 EMERGENCY DEPT VISIT MOD MDM: CPT | Mod: 25

## 2023-08-16 PROCEDURE — 99285 EMERGENCY DEPT VISIT HI MDM: CPT

## 2023-08-16 PROCEDURE — 76937 US GUIDE VASCULAR ACCESS: CPT | Mod: 26

## 2023-08-16 RX ORDER — SODIUM CHLORIDE 9 MG/ML
1000 INJECTION INTRAMUSCULAR; INTRAVENOUS; SUBCUTANEOUS ONCE
Refills: 0 | Status: COMPLETED | OUTPATIENT
Start: 2023-08-16 | End: 2023-08-16

## 2023-08-16 RX ORDER — ACETAMINOPHEN 500 MG
975 TABLET ORAL ONCE
Refills: 0 | Status: COMPLETED | OUTPATIENT
Start: 2023-08-16 | End: 2023-08-16

## 2023-08-16 RX ADMIN — SODIUM CHLORIDE 1000 MILLILITER(S): 9 INJECTION INTRAMUSCULAR; INTRAVENOUS; SUBCUTANEOUS at 11:16

## 2023-08-16 RX ADMIN — Medication 975 MILLIGRAM(S): at 13:10

## 2023-08-16 NOTE — ED PROVIDER NOTE - CARE PLAN
1 Principal Discharge DX:	Sickle cell crisis  Assessment and plan of treatment:	pt w/ generalized joint pain and weakness, likely sickle cell crisis. Does not want narcotic pain meds here.  Will obtain basic labs, retic count, administer IVF

## 2023-08-16 NOTE — ED PROVIDER NOTE - NSFOLLOWUPINSTRUCTIONS_ED_ALL_ED_FT
You were evaluated for pain related to your sickle cell disease. We evaluated you with labs to ensure your hemoglobin levels aren't dangerously low. Your levels show that you do not currently require a blood transfusion. We gave you fluids and Tylenol for the pain. We recommend that you make an appointment and follow up with your outpatient hematologist.    If you experience any worsening of pain, or new chest pain/ shortness of breath, please come back to the emergency room.

## 2023-08-16 NOTE — ED PROVIDER NOTE - PHYSICAL EXAMINATION
PHYSICAL EXAM:  GENERAL: NAD, Resting in bed, appears slightly lethargic  HEENT:  Head atraumatic, EOMI, PERRLA, conjunctiva and sclera clear; Moist mucous membranes, normal oropharynx  NECK: Supple, No JVD, no lymphadenopathy, no thyroid nodules or enlargement  CHEST/LUNG: Clear to auscultation bilaterally; No rales, rhonchi, wheezing, or rubs. Unlabored respirations on room air  HEART: Regular rate and rhythm; No murmurs, rubs, or gallops  ABDOMEN: Bowel sounds present; Soft, Nontender, Nondistended. No hepatomegally  EXTREMITIES:  2+ Peripheral Pulses, brisk capillary refill. No clubbing, cyanosis, or edema  NERVOUS SYSTEM:  Alert & Oriented X3, non-focal and spontaneous movements of all extremities  SKIN: No rashes or lesions

## 2023-08-16 NOTE — ED PROVIDER NOTE - NS ED ROS FT
REVIEW OF SYSTEMS:    CONSTITUTIONAL: weakness. No fevers or chills  EYES/ENT: No visual changes;  No vertigo or throat pain   NECK: No pain or stiffness  RESPIRATORY: No cough, wheezing, hemoptysis; No shortness of breath  CARDIOVASCULAR: No chest pain or palpitations  GASTROINTESTINAL: No abdominal or epigastric pain. No nausea, vomiting, or hematemesis; No diarrhea or constipation. No melena or hematochezia.  GENITOURINARY: No dysuria, frequency or hematuria  NEUROLOGICAL: No numbness or weakness  SKIN: No itching, rashes

## 2023-08-16 NOTE — ED PROVIDER NOTE - ATTENDING CONTRIBUTION TO CARE
Patient is a 52-year-old male with SCD, A-fib on Eliquis, GERD, hypertension, gout here for evaluation of 5 days of generalized aches and pains, bilateral knee pain, feeling of weakness.  Patient states he saw his cardiologist yesterday and had blood work done.  He is concerned he may be anemic and may need a blood transfusion.  Results were  not available to the patient prior to coming to the emergency department.  Patient denies any fevers, chills, nausea, vomiting, chest pain, shortness of breath, abdominal pain, urinary symptoms.  He denies any black or bloody stools.  Patient is prescribed p.o. Dilaudid but does not like to take it secondary to drug testing at his job.  His hematologist is Dr. Ugo Lincoln.  He states he missed an appointment with him yesterday but can make another appointment with him for follow-up.    VS noted  Gen. no acute distress, Non toxic   HEENT: EOMI, mmm  Lungs: CTAB/L no C/ W /R   CVS: RRR   Abd; Soft non tender, non distended   Ext: no edema, bilateral knees are without swelling, tenderness, skin changes.  Bilateral knees with full range of motion  Skin: no rash  Neuro AAOx3 non focal clear speech  a/p:  SCD–patient may be having sickle cell crisis.  He is reluctant to take any pain medications.  He last took Tylenol at 7 AM.  He cannot take NSAIDs.  He is requesting IV fluids at this time.    We will send labs and give IV fluids. Pain medication offered but declined.  - Jimi DURON

## 2023-08-16 NOTE — ED PROVIDER NOTE - CLINICAL SUMMARY MEDICAL DECISION MAKING FREE TEXT BOX
pt w/ generalized joint pain and weakness, likely sickle cell crisis. Does not want narcotic pain meds here.  Will obtain basic labs, retic count, administer IVF

## 2023-08-16 NOTE — ED PROVIDER NOTE - OBJECTIVE STATEMENT
52M pmh of SCD, afib on eliquis, GERD, HTN, gout p/w 5 days of generalized weakness and pain in his back, knees, and hips. He was last admitted in June for sickle cell crisis management where he was given dilaudid and 2 units of pRBCs. Has had no problems since then until 5 days ago. He spoke with his cardiologist who told him to come to the ED. He describes the pain as mild and achy. Denies CP, trauma, SoB, cough, dysuria, c/d, n/v.

## 2023-08-16 NOTE — ED PROVIDER NOTE - PATIENT PORTAL LINK FT
You can access the FollowMyHealth Patient Portal offered by [facility] by registering at the following website: [email or web address]. By joining [facility]’s FollowMyHealth portal, you will also be able to view your health information using other applications (apps) compatible with our system.

## 2023-08-16 NOTE — ED ADULT NURSE NOTE - OBJECTIVE STATEMENT
Pt is a 52 year old male c/o of overall weakness and body aches.  Requesting "hydration" Pt on stretcher, alert and oriented x 3.  Pt denies chest pain or SOB.  Pt respirations even and unlabored.  Abdomen soft, non tender.  Skin warm, dry, and appropriate color for age and race.  Pt denies any other complaints at this time.

## 2023-08-16 NOTE — ED PROVIDER NOTE - NS ED MD DISPO DISCHARGE
[facility]    CC: rectal bleeding    HPI 58 y.o. male  with a history of anxiety, depression, chronic back pain, liver transplant (2015) secondary to HCV/ETOH abuse who presents for evaluation of multiple small painless colon polyps found on prior colonoscopy without associated abdominal pain or weight loss. Now he reports small volume bright red rectal bleeding mainly seen on the toilet paper and the bleeding is new for him. On review of medical records he also has had a food bolus removal in 10/2017. He currently does not complain of dysphagia. The colonoscopy is not in our system. He has not had endoscopy since.    He follows with Dr. Romero regarding his liver transplantation in 2015 for alcoholic cirrhosis. He was recently in the hospital after presenting with intentional drug overdose from clonidine/gabapentin. It was a suicidal attempt. He is on immunosuppression with tacrolimus at home dose and monitor labs.    Medical records reviewed. Additional history obtained from family member at bedside.    PMH  Alcoholic cirrhosis  Alcohol withdrawal seizure  Anxiety  Depression  History of hepatitis C  History of throat cancer  HTN    PSH  Esophageal varices requiring ligation  Liver transplantation  TIPS    SH  Smoker 1/2 PPD x 35 years  Heavy alcohol use in past    FH  No family history of colon or gastric cancer    Review of Systems  General ROS: negative for chills, fever or weight loss  Psychological ROS: negative for hallucination, depression or suicidal ideation  Ophthalmic ROS: negative for blurry vision, photophobia or eye pain  ENT ROS: negative for epistaxis, sore throat or rhinorrhea  Respiratory ROS: no cough, shortness of breath, or wheezing  Cardiovascular ROS: no chest pain or dyspnea on exertion  Gastrointestinal ROS: no dysphagia, no abdominal pain, +rectal bleeding, +blood on toilet paper  Genito-Urinary ROS: no dysuria, trouble voiding, or hematuria  Musculoskeletal ROS: negative for gait  "disturbance or muscular weakness  Neurological ROS: no syncope or seizures; no ataxia  Dermatological ROS: negative for pruritis, rash and jaundice    Physical Examination  /69   Pulse 74   Temp 99.9 °F (37.7 °C) (Oral)   Ht 5' 11" (1.803 m)   Wt 79.6 kg (175 lb 7.8 oz)   BMI 24.48 kg/m²   General appearance: alert, cooperative, no distress, chronically ill male  HENT: Normocephalic, atraumatic, neck symmetrical, no nasal discharge   Eyes: conjunctivae/corneas clear, PERRL, EOM's intact  Lungs: clear to auscultation bilaterally, no dullness to percussion bilaterally  Heart: regular rate and rhythm without rub; no displacement of the PMI   Abdomen: soft, non-tender; bowel sounds normoactive; no organomegaly  Extremities: extremities symmetric; no clubbing, cyanosis, or edema  Integument: multiple tattoos; skin color, texture, turgor normal; no rashes; hair distrubution normal  Neurologic: Alert and oriented X 3, normal strength, normal coordination and gait  Psychiatric: no pressured speech; normal affect; no evidence of impaired cognition     Labs:  H/H 15/44.4  Plt 134    Imaging:  CXR:   No acute radiographic findings in the chest.    Assessment:   58 y.o. male  with a history of liver transplant (2015) secondary to HCV/ETOH abuse who presents for follow up of colon polyps and for evaluation of rectal bleeding. Suspect the rectal bleeding may be hemorrhoidal in nature but given immunosuppression he will need endoscopic evaluation as listed below to rule out occult malignancy.     Plan:   -Will plan EGD given history of food bolus in the past for evaluation if there is a ring or stenosis that requires dilation; if no obstruction seen endoscopically will plan biopsies for evaluation of EoE. Continue pepcid for now but change to prn GERD if EGD and biopsies are normal   -Will plan colonoscopy at the same time to evaluate for history of colon polyps and rectal bleeding  -Patient of Eva Perdomo " MD Matthew   [address], [address]   [place] [id number]   [phone]      Home

## 2023-08-17 ENCOUNTER — APPOINTMENT (OUTPATIENT)
Dept: ORTHOPEDIC SURGERY | Facility: CLINIC | Age: 53
End: 2023-08-17
Payer: COMMERCIAL

## 2023-08-17 VITALS — BODY MASS INDEX: 35 KG/M2 | HEIGHT: 71 IN | WEIGHT: 250 LBS

## 2023-08-17 PROCEDURE — 20610 DRAIN/INJ JOINT/BURSA W/O US: CPT | Mod: LT

## 2023-08-18 NOTE — ADDENDUM
[FreeTextEntry1] : This note was written by Yneifer Sol on 08/17/2023 acting solely as a scribe for Dr. Maulik Westbrook.  All medical record entries made by the Scribe were at my, Dr. Maulik Westbrook, direction and personally dictated by me on 08/17/2023. I have personally reviewed the chart and agree that the record accurately reflects my personal performance of the history, physical exam, assessment and plan.

## 2023-08-18 NOTE — END OF VISIT
[FreeTextEntry3] : 51 y/o male with bilateral knee osteoarthritis.   The final Euflexxa injection was given today under sterile conditions into the left knee joint without complication. I discussed the effects of this medication and how long it may provide benefit. Patient has obtained moderate immediate improvement. If no significant long-term benefit, the patient may elect for additional treatment strategies as previously discussed. However, if the patient obtains good relief of symptoms, the injection therapy series can be repeated over the next 6-12 months.  We'll have the patient followup on an as-needed basis at this time.

## 2023-08-18 NOTE — PROCEDURE
[de-identified] : Injection: left knee joint. Indication: Osteoarthritis.  A discussion was had with the patient regarding this procedure and all questions were answered. All risks, benefits and alternatives were discussed. These included but were not limited to bleeding, infection, and allergic reaction. Alcohol was used to clean the skin, and betadine was used to sterilize and prep the area in the supero-lateral aspect of the left knee. Ethyl chloride spray was then used as a topical anesthetic. A 21-gauge needle was used to inject 2 cc of Euflexxa into the knee. A sterile bandage was then applied. The patient tolerated the procedure well and there were no complications.

## 2023-08-25 NOTE — HISTORY OF PRESENT ILLNESS
[FreeTextEntry1] : 51AAM with sickle cell disease  recent diagnosis of gout, also with osteonecrosis right knee. Diagnostic arthrocentesis of right knee monoarthritis demonstrated MSU crystals consistent with gout. s/p 2 x IACI to right knee [id number] neg on allopurinol 300mg and colchicine 0.3mg daily no gout flares last uric acid in 4/11/2023 6.7  Interval History: minimal knee pain, seen by ortho, getting viscosupplementation injections which is helping

## 2023-08-25 NOTE — PHYSICAL EXAM
[General Appearance - Alert] : alert [General Appearance - In No Acute Distress] : in no acute distress [General Appearance - Well Nourished] : well nourished [] : no rash [Sensation] : the sensory exam was normal to light touch and pinprick [Motor Exam] : the motor exam was normal [Oriented To Time, Place, And Person] : oriented to person, place, and time [FreeTextEntry1] : mild ttp right knee no swelling warmth, full ROM

## 2023-08-25 NOTE — ASSESSMENT
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 Diagnostic arthrocentesis of right knee monoarthritis demonstrated MSU crystals consistent with gout. s/p 2 x IACI  on allopurinol and colchicine 0.3mg daily no gout flares last uric acid 6.7  Plan: # Gout -Check uric acid today, goal serum uric acid less than 6. HLA  negative. uptitrate allopurinol cw  colchicine to 0.3 mg daily. Steroids should be avoided as they can trigger/exacerbate sickle cell crisis. Doses of colchicine higher than 0.6 mg daily should be avoided due to increased toxicity in patients on amiodarone  # Knee pain -Ortho follow up of ON knees -gel injections -PT referral  -Follow-up in 3 months

## 2023-08-26 ENCOUNTER — EMERGENCY (EMERGENCY)
Facility: HOSPITAL | Age: 53
LOS: 1 days | Discharge: LEFT BEFORE DISCHARGE | End: 2023-08-26
Attending: EMERGENCY MEDICINE
Payer: COMMERCIAL

## 2023-08-26 VITALS
SYSTOLIC BLOOD PRESSURE: 147 MMHG | HEART RATE: 88 BPM | RESPIRATION RATE: 16 BRPM | OXYGEN SATURATION: 95 % | DIASTOLIC BLOOD PRESSURE: 82 MMHG | TEMPERATURE: 99 F

## 2023-08-26 VITALS
SYSTOLIC BLOOD PRESSURE: 132 MMHG | HEIGHT: 71 IN | WEIGHT: 250 LBS | DIASTOLIC BLOOD PRESSURE: 72 MMHG | OXYGEN SATURATION: 96 % | TEMPERATURE: 100 F | HEART RATE: 99 BPM | RESPIRATION RATE: 18 BRPM

## 2023-08-26 DIAGNOSIS — Z90.49 ACQUIRED ABSENCE OF OTHER SPECIFIED PARTS OF DIGESTIVE TRACT: Chronic | ICD-10-CM

## 2023-08-26 LAB
ALBUMIN SERPL ELPH-MCNC: 3.6 G/DL — SIGNIFICANT CHANGE UP (ref 3.3–5)
ALP SERPL-CCNC: 122 U/L — HIGH (ref 40–120)
ALT FLD-CCNC: 40 U/L — SIGNIFICANT CHANGE UP (ref 10–45)
ANION GAP SERPL CALC-SCNC: 11 MMOL/L — SIGNIFICANT CHANGE UP (ref 5–17)
ANISOCYTOSIS BLD QL: SIGNIFICANT CHANGE UP
APPEARANCE UR: CLEAR — SIGNIFICANT CHANGE UP
APTT BLD: 29.1 SEC — SIGNIFICANT CHANGE UP (ref 24.5–35.6)
AST SERPL-CCNC: 29 U/L — SIGNIFICANT CHANGE UP (ref 10–40)
BACTERIA # UR AUTO: NEGATIVE — SIGNIFICANT CHANGE UP
BASE EXCESS BLDV CALC-SCNC: -3 MMOL/L — LOW (ref -2–3)
BASOPHILS # BLD AUTO: 0 K/UL — SIGNIFICANT CHANGE UP (ref 0–0.2)
BASOPHILS NFR BLD AUTO: 0 % — SIGNIFICANT CHANGE UP (ref 0–2)
BILIRUB SERPL-MCNC: 0.6 MG/DL — SIGNIFICANT CHANGE UP (ref 0.2–1.2)
BILIRUB UR-MCNC: NEGATIVE — SIGNIFICANT CHANGE UP
BLD GP AB SCN SERPL QL: NEGATIVE — SIGNIFICANT CHANGE UP
BUN SERPL-MCNC: 23 MG/DL — SIGNIFICANT CHANGE UP (ref 7–23)
CA-I SERPL-SCNC: 1.39 MMOL/L — HIGH (ref 1.15–1.33)
CALCIUM SERPL-MCNC: 9.9 MG/DL — SIGNIFICANT CHANGE UP (ref 8.4–10.5)
CHLORIDE BLDV-SCNC: 103 MMOL/L — SIGNIFICANT CHANGE UP (ref 96–108)
CHLORIDE SERPL-SCNC: 104 MMOL/L — SIGNIFICANT CHANGE UP (ref 96–108)
CK MB BLD-MCNC: <1.7 % — SIGNIFICANT CHANGE UP (ref 0–3.5)
CK MB CFR SERPL CALC: <1 NG/ML — SIGNIFICANT CHANGE UP (ref 0–6.7)
CK SERPL-CCNC: 59 U/L — SIGNIFICANT CHANGE UP (ref 30–200)
CO2 BLDV-SCNC: 24 MMOL/L — SIGNIFICANT CHANGE UP (ref 22–26)
CO2 SERPL-SCNC: 20 MMOL/L — LOW (ref 22–31)
COLOR SPEC: SIGNIFICANT CHANGE UP
CREAT SERPL-MCNC: 1.43 MG/DL — HIGH (ref 0.5–1.3)
CRP SERPL-MCNC: 86 MG/L — HIGH (ref 0–4)
DACRYOCYTES BLD QL SMEAR: SLIGHT — SIGNIFICANT CHANGE UP
DIFF PNL FLD: NEGATIVE — SIGNIFICANT CHANGE UP
EGFR: 59 ML/MIN/1.73M2 — LOW
ELLIPTOCYTES BLD QL SMEAR: SLIGHT — SIGNIFICANT CHANGE UP
EOSINOPHIL # BLD AUTO: 0 K/UL — SIGNIFICANT CHANGE UP (ref 0–0.5)
EOSINOPHIL NFR BLD AUTO: 0 % — SIGNIFICANT CHANGE UP (ref 0–6)
EPI CELLS # UR: 0 /HPF — SIGNIFICANT CHANGE UP
GAS PNL BLDV: 134 MMOL/L — LOW (ref 136–145)
GAS PNL BLDV: SIGNIFICANT CHANGE UP
GAS PNL BLDV: SIGNIFICANT CHANGE UP
GLUCOSE BLDV-MCNC: 212 MG/DL — HIGH (ref 70–99)
GLUCOSE SERPL-MCNC: 198 MG/DL — HIGH (ref 70–99)
GLUCOSE UR QL: NEGATIVE — SIGNIFICANT CHANGE UP
HCO3 BLDV-SCNC: 23 MMOL/L — SIGNIFICANT CHANGE UP (ref 22–29)
HCT VFR BLD CALC: 21.1 % — LOW (ref 39–50)
HCT VFR BLDA CALC: 23 % — LOW (ref 39–51)
HGB BLD CALC-MCNC: 7.5 G/DL — LOW (ref 12.6–17.4)
HGB BLD-MCNC: 7.3 G/DL — LOW (ref 13–17)
HOWELL-JOLLY BOD BLD QL SMEAR: PRESENT — SIGNIFICANT CHANGE UP
HYALINE CASTS # UR AUTO: 0 /LPF — SIGNIFICANT CHANGE UP (ref 0–2)
INR BLD: 1.24 RATIO — HIGH (ref 0.85–1.18)
KETONES UR-MCNC: NEGATIVE — SIGNIFICANT CHANGE UP
LACTATE BLDV-MCNC: 2.4 MMOL/L — HIGH (ref 0.5–2)
LDH SERPL L TO P-CCNC: 338 U/L — HIGH (ref 50–242)
LEUKOCYTE ESTERASE UR-ACNC: NEGATIVE — SIGNIFICANT CHANGE UP
LYMPHOCYTES # BLD AUTO: 0.89 K/UL — LOW (ref 1–3.3)
LYMPHOCYTES # BLD AUTO: 13.6 % — SIGNIFICANT CHANGE UP (ref 13–44)
MACROCYTES BLD QL: SIGNIFICANT CHANGE UP
MANUAL SMEAR VERIFICATION: SIGNIFICANT CHANGE UP
MCHC RBC-ENTMCNC: 34.6 GM/DL — SIGNIFICANT CHANGE UP (ref 32–36)
MCHC RBC-ENTMCNC: 39.2 PG — HIGH (ref 27–34)
MCV RBC AUTO: 113.4 FL — HIGH (ref 80–100)
MICROCYTES BLD QL: SLIGHT — SIGNIFICANT CHANGE UP
MONOCYTES # BLD AUTO: 0.3 K/UL — SIGNIFICANT CHANGE UP (ref 0–0.9)
MONOCYTES NFR BLD AUTO: 4.6 % — SIGNIFICANT CHANGE UP (ref 2–14)
NEUTROPHILS # BLD AUTO: 5.33 K/UL — SIGNIFICANT CHANGE UP (ref 1.8–7.4)
NEUTROPHILS NFR BLD AUTO: 81.8 % — HIGH (ref 43–77)
NITRITE UR-MCNC: NEGATIVE — SIGNIFICANT CHANGE UP
NRBC # BLD: 24 /100 — HIGH (ref 0–0)
PAPPENHEIMER BOD BLD QL SMEAR: PRESENT — SIGNIFICANT CHANGE UP
PCO2 BLDV: 43 MMHG — SIGNIFICANT CHANGE UP (ref 42–55)
PH BLDV: 7.33 — SIGNIFICANT CHANGE UP (ref 7.32–7.43)
PH UR: 6 — SIGNIFICANT CHANGE UP (ref 5–8)
PLAT MORPH BLD: NORMAL — SIGNIFICANT CHANGE UP
PLATELET # BLD AUTO: 258 K/UL — SIGNIFICANT CHANGE UP (ref 150–400)
PO2 BLDV: 30 MMHG — SIGNIFICANT CHANGE UP (ref 25–45)
POIKILOCYTOSIS BLD QL AUTO: SLIGHT — SIGNIFICANT CHANGE UP
POLYCHROMASIA BLD QL SMEAR: SLIGHT — SIGNIFICANT CHANGE UP
POTASSIUM BLDV-SCNC: 5.1 MMOL/L — SIGNIFICANT CHANGE UP (ref 3.5–5.1)
POTASSIUM SERPL-MCNC: 4.2 MMOL/L — SIGNIFICANT CHANGE UP (ref 3.5–5.3)
POTASSIUM SERPL-SCNC: 4.2 MMOL/L — SIGNIFICANT CHANGE UP (ref 3.5–5.3)
PROCALCITONIN SERPL-MCNC: 0.28 NG/ML — HIGH (ref 0.02–0.1)
PROT SERPL-MCNC: 6.8 G/DL — SIGNIFICANT CHANGE UP (ref 6–8.3)
PROT UR-MCNC: ABNORMAL
PROTHROM AB SERPL-ACNC: 12.9 SEC — SIGNIFICANT CHANGE UP (ref 9.5–13)
RBC # BLD: 1.86 M/UL — LOW (ref 4.2–5.8)
RBC # BLD: 1.86 M/UL — LOW (ref 4.2–5.8)
RBC # FLD: 25.6 % — HIGH (ref 10.3–14.5)
RBC BLD AUTO: ABNORMAL
RBC CASTS # UR COMP ASSIST: 0 /HPF — SIGNIFICANT CHANGE UP (ref 0–4)
RETICS #: 104.7 K/UL — SIGNIFICANT CHANGE UP (ref 25–125)
RETICS/RBC NFR: 5.6 % — HIGH (ref 0.5–2.5)
RH IG SCN BLD-IMP: POSITIVE — SIGNIFICANT CHANGE UP
SAO2 % BLDV: 50.6 % — LOW (ref 67–88)
SCHISTOCYTES BLD QL AUTO: SLIGHT — SIGNIFICANT CHANGE UP
SICKLE CELLS BLD QL SMEAR: SLIGHT — SIGNIFICANT CHANGE UP
SODIUM SERPL-SCNC: 135 MMOL/L — SIGNIFICANT CHANGE UP (ref 135–145)
SP GR SPEC: 1.01 — SIGNIFICANT CHANGE UP (ref 1.01–1.02)
TARGETS BLD QL SMEAR: SIGNIFICANT CHANGE UP
TROPONIN T, HIGH SENSITIVITY RESULT: 12 NG/L — SIGNIFICANT CHANGE UP (ref 0–51)
UROBILINOGEN FLD QL: NEGATIVE — SIGNIFICANT CHANGE UP
WBC # BLD: 6.51 K/UL — SIGNIFICANT CHANGE UP (ref 3.8–10.5)
WBC # FLD AUTO: 6.51 K/UL — SIGNIFICANT CHANGE UP (ref 3.8–10.5)
WBC UR QL: 0 /HPF — SIGNIFICANT CHANGE UP (ref 0–5)

## 2023-08-26 PROCEDURE — 87086 URINE CULTURE/COLONY COUNT: CPT

## 2023-08-26 PROCEDURE — 86900 BLOOD TYPING SEROLOGIC ABO: CPT

## 2023-08-26 PROCEDURE — 76937 US GUIDE VASCULAR ACCESS: CPT | Mod: 26

## 2023-08-26 PROCEDURE — 96374 THER/PROPH/DIAG INJ IV PUSH: CPT

## 2023-08-26 PROCEDURE — 36000 PLACE NEEDLE IN VEIN: CPT

## 2023-08-26 PROCEDURE — 85730 THROMBOPLASTIN TIME PARTIAL: CPT

## 2023-08-26 PROCEDURE — 86140 C-REACTIVE PROTEIN: CPT

## 2023-08-26 PROCEDURE — 84132 ASSAY OF SERUM POTASSIUM: CPT

## 2023-08-26 PROCEDURE — 93005 ELECTROCARDIOGRAM TRACING: CPT

## 2023-08-26 PROCEDURE — 86850 RBC ANTIBODY SCREEN: CPT

## 2023-08-26 PROCEDURE — 85610 PROTHROMBIN TIME: CPT

## 2023-08-26 PROCEDURE — 0225U NFCT DS DNA&RNA 21 SARSCOV2: CPT

## 2023-08-26 PROCEDURE — 83615 LACTATE (LD) (LDH) ENZYME: CPT

## 2023-08-26 PROCEDURE — 80053 COMPREHEN METABOLIC PANEL: CPT

## 2023-08-26 PROCEDURE — 84484 ASSAY OF TROPONIN QUANT: CPT

## 2023-08-26 PROCEDURE — 85018 HEMOGLOBIN: CPT

## 2023-08-26 PROCEDURE — 82550 ASSAY OF CK (CPK): CPT

## 2023-08-26 PROCEDURE — 82553 CREATINE MB FRACTION: CPT

## 2023-08-26 PROCEDURE — 86901 BLOOD TYPING SEROLOGIC RH(D): CPT

## 2023-08-26 PROCEDURE — 84145 PROCALCITONIN (PCT): CPT

## 2023-08-26 PROCEDURE — 82947 ASSAY GLUCOSE BLOOD QUANT: CPT

## 2023-08-26 PROCEDURE — 87040 BLOOD CULTURE FOR BACTERIA: CPT

## 2023-08-26 PROCEDURE — 84295 ASSAY OF SERUM SODIUM: CPT

## 2023-08-26 PROCEDURE — 82435 ASSAY OF BLOOD CHLORIDE: CPT

## 2023-08-26 PROCEDURE — 83605 ASSAY OF LACTIC ACID: CPT

## 2023-08-26 PROCEDURE — 85014 HEMATOCRIT: CPT

## 2023-08-26 PROCEDURE — 99285 EMERGENCY DEPT VISIT HI MDM: CPT | Mod: 25

## 2023-08-26 PROCEDURE — 82803 BLOOD GASES ANY COMBINATION: CPT

## 2023-08-26 PROCEDURE — 99285 EMERGENCY DEPT VISIT HI MDM: CPT

## 2023-08-26 PROCEDURE — 76937 US GUIDE VASCULAR ACCESS: CPT

## 2023-08-26 PROCEDURE — 85025 COMPLETE CBC W/AUTO DIFF WBC: CPT

## 2023-08-26 PROCEDURE — 81001 URINALYSIS AUTO W/SCOPE: CPT

## 2023-08-26 PROCEDURE — 85045 AUTOMATED RETICULOCYTE COUNT: CPT

## 2023-08-26 PROCEDURE — 82330 ASSAY OF CALCIUM: CPT

## 2023-08-26 RX ORDER — ACETAMINOPHEN 500 MG
1000 TABLET ORAL ONCE
Refills: 0 | Status: COMPLETED | OUTPATIENT
Start: 2023-08-26 | End: 2023-08-26

## 2023-08-26 RX ORDER — SODIUM CHLORIDE 9 MG/ML
500 INJECTION INTRAMUSCULAR; INTRAVENOUS; SUBCUTANEOUS ONCE
Refills: 0 | Status: COMPLETED | OUTPATIENT
Start: 2023-08-26 | End: 2023-08-26

## 2023-08-26 RX ADMIN — Medication 400 MILLIGRAM(S): at 09:25

## 2023-08-26 RX ADMIN — SODIUM CHLORIDE 500 MILLILITER(S): 9 INJECTION INTRAMUSCULAR; INTRAVENOUS; SUBCUTANEOUS at 09:26

## 2023-08-26 NOTE — ED PROVIDER NOTE - CARE PROVIDER_API CALL
Iwona Zapata  Rheumatology  [address] [facility] [facility] [address]  [place] [address]  Phone: [phone]  Fax: [phone]  Follow Up Time: 4-6 Days

## 2023-08-26 NOTE — ED PROVIDER NOTE - MUSCULOSKELETAL, MLM
Spine appears normal, range of motion is not limited, no muscle or joint tenderness. No joint swelling.

## 2023-08-26 NOTE — ED PROVIDER NOTE - NSFOLLOWUPINSTRUCTIONS_ED_ALL_ED_FT
Follow-up with your primary care doctor in the next 2-3 days.    Additionally please follow-up with your rheumatologist within the next 1 week for further evaluation.    For pain or fever greater than 100.4 °F you may take Tylenol 650 mg every 6 hours.    Return to the Emergency Department immediately if you develop any new/worsening symptoms including chest pain, shortness of breath, vomiting, persistent fever, weakness or any other concerning symptoms.

## 2023-08-26 NOTE — ED ADULT NURSE NOTE - NSFALLUNIVINTERV_ED_ALL_ED
Bed/Stretcher in lowest position, wheels locked, appropriate side rails in place/Call bell, personal items and telephone in reach/Instruct patient to call for assistance before getting out of bed/chair/stretcher/Non-slip footwear applied when patient is off stretcher/[place] to call system/Physically safe environment - no spills, clutter or unnecessary equipment/Purposeful proactive rounding/Room/bathroom lighting operational, light cord in reach

## 2023-08-26 NOTE — ED PROVIDER NOTE - ATTENDING SHARED VISIT SELECTORS
Subjective:      Patient ID: Aleksandr Becerril is a 16 y.o. female.    Chief Complaint: Back Pain (lower back pain started 3 weeks ago)    HPI     Patient reports 2 episode of severe lower back pain. 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 Pain developed suddenly, 10/10 severity, at left flank/lumbar area, sharp stabbing quality, last several minutes. Relieved with rest and ibuprofen. She reports felling sore the next day but otherwise fine. The next episode occurred one week ago, also while at [employer] and similar presentation to the previous episode.  She reports no pain between the episodes.  She denies recent trauma.      Review of Systems   Constitution: Negative for chills, fever and weakness.   HENT: Negative for congestion, headaches and sore throat.    Eyes: Negative for visual disturbance.   Cardiovascular: Negative for chest pain and dyspnea on exertion.   Respiratory: Negative for cough and shortness of breath.    Endocrine:        Menarche at age 12. Periods are regular. Anticipated at the end of the month.   Skin: Negative for rash.   Musculoskeletal: Positive for back pain (as above in HPI).   Gastrointestinal: Negative for abdominal pain and change in bowel habit.   Genitourinary: Positive for flank pain (Patient reports drinking abundant amount of water while at cheer practice ). Negative for dysuria and hematuria.   Neurological: Negative for dizziness, focal weakness, loss of balance and paresthesias.   Psychiatric/Behavioral: Negative for depression.         Objective:    Back Exam     Tenderness   The patient is experiencing tenderness in the lumbar (left side).    Range of Motion   Extension: normal   Flexion: normal   Lateral Bend Right: normal   Lateral Bend Left: normal   Rotation Right: normal   Rotation Left: normal     Tests   Straight leg raise right: negative  Straight leg raise left: negative    Other    Gait: normal   Erythema: no back redness  Scars: absent    Comments:  Forward bend test normal - horizontal plane of spin appears symmetric        Physical Exam   Constitutional: She is oriented to person, place, and time and well-developed, well-nourished, and in no distress. No distress.   HENT:   Head: Normocephalic and atraumatic.   Neck: Normal range of motion. Neck supple.   Pulmonary/Chest: Effort normal.   Musculoskeletal: Normal range of motion. She exhibits no edema or deformity.   Neurological: She is alert and oriented to person, place, and time.   Skin: Skin is warm and dry.   Psychiatric: Affect normal.          Assessment:       No diagnosis found.      Left lower back/flank pain.  Likely muscle sprain or spasm. Kidney stone less likely     Plan:       1. Lumbar XR, 5 views for further assessment     2. Heat pack and Tylenol or Ibuprofen PRN for pain. Home stretching exercises.    3. Muscle relaxant if pain is refractory to above treatment.          Medical Decision Making

## 2023-08-26 NOTE — ED PROVIDER NOTE - PHYSICAL EXAMINATION
Attending Pamela Arriaga: Gen: NAD, heent: atrauamtic, eomi, perrla, mmm, op pink, , neck; nttp, no nuchal rigidity,no meningismus chest: nttp, no crepitus, cv: rrr, lungs: ctab, abd: soft, nontender, nondistended, no peritoneal signs,no guarding, ext: wwp, neg homans, no joint erythema or warmth. skin: no rash, neuro: awake and alert, following commands, speech clear, sensation and strength intact, no focal deficits

## 2023-08-26 NOTE — ED PROVIDER NOTE - CLINICAL SUMMARY MEDICAL DECISION MAKING FREE TEXT BOX
Attending Pamela Arriaga: 51 yo male with h/o sickle cell disease, afib on blood thinners presenting with fever, joint pains and weakness. upon arrival pt nontoxic appearing. no neck pain or meningismus to suggest meningitis. no headache and pt awake and alert without evidence of encephalitis. pt does report joint pain. able to range and no joint erythema or warmth to suggest septic joint. pt denies any chest pain. pt placed on monitor. abdomen soft and nontender making acute surgical cause of fever less likely. pt handling secertions. no trismus, op pink, pt states joint pains similar to prior sickle cell episodes. unclear cause of symptoms, consider possible viral pathology with congestion and fevers. will obtain labs, viral panel cultures, reticulocyte count, and re-eval. pain control as needed.

## 2023-08-26 NOTE — ED PROVIDER NOTE - OBJECTIVE STATEMENT
53 yo male PMHx of CAD, A-fib on Eliquis, GERD, HTN, gout presents with fever Tmax 100.6 this morning as well as diffuse joint pain. Recently seen in ED 5 days ago for similar complaint of diffuse joint pain though did not have fever at the time. Denies recent travel, recent sick contacts, abdominal pain, nausea/vomiting, dizziness/lightheadedness, chest pain, shortness of breath, rash, numbness/tingling, headache, neck stiffness.

## 2023-08-26 NOTE — ED ADULT NURSE NOTE - EXPLANATION OF PATIENT'S REASON FOR LEAVING
Pt states he feels better and wants to rest at home, MD Arriaga explained to pt that we do not know source of fevers, pt verbalized understanding, still wishes to leave

## 2023-08-26 NOTE — ED ADULT NURSE REASSESSMENT NOTE - NS ED NURSE REASSESS COMMENT FT1
Report received from MIRI Mark. Pt A&Ox4, breathing spontaneously and unlabored on room air, moving extremities easily, is ambulatory. MD Arriaga at bedside for ultrasound IV

## 2023-08-26 NOTE — ED ADULT NURSE REASSESSMENT NOTE - NS ED NURSE REASSESS COMMENT FT1
Pt wishing to sign out MD Bart LAWRENCE at bedside to discuss with patient. IV removed, vital signs as documented in sunrise.

## 2023-08-26 NOTE — ED PROVIDER NOTE - PATIENT PORTAL LINK FT
You can access the FollowMyHealth Patient Portal offered by [facility] by registering at the following website: [email or web address]. By joining [facility]’s FollowMyHealth portal, you will also be able to view your health information using other applications (apps) compatible with our system.

## 2023-08-26 NOTE — ED PROVIDER NOTE - PROGRESS NOTE DETAILS
Attending Pamela Arriaga: Patient saturating well.  Offered Tylenol for pain at this time.  Does not want anything stronger.  Blood work with elevated CRP.  No clear source of infection on exam.  Abdomen is soft and nontender.  Patient is awaiting viral studies as patient does endorse cough , congestion and symptoms are suggestive of possible viral pathology at this time. Pt wishes to at this time. Awaiting RVP at this time and counseled pt that we do not know cause of symptoms at this time, would recommend awaiting RVP/further workup. The patient acknowledges this and still wishes to leave. Patient is leaving against medical advice.  I have informed the patient about the risks, benefits, and alternatives to the evaluation and treatment of their condition.  The patient reports understanding of these issues and has the capacity and insight to make important medical decisions. The necessity to follow up with PMD follow up provided within 2-3 days was explained. The patient understands that this decision to go against medical advice can be changed at any time and the patient can return for re-evaluation and further treatment with any changes in condition or concerns.  The patient understands all the reasons to return to the Emergency Department, including any concerns at all, the patient reports understanding of above with capacity and insight. - Davide Villa PA-C Attending Pamela Arriaga: pt requesting to leave. d/w pt unclear cause of symptoms and concern for posible infectoius etiology in the setting of fevers at home. blood work with elevated lactate and elevated crp. recommended staying for repeat vbg and cxr. pt at this time states feels better and wants to go home. cultures pending. d/w pt recommend staying in the ed and pt requesting d/c

## 2023-08-26 NOTE — ED ADULT NURSE NOTE - OBJECTIVE STATEMENT
52 year old male, A&Ox4, PMH of sick cell disease, HTN, afib on ELIQUIS, presenting ambulatory to ED complaining of fever. Patient endorsing fevers up to 100.6, cough, joint pain and chills x1 day. Patient was recently in [facility] ED one week prior for vasoocclusive crisis. Denies CP, SOB, HA, n/v/d, abdominal pain, difficulty urinating at this time. Patient placed on cardiac monitor, NSR 90s. Respirations clear and equal bilaterally. Abdomen soft, nontender and nondistended. Peripheral pulses strong and equal. IV placed and labs drawn. Safety and comfort measures maintained.

## 2023-08-27 LAB
CULTURE RESULTS: SIGNIFICANT CHANGE UP
SPECIMEN SOURCE: SIGNIFICANT CHANGE UP

## 2023-08-31 LAB
CULTURE RESULTS: SIGNIFICANT CHANGE UP
CULTURE RESULTS: SIGNIFICANT CHANGE UP
SPECIMEN SOURCE: SIGNIFICANT CHANGE UP
SPECIMEN SOURCE: SIGNIFICANT CHANGE UP

## 2023-09-12 ENCOUNTER — APPOINTMENT (OUTPATIENT)
Dept: INTERNAL MEDICINE | Facility: CLINIC | Age: 53
End: 2023-09-12

## 2023-09-25 ENCOUNTER — APPOINTMENT (OUTPATIENT)
Dept: INTERNAL MEDICINE | Facility: CLINIC | Age: 53
End: 2023-09-25
Payer: COMMERCIAL

## 2023-09-25 ENCOUNTER — OUTPATIENT (OUTPATIENT)
Dept: OUTPATIENT SERVICES | Facility: HOSPITAL | Age: 53
LOS: 1 days | End: 2023-09-25

## 2023-09-25 ENCOUNTER — NON-APPOINTMENT (OUTPATIENT)
Age: 53
End: 2023-09-25

## 2023-09-25 VITALS — DIASTOLIC BLOOD PRESSURE: 70 MMHG | SYSTOLIC BLOOD PRESSURE: 130 MMHG

## 2023-09-25 VITALS
HEIGHT: 71 IN | OXYGEN SATURATION: 96 % | WEIGHT: 250 LBS | SYSTOLIC BLOOD PRESSURE: 150 MMHG | HEART RATE: 88 BPM | DIASTOLIC BLOOD PRESSURE: 60 MMHG | BODY MASS INDEX: 35 KG/M2

## 2023-09-25 DIAGNOSIS — Z87.19 PERSONAL HISTORY OF OTHER DISEASES OF THE DIGESTIVE SYSTEM: ICD-10-CM

## 2023-09-25 DIAGNOSIS — R11.0 NAUSEA: ICD-10-CM

## 2023-09-25 DIAGNOSIS — Z90.49 ACQUIRED ABSENCE OF OTHER SPECIFIED PARTS OF DIGESTIVE TRACT: Chronic | ICD-10-CM

## 2023-09-25 PROCEDURE — 99214 OFFICE O/P EST MOD 30 MIN: CPT

## 2023-09-25 RX ORDER — ONDANSETRON 4 MG/1
4 TABLET, ORALLY DISINTEGRATING ORAL EVERY 8 HOURS
Qty: 45 | Refills: 3 | Status: ACTIVE | COMMUNITY
Start: 2023-09-25 | End: 1900-01-01

## 2023-09-25 RX ORDER — AMIODARONE HYDROCHLORIDE 200 MG/1
200 TABLET ORAL DAILY
Qty: 90 | Refills: 1 | Status: COMPLETED | COMMUNITY
Start: 2022-03-15 | End: 2023-09-25

## 2023-09-28 DIAGNOSIS — M10.9 GOUT, UNSPECIFIED: ICD-10-CM

## 2023-09-28 DIAGNOSIS — I48.92 UNSPECIFIED ATRIAL FLUTTER: ICD-10-CM

## 2023-09-28 DIAGNOSIS — I10 ESSENTIAL (PRIMARY) HYPERTENSION: ICD-10-CM

## 2023-09-28 DIAGNOSIS — D57.1 SICKLE-CELL DISEASE WITHOUT CRISIS: ICD-10-CM

## 2023-09-28 DIAGNOSIS — E83.19 OTHER DISORDERS OF IRON METABOLISM: ICD-10-CM

## 2023-09-28 DIAGNOSIS — R11.0 NAUSEA: ICD-10-CM

## 2023-09-28 DIAGNOSIS — Z87.19 PERSONAL HISTORY OF OTHER DISEASES OF THE DIGESTIVE SYSTEM: ICD-10-CM

## 2023-09-28 DIAGNOSIS — N18.31 CHRONIC KIDNEY DISEASE, STAGE 3A: ICD-10-CM

## 2023-10-10 NOTE — DIETITIAN INITIAL EVALUATION ADULT. - DOB: +DATEOFBIRTH
Pts wife Edmond WADE
stretcher
Statement Selected

## 2023-10-25 ENCOUNTER — APPOINTMENT (OUTPATIENT)
Dept: UROLOGY | Facility: CLINIC | Age: 53
End: 2023-10-25
Payer: COMMERCIAL

## 2023-10-25 VITALS
RESPIRATION RATE: 14 BRPM | TEMPERATURE: 97.2 F | WEIGHT: 240 LBS | HEART RATE: 80 BPM | BODY MASS INDEX: 33.6 KG/M2 | DIASTOLIC BLOOD PRESSURE: 82 MMHG | SYSTOLIC BLOOD PRESSURE: 135 MMHG | HEIGHT: 71 IN | OXYGEN SATURATION: 98 %

## 2023-10-25 DIAGNOSIS — R35.1 NOCTURIA: ICD-10-CM

## 2023-10-25 DIAGNOSIS — N40.1 BENIGN PROSTATIC HYPERPLASIA WITH LOWER URINARY TRACT SYMPMS: ICD-10-CM

## 2023-10-25 DIAGNOSIS — N52.9 MALE ERECTILE DYSFUNCTION, UNSPECIFIED: ICD-10-CM

## 2023-10-25 DIAGNOSIS — R79.89 OTHER SPECIFIED ABNORMAL FINDINGS OF BLOOD CHEMISTRY: ICD-10-CM

## 2023-10-25 PROCEDURE — 51741 ELECTRO-UROFLOWMETRY FIRST: CPT

## 2023-10-25 PROCEDURE — 99214 OFFICE O/P EST MOD 30 MIN: CPT | Mod: 25

## 2023-10-25 PROCEDURE — 51798 US URINE CAPACITY MEASURE: CPT

## 2023-10-27 ENCOUNTER — NON-APPOINTMENT (OUTPATIENT)
Age: 53
End: 2023-10-27

## 2023-10-27 LAB
ALBUMIN SERPL ELPH-MCNC: 3.9 G/DL
FSH SERPL-MCNC: 5.8 IU/L
LH SERPL-ACNC: 4.3 IU/L
PROLACTIN SERPL-MCNC: 65.1 NG/ML
SHBG SERPL-SCNC: 36.3 NMOL/L
TESTOST SERPL-MCNC: 65 NG/DL

## 2023-11-01 LAB
ALBUMIN SERPL ELPH-MCNC: 4.1 G/DL
ALP BLD-CCNC: 139 U/L
ALT SERPL-CCNC: 22 U/L
ANION GAP SERPL CALC-SCNC: 12 MMOL/L
AST SERPL-CCNC: 23 U/L
BILIRUB SERPL-MCNC: 0.6 MG/DL
BUN SERPL-MCNC: 21 MG/DL
CALCIUM SERPL-MCNC: 10 MG/DL
CHLORIDE SERPL-SCNC: 107 MMOL/L
CO2 SERPL-SCNC: 20 MMOL/L
CREAT SERPL-MCNC: 1.28 MG/DL
EGFR: 67 ML/MIN/1.73M2
GLUCOSE SERPL-MCNC: 90 MG/DL
POTASSIUM SERPL-SCNC: 4.4 MMOL/L
PROT SERPL-MCNC: 6.9 G/DL
SODIUM SERPL-SCNC: 139 MMOL/L
URATE SERPL-MCNC: 6.6 MG/DL

## 2023-11-02 ENCOUNTER — APPOINTMENT (OUTPATIENT)
Dept: RHEUMATOLOGY | Facility: CLINIC | Age: 53
End: 2023-11-02

## 2023-11-02 RX ORDER — CLOMIPHENE CITRATE 50 MG/1
50 TABLET ORAL
Qty: 45 | Refills: 3 | Status: ACTIVE | COMMUNITY
Start: 2023-10-27 | End: 1900-01-01

## 2023-11-03 RX ORDER — COLCHICINE 0.6 MG/1
0.6 TABLET ORAL
Qty: 45 | Refills: 1 | Status: ACTIVE | COMMUNITY
Start: 2022-03-30 | End: 1900-01-01

## 2023-11-21 ENCOUNTER — OUTPATIENT (OUTPATIENT)
Dept: OUTPATIENT SERVICES | Facility: HOSPITAL | Age: 53
LOS: 1 days | End: 2023-11-21

## 2023-11-21 ENCOUNTER — APPOINTMENT (OUTPATIENT)
Dept: INTERNAL MEDICINE | Facility: CLINIC | Age: 53
End: 2023-11-21

## 2023-11-21 VITALS
WEIGHT: 246 LBS | HEIGHT: 71 IN | BODY MASS INDEX: 34.44 KG/M2 | DIASTOLIC BLOOD PRESSURE: 76 MMHG | SYSTOLIC BLOOD PRESSURE: 132 MMHG | RESPIRATION RATE: 14 BRPM | OXYGEN SATURATION: 99 % | HEART RATE: 104 BPM

## 2023-11-21 DIAGNOSIS — Z90.49 ACQUIRED ABSENCE OF OTHER SPECIFIED PARTS OF DIGESTIVE TRACT: Chronic | ICD-10-CM

## 2023-12-21 ENCOUNTER — NON-APPOINTMENT (OUTPATIENT)
Age: 53
End: 2023-12-21

## 2023-12-21 ENCOUNTER — OUTPATIENT (OUTPATIENT)
Dept: OUTPATIENT SERVICES | Facility: HOSPITAL | Age: 53
LOS: 1 days | End: 2023-12-21

## 2023-12-21 ENCOUNTER — APPOINTMENT (OUTPATIENT)
Dept: INTERNAL MEDICINE | Facility: CLINIC | Age: 53
End: 2023-12-21
Payer: COMMERCIAL

## 2023-12-21 VITALS
BODY MASS INDEX: 35.84 KG/M2 | RESPIRATION RATE: 16 BRPM | DIASTOLIC BLOOD PRESSURE: 67 MMHG | HEIGHT: 71 IN | WEIGHT: 256 LBS | HEART RATE: 77 BPM | TEMPERATURE: 97.6 F | SYSTOLIC BLOOD PRESSURE: 152 MMHG | OXYGEN SATURATION: 98 %

## 2023-12-21 VITALS — DIASTOLIC BLOOD PRESSURE: 80 MMHG | SYSTOLIC BLOOD PRESSURE: 140 MMHG

## 2023-12-21 DIAGNOSIS — Z90.49 ACQUIRED ABSENCE OF OTHER SPECIFIED PARTS OF DIGESTIVE TRACT: Chronic | ICD-10-CM

## 2023-12-21 DIAGNOSIS — R21 RASH AND OTHER NONSPECIFIC SKIN ERUPTION: ICD-10-CM

## 2023-12-21 PROCEDURE — 99214 OFFICE O/P EST MOD 30 MIN: CPT

## 2023-12-21 RX ORDER — HYDROMORPHONE HYDROCHLORIDE 2 MG/1
2 TABLET ORAL
Qty: 20 | Refills: 0 | Status: ACTIVE | COMMUNITY
Start: 2022-06-09 | End: 1900-01-01

## 2023-12-24 NOTE — HISTORY OF PRESENT ILLNESS
[de-identified] : The patient is a 52 yo male with HGB SS, here for f/u. Taking  HU 2000 mg QD. Has been taking Aranesp with Heme, and has been having less transfusion. His HGBs have been stable in the mid-7s. He says that he feels tired at this low HGB, but has iron overload, so transfusions have been decreased. He is no longer taking Oxbryta due to severe diarrhea with the Oxbryta that never resolved. He does have chronic right knee pain, gout has been controlled with colchicine 3 mg QD , prednisone 2.5 mg QD, and allopurinol 200 mg QD. He has rare pain, no dyspnea, is able to work full time, but is no longer able to work overtime.  He is following with rheum for gout. He is taking allopurinol 200 mg QD, 0.3 mg QD. He is also taking prednisone 2.5 mg QD, with excellent reduction of knee swelling and pain. He received euflexxa in both knee joints in June, with goo effect, and decrease in pain.  The patient has been compliant with all medication. Now taking Metoprolol ER 75 mg QD and lisinopril 10 mg QD for HTN S/P PE- taking eliquis 2.5 bid- PE was in 3/22. 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  The patient is back at work, working FT as a  for the [employer]. He enjoys the work, and has no limitations regarding his work due to medical issues. s/p past episode of a. flutter, now taking Multaq 400 mg QD- Has routine f/u with cardiology. He is asymptomatic. Taking HU 2000 mg QD. Taking Jadenu 1020 mg QD for iron overload  Hematologist: Dr. BurciagaAdy-[phone] needs new covid vax for this year 12/19/23 HGB 7.2 creat: 1.67 GFR 50 ferritin- 9/23- 2050 with Jadenu 1080 qd 10/31/23 creat 1.28 CMP otherwise neg  PE: gen- wdwn male in nad, talkative  vss-140/80, gained 16pounds since June mild icterus lungs- cta cor: rrr-m abd benign, obese ext; -c/c/e, scarring from past LE ulcers, both ankles effusions of knees have completely resolved  a/P- 52 yo male with HGB SS, s/p PE, with gout and a. flutter 1. HTN- metoprolol 75 mg QD with lisinopril 10 mg QD- patient advised to be careful of PO intake, that weight gain would further increase his BP 2.gout-   allopurinol 200 mg QD, now taking prednisone 2.5 mg QD with colchicine 0.3 mg QD followed by rheumatology 3.SCD- now transfusion-dependent- to continue with monthly transfusions, with Jadenu for iron overload  HU- continue HU to 2000 mg QD 4. a. flutter, stable- f/u with cardiology, continue Multaq 400 mg QD 5. 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 f/u  3 months  Brittany Veloz MD

## 2023-12-26 DIAGNOSIS — E83.19 OTHER DISORDERS OF IRON METABOLISM: ICD-10-CM

## 2023-12-26 DIAGNOSIS — M10.9 GOUT, UNSPECIFIED: ICD-10-CM

## 2023-12-26 DIAGNOSIS — I48.92 UNSPECIFIED ATRIAL FLUTTER: ICD-10-CM

## 2023-12-26 DIAGNOSIS — I10 ESSENTIAL (PRIMARY) HYPERTENSION: ICD-10-CM

## 2023-12-26 DIAGNOSIS — D57.1 SICKLE-CELL DISEASE WITHOUT CRISIS: ICD-10-CM

## 2023-12-26 DIAGNOSIS — R21 RASH AND OTHER NONSPECIFIC SKIN ERUPTION: ICD-10-CM

## 2024-01-16 ENCOUNTER — APPOINTMENT (OUTPATIENT)
Dept: RHEUMATOLOGY | Facility: CLINIC | Age: 54
End: 2024-01-16
Payer: COMMERCIAL

## 2024-01-16 VITALS
TEMPERATURE: 97.3 F | OXYGEN SATURATION: 97 % | SYSTOLIC BLOOD PRESSURE: 131 MMHG | HEART RATE: 76 BPM | HEIGHT: 71.5 IN | DIASTOLIC BLOOD PRESSURE: 73 MMHG | BODY MASS INDEX: 33.55 KG/M2 | WEIGHT: 245 LBS

## 2024-01-16 PROCEDURE — 99213 OFFICE O/P EST LOW 20 MIN: CPT

## 2024-01-16 RX ORDER — PREDNISONE 2.5 MG/1
2.5 TABLET ORAL
Qty: 30 | Refills: 3 | Status: ACTIVE | COMMUNITY
Start: 2022-12-05 | End: 1900-01-01

## 2024-01-25 ENCOUNTER — APPOINTMENT (OUTPATIENT)
Dept: INTERNAL MEDICINE | Facility: CLINIC | Age: 54
End: 2024-01-25
Payer: COMMERCIAL

## 2024-01-25 ENCOUNTER — OUTPATIENT (OUTPATIENT)
Dept: OUTPATIENT SERVICES | Facility: HOSPITAL | Age: 54
LOS: 1 days | End: 2024-01-25

## 2024-01-25 VITALS
HEIGHT: 71.5 IN | DIASTOLIC BLOOD PRESSURE: 76 MMHG | HEART RATE: 70 BPM | BODY MASS INDEX: 33.55 KG/M2 | RESPIRATION RATE: 16 BRPM | WEIGHT: 245 LBS | SYSTOLIC BLOOD PRESSURE: 132 MMHG | OXYGEN SATURATION: 98 %

## 2024-01-25 DIAGNOSIS — L97.909 NON-PRESSURE CHRONIC ULCER OF UNSPECIFIED PART OF UNSPECIFIED LOWER LEG WITH UNSPECIFIED SEVERITY: ICD-10-CM

## 2024-01-25 DIAGNOSIS — Z90.49 ACQUIRED ABSENCE OF OTHER SPECIFIED PARTS OF DIGESTIVE TRACT: Chronic | ICD-10-CM

## 2024-01-25 DIAGNOSIS — I10 ESSENTIAL (PRIMARY) HYPERTENSION: ICD-10-CM

## 2024-01-25 DIAGNOSIS — E83.19 OTHER DISORDERS OF IRON METABOLISM: ICD-10-CM

## 2024-01-25 DIAGNOSIS — D57.1 SICKLE-CELL DISEASE WITHOUT CRISIS: ICD-10-CM

## 2024-01-25 DIAGNOSIS — M10.9 GOUT, UNSPECIFIED: ICD-10-CM

## 2024-01-25 DIAGNOSIS — I48.92 UNSPECIFIED ATRIAL FLUTTER: ICD-10-CM

## 2024-01-25 PROCEDURE — 99213 OFFICE O/P EST LOW 20 MIN: CPT

## 2024-01-28 NOTE — HISTORY OF PRESENT ILLNESS
[FreeTextEntry1] : 51AAM with sickle cell disease  recent diagnosis of gout, also with osteonecrosis right knee. Diagnostic arthrocentesis of right knee monoarthritis demonstrated MSU crystals consistent with gout. s/p 2 x IACI to right knee [id number] neg on allopurinol 300mg and colchicine 0.3mg daily no gout flares last uric acid in 4/11/2023 6.7  Interval History: minimal knee pain, seen by ortho, getting viscosupplementation injections which is helping

## 2024-01-28 NOTE — ASSESSMENT
[FreeTextEntry1] : 51M with sickle cell disease recent diagnosis of gout, also with osteonecrosis right knee. Diagnostic arthrocentesis of right knee monoarthritis demonstrated MSU crystals consistent with gout. s/p 2 x IACI on allopurinol and colchicine 0.3mg daily no gout flares last uric acid 6.6  Plan: # Gout -Check uric acid today, goal serum uric acid less than 6. HLA  negative. uptitrate allopurinol cw colchicine to 0.3 mg daily. Steroids should be avoided as they can trigger/exacerbate sickle cell crisis. Doses of colchicine higher than 0.6 mg daily should be avoided due to increased toxicity in patients on amiodarone  # Knee pain -Ortho follow up of ON knees -gel injections -PT referral  -Follow-up in 3 months.

## 2024-01-29 ENCOUNTER — LABORATORY RESULT (OUTPATIENT)
Age: 54
End: 2024-01-29

## 2024-02-01 ENCOUNTER — APPOINTMENT (OUTPATIENT)
Dept: ORTHOPEDIC SURGERY | Facility: CLINIC | Age: 54
End: 2024-02-01
Payer: COMMERCIAL

## 2024-02-01 VITALS — WEIGHT: 245 LBS | BODY MASS INDEX: 33.55 KG/M2 | HEIGHT: 71.5 IN

## 2024-02-01 PROCEDURE — 99213 OFFICE O/P EST LOW 20 MIN: CPT

## 2024-02-01 RX ORDER — HYALURONATE SODIUM 20 MG/2 ML
20 SYRINGE (ML) INTRAARTICULAR
Qty: 3 | Refills: 1 | Status: ACTIVE | COMMUNITY
Start: 2024-02-01

## 2024-02-06 ENCOUNTER — NON-APPOINTMENT (OUTPATIENT)
Age: 54
End: 2024-02-06

## 2024-02-07 ENCOUNTER — APPOINTMENT (OUTPATIENT)
Dept: DERMATOLOGY | Facility: CLINIC | Age: 54
End: 2024-02-07
Payer: COMMERCIAL

## 2024-02-07 VITALS — WEIGHT: 245 LBS | HEIGHT: 71 IN | BODY MASS INDEX: 34.3 KG/M2

## 2024-02-07 DIAGNOSIS — Q80.0 ICHTHYOSIS VULGARIS: ICD-10-CM

## 2024-02-07 DIAGNOSIS — L30.9 DERMATITIS, UNSPECIFIED: ICD-10-CM

## 2024-02-07 PROCEDURE — 99204 OFFICE O/P NEW MOD 45 MIN: CPT

## 2024-02-07 RX ORDER — AMMONIUM LACTATE 12 %
12 CREAM (GRAM) TOPICAL
Qty: 1 | Refills: 2 | Status: ACTIVE | COMMUNITY
Start: 2024-02-07 | End: 1900-01-01

## 2024-02-08 LAB
ALBUMIN SERPL ELPH-MCNC: 4.1 G/DL
ALP BLD-CCNC: 124 U/L
ALT SERPL-CCNC: 21 U/L
ANION GAP SERPL CALC-SCNC: 9 MMOL/L
AST SERPL-CCNC: 22 U/L
BASOPHILS # BLD AUTO: 0.02 K/UL
BASOPHILS NFR BLD AUTO: 0.4 %
BILIRUB SERPL-MCNC: 0.6 MG/DL
BUN SERPL-MCNC: 24 MG/DL
CALCIUM SERPL-MCNC: 10.1 MG/DL
CHLORIDE SERPL-SCNC: 107 MMOL/L
CO2 SERPL-SCNC: 20 MMOL/L
CREAT SERPL-MCNC: 1.57 MG/DL
EGFR: 52 ML/MIN/1.73M2
EOSINOPHIL # BLD AUTO: 0.07 K/UL
EOSINOPHIL NFR BLD AUTO: 1.5 %
GLUCOSE SERPL-MCNC: 98 MG/DL
HCT VFR BLD CALC: 26.3 %
HGB BLD-MCNC: 8.8 G/DL
IMM GRANULOCYTES NFR BLD AUTO: 0.2 %
LYMPHOCYTES # BLD AUTO: 1.11 K/UL
LYMPHOCYTES NFR BLD AUTO: 24.1 %
MAN DIFF?: NORMAL
MCHC RBC-ENTMCNC: 33.5 GM/DL
MCHC RBC-ENTMCNC: 36.4 PG
MCV RBC AUTO: 108.7 FL
MONOCYTES # BLD AUTO: 0.49 K/UL
MONOCYTES NFR BLD AUTO: 10.7 %
NEUTROPHILS # BLD AUTO: 2.9 K/UL
NEUTROPHILS NFR BLD AUTO: 63.1 %
PLATELET # BLD AUTO: 225 K/UL
POTASSIUM SERPL-SCNC: 4.8 MMOL/L
PROT SERPL-MCNC: 6.8 G/DL
RBC # BLD: 2.42 M/UL
RBC # FLD: 27.4 %
SODIUM SERPL-SCNC: 137 MMOL/L
URATE SERPL-MCNC: 7 MG/DL
WBC # FLD AUTO: 4.6 K/UL

## 2024-02-09 NOTE — PHYSICAL EXAM
[de-identified] : Right Knee Exam:  Skin: Clean, dry, intact Inspection: No obvious malalignment, no masses, no swelling, no effusion Pulses: 2+ DP/PT pulses  ROM: 0-135 degrees of flexion. No pain with deep knee flexion/extension. Tenderness: No MJLT. No LJLT. No pain over the patella facets. No pain to the quadriceps tendon. No pain to the patella tendon. No posterior knee tenderness. Stability: Stable to varus, valgus. Negative Lachman testing. Negative anterior drawer, negative posterior drawer. Strength: 5/5 Q/H/TA/GS/EHL, without atrophy Neuro: Intact to light touch throughout, DTRs normal Additional Tests: Negative Nidia's test, Negative patellar grind test  Left Knee Exam:  Skin: Clean, dry, intact Inspection: No obvious malalignment, no masses, no swelling, no effusion Pulses: 2+ DP/PT pulses  ROM: 0-135 degrees of flexion. No pain with deep knee flexion/extension. Tenderness: No MJLT. No LJLT. No pain over the patella facets. No pain to the quadriceps tendon. No pain to the patella tendon. No posterior knee tenderness. Stability: Stable to varus, valgus. Negative Lachman testing. Negative anterior drawer, negative posterior drawer. Strength: 5/5 Q/H/TA/GS/EHL, without atrophy Neuro: Intact to light touch throughout, DTRs normal Additional Tests: Negative Nidia's test, Negative patellar grind test  [de-identified] : Images were reviewed dated 5/16/2023\par  \par  4 views of the right knee that show no acute fracture or dislocation. There is mild medial, mild lateral and mild patellofemoral degenerative changes seen. There is mild patella malorie\par  \par  4 views of the left knee that show no acute fracture or dislocation. There is mild medial, mild lateral and mild patellofemoral degenerative changes seen. There is patella malorie. Bone spur within the inferior pole of the left knee.

## 2024-02-09 NOTE — HISTORY OF PRESENT ILLNESS
[FreeTextEntry1] : NP rash [de-identified] : MAXI BUCHANAN is a 53 year old M who presents for evaluation of the following  1. Rash left temple Present for a few months Asx No new meds 6 months prior to onset  2. Discoloration and scaling of lower legs Dry but not symptomatic   SH: Here with wife

## 2024-02-09 NOTE — ASSESSMENT
[FreeTextEntry1] : # Dermatitis of face (L temple, L nasal bridge) -New diagnosis, unc prognosis  -Ddx includes eczematous vs EDP/LPP vs less likely FDE or DLE -Disc biopsy vs treatment trial, pt opts for latter -Tacrolimus 0.1% oint to AA bid until f/u, SED -Photos taken for chart  # Ichthyosis vulgaris- chronic -Can try ammonium lactate 2-3x/day  RTC 6 weeks

## 2024-02-09 NOTE — DISCUSSION/SUMMARY
[de-identified] : 51 y/o male with bilateral knee OA/gouty arthropathy  Patient presents for further evaluation of bilateral knee OA. The risks and benefits of OA treatment options were discussed and all questions were answered. At this time recommendations are for conservative and symptomatic care as detailed above with impact loading activity restriction, low impact exercise and avoidance of strenuous activity. Alos recommend retrial of Visco supplementation injection therapy. We'll obtain preauthorization prior to injection. Patient is not a candidate for intra-articular steroid injection or arthroplasty at this time.   Followup: Once approved for HA injection therapy.

## 2024-02-09 NOTE — PHYSICAL EXAM
[FreeTextEntry3] : Hyperpigmented patch left temple and left nasal bridge  Hyperpigmented fish like scales b/l lower legs

## 2024-02-09 NOTE — HISTORY OF PRESENT ILLNESS
[de-identified] : 52 year old male presents today with bilateral knee pain. He received HA injection in August 2023 which gave him significant relief until recently. He is here to restart HA injections.   He was seen in 2022 for gouty arthropathy/OA of right knee. The pan is brought on with stair usage. He is not taking pain medication. Prednisone provides great relief. His last uric acid was 7 1/29/2024 taking allopurinol and colchicine 0.3mg daily.

## 2024-02-26 ENCOUNTER — OUTPATIENT (OUTPATIENT)
Dept: OUTPATIENT SERVICES | Facility: HOSPITAL | Age: 54
LOS: 1 days | End: 2024-02-26

## 2024-02-26 ENCOUNTER — APPOINTMENT (OUTPATIENT)
Dept: INTERNAL MEDICINE | Facility: CLINIC | Age: 54
End: 2024-02-26
Payer: COMMERCIAL

## 2024-02-26 VITALS
HEIGHT: 71 IN | WEIGHT: 245 LBS | DIASTOLIC BLOOD PRESSURE: 58 MMHG | HEART RATE: 80 BPM | BODY MASS INDEX: 34.3 KG/M2 | SYSTOLIC BLOOD PRESSURE: 130 MMHG | OXYGEN SATURATION: 97 % | RESPIRATION RATE: 17 BRPM

## 2024-02-26 DIAGNOSIS — R79.89 OTHER SPECIFIED ABNORMAL FINDINGS OF BLOOD CHEMISTRY: ICD-10-CM

## 2024-02-26 DIAGNOSIS — N18.31 CHRONIC KIDNEY DISEASE, STAGE 3A: ICD-10-CM

## 2024-02-26 DIAGNOSIS — L97.909 NON-PRESSURE CHRONIC ULCER OF UNSPECIFIED PART OF UNSPECIFIED LOWER LEG WITH UNSPECIFIED SEVERITY: ICD-10-CM

## 2024-02-26 DIAGNOSIS — Z90.49 ACQUIRED ABSENCE OF OTHER SPECIFIED PARTS OF DIGESTIVE TRACT: Chronic | ICD-10-CM

## 2024-02-26 DIAGNOSIS — D57.1 SICKLE-CELL DISEASE WITHOUT CRISIS: ICD-10-CM

## 2024-02-26 DIAGNOSIS — I48.92 UNSPECIFIED ATRIAL FLUTTER: ICD-10-CM

## 2024-02-26 DIAGNOSIS — E83.19 OTHER DISORDERS OF IRON METABOLISM: ICD-10-CM

## 2024-02-26 DIAGNOSIS — M10.9 GOUT, UNSPECIFIED: ICD-10-CM

## 2024-02-26 PROCEDURE — 99214 OFFICE O/P EST MOD 30 MIN: CPT

## 2024-02-26 RX ORDER — OMEPRAZOLE 20 MG/1
20 CAPSULE, DELAYED RELEASE ORAL
Qty: 30 | Refills: 6 | Status: ACTIVE | COMMUNITY
Start: 2024-02-26 | End: 1900-01-01

## 2024-02-26 NOTE — HISTORY OF PRESENT ILLNESS
[de-identified] : The patient is a 54 yo male with HGB SS, here for f/u. He has no complaints today, essentially feeling well. Taking  HU 2000 mg QD. Has been taking Aranesp with Heme, and has been having less transfusion. His HGBs have been stable in the mid-7s. He says that he feels tired at this low HGB, but has iron overload, so transfusions have been decreased. He is no longer taking Oxbryta due to severe diarrhea with the Oxbryta that never resolved. He does have chronic right knee pain, gout has been controlled with colchicine 0.3 mg QD , prednisone 2.5 mg QD, and allopurinol 200 mg QD. He has rare pain, no dyspnea, is able to work full time, but is no longer able to work overtime.  Patient will be going for cardiac catheterization in 1 week. He had an abnormal stress test. The patient has no CP, no dyspnea. he was stress-tested because of his h/o HTN.  He is following with rheum for gout. He is taking allopurinol 200 mg QD, 0.3 mg QD. He is also taking prednisone 2.5 mg QD, with excellent reduction of knee swelling and pain. He received euflexxa in both knee joints in June, with good effect, and decrease in pain.  The patient has been compliant with all medication. Now taking Metoprolol ER 75 mg QD and lisinopril 10 mg QD for HTN S/P PE- taking eliquis 2.5 bid- PE was in 3/22. 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  The patient is on leave currently because of H/O DVT ( 2 years ago).  s/p past episode of a. flutter, now taking Multaq 400 mg QD- Has routine f/u with cardiology. He is asymptomatic. Taking HU 2000 mg QD. Taking Jadenu 1020 mg QD for iron overload  Hematologist: Dr. [phone]  1/29/23 HGB 8.8 creat: 1.57 ( stable) GFR 52 ferritin- 9/23- 2050 with Jadenu 1080 qd  PE: gen- wdwn male in nad, talkative  vss-130/58 anicteric oropharynx without injection lungs- cta cor: rrr+1/6 mark,  abd- benign ext; -c/c/e, scarring from past LE ulcers, both ankles, small ulcer right inner ankle, now closed   a/P- 54 yo male with HGB SS, s/p PE, with gout and a. flutter 1. HTN- metoprolol 75 mg QD with lisinopril 10 mg QD- followed by cardiology cardiac cath to follow for abnormal stress test 2.gout-   allopurinol 200 mg QD, now taking prednisone 2.5 mg QD with colchicine 0.3 mg QD followed by rheumatology 3.SCD- now transfusion-dependent- to continue with monthly transfusions, with Jadenu for iron overload  HU- continue HU to 2000 mg QD 4. a. flutter, stable- f/u with cardiology, continue Multaq 400 mg QD 5. iron overload- continue Jadenu to 1080mg QD 6. VTE- continue eliquis to 2.5 mg bid, lifelong therapy 7.LE ulcer- healing  Brittany Veloz MD

## 2024-02-28 ENCOUNTER — APPOINTMENT (OUTPATIENT)
Dept: UROLOGY | Facility: CLINIC | Age: 54
End: 2024-02-28

## 2024-03-04 ENCOUNTER — NON-APPOINTMENT (OUTPATIENT)
Age: 54
End: 2024-03-04

## 2024-03-06 ENCOUNTER — INPATIENT (INPATIENT)
Facility: HOSPITAL | Age: 54
LOS: 1 days | Discharge: HOME | DRG: 812 | End: 2024-03-08
Attending: INTERNAL MEDICINE | Admitting: INTERNAL MEDICINE
Payer: COMMERCIAL

## 2024-03-06 VITALS
WEIGHT: 244.93 LBS | TEMPERATURE: 98 F | DIASTOLIC BLOOD PRESSURE: 75 MMHG | SYSTOLIC BLOOD PRESSURE: 130 MMHG | HEIGHT: 71 IN | RESPIRATION RATE: 18 BRPM | OXYGEN SATURATION: 100 % | HEART RATE: 66 BPM

## 2024-03-06 DIAGNOSIS — R94.39 ABNORMAL RESULT OF OTHER CARDIOVASCULAR FUNCTION STUDY: ICD-10-CM

## 2024-03-06 DIAGNOSIS — Z90.49 ACQUIRED ABSENCE OF OTHER SPECIFIED PARTS OF DIGESTIVE TRACT: Chronic | ICD-10-CM

## 2024-03-06 LAB
ANION GAP SERPL CALC-SCNC: 10 MMOL/L — SIGNIFICANT CHANGE UP (ref 5–17)
ANION GAP SERPL CALC-SCNC: 10 MMOL/L — SIGNIFICANT CHANGE UP (ref 5–17)
BLD GP AB SCN SERPL QL: NEGATIVE — SIGNIFICANT CHANGE UP
BUN SERPL-MCNC: 29 MG/DL — HIGH (ref 7–23)
BUN SERPL-MCNC: 31 MG/DL — HIGH (ref 7–23)
CALCIUM SERPL-MCNC: 10.2 MG/DL — SIGNIFICANT CHANGE UP (ref 8.4–10.5)
CALCIUM SERPL-MCNC: 9.3 MG/DL — SIGNIFICANT CHANGE UP (ref 8.4–10.5)
CHLORIDE SERPL-SCNC: 109 MMOL/L — HIGH (ref 96–108)
CHLORIDE SERPL-SCNC: 110 MMOL/L — HIGH (ref 96–108)
CO2 SERPL-SCNC: 17 MMOL/L — LOW (ref 22–31)
CO2 SERPL-SCNC: 20 MMOL/L — LOW (ref 22–31)
CREAT SERPL-MCNC: 1.98 MG/DL — HIGH (ref 0.5–1.3)
CREAT SERPL-MCNC: 2.32 MG/DL — HIGH (ref 0.5–1.3)
EGFR: 33 ML/MIN/1.73M2 — LOW
EGFR: 39 ML/MIN/1.73M2 — LOW
GLUCOSE SERPL-MCNC: 90 MG/DL — SIGNIFICANT CHANGE UP (ref 70–99)
GLUCOSE SERPL-MCNC: 91 MG/DL — SIGNIFICANT CHANGE UP (ref 70–99)
HCT VFR BLD CALC: 19.2 % — CRITICAL LOW (ref 39–50)
HGB BLD-MCNC: 6.9 G/DL — CRITICAL LOW (ref 13–17)
MCHC RBC-ENTMCNC: 35.9 GM/DL — SIGNIFICANT CHANGE UP (ref 32–36)
MCHC RBC-ENTMCNC: 38.3 PG — HIGH (ref 27–34)
MCV RBC AUTO: 106.7 FL — HIGH (ref 80–100)
NRBC # BLD: 35 /100 WBCS — HIGH (ref 0–0)
PLATELET # BLD AUTO: 198 K/UL — SIGNIFICANT CHANGE UP (ref 150–400)
POTASSIUM SERPL-MCNC: 5.2 MMOL/L — SIGNIFICANT CHANGE UP (ref 3.5–5.3)
POTASSIUM SERPL-MCNC: 5.8 MMOL/L — HIGH (ref 3.5–5.3)
POTASSIUM SERPL-SCNC: 5.2 MMOL/L — SIGNIFICANT CHANGE UP (ref 3.5–5.3)
POTASSIUM SERPL-SCNC: 5.8 MMOL/L — HIGH (ref 3.5–5.3)
RBC # BLD: 1.8 M/UL — LOW (ref 4.2–5.8)
RBC # FLD: 28.2 % — HIGH (ref 10.3–14.5)
RH IG SCN BLD-IMP: POSITIVE — SIGNIFICANT CHANGE UP
SODIUM SERPL-SCNC: 137 MMOL/L — SIGNIFICANT CHANGE UP (ref 135–145)
SODIUM SERPL-SCNC: 139 MMOL/L — SIGNIFICANT CHANGE UP (ref 135–145)
WBC # BLD: 3.44 K/UL — LOW (ref 3.8–10.5)
WBC # FLD AUTO: 3.44 K/UL — LOW (ref 3.8–10.5)

## 2024-03-06 PROCEDURE — 93010 ELECTROCARDIOGRAM REPORT: CPT

## 2024-03-06 PROCEDURE — 93010 ELECTROCARDIOGRAM REPORT: CPT | Mod: 77

## 2024-03-06 RX ORDER — FOLIC ACID 0.8 MG
1 TABLET ORAL DAILY
Refills: 0 | Status: DISCONTINUED | OUTPATIENT
Start: 2024-03-06 | End: 2024-03-08

## 2024-03-06 RX ORDER — DRONEDARONE 400 MG/1
400 TABLET, FILM COATED ORAL
Refills: 0 | Status: DISCONTINUED | OUTPATIENT
Start: 2024-03-06 | End: 2024-03-08

## 2024-03-06 RX ORDER — HYDROXYUREA 500 MG/1
2000 CAPSULE ORAL DAILY
Refills: 0 | Status: DISCONTINUED | OUTPATIENT
Start: 2024-03-06 | End: 2024-03-08

## 2024-03-06 RX ORDER — LISINOPRIL 2.5 MG/1
1 TABLET ORAL
Refills: 0 | DISCHARGE

## 2024-03-06 RX ORDER — SODIUM CHLORIDE 9 MG/ML
1000 INJECTION INTRAMUSCULAR; INTRAVENOUS; SUBCUTANEOUS
Refills: 0 | Status: DISCONTINUED | OUTPATIENT
Start: 2024-03-06 | End: 2024-03-08

## 2024-03-06 RX ORDER — ACETAMINOPHEN 500 MG
650 TABLET ORAL ONCE
Refills: 0 | Status: COMPLETED | OUTPATIENT
Start: 2024-03-06 | End: 2024-03-06

## 2024-03-06 RX ORDER — DIPHENHYDRAMINE HCL 50 MG
25 CAPSULE ORAL ONCE
Refills: 0 | Status: COMPLETED | OUTPATIENT
Start: 2024-03-06 | End: 2024-03-06

## 2024-03-06 RX ORDER — APIXABAN 2.5 MG/1
1 TABLET, FILM COATED ORAL
Refills: 0 | DISCHARGE

## 2024-03-06 RX ORDER — LINACLOTIDE 145 UG/1
1 CAPSULE, GELATIN COATED ORAL
Qty: 0 | Refills: 0 | DISCHARGE

## 2024-03-06 RX ORDER — DIPHENHYDRAMINE HCL 50 MG
50 CAPSULE ORAL ONCE
Refills: 0 | Status: COMPLETED | OUTPATIENT
Start: 2024-03-06 | End: 2024-03-06

## 2024-03-06 RX ORDER — DEFERASIROX 180 MG/1
3 GRANULE ORAL
Refills: 0 | DISCHARGE

## 2024-03-06 RX ORDER — HYDROXYUREA 500 MG/1
4 CAPSULE ORAL
Refills: 0 | DISCHARGE

## 2024-03-06 RX ORDER — COLCHICINE 0.6 MG
0.5 TABLET ORAL
Refills: 0 | DISCHARGE

## 2024-03-06 RX ORDER — DEFERASIROX 180 MG/1
2 GRANULE ORAL
Refills: 0 | DISCHARGE

## 2024-03-06 RX ORDER — FOLIC ACID 0.8 MG
1 TABLET ORAL
Qty: 0 | Refills: 0 | DISCHARGE

## 2024-03-06 RX ORDER — LINACLOTIDE 145 UG/1
1 CAPSULE, GELATIN COATED ORAL
Refills: 0 | DISCHARGE

## 2024-03-06 RX ORDER — OMEPRAZOLE 10 MG/1
1 CAPSULE, DELAYED RELEASE ORAL
Refills: 0 | DISCHARGE

## 2024-03-06 RX ORDER — SODIUM CHLORIDE 9 MG/ML
250 INJECTION INTRAMUSCULAR; INTRAVENOUS; SUBCUTANEOUS ONCE
Refills: 0 | Status: COMPLETED | OUTPATIENT
Start: 2024-03-06 | End: 2024-03-06

## 2024-03-06 RX ORDER — ONDANSETRON 8 MG/1
4 TABLET, FILM COATED ORAL ONCE
Refills: 0 | Status: DISCONTINUED | OUTPATIENT
Start: 2024-03-06 | End: 2024-03-08

## 2024-03-06 RX ORDER — ACETAMINOPHEN 500 MG
1000 TABLET ORAL ONCE
Refills: 0 | Status: DISCONTINUED | OUTPATIENT
Start: 2024-03-06 | End: 2024-03-08

## 2024-03-06 RX ORDER — MAGNESIUM OXIDE 400 MG ORAL TABLET 241.3 MG
400 TABLET ORAL
Refills: 0 | Status: DISCONTINUED | OUTPATIENT
Start: 2024-03-06 | End: 2024-03-07

## 2024-03-06 RX ORDER — ALLOPURINOL 300 MG
1 TABLET ORAL
Refills: 0 | DISCHARGE

## 2024-03-06 RX ORDER — METOPROLOL TARTRATE 50 MG
2 TABLET ORAL
Refills: 0 | DISCHARGE

## 2024-03-06 RX ORDER — HYDROMORPHONE HYDROCHLORIDE 2 MG/ML
0.5 INJECTION INTRAMUSCULAR; INTRAVENOUS; SUBCUTANEOUS ONCE
Refills: 0 | Status: DISCONTINUED | OUTPATIENT
Start: 2024-03-06 | End: 2024-03-06

## 2024-03-06 RX ORDER — MAGNESIUM OXIDE 400 MG ORAL TABLET 241.3 MG
1 TABLET ORAL
Refills: 0 | DISCHARGE

## 2024-03-06 RX ORDER — PANTOPRAZOLE SODIUM 20 MG/1
40 TABLET, DELAYED RELEASE ORAL
Refills: 0 | Status: DISCONTINUED | OUTPATIENT
Start: 2024-03-06 | End: 2024-03-08

## 2024-03-06 RX ORDER — DEFERASIROX 180 MG/1
1500 GRANULE ORAL DAILY
Refills: 0 | Status: DISCONTINUED | OUTPATIENT
Start: 2024-03-06 | End: 2024-03-08

## 2024-03-06 RX ORDER — LISINOPRIL 2.5 MG/1
20 TABLET ORAL DAILY
Refills: 0 | Status: DISCONTINUED | OUTPATIENT
Start: 2024-03-06 | End: 2024-03-08

## 2024-03-06 RX ORDER — ALLOPURINOL 300 MG
300 TABLET ORAL DAILY
Refills: 0 | Status: DISCONTINUED | OUTPATIENT
Start: 2024-03-06 | End: 2024-03-08

## 2024-03-06 RX ADMIN — SODIUM CHLORIDE 75 MILLILITER(S): 9 INJECTION INTRAMUSCULAR; INTRAVENOUS; SUBCUTANEOUS at 14:52

## 2024-03-06 RX ADMIN — Medication 650 MILLIGRAM(S): at 10:15

## 2024-03-06 RX ADMIN — SODIUM CHLORIDE 750 MILLILITER(S): 9 INJECTION INTRAMUSCULAR; INTRAVENOUS; SUBCUTANEOUS at 07:10

## 2024-03-06 RX ADMIN — HYDROMORPHONE HYDROCHLORIDE 0.5 MILLIGRAM(S): 2 INJECTION INTRAMUSCULAR; INTRAVENOUS; SUBCUTANEOUS at 22:46

## 2024-03-06 RX ADMIN — PANTOPRAZOLE SODIUM 40 MILLIGRAM(S): 20 TABLET, DELAYED RELEASE ORAL at 10:29

## 2024-03-06 RX ADMIN — Medication 300 MILLIGRAM(S): at 12:45

## 2024-03-06 RX ADMIN — MAGNESIUM OXIDE 400 MG ORAL TABLET 400 MILLIGRAM(S): 241.3 TABLET ORAL at 10:24

## 2024-03-06 RX ADMIN — Medication 25 MILLIGRAM(S): at 13:52

## 2024-03-06 RX ADMIN — SODIUM CHLORIDE 75 MILLILITER(S): 9 INJECTION INTRAMUSCULAR; INTRAVENOUS; SUBCUTANEOUS at 07:10

## 2024-03-06 RX ADMIN — Medication 50 MILLIGRAM(S): at 10:24

## 2024-03-06 RX ADMIN — DEFERASIROX 1500 MILLIGRAM(S): 180 GRANULE ORAL at 22:04

## 2024-03-06 RX ADMIN — Medication 1 MILLIGRAM(S): at 12:45

## 2024-03-06 RX ADMIN — DRONEDARONE 400 MILLIGRAM(S): 400 TABLET, FILM COATED ORAL at 17:39

## 2024-03-06 RX ADMIN — MAGNESIUM OXIDE 400 MG ORAL TABLET 400 MILLIGRAM(S): 241.3 TABLET ORAL at 17:40

## 2024-03-06 RX ADMIN — Medication 650 MILLIGRAM(S): at 10:48

## 2024-03-06 RX ADMIN — Medication 50 MILLIGRAM(S): at 18:13

## 2024-03-06 NOTE — H&P CARDIOLOGY - NSICDXPASTMEDICALHX_GEN_ALL_CORE_FT
PAST MEDICAL HISTORY:  Chronic venous insufficiency     Paroxysmal atrial fibrillation     Right knee pain     Sickle cell disease      PAST MEDICAL HISTORY:  Chronic venous insufficiency     GERD (gastroesophageal reflux disease)     Gout     Paroxysmal atrial fibrillation     Right knee pain     Sickle cell disease

## 2024-03-06 NOTE — PHARMACOTHERAPY INTERVENTION NOTE - COMMENTS
Confirmed home medications with patient and pharmacy, updated in Outpatient Medication Review.    Home Medications:  allopurinol 300 mg oral tablet: 1 tab(s) orally once a day  Eliquis 5 mg oral tablet: 1 tab(s) orally 2 times a day  folic acid 1 mg oral tablet: 1 tab(s) orally once a day  hydroxyurea 500 mg oral capsule: 4 tab(s) orally once a day  lisinopril 20 mg oral tablet: 1 tab(s) orally once a day  magnesium oxide 400 mg oral capsule: 1 cap(s) orally 2 times a day  Multaq 400 mg oral tablet: 1 tab(s) orally 2 times a day  omeprazole 40 mg oral delayed release capsule: 1 cap(s) orally once a day    Added:  deferasirox 360 mg oral tablet: 3 tab(s) orally once a day (Brand name Jadenu)  Linzess 290 mcg oral capsule: 1 cap(s) orally once a day  metoprolol succinate 25 mg oral tablet, extended release: 2 tab(s) orally 2 times a day    S/w CHIRAG Kenney- patient unsure if wife can bring in home Jadenu (deferasirox, strength-adjusted formulation). Inpatient pharmacy stocks a different formulation of deferasirox, brand name Exjade (immediate release formulation). Patient is ok with taking Exjade while inpatient. Patient was taking Jadenu 1080mg once a day at home, so Exjade equivalent dose is 1500mg and was ordered (dose of Jadenu is 70% of Exjade dose).     Ezra Hernandez PharmD  Transitions of Care Pharmacist  Available on Microsoft Teams  Spectra #[phone]

## 2024-03-06 NOTE — PROVIDER CONTACT NOTE (CRITICAL VALUE NOTIFICATION) - SITUATION
patients hgb and hct low, dr mancia made aware,  will decide to transfuse patient at [facility] or go to outpatient hematology
report was taken by rBielle humphreys H/H

## 2024-03-06 NOTE — ASU PATIENT PROFILE, ADULT - FALL HARM RISK - UNIVERSAL INTERVENTIONS
Bed in lowest position, wheels locked, appropriate side rails in place/Call bell, personal items and telephone in reach/Instruct patient to call for assistance before getting out of bed or chair/Non-slip footwear when patient is out of bed/[place] to call system/Physically safe environment - no spills, clutter or unnecessary equipment/Purposeful Proactive Rounding/Room/bathroom lighting operational, light cord in reach

## 2024-03-06 NOTE — H&P CARDIOLOGY - HISTORY OF PRESENT ILLNESS
53 year old male h/o sickle cell, paroxysmal afib on Elquis (last dose Tues am 3/5/24), PE (pt denies), HTN, peripheral venous insufficiency who underwent stress test 2/21/24  for job () that revealed inferior wall ischemia and normal LV function. Pt presents today for left heart cath.       Cards: Dr Cruz 53 year old male h/o sickle cell, paroxysmal afib on Elquis (last dose Tues am 3/5/24), PE (pt denies), HTN, peripheral venous insufficiency who underwent stress test 2/21/24  for job () that revealed inferior wall ischemia and normal LV function. Pt denies CP/SOB/decreased exercise tolerance.  Pt presents today for left heart cath.       Cards: Dr Cruz 53 year old male h/o sickle cell transfused , paroxysmal afib on Elquis (last dose Tues am 3/5/24), PE (pt denies), HTN, peripheral venous insufficiency who underwent stress test 2/21/24  for job () that revealed inferior wall ischemia and normal LV function. Pt denies CP/SOB/decreased exercise tolerance.  Pt presents today for left heart cath.       Cards: Dr Cruz  Heme: Dr Javier Lincoln, [facility], [phone] or [phone] 53 year old male h/o sickle cell transfused approx 1 per month in heme office , paroxysmal afib on Elquis (last dose Tues am 3/5/24), PE (pt denies), HTN, peripheral venous insufficiency, GERD, gout who underwent stress test 2/21/24  for job () that revealed inferior wall ischemia and normal LV function. Pt denies CP/SOB/decreased exercise tolerance.  Pt presents today for left heart cath. Pre procedure labs H/H 6.9/19.2: cath cancelled. Pt to be admitted for transfusion and rescheduled for cardiac cath in am.       Cards: Dr Cruz  Heme: Dr Javier Lincoln, 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 Pt denies CP/SOB/decreased exercise tolerance.  Pt presents today for left heart cath.     Pre procedure labs: H/H 6.9/19.2: cath cancelled. Pt to be admitted for transfusion and rescheduled for cardiac cath in am.       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 Pt denies CP/SOB/decreased exercise tolerance.  Pt presents today for left heart cath.     Pre procedure labs: H/H 6.9/19.2: cath cancelled. Pt to be admitted for transfusion and rescheduled for cardiac cath in am.       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 Pt denies CP/SOB/decreased exercise tolerance.  Pt presents today for left heart cath.     Pre procedure labs: H/H 6.9/19.2: cath cancelled. Pt to be admitted for transfusion and rescheduled for cardiac cath in am.       Cards: Dr Cruz  [facility]: Dr Javier Lincoln, [facility], [phone] or [phone]

## 2024-03-07 DIAGNOSIS — R94.39 ABNORMAL RESULT OF OTHER CARDIOVASCULAR FUNCTION STUDY: ICD-10-CM

## 2024-03-07 DIAGNOSIS — D57.00 HB-SS DISEASE WITH CRISIS, UNSPECIFIED: ICD-10-CM

## 2024-03-07 DIAGNOSIS — N18.9 CHRONIC KIDNEY DISEASE, UNSPECIFIED: ICD-10-CM

## 2024-03-07 DIAGNOSIS — N17.9 ACUTE KIDNEY FAILURE, UNSPECIFIED: ICD-10-CM

## 2024-03-07 DIAGNOSIS — I48.0 PAROXYSMAL ATRIAL FIBRILLATION: ICD-10-CM

## 2024-03-07 LAB
ALBUMIN SERPL ELPH-MCNC: 3.3 G/DL — SIGNIFICANT CHANGE UP (ref 3.3–5)
ALP SERPL-CCNC: 84 U/L — SIGNIFICANT CHANGE UP (ref 40–120)
ALT FLD-CCNC: 24 U/L — SIGNIFICANT CHANGE UP (ref 10–45)
ANION GAP SERPL CALC-SCNC: 10 MMOL/L — SIGNIFICANT CHANGE UP (ref 5–17)
AST SERPL-CCNC: 32 U/L — SIGNIFICANT CHANGE UP (ref 10–40)
BILIRUB SERPL-MCNC: 0.6 MG/DL — SIGNIFICANT CHANGE UP (ref 0.2–1.2)
BUN SERPL-MCNC: 22 MG/DL — SIGNIFICANT CHANGE UP (ref 7–23)
CALCIUM SERPL-MCNC: 9.8 MG/DL — SIGNIFICANT CHANGE UP (ref 8.4–10.5)
CHLORIDE SERPL-SCNC: 108 MMOL/L — SIGNIFICANT CHANGE UP (ref 96–108)
CO2 SERPL-SCNC: 18 MMOL/L — LOW (ref 22–31)
CREAT SERPL-MCNC: 1.65 MG/DL — HIGH (ref 0.5–1.3)
EGFR: 49 ML/MIN/1.73M2 — LOW
FERRITIN SERPL-MCNC: 3158 NG/ML — HIGH (ref 30–400)
GLUCOSE SERPL-MCNC: 87 MG/DL — SIGNIFICANT CHANGE UP (ref 70–99)
HAPTOGLOB SERPL-MCNC: <20 MG/DL — LOW (ref 34–200)
HCT VFR BLD CALC: 20.7 % — CRITICAL LOW (ref 39–50)
HGB BLD-MCNC: 7.4 G/DL — LOW (ref 13–17)
IRON SATN MFR SERPL: 274 UG/DL — HIGH (ref 45–165)
IRON SATN MFR SERPL: 58 % — HIGH (ref 16–55)
LDH SERPL L TO P-CCNC: 418 U/L — HIGH (ref 50–242)
MCHC RBC-ENTMCNC: 35.4 PG — HIGH (ref 27–34)
MCHC RBC-ENTMCNC: 35.7 GM/DL — SIGNIFICANT CHANGE UP (ref 32–36)
MCV RBC AUTO: 99 FL — SIGNIFICANT CHANGE UP (ref 80–100)
NRBC # BLD: 36 /100 WBCS — HIGH (ref 0–0)
PLATELET # BLD AUTO: 154 K/UL — SIGNIFICANT CHANGE UP (ref 150–400)
POTASSIUM SERPL-MCNC: 5 MMOL/L — SIGNIFICANT CHANGE UP (ref 3.5–5.3)
POTASSIUM SERPL-SCNC: 5 MMOL/L — SIGNIFICANT CHANGE UP (ref 3.5–5.3)
PROT SERPL-MCNC: 6.5 G/DL — SIGNIFICANT CHANGE UP (ref 6–8.3)
RBC # BLD: 2.09 M/UL — LOW (ref 4.2–5.8)
RBC # BLD: 2.26 M/UL — LOW (ref 4.2–5.8)
RBC # FLD: SIGNIFICANT CHANGE UP (ref 10.3–14.5)
RETICS #: 86.6 K/UL — SIGNIFICANT CHANGE UP (ref 25–125)
RETICS/RBC NFR: 3.8 % — HIGH (ref 0.5–2.5)
SODIUM SERPL-SCNC: 136 MMOL/L — SIGNIFICANT CHANGE UP (ref 135–145)
TIBC SERPL-MCNC: 472 UG/DL — HIGH (ref 220–430)
TROPONIN T, HIGH SENSITIVITY RESULT: 11 NG/L — SIGNIFICANT CHANGE UP (ref 0–51)
UIBC SERPL-MCNC: 197 UG/DL — SIGNIFICANT CHANGE UP (ref 110–370)
WBC # BLD: 3.31 K/UL — LOW (ref 3.8–10.5)
WBC # FLD AUTO: 3.31 K/UL — LOW (ref 3.8–10.5)

## 2024-03-07 PROCEDURE — 99223 1ST HOSP IP/OBS HIGH 75: CPT | Mod: GC

## 2024-03-07 RX ORDER — DIPHENHYDRAMINE HCL 50 MG
25 CAPSULE ORAL ONCE
Refills: 0 | Status: COMPLETED | OUTPATIENT
Start: 2024-03-07 | End: 2024-03-07

## 2024-03-07 RX ADMIN — DRONEDARONE 400 MILLIGRAM(S): 400 TABLET, FILM COATED ORAL at 05:17

## 2024-03-07 RX ADMIN — Medication 1 MILLIGRAM(S): at 12:56

## 2024-03-07 RX ADMIN — DRONEDARONE 400 MILLIGRAM(S): 400 TABLET, FILM COATED ORAL at 18:00

## 2024-03-07 RX ADMIN — LISINOPRIL 20 MILLIGRAM(S): 2.5 TABLET ORAL at 05:15

## 2024-03-07 RX ADMIN — HYDROXYUREA 2000 MILLIGRAM(S): 500 CAPSULE ORAL at 05:15

## 2024-03-07 RX ADMIN — Medication 300 MILLIGRAM(S): at 12:56

## 2024-03-07 RX ADMIN — MAGNESIUM OXIDE 400 MG ORAL TABLET 400 MILLIGRAM(S): 241.3 TABLET ORAL at 08:57

## 2024-03-07 RX ADMIN — DEFERASIROX 1500 MILLIGRAM(S): 180 GRANULE ORAL at 13:30

## 2024-03-07 RX ADMIN — HYDROMORPHONE HYDROCHLORIDE 0.5 MILLIGRAM(S): 2 INJECTION INTRAMUSCULAR; INTRAVENOUS; SUBCUTANEOUS at 00:05

## 2024-03-07 RX ADMIN — PANTOPRAZOLE SODIUM 40 MILLIGRAM(S): 20 TABLET, DELAYED RELEASE ORAL at 05:31

## 2024-03-07 RX ADMIN — Medication 25 MILLIGRAM(S): at 16:27

## 2024-03-07 NOTE — CONSULT NOTE ADULT - ASSESSMENT
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 Pt denies CP/SOB/decreased exercise tolerance. Patient referred for Cardiac catherization with possible intervention.    anemia  likely 2/2 sickle cell disease  - transfuse 1 more unit pr prbc  - will send hemolysis workup  - c/w hydroxyurea  - consider hematology consult    h/o iron overload  - cont deferasirox Dispersible Tablet   - check ferritin    HTN  - c/w lisinopril    PAF  - cont multaq    gout  - c/w allopurinol            
Pt. with ALFREDO on CKD

## 2024-03-07 NOTE — CONSULT NOTE ADULT - ATTENDING COMMENTS
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 Lagos) admitted with a positive stress test, to have a cardiac cath   baseline creatinine 1.6 > admitted with 2.4 >now back to 1.6 mg/dl  check urinalysis with microscopy   agree with fluids 75 cc/hr for 12 hours pre and post cath     adalberto fuller  nephrology attending   please contact me on TEAMS   Office- [phone]

## 2024-03-07 NOTE — CHART NOTE - NSCHARTNOTEFT_GEN_A_CORE
Called by RN  to evaluate pt's generalized pain and left sided chest pressure. Pt examined at the bedside. Calm, alert oriented x3. Pt sates the chest discomfort he is having very often at home and the he takes his magnesium and cardiac medications. Pt was told that he got  all his medications.   Denies  palpitations, diaphoresis, dyspnea, nausea, vomiting, headache or dysuria.     Vital Signs Last 24 Hrs  T(C): 36.6 (06 Mar 2024 20:45), Max: 37.1 (06 Mar 2024 11:15)  T(F): 97.8 (06 Mar 2024 20:45), Max: 98.8 (06 Mar 2024 13:42)  HR: 71 (06 Mar 2024 22:07) (65 - 82)  BP: 141/81 (06 Mar 2024 22:07) (121/74 - 150/80)  BP(mean): 93 (06 Mar 2024 06:27) (93 - 93)  RR: 18 (06 Mar 2024 22:07) (16 - 18)  SpO2: 98% (06 Mar 2024 22:07) (96% - 100%)    Parameters below as of 06 Mar 2024 22:07  Patient On (Oxygen Delivery Method): room air        dronedarone 400 milliGRAM(s) Oral two times a day  lisinopril 20 milliGRAM(s) Oral daily      Labs                          6.9    3.44  )-----------( 198      ( 06 Mar 2024 07:02 )             19.2       03-06    137  |  110<H>  |  29<H>  ----------------------------<  90  5.2   |  17<L>  |  1.98<H>    Ca    9.3      06 Mar 2024 16:56        PHYSICAL EXAM:  GENERAL: NAD, well-developed  HEAD:  Atraumatic, Normocephalic  EYES: EOMI, PERRLA, conjunctiva and sclera clear  NECK: Supple, No JVD  CHEST/LUNG: Clear to auscultation bilaterally; No wheeze  HEART: Regular rate and rhythm; reproducible tenderness under left breast site.   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 Pt denies CP/SOB/decreased exercise tolerance.  Pt presents today for left heart cath.  Pre procedure labs: H/H 6.9/19.2: cath cancelled. Pt to be admitted for transfusion, which was done on 3/6 1 unit PRBC and rescheduled for cardiac cath in am.     Chest pressure, recurring  - EKG stat n changes  - troponin  - c/w IVF   - Dilaudid 0.5 mg (adm diluted in 4 ml NS) for generalized pain  -Tylenol IV pt refusing "states its not helping    Pt reevaluated, resting comfortably  Will endorse to day team      Gena Harper NP, #[id number]    -

## 2024-03-07 NOTE — CONSULT NOTE ADULT - SUBJECTIVE AND OBJECTIVE BOX
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 Pt denies CP/SOB/decreased exercise tolerance.  Pt presents today for left heart cath.     Pre procedure labs: H/H 6.9/19.2: cath cancelled. Pt to be admitted for transfusion and rescheduled for cardiac cath       Cards: Dr Cruz  [facility]: Dr Javier Lincoln, [facility], [phone] or [phone] (06 Mar 2024 06:27)    03-07-24 @ 11:20  PAST MEDICAL & SURGICAL HISTORY:  Sickle cell disease      Right knee pain      Paroxysmal atrial fibrillation      Chronic venous insufficiency      Gout      GERD (gastroesophageal reflux disease)      History of cholecystectomy          Review of Systems:   CONSTITUTIONAL: No fever, weight loss, ++ fatigue and weakness  EYES: No eye pain, visual disturbances, or discharge  ENMT:  No difficulty hearing, tinnitus, vertigo; No sinus or throat pain  NECK: No pain or stiffness  BREASTS: No pain, masses, or nipple discharge  RESPIRATORY: No cough, wheezing, chills or hemoptysis; No shortness of breath  CARDIOVASCULAR: No chest pain, palpitations, dizziness, or leg swelling  GASTROINTESTINAL: No abdominal or epigastric pain. No nausea, vomiting, or hematemesis; No diarrhea or constipation. No melena or hematochezia.  GENITOURINARY: No dysuria, frequency, hematuria, or incontinence  NEUROLOGICAL: No headaches, memory loss, loss of strength, numbness, or tremors  SKIN: No itching, burning, rashes, or lesions   LYMPH NODES: No enlarged glands  ENDOCRINE: No heat or cold intolerance; No hair loss  MUSCULOSKELETAL: No joint pain or swelling; No muscle, back, or extremity pain  PSYCHIATRIC: No depression, anxiety, mood swings, or difficulty sleeping  HEME/LYMPH: No easy bruising, or bleeding gums  ALLERY AND IMMUNOLOGIC: No hives or eczema    Allergies    shellfish (Short breath)  [This allergen will not trigger allergy alert] No Known Drug Allergies (Unknown)    Intolerances        Social History:     FAMILY HISTORY:  Family history of essential hypertension    Family history of sickle cell trait        MEDICATIONS  (STANDING):  acetaminophen   IVPB .. 1000 milliGRAM(s) IV Intermittent once  allopurinol 300 milliGRAM(s) Oral daily  deferasirox Dispersible Tablet 1500 milliGRAM(s) Oral daily  dronedarone 400 milliGRAM(s) Oral two times a day  folic acid 1 milliGRAM(s) Oral daily  hydroxyurea 2000 milliGRAM(s) Oral daily  lisinopril 20 milliGRAM(s) Oral daily  magnesium oxide 400 milliGRAM(s) Oral two times a day with meals  ondansetron Injectable 4 milliGRAM(s) IV Push once  pantoprazole    Tablet 40 milliGRAM(s) Oral before breakfast  sodium chloride 0.9%. 1000 milliLiter(s) (75 mL/Hr) IV Continuous <Continuous>  sodium chloride 0.9%. 1000 milliLiter(s) (75 mL/Hr) IV Continuous <Continuous>    MEDICATIONS  (PRN):      Vital Signs Last 24 Hrs  T(C): 36.9 (07 Mar 2024 04:52), Max: 37.1 (06 Mar 2024 13:42)  T(F): 98.5 (07 Mar 2024 04:52), Max: 98.8 (06 Mar 2024 13:42)  HR: 67 (07 Mar 2024 04:52) (66 - 73)  BP: 144/80 (07 Mar 2024 04:52) (132/68 - 150/80)  BP(mean): --  RR: 18 (07 Mar 2024 04:52) (18 - 18)  SpO2: 96% (07 Mar 2024 04:52) (96% - 98%)    Parameters below as of 07 Mar 2024 04:52  Patient On (Oxygen Delivery Method): room air      CAPILLARY BLOOD GLUCOSE        I&O's Summary    06 Mar 2024 07:01  -  07 Mar 2024 07:00  --------------------------------------------------------  IN: 580 mL / OUT: 1700 mL / NET: -1120 mL        PHYSICAL EXAM:  GENERAL: NAD, well-developed  HEAD:  Atraumatic, Normocephalic  EYES: EOMI, PERRLA, conjunctiva and sclera clear  NECK: Supple, No JVD  CHEST/LUNG: Clear to auscultation bilaterally; No wheeze  HEART: Regular rate and rhythm; No murmurs, rubs, or gallops  ABDOMEN: Soft, Nontender, Nondistended; Bowel sounds present  EXTREMITIES:  2+ Peripheral Pulses, No clubbing, cyanosis, or edema  PSYCH: AAOx3  NEUROLOGY: non-focal  SKIN: No rashes or lesions    LABS:                        7.4    3.31  )-----------( 154      ( 07 Mar 2024 07:31 )             20.7     03-07    136  |  108  |  22  ----------------------------<  87  5.0   |  18<L>  |  1.65<H>    Ca    9.8      07 Mar 2024 07:30    TPro  6.5  /  Alb  3.3  /  TBili  0.6  /  DBili  x   /  AST  32  /  ALT  24  /  AlkPhos  84  03-07          Urinalysis Basic - ( 07 Mar 2024 07:30 )    Color: x / Appearance: x / SG: x / pH: x  Gluc: 87 mg/dL / Ketone: x  / Bili: x / Urobili: x   Blood: x / Protein: x / Nitrite: x   Leuk Esterase: x / RBC: x / WBC x   Sq Epi: x / Non Sq Epi: x / Bacteria: x        RADIOLOGY & ADDITIONAL TESTS:    Imaging Personally Reviewed:    Consultant(s) Notes Reviewed:      Care Discussed with Consultants/Other Providers:  
[facility] DIVISION OF KIDNEY DISEASES AND HYPERTENSION -- [phone]  -- INITIAL CONSULT NOTE  --------------------------------------------------------------------------------  HPI: 53-year-old male with PMH of HTN, sickle cell disease, pAfib, HF, CKD Stage 3a/b (outpatient Nephrologist Dr. Lagos), and gout who presents to the ED after he was found to have a positive stress test requiring LHC. Nephrology consulted for ALFREDO on CKD    On review of labs on [place], prior to admission last Scr was 1.57 on 1/29/24. Scr on admission elevated at 2.32 on 3/6 and improved to 1.65 today.     Pt. seen and examined at the bedside today and overall feels well. Reports improvement in chest pain.     PAST HISTORY  --------------------------------------------------------------------------------  PAST MEDICAL & SURGICAL HISTORY:  Sickle cell disease  Right knee pain  Paroxysmal atrial fibrillation  Chronic venous insufficiency  Gout  GERD (gastroesophageal reflux disease)  History of cholecystectomy    FAMILY HISTORY:  Family history of essential hypertension  Family history of sickle cell trait    PAST SOCIAL HISTORY:  No history of smoking    ALLERGIES & MEDICATIONS  --------------------------------------------------------------------------------  Allergies  shellfish (Short breath)  [This allergen will not trigger allergy alert] No Known Drug Allergies (Unknown)    Intolerances    Standing Inpatient Medicationsacetaminophen   IVPB .. 1000 milliGRAM(s) IV Intermittent once  allopurinol 300 milliGRAM(s) Oral daily  deferasirox Dispersible Tablet 1500 milliGRAM(s) Oral daily  dronedarone 400 milliGRAM(s) Oral two times a day  folic acid 1 milliGRAM(s) Oral daily  hydroxyurea 2000 milliGRAM(s) Oral daily  lisinopril 20 milliGRAM(s) Oral daily  ondansetron Injectable 4 milliGRAM(s) IV Push once  pantoprazole    Tablet 40 milliGRAM(s) Oral before breakfast  sodium chloride 0.9%. 1000 milliLiter(s) IV Continuous <Continuous>  sodium chloride 0.9%. 1000 milliLiter(s) IV Continuous <Continuous>    PRN Inpatient Medications    REVIEW OF SYSTEMS  --------------------------------------------------------------------------------  Gen: No fevers/chills  Skin: No rashes  Head/Eyes/Ears: No HA  Respiratory: No dyspnea, cough  CV: chest pain improved  GI: No abdominal pain, diarrhea  : No dysuria, hematuria  MSK: No edema  Heme: No easy bruising or bleeding  Psych: No significant depression    All other systems were reviewed and are negative, except as noted.    VITALS/PHYSICAL EXAM  --------------------------------------------------------------------------------  T(C): 36.9 (03-07-24 @ 12:09), Max: 36.9 (03-07-24 @ 04:52)  HR: 61 (03-07-24 @ 12:09) (61 - 73)  BP: 134/78 (03-07-24 @ 12:09) (132/68 - 144/80)  RR: 18 (03-07-24 @ 12:09) (18 - 18)  SpO2: 96% (03-07-24 @ 12:09) (96% - 98%)  Wt(kg): --  Height (cm): 180.3 (03-06-24 @ 06:28)  Weight (kg): 111.1 (03-06-24 @ 06:28)  BMI (kg/m2): 34.2 (03-06-24 @ 06:28)  BSA (m2): 2.3 (03-06-24 @ 06:28)    03-06-24 @ 07:01  -  03-07-24 @ 07:00  --------------------------------------------------------  IN: 580 mL / OUT: 1700 mL / NET: -1120 mL    03-07-24 @ 07:01  -  03-07-24 @ 17:42  --------------------------------------------------------  IN: 640 mL / OUT: 0 mL / NET: 640 mL    Physical Exam:  Gen: NAD  HEENT: MMM  Pulm: CTA B/L  CV: S1S2  Abd: Soft, +BS   Ext: No B/L LE edema  Neuro: Awake  Skin: Warm and dry    LABS/STUDIES  --------------------------------------------------------------------------------              7.4    3.31  >-----------<  154      [03-07-24 @ 07:31]              20.7     136  |  108  |  22  ----------------------------<  87      [03-07-24 @ 07:30]  5.0   |  18  |  1.65        Ca     9.8     [03-07-24 @ 07:30]    TPro  6.5  /  Alb  3.3  /  TBili  0.6  /  DBili  x   /  AST  32  /  ALT  24  /  AlkPhos  84  [03-07-24 @ 07:30]          [03-07-24 @ 13:43]    Creatinine Trend:  SCr 1.65 [03-07 @ 07:30]  SCr 1.98 [03-06 @ 16:56]  SCr 2.32 [03-06 @ 07:02]    Iron 274, TIBC 472, %sat 58      [03-07-24 @ 13:43]  Lipid: chol 150, , HDL 54, LDL --      [06-05-23 @ 12:12]

## 2024-03-07 NOTE — CONSULT NOTE ADULT - PROBLEM SELECTOR RECOMMENDATION 9
Pt. with ALFREDO on CKD (Stage 3a/b) in the setting of +nuclear stress test, HTN, anemia, and sickle cell disease. Outpatient Nephrologist is Dr. Lagos. On review of labs on [place], prior to admission last Scr was 1.57 on 1/29/24. Appears baseline Scr is ~1.6. Scr on admission elevated at 2.32 on 3/6 and improved back to baseline at 1.65 today. Scheduled for [facility]. Recommend to obtain UA, urine lytes, UPCR, and kidney US. Monitor labs and urine output. Avoid nephrotoxins. Dose medications as per eGFR.    If you have any questions, please feel free to contact me.  Alfie Blanchard MD  Nephrology Fellow  [id number] / [employer] Teams (Preferred)  (After 4pm or on weekends, please call the on-call Fellow)

## 2024-03-08 ENCOUNTER — TRANSCRIPTION ENCOUNTER (OUTPATIENT)
Age: 54
End: 2024-03-08

## 2024-03-08 VITALS
OXYGEN SATURATION: 100 % | RESPIRATION RATE: 18 BRPM | HEART RATE: 70 BPM | DIASTOLIC BLOOD PRESSURE: 70 MMHG | SYSTOLIC BLOOD PRESSURE: 139 MMHG | TEMPERATURE: 98 F

## 2024-03-08 LAB
ANION GAP SERPL CALC-SCNC: 8 MMOL/L — SIGNIFICANT CHANGE UP (ref 5–17)
BUN SERPL-MCNC: 19 MG/DL — SIGNIFICANT CHANGE UP (ref 7–23)
CALCIUM SERPL-MCNC: 9.8 MG/DL — SIGNIFICANT CHANGE UP (ref 8.4–10.5)
CHLORIDE SERPL-SCNC: 106 MMOL/L — SIGNIFICANT CHANGE UP (ref 96–108)
CO2 SERPL-SCNC: 21 MMOL/L — LOW (ref 22–31)
CREAT SERPL-MCNC: 1.62 MG/DL — HIGH (ref 0.5–1.3)
EGFR: 50 ML/MIN/1.73M2 — LOW
GLUCOSE SERPL-MCNC: 88 MG/DL — SIGNIFICANT CHANGE UP (ref 70–99)
HAPTOGLOB SERPL-MCNC: <20 MG/DL — LOW (ref 34–200)
HCT VFR BLD CALC: 23.5 % — LOW (ref 39–50)
HGB BLD-MCNC: 8.3 G/DL — LOW (ref 13–17)
LDH SERPL L TO P-CCNC: 347 U/L — HIGH (ref 50–242)
MCHC RBC-ENTMCNC: 34.2 PG — HIGH (ref 27–34)
MCHC RBC-ENTMCNC: 35.3 GM/DL — SIGNIFICANT CHANGE UP (ref 32–36)
MCV RBC AUTO: 96.7 FL — SIGNIFICANT CHANGE UP (ref 80–100)
NRBC # BLD: 30 /100 WBCS — HIGH (ref 0–0)
PLATELET # BLD AUTO: 159 K/UL — SIGNIFICANT CHANGE UP (ref 150–400)
POTASSIUM SERPL-MCNC: 4.6 MMOL/L — SIGNIFICANT CHANGE UP (ref 3.5–5.3)
POTASSIUM SERPL-SCNC: 4.6 MMOL/L — SIGNIFICANT CHANGE UP (ref 3.5–5.3)
RBC # BLD: 2.43 M/UL — LOW (ref 4.2–5.8)
RBC # FLD: SIGNIFICANT CHANGE UP (ref 10.3–14.5)
SODIUM SERPL-SCNC: 135 MMOL/L — SIGNIFICANT CHANGE UP (ref 135–145)
WBC # BLD: 3.99 K/UL — SIGNIFICANT CHANGE UP (ref 3.8–10.5)
WBC # FLD AUTO: 3.99 K/UL — SIGNIFICANT CHANGE UP (ref 3.8–10.5)

## 2024-03-08 PROCEDURE — 36415 COLL VENOUS BLD VENIPUNCTURE: CPT

## 2024-03-08 PROCEDURE — 86923 COMPATIBILITY TEST ELECTRIC: CPT

## 2024-03-08 PROCEDURE — C1769: CPT

## 2024-03-08 PROCEDURE — 36430 TRANSFUSION BLD/BLD COMPNT: CPT

## 2024-03-08 PROCEDURE — 93005 ELECTROCARDIOGRAM TRACING: CPT

## 2024-03-08 PROCEDURE — 93454 CORONARY ARTERY ANGIO S&I: CPT

## 2024-03-08 PROCEDURE — 83010 ASSAY OF HAPTOGLOBIN QUANT: CPT

## 2024-03-08 PROCEDURE — 86900 BLOOD TYPING SEROLOGIC ABO: CPT

## 2024-03-08 PROCEDURE — P9040: CPT

## 2024-03-08 PROCEDURE — 80048 BASIC METABOLIC PNL TOTAL CA: CPT

## 2024-03-08 PROCEDURE — 84484 ASSAY OF TROPONIN QUANT: CPT

## 2024-03-08 PROCEDURE — 85045 AUTOMATED RETICULOCYTE COUNT: CPT

## 2024-03-08 PROCEDURE — C1887: CPT

## 2024-03-08 PROCEDURE — 83540 ASSAY OF IRON: CPT

## 2024-03-08 PROCEDURE — 86850 RBC ANTIBODY SCREEN: CPT

## 2024-03-08 PROCEDURE — 86901 BLOOD TYPING SEROLOGIC RH(D): CPT

## 2024-03-08 PROCEDURE — 86902 BLOOD TYPE ANTIGEN DONOR EA: CPT

## 2024-03-08 PROCEDURE — 93454 CORONARY ARTERY ANGIO S&I: CPT | Mod: 26

## 2024-03-08 PROCEDURE — 82728 ASSAY OF FERRITIN: CPT

## 2024-03-08 PROCEDURE — 85027 COMPLETE CBC AUTOMATED: CPT

## 2024-03-08 PROCEDURE — 80053 COMPREHEN METABOLIC PANEL: CPT

## 2024-03-08 PROCEDURE — C1894: CPT

## 2024-03-08 PROCEDURE — 83615 LACTATE (LD) (LDH) ENZYME: CPT

## 2024-03-08 PROCEDURE — 83550 IRON BINDING TEST: CPT

## 2024-03-08 RX ADMIN — Medication 300 MILLIGRAM(S): at 12:10

## 2024-03-08 RX ADMIN — DEFERASIROX 1500 MILLIGRAM(S): 180 GRANULE ORAL at 12:10

## 2024-03-08 RX ADMIN — DRONEDARONE 400 MILLIGRAM(S): 400 TABLET, FILM COATED ORAL at 05:51

## 2024-03-08 RX ADMIN — LISINOPRIL 20 MILLIGRAM(S): 2.5 TABLET ORAL at 05:52

## 2024-03-08 RX ADMIN — DRONEDARONE 400 MILLIGRAM(S): 400 TABLET, FILM COATED ORAL at 17:20

## 2024-03-08 RX ADMIN — Medication 1 MILLIGRAM(S): at 12:10

## 2024-03-08 RX ADMIN — PANTOPRAZOLE SODIUM 40 MILLIGRAM(S): 20 TABLET, DELAYED RELEASE ORAL at 05:51

## 2024-03-08 RX ADMIN — HYDROXYUREA 2000 MILLIGRAM(S): 500 CAPSULE ORAL at 05:52

## 2024-03-08 NOTE — DISCHARGE NOTE PROVIDER - NSDCFUADDAPPT_GEN_ALL_CORE_FT
APPTS ARE READY TO BE MADE: [x ] YES    Best Family or Patient Contact (if needed):    Additional Information about above appointments (if needed):    1:   2:   3:     Other comments or requests:    APPTS ARE READY TO BE MADE: [x ] YES    Best Family or Patient Contact (if needed):    Additional Information about above appointments (if needed):    1:   2:   3:     Other comments or requests:     Patient informed us they already have secured a follow up appointment which is not visible on [facility]. Patient is scheduled with Dr. Cruz 4/16/24 12:15pm [address].    Appointment was rescheduled in [facility] with Dr. Veloz 4/11/24 3:30pm [address] [place] [address].

## 2024-03-08 NOTE — DISCHARGE NOTE PROVIDER - NSDCCPCAREPLAN_GEN_ALL_CORE_FT
PRINCIPAL DISCHARGE DIAGNOSIS  Diagnosis: Abnormal nuclear stress test  Assessment and Plan of Treatment: You underwent a cath today which showed normal coronary anatomy.  Follow up with your PCP.      SECONDARY DISCHARGE DIAGNOSES  Diagnosis: Anemia, sickle cell with crisis  Assessment and Plan of Treatment: Notify your doctor immediately if you experience abnormal bleeding.  Avoid overuse of NSAIDs (aspirin, Ibuprofen, Advil, Motrin, and Aleve) unless instructed to do so by your doctor.  Signs of worsening anemia include dizziness, lightheadedness, difficulty concentrating, chest pain, palpitations, and shortness of breath.  If you experience these symptoms call your doctor or call an ambulance to take you to the emergency room.      Diagnosis: ALFREDO (acute kidney injury)  Assessment and Plan of Treatment: Avoid taking (NSAIDs) - (ex: Ibuprofen, Advil, Celebrex, Naprosyn)  Avoid taking any nephrotoxic agents (can harm kidneys) - Intravenous contrast for diagnostic testing, combination cold medications.  Have all medications adjusted for your renal function by your Health Care Provider.  Blood pressure control is important.  Take all medication as prescribed.       PRINCIPAL DISCHARGE DIAGNOSIS  Diagnosis: Abnormal nuclear stress test  Assessment and Plan of Treatment: You underwent a cath today which showed normal coronary anatomy.  Follow up with your PCP.  RESTART HOLLIE TOMORROW      SECONDARY DISCHARGE DIAGNOSES  Diagnosis: Anemia, sickle cell with crisis  Assessment and Plan of Treatment: Notify your doctor immediately if you experience abnormal bleeding.  Avoid overuse of NSAIDs (aspirin, Ibuprofen, Advil, Motrin, and Aleve) unless instructed to do so by your doctor.  Signs of worsening anemia include dizziness, lightheadedness, difficulty concentrating, chest pain, palpitations, and shortness of breath.  If you experience these symptoms call your doctor or call an ambulance to take you to the emergency room.      Diagnosis: ALFREDO (acute kidney injury)  Assessment and Plan of Treatment: Avoid taking (NSAIDs) - (ex: Ibuprofen, Advil, Celebrex, Naprosyn)  Avoid taking any nephrotoxic agents (can harm kidneys) - Intravenous contrast for diagnostic testing, combination cold medications.  Have all medications adjusted for your renal function by your Health Care Provider.  Blood pressure control is important.  Take all medication as prescribed.

## 2024-03-08 NOTE — DISCHARGE NOTE PROVIDER - NSDCMRMEDTOKEN_GEN_ALL_CORE_FT
allopurinol 300 mg oral tablet: 1 tab(s) orally once a day  deferasirox 360 mg oral tablet: 3 tab(s) orally once a day (Brand name Jadenu)  Eliquis 5 mg oral tablet: 1 tab(s) orally 2 times a day  folic acid 1 mg oral tablet: 1 tab(s) orally once a day  hydroxyurea 500 mg oral capsule: 4 tab(s) orally once a day  Linzess 290 mcg oral capsule: 1 cap(s) orally once a day  lisinopril 20 mg oral tablet: 1 tab(s) orally once a day  magnesium oxide 400 mg oral capsule: 1 cap(s) orally 2 times a day  metoprolol succinate 25 mg oral tablet, extended release: 2 tab(s) orally 2 times a day  Multaq 400 mg oral tablet: 1 tab(s) orally 2 times a day  omeprazole 40 mg oral delayed release capsule: 1 cap(s) orally once a day

## 2024-03-08 NOTE — DISCHARGE NOTE PROVIDER - CARE PROVIDER_API CALL
Brittany Veloz  Internal Medicine  [address] [address] [place] [address]  Phone: [phone]  Fax: [phone]  Established Patient  Follow Up Time: 1 week    Mukudn Cruz  [address], [facility] [address]  [place] [address]  Phone: [phone]  Fax: [phone]  Established Patient  Follow Up Time: 1 week

## 2024-03-08 NOTE — DISCHARGE NOTE PROVIDER - NSDCFUSCHEDAPPT_GEN_ALL_CORE_FT
Iwona Zapata  [facility]  RHEUM [address]  Scheduled Appointment: 03/19/2024    Nancy Booth  [facility]  DERM [address]  Scheduled Appointment: 03/20/2024    Maulik Westbrook  [facility]  ORTHOSURG [address] [facility]  Scheduled Appointment: 03/25/2024    Maulik Westbrook  [place]  ORTHOSURG [address] [facility]  Scheduled Appointment: 04/01/2024    Maulik Westbrook  [facility]  ORTHOSURG [address] [facility]  Scheduled Appointment: 04/08/2024    Brittany Veloz  [facility]  INTMED OP [id number]  Scheduled Appointment: 05/14/2024     Iwona Zapata  [facility]  RHEUM [address]  Scheduled Appointment: 03/19/2024    Nancy Booth  [facility]  DERM [address]  Scheduled Appointment: 03/20/2024    Maulik Westbrook  [facility]  ORTHOSURG [address] [facility]  Scheduled Appointment: 03/25/2024    Maulik Westbrook  [facility]  ORTHOSURG [address] [facility]  Scheduled Appointment: 04/01/2024    Maulik Westbrook  [facility]  ORTHOSURG [address] [facility]  Scheduled Appointment: 04/08/2024    Brittany Veloz  [facility]  INTMED OP [id number]  Scheduled Appointment: 04/11/2024

## 2024-03-08 NOTE — PROGRESS NOTE ADULT - PROBLEM SELECTOR PLAN 1
Awaiting [facility] today
Awaiting [facility]

## 2024-03-08 NOTE — PROGRESS NOTE ADULT - PROBLEM SELECTOR PLAN 2
s/p PRBC transfusion x 1   do not transfuse anymore   his baseline Hgb is 7
s/p PRBC transfusion x 1   do not transfuse anymore   his baseline Hgb is 7

## 2024-03-08 NOTE — DISCHARGE NOTE PROVIDER - PROVIDER TOKENS
PROVIDER:[TOKEN:[[id number]],FOLLOWUP:[1 week],ESTABLISHEDPATIENT:[T]],PROVIDER:[TOKEN:[[id number]],FOLLOWUP:[1 week],ESTABLISHEDPATIENT:[T]]

## 2024-03-08 NOTE — PROGRESS NOTE ADULT - ASSESSMENT
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 Pt denies CP/SOB/decreased exercise tolerance. Patient referred for Cardiac catherization with possible intervention.    abnormal stress test  - cath today    symptomatic anemia ( pt was weak and tired )  likely 2/2 sickle cell disease  -  s/p  1 more unit pr prbc  - c/w hydroxyurea  - consider hematology consult    h/o iron overload  - cont deferasirox Dispersible Tablet   - elevated ferritin    HTN  - c/w lisinopril    PAF  - cont multaq    gout  - c/w allopurinol            
54 yo male with Sicke Cell admitted with abnormal spect awaiting LHC   s/p PRBC transfusion   ALFREDO 
52 yo male with Sicke Cell admitted with abnormal spect awaiting LHC   s/p PRBC transfusion   ALFREDO

## 2024-03-08 NOTE — DISCHARGE NOTE NURSING/CASE MANAGEMENT/SOCIAL WORK - PATIENT PORTAL LINK FT
You can access the FollowMyHealth Patient Portal offered by [facility] by registering at the following website: [email or web address]. By joining [facility]’s FollowMyHealth portal, you will also be able to view your health information using other applications (apps) compatible with our system.

## 2024-03-08 NOTE — PROGRESS NOTE ADULT - SUBJECTIVE AND OBJECTIVE BOX
Subjective: Patient seen and examined. No new events except as noted.   feels ok   Wife at bedside     REVIEW OF SYSTEMS:    CONSTITUTIONAL:+ weakness, fevers or chills  EYES/ENT: No visual changes;  No vertigo or throat pain   NECK: No pain or stiffness  RESPIRATORY: No cough, wheezing, hemoptysis; No shortness of breath  CARDIOVASCULAR: No chest pain or palpitations  GASTROINTESTINAL: No abdominal or epigastric pain. No nausea, vomiting, or hematemesis; No diarrhea or constipation. No melena or hematochezia.  GENITOURINARY: No dysuria, frequency or hematuria  NEUROLOGICAL: No numbness or weakness  SKIN: No itching, burning, rashes, or lesions   All other review of systems is negative unless indicated above.    MEDICATIONS:  MEDICATIONS  (STANDING):  acetaminophen   IVPB .. 1000 milliGRAM(s) IV Intermittent once  allopurinol 300 milliGRAM(s) Oral daily  deferasirox Dispersible Tablet 1500 milliGRAM(s) Oral daily  dronedarone 400 milliGRAM(s) Oral two times a day  folic acid 1 milliGRAM(s) Oral daily  hydroxyurea 2000 milliGRAM(s) Oral daily  lisinopril 20 milliGRAM(s) Oral daily  ondansetron Injectable 4 milliGRAM(s) IV Push once  pantoprazole    Tablet 40 milliGRAM(s) Oral before breakfast  sodium chloride 0.9%. 1000 milliLiter(s) (75 mL/Hr) IV Continuous <Continuous>  sodium chloride 0.9%. 1000 milliLiter(s) (75 mL/Hr) IV Continuous <Continuous>      PHYSICAL EXAM:  T(C): 36.8 (03-08-24 @ 04:24), Max: 36.9 (03-07-24 @ 12:09)  HR: 71 (03-08-24 @ 04:24) (61 - 71)  BP: 154/80 (03-08-24 @ 04:24) (134/78 - 154/80)  RR: 18 (03-08-24 @ 04:24) (18 - 18)  SpO2: 87% (03-08-24 @ 04:24) (87% - 100%)  Wt(kg): --  I&O's Summary    07 Mar 2024 07:01  -  08 Mar 2024 07:00  --------------------------------------------------------  IN: 640 mL / OUT: 0 mL / NET: 640 mL    08 Mar 2024 07:01  -  08 Mar 2024 10:23  --------------------------------------------------------  IN: 120 mL / OUT: 0 mL / NET: 120 mL          Appearance: Normal	  HEENT:   Normal oral mucosa, PERRL, EOMI	  Lymphatic: No lymphadenopathy , no edema  Cardiovascular: Normal S1 S2, No JVD, No murmurs , Peripheral pulses palpable 2+ bilaterally  Respiratory: Lungs clear to auscultation, normal effort 	  Gastrointestinal:  Soft, Non-tender, + BS	  Skin: No rashes, No ecchymoses, No cyanosis, warm to touch  Musculoskeletal: Normal range of motion, normal strength  Psychiatry:  Mood & affect appropriate  Ext: No edema      LABS:    CARDIAC MARKERS:                                8.3    3.99  )-----------( 159      ( 08 Mar 2024 06:19 )             23.5     03-08    135  |  106  |  19  ----------------------------<  88  4.6   |  21<L>  |  1.62<H>    Ca    9.8      08 Mar 2024 06:19    TPro  6.5  /  Alb  3.3  /  TBili  0.6  /  DBili  x   /  AST  32  /  ALT  24  /  AlkPhos  84  03-07    proBNP:   Lipid Profile:   HgA1c:   TSH:             TELEMETRY: 	SR    ECG:  	  RADIOLOGY:   DIAGNOSTIC TESTING:  [ ] Echocardiogram:  [ ]  Catheterization:  [ ] Stress Test:    OTHER: 	          
DATE OF SERVICE: 03-08-24 @ 12:23    Patient is a 53y old  Male who presents with a chief complaint of abnormal stress test (07 Mar 2024 11:20)      SUBJECTIVE / OVERNIGHT EVENTS:  No chest pain. No shortness of breath. No complaints. No events overnight.     MEDICATIONS  (STANDING):  acetaminophen   IVPB .. 1000 milliGRAM(s) IV Intermittent once  allopurinol 300 milliGRAM(s) Oral daily  deferasirox Dispersible Tablet 1500 milliGRAM(s) Oral daily  dronedarone 400 milliGRAM(s) Oral two times a day  folic acid 1 milliGRAM(s) Oral daily  hydroxyurea 2000 milliGRAM(s) Oral daily  lisinopril 20 milliGRAM(s) Oral daily  ondansetron Injectable 4 milliGRAM(s) IV Push once  pantoprazole    Tablet 40 milliGRAM(s) Oral before breakfast  sodium chloride 0.9%. 1000 milliLiter(s) (75 mL/Hr) IV Continuous <Continuous>  sodium chloride 0.9%. 1000 milliLiter(s) (75 mL/Hr) IV Continuous <Continuous>    MEDICATIONS  (PRN):      Vital Signs Last 24 Hrs  T(C): 36.9 (08 Mar 2024 11:45), Max: 36.9 (08 Mar 2024 11:45)  T(F): 98.5 (08 Mar 2024 11:45), Max: 98.5 (08 Mar 2024 11:45)  HR: 70 (08 Mar 2024 11:45) (68 - 71)  BP: 150/80 (08 Mar 2024 11:45) (145/84 - 154/80)  BP(mean): --  RR: 18 (08 Mar 2024 11:45) (18 - 18)  SpO2: 98% (08 Mar 2024 11:45) (87% - 100%)    Parameters below as of 08 Mar 2024 11:45  Patient On (Oxygen Delivery Method): room air      CAPILLARY BLOOD GLUCOSE        I&O's Summary    07 Mar 2024 07:01  -  08 Mar 2024 07:00  --------------------------------------------------------  IN: 640 mL / OUT: 0 mL / NET: 640 mL    08 Mar 2024 07:01  -  08 Mar 2024 12:23  --------------------------------------------------------  IN: 120 mL / OUT: 0 mL / NET: 120 mL        PHYSICAL EXAM:  GENERAL: NAD, well-developed  HEAD:  Atraumatic, Normocephalic  EYES: EOMI, PERRLA, conjunctiva and sclera clear  NECK: Supple, No JVD  CHEST/LUNG: Clear to auscultation bilaterally; No wheeze  HEART: Regular rate and rhythm; No murmurs, rubs, or gallops  ABDOMEN: Soft, Nontender, Nondistended; Bowel sounds present  EXTREMITIES:  2+ Peripheral Pulses, No clubbing, cyanosis, or edema  PSYCH: AAOx3  NEUROLOGY: non-focal  SKIN: No rashes or lesions    LABS:                        8.3    3.99  )-----------( 159      ( 08 Mar 2024 06:19 )             23.5     03-08    135  |  106  |  19  ----------------------------<  88  4.6   |  21<L>  |  1.62<H>    Ca    9.8      08 Mar 2024 06:19    TPro  6.5  /  Alb  3.3  /  TBili  0.6  /  DBili  x   /  AST  32  /  ALT  24  /  AlkPhos  84  03-07          Urinalysis Basic - ( 08 Mar 2024 06:19 )    Color: x / Appearance: x / SG: x / pH: x  Gluc: 88 mg/dL / Ketone: x  / Bili: x / Urobili: x   Blood: x / Protein: x / Nitrite: x   Leuk Esterase: x / RBC: x / WBC x   Sq Epi: x / Non Sq Epi: x / Bacteria: x        RADIOLOGY & ADDITIONAL TESTS:    Imaging Personally Reviewed:    Consultant(s) Notes Reviewed:      Care Discussed with Consultants/Other Providers:  
Subjective: Patient seen and examined. No new events except as noted.   feels good   s/p prbc transfusion   scheduled for [facility] today     REVIEW OF SYSTEMS:    CONSTITUTIONAL: No weakness, fevers or chills  EYES/ENT: No visual changes;  No vertigo or throat pain   NECK: No pain or stiffness  RESPIRATORY: No cough, wheezing, hemoptysis; No shortness of breath  CARDIOVASCULAR: No chest pain or palpitations  GASTROINTESTINAL: No abdominal or epigastric pain. No nausea, vomiting, or hematemesis; No diarrhea or constipation. No melena or hematochezia.  GENITOURINARY: No dysuria, frequency or hematuria  NEUROLOGICAL: No numbness or weakness  SKIN: No itching, burning, rashes, or lesions   All other review of systems is negative unless indicated above.    MEDICATIONS:  MEDICATIONS  (STANDING):  acetaminophen   IVPB .. 1000 milliGRAM(s) IV Intermittent once  allopurinol 300 milliGRAM(s) Oral daily  deferasirox Dispersible Tablet 1500 milliGRAM(s) Oral daily  diphenhydrAMINE Injectable 25 milliGRAM(s) IV Push once  dronedarone 400 milliGRAM(s) Oral two times a day  folic acid 1 milliGRAM(s) Oral daily  hydroxyurea 2000 milliGRAM(s) Oral daily  lisinopril 20 milliGRAM(s) Oral daily  ondansetron Injectable 4 milliGRAM(s) IV Push once  pantoprazole    Tablet 40 milliGRAM(s) Oral before breakfast  sodium chloride 0.9%. 1000 milliLiter(s) (75 mL/Hr) IV Continuous <Continuous>  sodium chloride 0.9%. 1000 milliLiter(s) (75 mL/Hr) IV Continuous <Continuous>      PHYSICAL EXAM:  T(C): 36.9 (03-07-24 @ 12:09), Max: 36.9 (03-07-24 @ 04:52)  HR: 61 (03-07-24 @ 12:09) (61 - 73)  BP: 134/78 (03-07-24 @ 12:09) (132/68 - 144/80)  RR: 18 (03-07-24 @ 12:09) (18 - 18)  SpO2: 96% (03-07-24 @ 12:09) (96% - 98%)  Wt(kg): --  I&O's Summary    06 Mar 2024 07:01  -  07 Mar 2024 07:00  --------------------------------------------------------  IN: 580 mL / OUT: 1700 mL / NET: -1120 mL    07 Mar 2024 07:01  -  07 Mar 2024 16:27  --------------------------------------------------------  IN: 240 mL / OUT: 0 mL / NET: 240 mL          Appearance: NAD  HEENT:   Dry oral mucosa, PERRL, EOMI	  Lymphatic: No lymphadenopathy , no edema  Cardiovascular: Normal S1 S2, No JVD, No murmurs , Peripheral pulses palpable 2+ bilaterally  Respiratory: Lungs clear to auscultation, normal effort 	  Gastrointestinal:  Soft, Non-tender, + BS	  Skin: No rashes, No ecchymoses, No cyanosis, warm to touch  Musculoskeletal: Normal range of motion, normal strength  Psychiatry:  Mood & affect appropriate  Ext: No edema      LABS:    CARDIAC MARKERS:                                7.4    3.31  )-----------( 154      ( 07 Mar 2024 07:31 )             20.7     03-07    136  |  108  |  22  ----------------------------<  87  5.0   |  18<L>  |  1.65<H>    Ca    9.8      07 Mar 2024 07:30    TPro  6.5  /  Alb  3.3  /  TBili  0.6  /  DBili  x   /  AST  32  /  ALT  24  /  AlkPhos  84  03-07      TELEMETRY: 	  SR  ECG:  	  RADIOLOGY:   DIAGNOSTIC TESTING:  [ ] Echocardiogram:  [ ]  Catheterization:  [ ] Stress Test:    OTHER:

## 2024-03-08 NOTE — DISCHARGE NOTE PROVIDER - HOSPITAL COURSE
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 Pt denies CP/SOB/decreased exercise tolerance.  Pt presents today for left heart cath.   Pre procedure labs: H/H 6.9/19.2: cath cancelled. Pt to be admitted for transfusion and rescheduled for cardiac cath in am.   Cards: Dr Cruz  [facility]: Dr Javier Lincoln, [facility], [phone] or [phone] (06 Mar 2024 06:27)    Hospital Course:  54 y/o M PMHx of Sickle cell admitted for abnormal NST outpatient, presents for LHC. Now s/p LHC on 3/8. Cath report showing: The coronary anatomy is normal.   Nephrology consulted for ALFREDO on CKD. Pt. with ALFREDO on CKD (Stage 3a/b) in the setting of +nuclear stress test, HTN, anemia, and sickle cell disease. Outpatient Nephrologist is Dr. Lagos. On review of labs on [place] [facility], prior to admission last Scr was 1.57 on 1/29/24. Appears baseline Scr is ~1.6. Scr on admission elevated at 2.32 on 3/6 and improved back to baseline at 1.65 today.   Pt also noted with symptomatic anemia, with H/H 6.9/19.2 on admission. Pt received 1uprbc transfusion on 3/6 and 3/7.  H/H improved to 8.3/23.5 on discharge.  Pt now medically stable to be discharged home. Discharge discussed with attending Dr. Cruz.     Important Medication Changes and Reason:  Restart Eliquis tomorrow    Active or Pending Issues Requiring Follow-up:  Follow up with PCP and Cardiology    Advanced Directives:   [x ] Full code  [ ] DNR  [ ] Hospice    Discharge Diagnoses:  Abnormal stress test  ALFREDO on CKD  Sickle cell

## 2024-03-19 ENCOUNTER — NON-APPOINTMENT (OUTPATIENT)
Age: 54
End: 2024-03-19

## 2024-03-19 ENCOUNTER — APPOINTMENT (OUTPATIENT)
Dept: RHEUMATOLOGY | Facility: CLINIC | Age: 54
End: 2024-03-19

## 2024-03-20 ENCOUNTER — APPOINTMENT (OUTPATIENT)
Dept: DERMATOLOGY | Facility: CLINIC | Age: 54
End: 2024-03-20
Payer: COMMERCIAL

## 2024-03-20 DIAGNOSIS — L81.9 DISORDER OF PIGMENTATION, UNSPECIFIED: ICD-10-CM

## 2024-03-20 DIAGNOSIS — D48.5 NEOPLASM OF UNCERTAIN BEHAVIOR OF SKIN: ICD-10-CM

## 2024-03-20 PROBLEM — I87.2 VENOUS INSUFFICIENCY (CHRONIC) (PERIPHERAL): Chronic | Status: ACTIVE | Noted: 2024-03-06

## 2024-03-20 PROBLEM — M10.9 GOUT, UNSPECIFIED: Chronic | Status: ACTIVE | Noted: 2024-03-06

## 2024-03-20 PROBLEM — I48.0 PAROXYSMAL ATRIAL FIBRILLATION: Chronic | Status: ACTIVE | Noted: 2024-03-06

## 2024-03-20 PROBLEM — K21.9 GASTRO-ESOPHAGEAL REFLUX DISEASE WITHOUT ESOPHAGITIS: Chronic | Status: ACTIVE | Noted: 2024-03-06

## 2024-03-20 PROCEDURE — 11104 PUNCH BX SKIN SINGLE LESION: CPT

## 2024-03-20 PROCEDURE — 99213 OFFICE O/P EST LOW 20 MIN: CPT | Mod: 25

## 2024-03-21 PROBLEM — L81.9 HYPERPIGMENTATION OF SKIN: Status: ACTIVE | Noted: 2024-03-21

## 2024-03-21 NOTE — ASSESSMENT
[FreeTextEntry1] : # Hyperpigmentation of skin involving left temple and left nasal bridge, now starting to involve right nasal bridge as well -New diagnosis, unc prognosis  -No improvement with tacrolimus oint, can continue bid for now pending biopsy results -Ddx includes eczematous vs EDP/LPP vs less likely FDE or DLE- biopsy as below  # Neoplasm of uncertain behavior Location: Left infra-orbital cheek DDx: eczematous vs EDP/LPP vs FDE or DLE Biopsy by 3 mm punch  The risks/benefits/alternatives of skin biopsy were explained to the patient, which include and are not limited to bleeding, infection, scarring or discoloration of skin, and recurrence of lesion. Patient expressed understanding of these risks and provided consent to the procedure. Time out with verification of patient and lesion site was performed. Site was prepped with rubbing alcohol, lidocaine with epinephrine was injected for anesthesia, and biopsy was performed. Specimen sent to path.  Procedure was without complication and well tolerated. Wound care was discussed.  f/u in 2 weeks for suture removal/biopsy review

## 2024-03-21 NOTE — HISTORY OF PRESENT ILLNESS
[de-identified] : MAXI BUCHANAN is a 53 year old M who presents for evaluation of the following  1. Rash left temple Present for a few months Asx No new meds 6 months prior to onset Since last visit using tacrolimus oint bid without any improvement  2. Discoloration and scaling of lower legs Dry but not symptomatic   SH: Here with wife [FreeTextEntry1] : RP rash

## 2024-03-22 RX ORDER — METOPROLOL SUCCINATE 25 MG/1
25 TABLET, EXTENDED RELEASE ORAL
Qty: 270 | Refills: 1 | Status: ACTIVE | COMMUNITY
Start: 2022-04-24 | End: 1900-01-01

## 2024-03-25 ENCOUNTER — APPOINTMENT (OUTPATIENT)
Dept: ORTHOPEDIC SURGERY | Facility: CLINIC | Age: 54
End: 2024-03-25
Payer: COMMERCIAL

## 2024-03-25 PROCEDURE — 20610 DRAIN/INJ JOINT/BURSA W/O US: CPT | Mod: 50

## 2024-03-26 NOTE — PROCEDURE
[de-identified] : Injection: Right knee joint. Indication: Osteoarthritis.   A discussion was had with the patient regarding this procedure and all questions were answered. All risks, benefits and alternatives were discussed. These included but were not limited to bleeding, infection, and allergic reaction. Alcohol was used to clean the skin, and betadine was used to sterilize and prep the area in the supero-lateral aspect of the right knee. Ethyl chloride spray was then used as a topical anesthetic. A 21-gauge needle was used to inject Orthovisc into the knee. A sterile bandage was then applied. The patient tolerated the procedure well and there were no complications.   Injection: Left knee joint. Indication: Osteoarthritis.   A discussion was had with the patient regarding this procedure and all questions were answered. All risks, benefits and alternatives were discussed. These included but were not limited to bleeding, infection, and allergic reaction. Alcohol was used to clean the skin, and betadine was used to sterilize and prep the area in the supero-lateral aspect of the left knee. Ethyl chloride spray was then used as a topical anesthetic. A 21-gauge needle was used to inject Orthovisc into the knee. A sterile bandage was then applied. The patient tolerated the procedure well and there were no complications.

## 2024-03-26 NOTE — END OF VISIT
[FreeTextEntry3] : 54 y/o male with bilateral knee osteoarthritis.   The first of three Orthovisc injections was given today under sterile conditions into the bilateral knee joint without complication. The patient was instructed on modification of activities over the next 48-72 hours. I advised the patient to ice the knee as needed for control of local irritation from the injection. I advised that some patients have immediate benefit from the initial injection therapy, however it usually takes the medication a number of weeks (~8wks) to provide significant relief of osteoarthritic symptoms.   We will see the patient back for the second injection in a weeks time.

## 2024-03-26 NOTE — ADDENDUM
[FreeTextEntry1] : This note was written by Yenifer Sol on 03/25/2024 acting solely as a scribe for Dr. Maulik Westbrook.  All medical record entries made by the Scribe were at my, Dr. Maulik Westbrook, direction and personally dictated by me on 03/25/2024. I have personally reviewed the chart and agree that the record accurately reflects my personal performance of the history, physical exam, assessment and plan.

## 2024-04-01 ENCOUNTER — APPOINTMENT (OUTPATIENT)
Dept: ORTHOPEDIC SURGERY | Facility: CLINIC | Age: 54
End: 2024-04-01
Payer: COMMERCIAL

## 2024-04-01 DIAGNOSIS — M17.0 BILATERAL PRIMARY OSTEOARTHRITIS OF KNEE: ICD-10-CM

## 2024-04-01 PROCEDURE — 20610 DRAIN/INJ JOINT/BURSA W/O US: CPT | Mod: 50

## 2024-04-03 ENCOUNTER — APPOINTMENT (OUTPATIENT)
Dept: DERMATOLOGY | Facility: CLINIC | Age: 54
End: 2024-04-03
Payer: COMMERCIAL

## 2024-04-03 ENCOUNTER — NON-APPOINTMENT (OUTPATIENT)
Age: 54
End: 2024-04-03

## 2024-04-03 DIAGNOSIS — L43.8 OTHER LICHEN PLANUS: ICD-10-CM

## 2024-04-03 LAB — DERMATOLOGY BIOPSY: NORMAL

## 2024-04-03 PROCEDURE — 99214 OFFICE O/P EST MOD 30 MIN: CPT

## 2024-04-03 RX ORDER — TACROLIMUS 1 MG/G
0.1 OINTMENT TOPICAL
Qty: 1 | Refills: 2 | Status: ACTIVE | COMMUNITY
Start: 2024-02-07 | End: 1900-01-01

## 2024-04-03 NOTE — ASSESSMENT
[FreeTextEntry1] : # Lichen planus pigmentosus  -Chronic, flaring  -Bx from 3/20/24 consistent with LPP -Disc chronic nature and lack of great treatment modalities -For now, resume tacrolimus 0.1% oint to AA bid, SED -Disc importance of sunscreen, broad band physical blocker, SPF 30+  RTC 3 months

## 2024-04-03 NOTE — HISTORY OF PRESENT ILLNESS
[de-identified] : MAXI BUCHANAN is a 53 year old M who presents for evaluation of the following  1. Rash left temple, then spread to left>right nasal bridge Present for a few months Asx No new meds 6 months prior to onset Bx last visit consistent with lichen planus pigmentosus   [FreeTextEntry1] : RP rash

## 2024-04-04 RX ORDER — HYDROXYUREA 500 MG/1
500 CAPSULE ORAL DAILY
Qty: 360 | Refills: 1 | Status: ACTIVE | COMMUNITY
Start: 2022-04-24 | End: 1900-01-01

## 2024-04-05 ENCOUNTER — NON-APPOINTMENT (OUTPATIENT)
Age: 54
End: 2024-04-05

## 2024-04-08 ENCOUNTER — APPOINTMENT (OUTPATIENT)
Dept: ORTHOPEDIC SURGERY | Facility: CLINIC | Age: 54
End: 2024-04-08
Payer: COMMERCIAL

## 2024-04-08 PROCEDURE — 20610 DRAIN/INJ JOINT/BURSA W/O US: CPT | Mod: 50

## 2024-04-08 NOTE — ADDENDUM
[FreeTextEntry1] : This note was written by Yenifer Sol on 04/08/2024 acting solely as a scribe for Dr. Maulik Westbrook.  All medical record entries made by the Scribe were at my, Dr. Maulik Westbrook, direction and personally dictated by me on 04/08/2024. I have personally reviewed the chart and agree that the record accurately reflects my personal performance of the history, physical exam, assessment and plan.

## 2024-04-08 NOTE — END OF VISIT
[FreeTextEntry3] : 54 y/o male with bilateral knee osteoarthritis.   The final Orthovisc injection was given today under sterile conditions into the bilateral knee joint without complication. I discussed the effects of this medication and how long it may provide benefit. Patient has obtained moderate immediate improvement. If no significant long-term benefit, the patient may elect for additional treatment strategies as previously discussed. However, if the patient obtains good relief of symptoms, the injection therapy series can be repeated over the next 6-12 months.  We'll have the patient followup on an as-needed basis at this time.

## 2024-04-08 NOTE — PROCEDURE
[de-identified] : Injection: Right knee joint. Indication: Osteoarthritis.   A discussion was had with the patient regarding this procedure and all questions were answered. All risks, benefits and alternatives were discussed. These included but were not limited to bleeding, infection, and allergic reaction. Alcohol was used to clean the skin, and betadine was used to sterilize and prep the area in the supero-lateral aspect of the right knee. Ethyl chloride spray was then used as a topical anesthetic. A 21-gauge needle was used to inject Orthovisc into the knee. A sterile bandage was then applied. The patient tolerated the procedure well and there were no complications.   Injection: Left knee joint. Indication: Osteoarthritis.   A discussion was had with the patient regarding this procedure and all questions were answered. All risks, benefits and alternatives were discussed. These included but were not limited to bleeding, infection, and allergic reaction. Alcohol was used to clean the skin, and betadine was used to sterilize and prep the area in the supero-lateral aspect of the left knee. Ethyl chloride spray was then used as a topical anesthetic. A 21-gauge needle was used to inject Orthovisc into the knee. A sterile bandage was then applied. The patient tolerated the procedure well and there were no complications.

## 2024-04-09 PROBLEM — M17.0 PRIMARY LOCALIZED OSTEOARTHRITIS OF BOTH KNEES: Status: ACTIVE | Noted: 2023-06-27

## 2024-04-09 NOTE — ADDENDUM
[FreeTextEntry1] : This note was written by Yenifer Sol on 04/01/2024 acting solely as a scribe for Dr. Maulik Westbrook.  All medical record entries made by the Scribe were at my, Dr. Maulik Westbrook, direction and personally dictated by me on 04/01/2024. I have personally reviewed the chart and agree that the record accurately reflects my personal performance of the history, physical exam, assessment and plan.

## 2024-04-09 NOTE — END OF VISIT
[FreeTextEntry3] : 52 y/o male with bilateral knee osteoarthritis.   The second of three Orthovisc injections was given today under sterile conditions into the bilateral knee joint without complication. I again discussed the role of activity modification/icing following the injection to treat any local irritation from the injection.  We'll have the patient followup for the final injection next week.

## 2024-04-09 NOTE — PROCEDURE
[de-identified] : Injection: Right knee joint. Indication: Osteoarthritis.   A discussion was had with the patient regarding this procedure and all questions were answered. All risks, benefits and alternatives were discussed. These included but were not limited to bleeding, infection, and allergic reaction. Alcohol was used to clean the skin, and betadine was used to sterilize and prep the area in the supero-lateral aspect of the right knee. Ethyl chloride spray was then used as a topical anesthetic. A 21-gauge needle was used to inject Orthovisc into the knee. A sterile bandage was then applied. The patient tolerated the procedure well and there were no complications.   Injection: Left knee joint. Indication: Osteoarthritis.   A discussion was had with the patient regarding this procedure and all questions were answered. All risks, benefits and alternatives were discussed. These included but were not limited to bleeding, infection, and allergic reaction. Alcohol was used to clean the skin, and betadine was used to sterilize and prep the area in the supero-lateral aspect of the left knee. Ethyl chloride spray was then used as a topical anesthetic. A 21-gauge needle was used to inject Orthovisc into the knee. A sterile bandage was then applied. The patient tolerated the procedure well and there were no complications.

## 2024-04-10 ENCOUNTER — NON-APPOINTMENT (OUTPATIENT)
Age: 54
End: 2024-04-10

## 2024-04-11 ENCOUNTER — LABORATORY RESULT (OUTPATIENT)
Age: 54
End: 2024-04-11

## 2024-04-11 ENCOUNTER — OUTPATIENT (OUTPATIENT)
Dept: OUTPATIENT SERVICES | Facility: HOSPITAL | Age: 54
LOS: 1 days | End: 2024-04-11

## 2024-04-11 ENCOUNTER — APPOINTMENT (OUTPATIENT)
Dept: INTERNAL MEDICINE | Facility: CLINIC | Age: 54
End: 2024-04-11
Payer: COMMERCIAL

## 2024-04-11 VITALS
HEIGHT: 71 IN | BODY MASS INDEX: 34.3 KG/M2 | RESPIRATION RATE: 17 BRPM | OXYGEN SATURATION: 99 % | DIASTOLIC BLOOD PRESSURE: 70 MMHG | SYSTOLIC BLOOD PRESSURE: 128 MMHG | HEART RATE: 74 BPM | WEIGHT: 245 LBS

## 2024-04-11 DIAGNOSIS — E83.19 OTHER DISORDERS OF IRON METABOLISM: ICD-10-CM

## 2024-04-11 DIAGNOSIS — Z90.49 ACQUIRED ABSENCE OF OTHER SPECIFIED PARTS OF DIGESTIVE TRACT: Chronic | ICD-10-CM

## 2024-04-11 PROCEDURE — 99214 OFFICE O/P EST MOD 30 MIN: CPT

## 2024-04-11 RX ORDER — LINACLOTIDE 290 UG/1
290 CAPSULE, GELATIN COATED ORAL
Qty: 30 | Refills: 6 | Status: ACTIVE | COMMUNITY
Start: 2022-06-09 | End: 1900-01-01

## 2024-04-11 RX ORDER — APIXABAN 2.5 MG/1
2.5 TABLET, FILM COATED ORAL
Qty: 60 | Refills: 6 | Status: ACTIVE | COMMUNITY
Start: 2022-03-15 | End: 1900-01-01

## 2024-04-11 RX ORDER — LISINOPRIL 10 MG/1
10 TABLET ORAL DAILY
Qty: 90 | Refills: 3 | Status: ACTIVE | COMMUNITY
Start: 2022-04-24 | End: 1900-01-01

## 2024-04-12 LAB
ALBUMIN SERPL ELPH-MCNC: 4.3 G/DL
ALP BLD-CCNC: 137 U/L
ALT SERPL-CCNC: 29 U/L
ANION GAP SERPL CALC-SCNC: 11 MMOL/L
APPEARANCE: CLEAR
AST SERPL-CCNC: 32 U/L
BILIRUB SERPL-MCNC: 0.4 MG/DL
BILIRUBIN URINE: NEGATIVE
BLOOD URINE: NEGATIVE
BUN SERPL-MCNC: 22 MG/DL
CALCIUM SERPL-MCNC: 10.1 MG/DL
CHLORIDE SERPL-SCNC: 106 MMOL/L
CO2 SERPL-SCNC: 20 MMOL/L
COLOR: YELLOW
CREAT SERPL-MCNC: 1.71 MG/DL
EGFR: 47 ML/MIN/1.73M2
GLUCOSE QUALITATIVE U: NEGATIVE MG/DL
GLUCOSE SERPL-MCNC: 113 MG/DL
KETONES URINE: NEGATIVE MG/DL
LEUKOCYTE ESTERASE URINE: NEGATIVE
NITRITE URINE: NEGATIVE
PH URINE: 6
POTASSIUM SERPL-SCNC: 4.7 MMOL/L
PROT SERPL-MCNC: 7.1 G/DL
PROTEIN URINE: 30 MG/DL
RBC # BLD: 2.42 M/UL
RETICS # AUTO: 3.3 %
RETICS AGGREG/RBC NFR: 80.3 K/UL
SODIUM SERPL-SCNC: 138 MMOL/L
SPECIFIC GRAVITY URINE: 1.01
UROBILINOGEN URINE: 0.2 MG/DL

## 2024-04-15 DIAGNOSIS — N18.31 CHRONIC KIDNEY DISEASE, STAGE 3A: ICD-10-CM

## 2024-04-15 DIAGNOSIS — E83.19 OTHER DISORDERS OF IRON METABOLISM: ICD-10-CM

## 2024-04-15 DIAGNOSIS — I10 ESSENTIAL (PRIMARY) HYPERTENSION: ICD-10-CM

## 2024-04-15 DIAGNOSIS — I48.92 UNSPECIFIED ATRIAL FLUTTER: ICD-10-CM

## 2024-04-15 DIAGNOSIS — M10.9 GOUT, UNSPECIFIED: ICD-10-CM

## 2024-04-15 DIAGNOSIS — D57.1 SICKLE-CELL DISEASE WITHOUT CRISIS: ICD-10-CM

## 2024-04-15 LAB
25(OH)D3 SERPL-MCNC: 26.7 NG/ML
BASOPHILS # BLD AUTO: 0.02 K/UL
BASOPHILS NFR BLD AUTO: 1.3 %
EOSINOPHIL # BLD AUTO: 0 K/UL
EOSINOPHIL NFR BLD AUTO: 0 %
FERRITIN SERPL-MCNC: 2453 NG/ML
HCT VFR BLD CALC: 22.8 %
HGB BLD-MCNC: 7.9 G/DL
LYMPHOCYTES # BLD AUTO: 0.9 K/UL
LYMPHOCYTES NFR BLD AUTO: 51.3 %
MAN DIFF?: NORMAL
MCHC RBC-ENTMCNC: 32.6 PG
MCHC RBC-ENTMCNC: 34.6 GM/DL
MCV RBC AUTO: 94.2 FL
MONOCYTES # BLD AUTO: 0.12 K/UL
MONOCYTES NFR BLD AUTO: 6.6 %
NEUTROPHILS # BLD AUTO: 0.71 K/UL
NEUTROPHILS NFR BLD AUTO: 40.8 %
PLATELET # BLD AUTO: 315 K/UL
RBC # BLD: 2.42 M/UL
RBC # FLD: 29.9 %
WBC # FLD AUTO: 1.75 K/UL

## 2024-04-15 NOTE — HISTORY OF PRESENT ILLNESS
[de-identified] : The patient is a 52 yo male with HGB SS, here for f/u. He has no complaints today, essentially feeling well. He is s/p ED visit 4/10/24 for severe SCD pain requiring two units PRBCs. Taking  HU 2000 mg QD. Has been taking Aranesp with Heme, and has been requiring 1-2 unita PRBCs per month. His HGBs have been stable in the mid-7s. He has chronic right knee pain due to gout. The gout has been controlled with colchicine 0.3 mg QD , prednisone 2.5 mg QD, and allopurinol 300 mg QD. He has rare pain, no dyspnea, is able to work full time, but is no longer able to work overtime.  The patient has an h/o HTN for which he takes: lisinopril 10 mg QD, Metoprolol ER 25 mg QD He is following with rheum for gout. He is taking allopurinol 200 mg QD, 0.3 mg QD. He is also taking prednisone 2.5 mg QD, with excellent reduction of knee swelling and pain. He received euflexxa in both knee joints in June, with good effect, and decrease in pain.  The patient has been compliant with all medication. Now taking Metoprolol ER 75 mg QD and lisinopril 10 mg QD for HTN S/P PE- taking eliquis 2.5 bid- PE was in 3/22. 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  The patient is on leave currently because of H/O DVT ( 2 years ago).  s/p past episode of a. flutter, now taking Multaq 400 mg QD- Has routine f/u with cardiology. He is asymptomatic. He has recent worsening of CKD, creatinine @ 1.7 Taking Jadenu 1080 mg QD for iron overload  Hematologist: Dr. BurciagaBoz-[phone]  4/9/24 HGB 5.5 creat: 1.73( stable) GFR 52 ferritin- 9/23- 2050 with Jadenu 1080 qd repeat post-transfusion HGB 7.9 creatinine 1.7 ferrtin 2400  PE: gen- wdwn male in nad, talkative  vss-128/70 anicteric oropharynx without injection lungs- cta cor: rrr+1/6 mark,  abd- benign ext; -c/c/e, scarring from past LE ulcers, both ankles, small ulcer right inner ankle, now closed right knee without swelling  a/P- 52 yo male with HGB SS, s/p PE, with gout and a. flutter 1. HTN- metoprolol 75 mg QD with lisinopril 10 mg QD- followed by cardiology 2.gout-   allopurinol 300 mg QD, now taking prednisone 2.5 mg QD with colchicine 0.3 mg QD followed by rheumatology 3.SCD- now transfusion-dependent- to continue with monthly transfusions, with Jadenu for iron overload  HU- continue HU to 2000 mg QD 4. a. flutter, stable- f/u with cardiology, continue Multaq 400 mg QD 5. iron overload- decrease Jadenu to 720 mg QD ( due to CKD) 6. 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 f/u 3 months  Brittany Veloz MD

## 2024-04-24 RX ORDER — METHYLPREDNISOLONE 32 MG/1
32 TABLET ORAL
Qty: 2 | Refills: 0 | Status: ACTIVE | COMMUNITY
Start: 2024-04-24 | End: 1900-01-01

## 2024-05-14 ENCOUNTER — APPOINTMENT (OUTPATIENT)
Dept: RHEUMATOLOGY | Facility: CLINIC | Age: 54
End: 2024-05-14
Payer: COMMERCIAL

## 2024-05-14 ENCOUNTER — LABORATORY RESULT (OUTPATIENT)
Age: 54
End: 2024-05-14

## 2024-05-14 VITALS
TEMPERATURE: 97.2 F | OXYGEN SATURATION: 99 % | SYSTOLIC BLOOD PRESSURE: 143 MMHG | HEART RATE: 79 BPM | BODY MASS INDEX: 33.6 KG/M2 | WEIGHT: 240 LBS | HEIGHT: 71 IN | DIASTOLIC BLOOD PRESSURE: 77 MMHG

## 2024-05-14 PROCEDURE — 99213 OFFICE O/P EST LOW 20 MIN: CPT

## 2024-05-14 PROCEDURE — G2211 COMPLEX E/M VISIT ADD ON: CPT | Mod: NC,1L

## 2024-05-15 LAB
ALBUMIN SERPL ELPH-MCNC: 3.9 G/DL
ALP BLD-CCNC: 131 U/L
ALT SERPL-CCNC: 23 U/L
ANION GAP SERPL CALC-SCNC: 10 MMOL/L
APPEARANCE: CLEAR
APTT BLD: 29.9 SEC
AST SERPL-CCNC: 21 U/L
BACTERIA: NEGATIVE /HPF
BILIRUB SERPL-MCNC: 0.5 MG/DL
BILIRUBIN URINE: NEGATIVE
BLOOD URINE: NEGATIVE
BUN SERPL-MCNC: 19 MG/DL
CALCIUM SERPL-MCNC: 9.7 MG/DL
CAST: 3 /LPF
CHLORIDE SERPL-SCNC: 109 MMOL/L
CO2 SERPL-SCNC: 20 MMOL/L
COLOR: YELLOW
CREAT SERPL-MCNC: 1.57 MG/DL
EGFR: 52 ML/MIN/1.73M2
EPITHELIAL CELLS: 0 /HPF
GLUCOSE QUALITATIVE U: NEGATIVE MG/DL
GLUCOSE SERPL-MCNC: 117 MG/DL
INR PPP: 0.94 RATIO
KETONES URINE: NEGATIVE MG/DL
LEUKOCYTE ESTERASE URINE: NEGATIVE
MICROSCOPIC-UA: NORMAL
NITRITE URINE: NEGATIVE
PH URINE: 5.5
POTASSIUM SERPL-SCNC: 4.4 MMOL/L
PROT SERPL-MCNC: 6.3 G/DL
PROTEIN URINE: 30 MG/DL
PT BLD: 10.6 SEC
RED BLOOD CELLS URINE: 0 /HPF
SODIUM SERPL-SCNC: 139 MMOL/L
SPECIFIC GRAVITY URINE: 1.01
URATE SERPL-MCNC: 7.1 MG/DL
UROBILINOGEN URINE: 0.2 MG/DL
WHITE BLOOD CELLS URINE: 0 /HPF

## 2024-05-20 LAB
BASOPHILS # BLD AUTO: 0.02 K/UL
BASOPHILS NFR BLD AUTO: 0.4 %
EOSINOPHIL # BLD AUTO: 0.07 K/UL
EOSINOPHIL NFR BLD AUTO: 1.4 %
HCT VFR BLD CALC: 20.5 %
HGB BLD-MCNC: 6.7 G/DL
IMM GRANULOCYTES NFR BLD AUTO: 0.4 %
LYMPHOCYTES # BLD AUTO: 1.37 K/UL
LYMPHOCYTES NFR BLD AUTO: 28.4 %
MAN DIFF?: NORMAL
MCHC RBC-ENTMCNC: 32.7 GM/DL
MCHC RBC-ENTMCNC: 34.7 PG
MCV RBC AUTO: 106.2 FL
MONOCYTES # BLD AUTO: 0.32 K/UL
MONOCYTES NFR BLD AUTO: 6.6 %
NEUTROPHILS # BLD AUTO: 3.03 K/UL
NEUTROPHILS NFR BLD AUTO: 62.8 %
PLATELET # BLD AUTO: 267 K/UL
PMV BLD AUTO: 30 /100 WBCS
RBC # BLD: 1.93 M/UL
RBC # FLD: 28.3 %
WBC # FLD AUTO: 4.83 K/UL

## 2024-05-27 NOTE — HISTORY OF PRESENT ILLNESS
[FreeTextEntry1] : 51AAM with sickle cell disease  recent diagnosis of gout, also with osteonecrosis right knee. Diagnostic arthrocentesis of right knee monoarthritis demonstrated MSU crystals consistent with gout. s/p 2 x IACI to right knee [id number] neg on allopurinol 300mg and colchicine 0.3mg daily no gout flares last uric acid in 4/11/2023 6.7  Interval History: pt had rectal bleeding event one time, has not seen GI. is on DOAC. He stopped taking allopurinol and colchicine due to fear of this being the reason. with work on routine screening found to have RBC in his urine? No record available, but scheduled to see nephrology.

## 2024-05-27 NOTE — ASSESSMENT
[FreeTextEntry1] : 51M with sickle cell disease recent diagnosis of gout, also with osteonecrosis right knee. Diagnostic arthrocentesis of right knee monoarthritis demonstrated MSU crystals consistent with gout. s/p 2 x IACI on allopurinol and colchicine 0.3mg daily no gout flares last uric acid 7.0 Recent rectal bleeding event and reported microhematuria (though no evidence of latter on [facility] records)  Plan: # Bleeding events: Rectal bleeding and reported microhematuria -Recommend to see hematology as patient is on DOAC, GI and nephrology respectively -No indication to stop or hold allopurinol. Allopurinol is not associated with bleeding. More likely explanation for bleeding event may be DOAC that patient is on.  # Gout -Check uric acid today, goal serum uric acid less than 6. HLA  negative. uptitrate allopurinol cw colchicine to 0.3 mg daily. Steroids should be avoided as they can trigger/exacerbate sickle cell crisis. Doses of colchicine higher than 0.6 mg daily should be avoided due to increased toxicity in patients on amiodarone. -restart allopurinol at 200mg as pt is apprehensive to be on 300mg daily.  # Knee pain -Ortho follow up of ON knees -gel injections -PT referral  -Follow-up in 3 months.

## 2024-06-03 ENCOUNTER — APPOINTMENT (OUTPATIENT)
Dept: RHEUMATOLOGY | Facility: CLINIC | Age: 54
End: 2024-06-03

## 2024-06-03 ENCOUNTER — LABORATORY RESULT (OUTPATIENT)
Age: 54
End: 2024-06-03

## 2024-06-03 ENCOUNTER — OUTPATIENT (OUTPATIENT)
Dept: OUTPATIENT SERVICES | Facility: HOSPITAL | Age: 54
LOS: 1 days | End: 2024-06-03

## 2024-06-03 ENCOUNTER — NON-APPOINTMENT (OUTPATIENT)
Age: 54
End: 2024-06-03

## 2024-06-03 ENCOUNTER — APPOINTMENT (OUTPATIENT)
Dept: INTERNAL MEDICINE | Facility: CLINIC | Age: 54
End: 2024-06-03
Payer: COMMERCIAL

## 2024-06-03 VITALS
DIASTOLIC BLOOD PRESSURE: 68 MMHG | WEIGHT: 242 LBS | SYSTOLIC BLOOD PRESSURE: 120 MMHG | BODY MASS INDEX: 33.75 KG/M2 | OXYGEN SATURATION: 98 % | HEART RATE: 82 BPM

## 2024-06-03 DIAGNOSIS — I48.92 UNSPECIFIED ATRIAL FLUTTER: ICD-10-CM

## 2024-06-03 DIAGNOSIS — D57.1 SICKLE-CELL DISEASE W/OUT CRISIS: ICD-10-CM

## 2024-06-03 DIAGNOSIS — Z90.49 ACQUIRED ABSENCE OF OTHER SPECIFIED PARTS OF DIGESTIVE TRACT: Chronic | ICD-10-CM

## 2024-06-03 DIAGNOSIS — M10.9 GOUT, UNSPECIFIED: ICD-10-CM

## 2024-06-03 PROCEDURE — G2211 COMPLEX E/M VISIT ADD ON: CPT | Mod: NC,1L

## 2024-06-03 PROCEDURE — 99214 OFFICE O/P EST MOD 30 MIN: CPT

## 2024-06-03 RX ORDER — GABAPENTIN 300 MG/1
300 CAPSULE ORAL 3 TIMES DAILY
Qty: 90 | Refills: 1 | Status: COMPLETED | COMMUNITY
Start: 2023-12-21 | End: 2024-06-03

## 2024-06-04 ENCOUNTER — APPOINTMENT (OUTPATIENT)
Dept: OPHTHALMOLOGY | Facility: CLINIC | Age: 54
End: 2024-06-04
Payer: COMMERCIAL

## 2024-06-04 ENCOUNTER — NON-APPOINTMENT (OUTPATIENT)
Age: 54
End: 2024-06-04

## 2024-06-04 DIAGNOSIS — I48.92 UNSPECIFIED ATRIAL FLUTTER: ICD-10-CM

## 2024-06-04 DIAGNOSIS — D57.1 SICKLE-CELL DISEASE WITHOUT CRISIS: ICD-10-CM

## 2024-06-04 DIAGNOSIS — N18.31 CHRONIC KIDNEY DISEASE, STAGE 3A: ICD-10-CM

## 2024-06-04 DIAGNOSIS — I10 ESSENTIAL (PRIMARY) HYPERTENSION: ICD-10-CM

## 2024-06-04 DIAGNOSIS — M10.9 GOUT, UNSPECIFIED: ICD-10-CM

## 2024-06-04 LAB
25(OH)D3 SERPL-MCNC: 28.2 NG/ML
ALBUMIN SERPL ELPH-MCNC: 4.1 G/DL
ALP BLD-CCNC: 134 U/L
ALT SERPL-CCNC: 35 U/L
ANION GAP SERPL CALC-SCNC: 12 MMOL/L
APPEARANCE: CLEAR
AST SERPL-CCNC: 35 U/L
BASOPHILS # BLD AUTO: 0.09 K/UL
BASOPHILS NFR BLD AUTO: 1.8 %
BILIRUB SERPL-MCNC: 0.4 MG/DL
BILIRUBIN URINE: NEGATIVE
BLOOD URINE: NEGATIVE
BUN SERPL-MCNC: 25 MG/DL
CALCIUM SERPL-MCNC: 10 MG/DL
CHLORIDE SERPL-SCNC: 106 MMOL/L
CO2 SERPL-SCNC: 20 MMOL/L
COLOR: YELLOW
CREAT SERPL-MCNC: 1.81 MG/DL
EGFR: 44 ML/MIN/1.73M2
EOSINOPHIL # BLD AUTO: 0 K/UL
EOSINOPHIL NFR BLD AUTO: 0 %
FERRITIN SERPL-MCNC: 2377 NG/ML
GLUCOSE QUALITATIVE U: NEGATIVE MG/DL
GLUCOSE SERPL-MCNC: 114 MG/DL
HCT VFR BLD CALC: 24.8 %
HGB BLD-MCNC: 8.2 G/DL
KETONES URINE: NEGATIVE MG/DL
LEUKOCYTE ESTERASE URINE: NEGATIVE
LYMPHOCYTES # BLD AUTO: 1.39 K/UL
LYMPHOCYTES NFR BLD AUTO: 26.5 %
MAN DIFF?: NORMAL
MCHC RBC-ENTMCNC: 33.1 GM/DL
MCHC RBC-ENTMCNC: 35.3 PG
MCV RBC AUTO: 106.9 FL
MONOCYTES # BLD AUTO: 0.23 K/UL
MONOCYTES NFR BLD AUTO: 4.4 %
NEUTROPHILS # BLD AUTO: 3.53 K/UL
NEUTROPHILS NFR BLD AUTO: 67.3 %
NITRITE URINE: NEGATIVE
PH URINE: 6
PLATELET # BLD AUTO: 285 K/UL
POTASSIUM SERPL-SCNC: 4.5 MMOL/L
PROT SERPL-MCNC: 7 G/DL
PROTEIN URINE: 30 MG/DL
RBC # BLD: 2.32 M/UL
RBC # BLD: 2.32 M/UL
RBC # FLD: 25.9 %
RETICS # AUTO: 7.9 %
RETICS AGGREG/RBC NFR: 182.8 K/UL
SODIUM SERPL-SCNC: 138 MMOL/L
SPECIFIC GRAVITY URINE: 1.01
UROBILINOGEN URINE: 0.2 MG/DL
WBC # FLD AUTO: 5.25 K/UL

## 2024-06-04 PROCEDURE — 92250 FUNDUS PHOTOGRAPHY W/I&R: CPT

## 2024-06-04 PROCEDURE — 92014 COMPRE OPH EXAM EST PT 1/>: CPT

## 2024-06-09 NOTE — HISTORY OF PRESENT ILLNESS
[de-identified] : The patient is a 54 yo male with HGB SS, here for f/u. He has no complaints today, essentially feeling well. Taking  HU 2000 mg QD. Has been taking Aranesp with Heme, and has been requiring 1-2 units PRBCs per month. His HGBs have been stable in the mid-7s-8s. He has rare pain, no dyspnea, is able to work full time. He has been sent to this office today for further evaluation by the [facility], form filling.   The patient has an h/o HTN for which he takes: lisinopril 10 mg QD, Metoprolol ER 75 mg QD. His HTN is well-controlled.  He is following with rheum for gout. He is taking allopurinol 300 mg QD, 0.3 mg QD. He is also taking prednisone 2.5 mg QD, with excellent reduction of knee swelling and pain. He received euflexxa in both knee joints in June, with good effect, and almost complete alleviation of pain.  S/P PE- taking eliquis 2.5 bid- PE was in 3/22. 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  s/p past episode of a. flutter, now taking Multaq 400 mg QD- Has routine f/u with cardiology. He is asymptomatic. The patient is compliant with all of his medication.  He has CKD3 and is followed by nephrology.  Taking Jadenu 720 mg QD for iron overload ophtho- 6/4/24  Hematologist: Dr. BurciagaDvx-[phone]  PE: gen- wdwn male in nad, talkative  vss-120/68 anicteric oropharynx without injection lungs- cta cor: rrr+1/6 mark,  abd- benign ext; -c/c/e, no ulcers right knee without swelling, full ROM  a/P- 54 yo male with HGB SS, s/p PE, with gout and a. flutter, HTN 1. HTN-well-controlled- metoprolol 75 mg QD with lisinopril 10 mg QD- 2.gout- well-controlled-  allopurinol 300 mg QD, now taking prednisone 2.5 mg QD with colchicine 0.3 mg QD followed by rheumatology 3.SCD- now transfusion-dependent- to continue with monthly transfusions, with Jadenu for iron overload  HU- decrease  HU to 1500 mg QD 4. mariela. dora, stable- f/u with cardiology, continue Multaq 400 mg QD 5. 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 Proteinuria-mild- consistent with SCD as well as HTN- treated with lisinopril 10 mg QD No evidence of hematuria seen 9. f/u 2 months  Brittany Veloz MD

## 2024-06-10 ENCOUNTER — NON-APPOINTMENT (OUTPATIENT)
Age: 54
End: 2024-06-10

## 2024-06-11 NOTE — ED ADULT NURSE NOTE - FINAL NURSING ELECTRONIC SIGNATURE
Initiate Treatment: clobetasol 0.05 % topical ointment BID
Detail Level: Generalized
26-Feb-2022 18:28

## 2024-06-20 NOTE — PROGRESS NOTE ADULT - SUBJECTIVE AND OBJECTIVE BOX
BE=299 bpm, FLYP=654/88 mmhg, SpO2=98.0 %, Resp=0 B/min, EtCO2=6 mmHg, Lbdmg=311 Seconds, Pain=0, Mateusz=2 Patient is a 51y old  Male who presents with a chief complaint of S/P discharge from [facility] with 3 weeks of RIGHT knee pain, sickle cell crisis (28 Feb 2022 10:41)      SUBJECTIVE / OVERNIGHT EVENTS:  has 10/10 pain currently in the r knee and back pain, b/l legs like his usual pain crisis  denies any chest pain, palpitations, fevers, chills, abdominal pain.  + BM and using incentive spirometer  encouraged patient to go to OOB chair    ROS:  14 point ROS negative in detail except stated as above    MEDICATIONS  (STANDING):  enoxaparin Injectable 100 milliGRAM(s) SubCutaneous two times a day  folic acid 1 milliGRAM(s) Oral daily  HYDROmorphone  Injectable 1 milliGRAM(s) IV Push once  HYDROmorphone PCA (1 mG/mL) 30 milliLiter(s) PCA Continuous PCA Continuous  hydroxyurea 1500 milliGRAM(s) Oral daily  multivitamin 1 Tablet(s) Oral daily  sodium chloride 0.45%. 1000 milliLiter(s) (75 mL/Hr) IV Continuous <Continuous>    MEDICATIONS  (PRN):  acetaminophen     Tablet .. 650 milliGRAM(s) Oral every 6 hours PRN Temp greater or equal to 38C (100.4F), Mild Pain (1 - 3)      CAPILLARY BLOOD GLUCOSE        I&O's Summary    27 Feb 2022 07:01  -  28 Feb 2022 07:00  --------------------------------------------------------  IN: 1080 mL / OUT: 2550 mL / NET: -1470 mL        PHYSICAL EXAM:  Vital Signs Last 24 Hrs  T(C): 37.3 (28 Feb 2022 03:56), Max: 37.4 (27 Feb 2022 13:56)  T(F): 99.1 (28 Feb 2022 03:56), Max: 99.4 (28 Feb 2022 00:32)  HR: 97 (28 Feb 2022 03:56) (93 - 100)  BP: 167/72 (28 Feb 2022 03:56) (139/74 - 167/72)  BP(mean): --  RR: 18 (28 Feb 2022 03:56) (18 - 18)  SpO2: 97% (28 Feb 2022 03:56) (95% - 97%)    GENERAL: NAD, well-developed  HEAD:  Atraumatic, Normocephalic  EYES: EOMI, PERRL, conjunctiva and sclera clear  NECK: Supple, No JVD  CHEST/LUNG: Clear to auscultation bilaterally; No wheeze  HEART: Regular rate and rhythm; No murmurs, rubs, or gallops  ABDOMEN: Soft, Nontender, Nondistended; Bowel sounds present  EXTREMITIES:  2+ Peripheral Pulses, No clubbing, cyanosis, or edema  R knee no erythema + edema, palpable effusion; full ROM. TTP R leg +1 pitting edema > L  NEUROLOGY: AAOx3; non-focal  SKIN: No rashes or lesions    LABS:                        7.6    9.68  )-----------( 540      ( 28 Feb 2022 10:24 )             23.6     02-27    135  |  105  |  11  ----------------------------<  93  5.5<H>   |  18<L>  |  1.12    Ca    8.6      27 Feb 2022 08:13    TPro  7.9  /  Alb  3.5  /  TBili  0.2  /  DBili  <0.1  /  AST  21  /  ALT  20  /  AlkPhos  94  02-26    PT/INR - ( 26 Feb 2022 13:17 )   PT: 14.1 sec;   INR: 1.17 ratio         PTT - ( 26 Feb 2022 13:17 )  PTT:31.7 sec          RADIOLOGY & ADDITIONAL TESTS:    Imaging Personally Reviewed:    Consultant(s) Notes Reviewed:      Care Discussed with Consultants/Other Providers:  mal Dalton

## 2024-06-26 ENCOUNTER — APPOINTMENT (OUTPATIENT)
Dept: NEPHROLOGY | Facility: CLINIC | Age: 54
End: 2024-06-26
Payer: COMMERCIAL

## 2024-06-26 VITALS
SYSTOLIC BLOOD PRESSURE: 137 MMHG | WEIGHT: 240 LBS | HEART RATE: 70 BPM | DIASTOLIC BLOOD PRESSURE: 83 MMHG | RESPIRATION RATE: 16 BRPM | HEIGHT: 71 IN | OXYGEN SATURATION: 99 % | TEMPERATURE: 97.2 F | BODY MASS INDEX: 33.6 KG/M2

## 2024-06-26 DIAGNOSIS — I10 ESSENTIAL (PRIMARY) HYPERTENSION: ICD-10-CM

## 2024-06-26 DIAGNOSIS — N18.31 CHRONIC KIDNEY DISEASE, STAGE 3A: ICD-10-CM

## 2024-06-26 DIAGNOSIS — E55.9 VITAMIN D DEFICIENCY, UNSPECIFIED: ICD-10-CM

## 2024-06-26 DIAGNOSIS — R80.9 PROTEINURIA, UNSPECIFIED: ICD-10-CM

## 2024-06-26 LAB
25(OH)D3 SERPL-MCNC: 20.7 NG/ML
ALBUMIN SERPL ELPH-MCNC: 4 G/DL
ANION GAP SERPL CALC-SCNC: 12 MMOL/L
APPEARANCE: CLEAR
BACTERIA: NEGATIVE /HPF
BILIRUBIN URINE: NEGATIVE
BLOOD URINE: NEGATIVE
BUN SERPL-MCNC: 26 MG/DL
CALCIUM SERPL-MCNC: 9.9 MG/DL
CALCIUM SERPL-MCNC: 9.9 MG/DL
CAST: 8 /LPF
CHLORIDE SERPL-SCNC: 104 MMOL/L
CO2 SERPL-SCNC: 21 MMOL/L
COLOR: YELLOW
CREAT SERPL-MCNC: 2.04 MG/DL
CREAT SPEC-SCNC: 132 MG/DL
CREAT SPEC-SCNC: 132 MG/DL
CREAT/PROT UR: 0.3 RATIO
EGFR: 38 ML/MIN/1.73M2
EPITHELIAL CELLS: 1 /HPF
GLUCOSE QUALITATIVE U: NEGATIVE MG/DL
GLUCOSE SERPL-MCNC: 71 MG/DL
HYALINE CASTS: PRESENT
KETONES URINE: NEGATIVE MG/DL
LEUKOCYTE ESTERASE URINE: NEGATIVE
MICROALBUMIN 24H UR DL<=1MG/L-MCNC: 15.3 MG/DL
MICROALBUMIN/CREAT 24H UR-RTO: 116 MG/G
MICROSCOPIC-UA: NORMAL
NITRITE URINE: NEGATIVE
PARATHYROID HORMONE INTACT: 147 PG/ML
PH URINE: 6
PHOSPHATE SERPL-MCNC: 3.2 MG/DL
POTASSIUM SERPL-SCNC: 5.4 MMOL/L
PROT UR-MCNC: 32 MG/DL
PROTEIN URINE: 30 MG/DL
RED BLOOD CELLS URINE: 2 /HPF
REVIEW: NORMAL
SODIUM SERPL-SCNC: 137 MMOL/L
SPECIFIC GRAVITY URINE: 1.01
UROBILINOGEN URINE: 0.2 MG/DL
WHITE BLOOD CELLS URINE: 0 /HPF

## 2024-06-26 PROCEDURE — 99214 OFFICE O/P EST MOD 30 MIN: CPT

## 2024-06-26 PROCEDURE — G2211 COMPLEX E/M VISIT ADD ON: CPT | Mod: NC,1L

## 2024-06-26 RX ORDER — DEFERASIROX 360 MG/1
360 TABLET, FILM COATED ORAL DAILY
Qty: 90 | Refills: 6 | Status: ACTIVE | COMMUNITY
Start: 2022-12-20

## 2024-06-26 RX ORDER — ALLOPURINOL 100 MG/1
100 TABLET ORAL
Qty: 30 | Refills: 0 | Status: ACTIVE | COMMUNITY
Start: 2022-03-30

## 2024-06-28 PROBLEM — E55.9 VITAMIN D DEFICIENCY: Status: ACTIVE | Noted: 2024-06-28

## 2024-07-10 ENCOUNTER — APPOINTMENT (OUTPATIENT)
Dept: DERMATOLOGY | Facility: CLINIC | Age: 54
End: 2024-07-10

## 2024-07-30 NOTE — ED PROVIDER NOTE - WET READ LAUNCH FT
Health Maintenance       HPV Vaccine (2 - 3-dose series)  Overdue since 3/25/2008    COVID-19 Vaccine (1 - 2023-24 season)  Never done    Cervical Cancer Screening (HPV/Cotest - Every 5 Years)  Order placed this encounter    Depression Screening (Yearly)  Due soon on 1/25/2025           Following review of the above:  Patient wishes to discuss with clinician:     Note: Refer to final orders and clinician documentation.       There are no Wet Read(s) to document. There is 1 Wet Read(s) to document.

## 2024-08-22 ENCOUNTER — NON-APPOINTMENT (OUTPATIENT)
Age: 54
End: 2024-08-22

## 2024-08-22 ENCOUNTER — OUTPATIENT (OUTPATIENT)
Dept: OUTPATIENT SERVICES | Facility: HOSPITAL | Age: 54
LOS: 1 days | End: 2024-08-22

## 2024-08-22 ENCOUNTER — APPOINTMENT (OUTPATIENT)
Dept: INTERNAL MEDICINE | Facility: CLINIC | Age: 54
End: 2024-08-22
Payer: COMMERCIAL

## 2024-08-22 VITALS
WEIGHT: 239 LBS | SYSTOLIC BLOOD PRESSURE: 148 MMHG | DIASTOLIC BLOOD PRESSURE: 70 MMHG | RESPIRATION RATE: 16 BRPM | HEART RATE: 70 BPM | OXYGEN SATURATION: 98 % | HEIGHT: 71 IN | BODY MASS INDEX: 33.46 KG/M2

## 2024-08-22 VITALS — DIASTOLIC BLOOD PRESSURE: 70 MMHG | SYSTOLIC BLOOD PRESSURE: 134 MMHG

## 2024-08-22 DIAGNOSIS — E83.19 OTHER DISORDERS OF IRON METABOLISM: ICD-10-CM

## 2024-08-22 DIAGNOSIS — D57.1 SICKLE-CELL DISEASE W/OUT CRISIS: ICD-10-CM

## 2024-08-22 DIAGNOSIS — I10 ESSENTIAL (PRIMARY) HYPERTENSION: ICD-10-CM

## 2024-08-22 DIAGNOSIS — M10.9 GOUT, UNSPECIFIED: ICD-10-CM

## 2024-08-22 DIAGNOSIS — Z90.49 ACQUIRED ABSENCE OF OTHER SPECIFIED PARTS OF DIGESTIVE TRACT: Chronic | ICD-10-CM

## 2024-08-22 DIAGNOSIS — N18.31 CHRONIC KIDNEY DISEASE, STAGE 3A: ICD-10-CM

## 2024-08-22 DIAGNOSIS — I48.92 UNSPECIFIED ATRIAL FLUTTER: ICD-10-CM

## 2024-08-22 PROCEDURE — 99214 OFFICE O/P EST MOD 30 MIN: CPT

## 2024-08-22 PROCEDURE — G2211 COMPLEX E/M VISIT ADD ON: CPT | Mod: NC

## 2024-08-22 RX ORDER — OXYCODONE AND ACETAMINOPHEN 5; 325 MG/1; MG/1
5-325 TABLET ORAL
Qty: 32 | Refills: 0 | Status: ACTIVE | COMMUNITY
Start: 2024-08-22 | End: 1900-01-01

## 2024-08-26 RX ORDER — DAPAGLIFLOZIN 5 MG/1
5 TABLET, FILM COATED ORAL DAILY
Qty: 30 | Refills: 6 | Status: ACTIVE | COMMUNITY
Start: 2024-08-26 | End: 1900-01-01

## 2024-08-26 NOTE — HISTORY OF PRESENT ILLNESS
[de-identified] : The patient is a 52 yo male with HGB SS, here for f/u. He has no complaints today, essentially feeling well. Taking  HU 1500 mg QD. Has been taking Aranesp with Heme, and has been requiring 1-2 units PRBCs per month. His HGBs have been stable in the mid-7s-8s. He has rare pain, no dyspnea, is able to work full time. He has been sent to this office today for further evaluation by the [facility], form filling.   The patient has an h/o HTN for which he takes: lisinopril 10 mg QD, Metoprolol ER 75 mg QD. His HTN is well-controlled.  He is following with rheum for gout. He is taking allopurinol 300 mg QD, 0.3 mg QD. He is also taking prednisone 2.5 mg QD, with excellent reduction of knee swelling and pain. He received euflexxa in both knee joints in June, with good effect, and almost complete alleviation of pain.  S/P PE- taking eliquis 2.5 bid- PE was in 3/22. 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  s/p past episode of a. flutter, now taking Multaq 400 mg QD- Has routine f/u with cardiology. He is asymptomatic. The patient is compliant with all of his medication.  He has CKD3 and is followed by nephrology.  Taking Jadenu 720 mg QD for iron overload ophtho- UTD  Hematologist: Dr. BurciagaUyu-[phone]  PE: gen- wdwn male in nad, talkative  vss-120/68 anicteric oropharynx without injection lungs- cta cor: rrr+1/6 mark,  abd- benign ext; -c/c/e, no ulcers right knee without swelling, full ROM  6/26/24 creat-2.04  a/P- 52 yo male with HGB SS, s/p PE, with gout and a. flutter, HTN 1. HTN-well-controlled- metoprolol 75 mg QD with lisinopril 10 mg QD- 2.gout- well-controlled-  allopurinol 300 mg QD, now taking prednisone 2.5 mg QD with colchicine 0.3 mg QD followed by rheumatology 3.SCD- now transfusion-dependent- to continue with monthly transfusions, with Jadenu for iron overload  HU- continue HU to 1500 mg QD 4. a. flutter, stable- f/u with cardiology, continue Multaq 400 mg QD 5. 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 Proteinuria-mild- consistent with SCD as well as HTN- treated with lisinopril 10 mg QD worsening creat- will start farxiga 5 mg QD 9. f/u 2 months 10. percocet-5/325- #32 ISTOP checked  Brittany Veloz MD

## 2024-08-27 DIAGNOSIS — I10 ESSENTIAL (PRIMARY) HYPERTENSION: ICD-10-CM

## 2024-08-27 DIAGNOSIS — D57.1 SICKLE-CELL DISEASE WITHOUT CRISIS: ICD-10-CM

## 2024-08-27 DIAGNOSIS — E83.19 OTHER DISORDERS OF IRON METABOLISM: ICD-10-CM

## 2024-08-27 DIAGNOSIS — M10.9 GOUT, UNSPECIFIED: ICD-10-CM

## 2024-08-27 DIAGNOSIS — N18.31 CHRONIC KIDNEY DISEASE, STAGE 3A: ICD-10-CM

## 2024-08-27 DIAGNOSIS — I48.92 UNSPECIFIED ATRIAL FLUTTER: ICD-10-CM

## 2024-09-03 ENCOUNTER — OUTPATIENT (OUTPATIENT)
Dept: OUTPATIENT SERVICES | Facility: HOSPITAL | Age: 54
LOS: 1 days | End: 2024-09-03
Payer: COMMERCIAL

## 2024-09-03 ENCOUNTER — APPOINTMENT (OUTPATIENT)
Dept: ULTRASOUND IMAGING | Facility: IMAGING CENTER | Age: 54
End: 2024-09-03
Payer: COMMERCIAL

## 2024-09-03 DIAGNOSIS — Z90.49 ACQUIRED ABSENCE OF OTHER SPECIFIED PARTS OF DIGESTIVE TRACT: Chronic | ICD-10-CM

## 2024-09-03 DIAGNOSIS — Z00.8 ENCOUNTER FOR OTHER GENERAL EXAMINATION: ICD-10-CM

## 2024-09-03 PROCEDURE — 76770 US EXAM ABDO BACK WALL COMP: CPT | Mod: 26

## 2024-09-03 PROCEDURE — 76770 US EXAM ABDO BACK WALL COMP: CPT

## 2024-09-30 ENCOUNTER — NON-APPOINTMENT (OUTPATIENT)
Age: 54
End: 2024-09-30

## 2024-10-01 ENCOUNTER — LABORATORY RESULT (OUTPATIENT)
Age: 54
End: 2024-10-01

## 2024-10-01 ENCOUNTER — OUTPATIENT (OUTPATIENT)
Dept: OUTPATIENT SERVICES | Facility: HOSPITAL | Age: 54
LOS: 1 days | End: 2024-10-01

## 2024-10-01 ENCOUNTER — APPOINTMENT (OUTPATIENT)
Dept: INTERNAL MEDICINE | Facility: CLINIC | Age: 54
End: 2024-10-01
Payer: COMMERCIAL

## 2024-10-01 VITALS
BODY MASS INDEX: 33.18 KG/M2 | DIASTOLIC BLOOD PRESSURE: 70 MMHG | SYSTOLIC BLOOD PRESSURE: 136 MMHG | WEIGHT: 237 LBS | HEIGHT: 71 IN | OXYGEN SATURATION: 98 % | HEART RATE: 78 BPM | RESPIRATION RATE: 16 BRPM

## 2024-10-01 DIAGNOSIS — Z90.49 ACQUIRED ABSENCE OF OTHER SPECIFIED PARTS OF DIGESTIVE TRACT: Chronic | ICD-10-CM

## 2024-10-01 DIAGNOSIS — E83.19 OTHER DISORDERS OF IRON METABOLISM: ICD-10-CM

## 2024-10-01 DIAGNOSIS — M10.9 GOUT, UNSPECIFIED: ICD-10-CM

## 2024-10-01 DIAGNOSIS — I10 ESSENTIAL (PRIMARY) HYPERTENSION: ICD-10-CM

## 2024-10-01 DIAGNOSIS — I48.92 UNSPECIFIED ATRIAL FLUTTER: ICD-10-CM

## 2024-10-01 DIAGNOSIS — D57.1 SICKLE-CELL DISEASE W/OUT CRISIS: ICD-10-CM

## 2024-10-01 DIAGNOSIS — N18.31 CHRONIC KIDNEY DISEASE, STAGE 3A: ICD-10-CM

## 2024-10-01 DIAGNOSIS — D57.1 SICKLE-CELL DISEASE WITHOUT CRISIS: ICD-10-CM

## 2024-10-01 PROCEDURE — 99214 OFFICE O/P EST MOD 30 MIN: CPT

## 2024-10-01 PROCEDURE — G2211 COMPLEX E/M VISIT ADD ON: CPT | Mod: NC

## 2024-10-01 NOTE — HISTORY OF PRESENT ILLNESS
[de-identified] : The patient is a 52 yo male with HGB SS, here for f/u. He has no complaints today, essentially feeling well. Taking  HU 1500 mg QD. Has been taking Aranesp with Heme, and has been requiring 1-2 units PRBCs per month. His HGBs have been stable in the mid-7s-8s. He has rare pain, no dyspnea, is able to work full time. He has been sent to this office today for further evaluation by the [facility], form filling. Last flu shot 2005, had an allergic rx He had been having gout-related knee pain. Recently stopped eating red meat, and is feeling much less pain.   The patient has an h/o HTN for which he takes: lisinopril 10 mg QD, Metoprolol ER 75 mg QD. His HTN is well-controlled.  He is following with rheum for gout. He is taking allopurinol 300 mg QD, 0.3 mg QD. He is also taking prednisone 2.5 mg QD, with excellent reduction of knee swelling and pain. He received euflexxa in both knee joints in June, with good effect, and almost complete alleviation of pain.  S/P PE- taking eliquis 2.5 bid- PE was in 3/22. 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  s/p past episode of a. flutter, now taking Multaq 400 mg QD- Has routine f/u with cardiology. He is asymptomatic. The patient is compliant with all of his medication.  He has CKD3 and is followed by nephrology.  Taking Jadenu 720 mg QD for iron overload ophtho- UTD  Hematologist: Dr. BurciagaFat-[phone] [phone]  PE: gen- wdwn male in nad, talkative  vss-120/68 anicteric oropharynx without injection lungs- cta cor: rrr+1/6 mark,  abd- benign ext; -c/c/e, no ulcers right knee without swelling, full ROM  6/26/24 creat-2.04  a/P- 52 yo male with HGB SS, s/p PE, with gout and a. flutter, HTN 1. HTN-well-controlled- metoprolol 75 mg QD with lisinopril 10 mg QD- 2.gout- well-controlled-  allopurinol 300 mg QD, now taking prednisone 2.5 mg QD with colchicine 0.3 mg QD followed by rheumatology 3.SCD- now transfusion-dependent- to continue with monthly transfusions, with Jadenu for iron overload  HU- continue HU to 1500 mg QD 4. a. flutter, stable- f/u with cardiology, continue Multaq 400 mg QD 5. 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 Proteinuria-mild- consistent with SCD as well as HTN- treated with lisinopril 10 mg QD worsening creat- will start farxiga 5 mg QD 9. f/u 2 months 10. routine labs today 11. patient is intolerant to flu vax, so refused today. Will go to local pharmacy for covid vax  Brittany Veloz MD

## 2024-10-04 LAB
25(OH)D3 SERPL-MCNC: 27.8 NG/ML
ALBUMIN SERPL ELPH-MCNC: 3.8 G/DL
ALP BLD-CCNC: 133 U/L
ALT SERPL-CCNC: 16 U/L
ANION GAP SERPL CALC-SCNC: 13 MMOL/L
AST SERPL-CCNC: 20 U/L
BASOPHILS # BLD AUTO: 0.06 K/UL
BASOPHILS NFR BLD AUTO: 1 %
BILIRUB SERPL-MCNC: 0.4 MG/DL
BUN SERPL-MCNC: 31 MG/DL
CALCIUM SERPL-MCNC: 9.9 MG/DL
CHLORIDE SERPL-SCNC: 109 MMOL/L
CO2 SERPL-SCNC: 18 MMOL/L
CREAT SERPL-MCNC: 1.62 MG/DL
EGFR: 50 ML/MIN/1.73M2
EOSINOPHIL # BLD AUTO: 0 K/UL
EOSINOPHIL NFR BLD AUTO: 0 %
FERRITIN SERPL-MCNC: 2209 NG/ML
GLUCOSE SERPL-MCNC: 104 MG/DL
HCT VFR BLD CALC: 22.4 %
HGB A MFR BLD: 54.4 %
HGB A2 MFR BLD: 2.4 %
HGB BLD-MCNC: 7.5 G/DL
HGB F MFR BLD: 15.8 %
HGB FRACT BLD-IMP: NORMAL
HGB S MFR BLD: 27.4 %
LYMPHOCYTES # BLD AUTO: 1.23 K/UL
LYMPHOCYTES NFR BLD AUTO: 22.4 %
MAN DIFF?: NORMAL
MCHC RBC-ENTMCNC: 33.2 PG
MCHC RBC-ENTMCNC: 33.5 GM/DL
MCV RBC AUTO: 99.1 FL
MONOCYTES # BLD AUTO: 0.15 K/UL
MONOCYTES NFR BLD AUTO: 2.8 %
NEUTROPHILS # BLD AUTO: 4.07 K/UL
NEUTROPHILS NFR BLD AUTO: 73.8 %
PLATELET # BLD AUTO: 276 K/UL
POTASSIUM SERPL-SCNC: 4.2 MMOL/L
PROT SERPL-MCNC: 6.8 G/DL
RBC # BLD: 2.26 M/UL
RBC # BLD: 2.26 M/UL
RBC # FLD: 25 %
RETICS # AUTO: 4.9 %
RETICS AGGREG/RBC NFR: 108.3 K/UL
SODIUM SERPL-SCNC: 140 MMOL/L
WBC # FLD AUTO: 5.51 K/UL

## 2024-10-09 ENCOUNTER — APPOINTMENT (OUTPATIENT)
Dept: ORTHOPEDIC SURGERY | Facility: CLINIC | Age: 54
End: 2024-10-09
Payer: COMMERCIAL

## 2024-10-09 ENCOUNTER — RX RENEWAL (OUTPATIENT)
Age: 54
End: 2024-10-09

## 2024-10-09 VITALS — HEIGHT: 71 IN | BODY MASS INDEX: 33.18 KG/M2 | WEIGHT: 237 LBS

## 2024-10-09 DIAGNOSIS — M17.0 BILATERAL PRIMARY OSTEOARTHRITIS OF KNEE: ICD-10-CM

## 2024-10-09 PROCEDURE — 99213 OFFICE O/P EST LOW 20 MIN: CPT

## 2024-10-09 RX ORDER — HYALURONATE SODIUM 20 MG/2 ML
20 SYRINGE (ML) INTRAARTICULAR
Qty: 3 | Refills: 1 | Status: ACTIVE | COMMUNITY
Start: 2024-10-09

## 2024-10-29 ENCOUNTER — APPOINTMENT (OUTPATIENT)
Dept: ORTHOPEDIC SURGERY | Facility: CLINIC | Age: 54
End: 2024-10-29
Payer: COMMERCIAL

## 2024-10-29 VITALS — HEIGHT: 71 IN | WEIGHT: 237 LBS | BODY MASS INDEX: 33.18 KG/M2

## 2024-10-29 PROCEDURE — 20610 DRAIN/INJ JOINT/BURSA W/O US: CPT | Mod: 50

## 2024-11-04 ENCOUNTER — APPOINTMENT (OUTPATIENT)
Dept: ORTHOPEDIC SURGERY | Facility: CLINIC | Age: 54
End: 2024-11-04
Payer: COMMERCIAL

## 2024-11-04 VITALS — BODY MASS INDEX: 33.32 KG/M2 | WEIGHT: 238 LBS | HEIGHT: 71 IN

## 2024-11-04 PROCEDURE — 20610 DRAIN/INJ JOINT/BURSA W/O US: CPT | Mod: 50

## 2024-11-11 ENCOUNTER — APPOINTMENT (OUTPATIENT)
Dept: ORTHOPEDIC SURGERY | Facility: CLINIC | Age: 54
End: 2024-11-11
Payer: COMMERCIAL

## 2024-11-11 DIAGNOSIS — M17.0 BILATERAL PRIMARY OSTEOARTHRITIS OF KNEE: ICD-10-CM

## 2024-11-11 PROCEDURE — 20610 DRAIN/INJ JOINT/BURSA W/O US: CPT | Mod: 50

## 2024-11-12 ENCOUNTER — NON-APPOINTMENT (OUTPATIENT)
Age: 54
End: 2024-11-12

## 2024-11-12 ENCOUNTER — APPOINTMENT (OUTPATIENT)
Dept: NEPHROLOGY | Facility: CLINIC | Age: 54
End: 2024-11-12
Payer: COMMERCIAL

## 2024-11-12 ENCOUNTER — LABORATORY RESULT (OUTPATIENT)
Age: 54
End: 2024-11-12

## 2024-11-12 VITALS
OXYGEN SATURATION: 98 % | HEIGHT: 71 IN | SYSTOLIC BLOOD PRESSURE: 142 MMHG | BODY MASS INDEX: 33.64 KG/M2 | WEIGHT: 240.3 LBS | DIASTOLIC BLOOD PRESSURE: 75 MMHG | HEART RATE: 71 BPM | TEMPERATURE: 97.9 F

## 2024-11-12 DIAGNOSIS — I10 ESSENTIAL (PRIMARY) HYPERTENSION: ICD-10-CM

## 2024-11-12 DIAGNOSIS — E55.9 VITAMIN D DEFICIENCY, UNSPECIFIED: ICD-10-CM

## 2024-11-12 DIAGNOSIS — R80.9 PROTEINURIA, UNSPECIFIED: ICD-10-CM

## 2024-11-12 DIAGNOSIS — N18.31 CHRONIC KIDNEY DISEASE, STAGE 3A: ICD-10-CM

## 2024-11-12 PROCEDURE — 99214 OFFICE O/P EST MOD 30 MIN: CPT

## 2024-11-12 PROCEDURE — G2211 COMPLEX E/M VISIT ADD ON: CPT | Mod: NC

## 2024-11-12 RX ORDER — DRONEDARONE 400 MG/1
400 TABLET, FILM COATED ORAL
Qty: 60 | Refills: 0 | Status: ACTIVE | COMMUNITY
Start: 2024-09-26

## 2024-11-13 LAB
25(OH)D3 SERPL-MCNC: 27.4 NG/ML
ALBUMIN SERPL ELPH-MCNC: 3.7 G/DL
ANION GAP SERPL CALC-SCNC: 12 MMOL/L
APPEARANCE: CLEAR
BACTERIA: NEGATIVE /HPF
BASOPHILS # BLD AUTO: 0 K/UL
BASOPHILS NFR BLD AUTO: 0 %
BILIRUBIN URINE: NEGATIVE
BLOOD URINE: NEGATIVE
BUN SERPL-MCNC: 31 MG/DL
CALCIUM SERPL-MCNC: 10 MG/DL
CALCIUM SERPL-MCNC: 10 MG/DL
CAST: 1 /LPF
CHLORIDE SERPL-SCNC: 106 MMOL/L
CO2 SERPL-SCNC: 21 MMOL/L
COLOR: YELLOW
CREAT SERPL-MCNC: 1.7 MG/DL
CREAT SPEC-SCNC: 55 MG/DL
CREAT/PROT UR: 0.3 RATIO
EGFR: 47 ML/MIN/1.73M2
EOSINOPHIL # BLD AUTO: 0.05 K/UL
EOSINOPHIL NFR BLD AUTO: 0.9 %
EPITHELIAL CELLS: 0 /HPF
GLUCOSE QUALITATIVE U: 500 MG/DL
GLUCOSE SERPL-MCNC: 110 MG/DL
HCT VFR BLD CALC: 24 %
HGB BLD-MCNC: 8.1 G/DL
KETONES URINE: NEGATIVE MG/DL
LEUKOCYTE ESTERASE URINE: NEGATIVE
LYMPHOCYTES # BLD AUTO: 1.58 K/UL
LYMPHOCYTES NFR BLD AUTO: 28.9 %
MAN DIFF?: NORMAL
MCHC RBC-ENTMCNC: 33.3 PG
MCHC RBC-ENTMCNC: 33.8 G/DL
MCV RBC AUTO: 98.8 FL
MICROSCOPIC-UA: NORMAL
MONOCYTES # BLD AUTO: 0.24 K/UL
MONOCYTES NFR BLD AUTO: 4.4 %
NEUTROPHILS # BLD AUTO: 3.6 K/UL
NEUTROPHILS NFR BLD AUTO: 65.8 %
NITRITE URINE: NEGATIVE
PARATHYROID HORMONE INTACT: 115 PG/ML
PH URINE: 6
PHOSPHATE SERPL-MCNC: 3 MG/DL
PLATELET # BLD AUTO: 278 K/UL
POTASSIUM SERPL-SCNC: 4.6 MMOL/L
PROT UR-MCNC: 16 MG/DL
PROTEIN URINE: NORMAL MG/DL
RBC # BLD: 2.43 M/UL
RBC # FLD: 25.3 %
RED BLOOD CELLS URINE: 0 /HPF
SODIUM SERPL-SCNC: 139 MMOL/L
SPECIFIC GRAVITY URINE: 1.01
UROBILINOGEN URINE: 0.2 MG/DL
WBC # FLD AUTO: 5.47 K/UL
WHITE BLOOD CELLS URINE: 0 /HPF

## 2024-11-27 ENCOUNTER — APPOINTMENT (OUTPATIENT)
Dept: NEPHROLOGY | Facility: CLINIC | Age: 54
End: 2024-11-27

## 2024-11-27 VITALS
SYSTOLIC BLOOD PRESSURE: 133 MMHG | TEMPERATURE: 97.4 F | DIASTOLIC BLOOD PRESSURE: 71 MMHG | HEIGHT: 71 IN | OXYGEN SATURATION: 96 % | HEART RATE: 69 BPM

## 2024-12-02 ENCOUNTER — NON-APPOINTMENT (OUTPATIENT)
Age: 54
End: 2024-12-02

## 2024-12-03 ENCOUNTER — APPOINTMENT (OUTPATIENT)
Dept: INTERNAL MEDICINE | Facility: CLINIC | Age: 54
End: 2024-12-03
Payer: COMMERCIAL

## 2024-12-03 ENCOUNTER — OUTPATIENT (OUTPATIENT)
Dept: OUTPATIENT SERVICES | Facility: HOSPITAL | Age: 54
LOS: 1 days | End: 2024-12-03

## 2024-12-03 VITALS
WEIGHT: 232 LBS | BODY MASS INDEX: 32.36 KG/M2 | OXYGEN SATURATION: 98 % | HEART RATE: 75 BPM | DIASTOLIC BLOOD PRESSURE: 70 MMHG | SYSTOLIC BLOOD PRESSURE: 120 MMHG

## 2024-12-03 DIAGNOSIS — D57.1 SICKLE-CELL DISEASE W/OUT CRISIS: ICD-10-CM

## 2024-12-03 DIAGNOSIS — N18.2 CHRONIC KIDNEY DISEASE, STAGE 2 (MILD): ICD-10-CM

## 2024-12-03 DIAGNOSIS — Z90.49 ACQUIRED ABSENCE OF OTHER SPECIFIED PARTS OF DIGESTIVE TRACT: Chronic | ICD-10-CM

## 2024-12-03 DIAGNOSIS — R80.9 PROTEINURIA, UNSPECIFIED: ICD-10-CM

## 2024-12-03 DIAGNOSIS — E83.19 OTHER DISORDERS OF IRON METABOLISM: ICD-10-CM

## 2024-12-03 PROCEDURE — G2211 COMPLEX E/M VISIT ADD ON: CPT | Mod: NC

## 2024-12-03 PROCEDURE — 99214 OFFICE O/P EST MOD 30 MIN: CPT

## 2024-12-11 DIAGNOSIS — E83.19 OTHER DISORDERS OF IRON METABOLISM: ICD-10-CM

## 2024-12-11 DIAGNOSIS — D57.1 SICKLE-CELL DISEASE WITHOUT CRISIS: ICD-10-CM

## 2024-12-11 DIAGNOSIS — N18.2 CHRONIC KIDNEY DISEASE, STAGE 2 (MILD): ICD-10-CM

## 2024-12-11 DIAGNOSIS — R80.9 PROTEINURIA, UNSPECIFIED: ICD-10-CM

## 2025-01-08 ENCOUNTER — APPOINTMENT (OUTPATIENT)
Dept: NEPHROLOGY | Facility: CLINIC | Age: 55
End: 2025-01-08

## 2025-02-03 ENCOUNTER — NON-APPOINTMENT (OUTPATIENT)
Age: 55
End: 2025-02-03

## 2025-02-04 ENCOUNTER — LABORATORY RESULT (OUTPATIENT)
Age: 55
End: 2025-02-04

## 2025-02-04 ENCOUNTER — OUTPATIENT (OUTPATIENT)
Dept: OUTPATIENT SERVICES | Facility: HOSPITAL | Age: 55
LOS: 1 days | End: 2025-02-04

## 2025-02-04 ENCOUNTER — APPOINTMENT (OUTPATIENT)
Dept: INTERNAL MEDICINE | Facility: CLINIC | Age: 55
End: 2025-02-04
Payer: COMMERCIAL

## 2025-02-04 VITALS — BODY MASS INDEX: 34.3 KG/M2 | HEART RATE: 100 BPM | OXYGEN SATURATION: 99 % | WEIGHT: 245 LBS | HEIGHT: 71 IN

## 2025-02-04 VITALS — DIASTOLIC BLOOD PRESSURE: 70 MMHG | SYSTOLIC BLOOD PRESSURE: 120 MMHG

## 2025-02-04 DIAGNOSIS — R80.9 PROTEINURIA, UNSPECIFIED: ICD-10-CM

## 2025-02-04 DIAGNOSIS — D57.1 SICKLE-CELL DISEASE W/OUT CRISIS: ICD-10-CM

## 2025-02-04 DIAGNOSIS — M10.9 GOUT, UNSPECIFIED: ICD-10-CM

## 2025-02-04 DIAGNOSIS — N18.2 CHRONIC KIDNEY DISEASE, STAGE 2 (MILD): ICD-10-CM

## 2025-02-04 DIAGNOSIS — I48.92 UNSPECIFIED ATRIAL FLUTTER: ICD-10-CM

## 2025-02-04 DIAGNOSIS — E83.19 OTHER DISORDERS OF IRON METABOLISM: ICD-10-CM

## 2025-02-04 DIAGNOSIS — Z90.49 ACQUIRED ABSENCE OF OTHER SPECIFIED PARTS OF DIGESTIVE TRACT: Chronic | ICD-10-CM

## 2025-02-04 PROCEDURE — G2211 COMPLEX E/M VISIT ADD ON: CPT | Mod: NC

## 2025-02-04 PROCEDURE — 99214 OFFICE O/P EST MOD 30 MIN: CPT

## 2025-02-05 LAB
25(OH)D3 SERPL-MCNC: 30.2 NG/ML
ALBUMIN SERPL ELPH-MCNC: 4 G/DL
ALP BLD-CCNC: 129 U/L
ALT SERPL-CCNC: 15 U/L
ANION GAP SERPL CALC-SCNC: 14 MMOL/L
APPEARANCE: CLEAR
AST SERPL-CCNC: 22 U/L
BASOPHILS # BLD AUTO: 0.03 K/UL
BASOPHILS NFR BLD AUTO: 0.9 %
BILIRUB SERPL-MCNC: 0.4 MG/DL
BILIRUBIN URINE: NEGATIVE
BLOOD URINE: NEGATIVE
BUN SERPL-MCNC: 27 MG/DL
CALCIUM SERPL-MCNC: 10.2 MG/DL
CHLORIDE SERPL-SCNC: 109 MMOL/L
CO2 SERPL-SCNC: 17 MMOL/L
COLOR: YELLOW
CREAT SERPL-MCNC: 1.54 MG/DL
EGFR: 53 ML/MIN/1.73M2
EOSINOPHIL # BLD AUTO: 0 K/UL
EOSINOPHIL NFR BLD AUTO: 0 %
FERRITIN SERPL-MCNC: 2017 NG/ML
GLUCOSE QUALITATIVE U: 100 MG/DL
GLUCOSE SERPL-MCNC: 89 MG/DL
HCT VFR BLD CALC: 25.1 %
HGB BLD-MCNC: 8.2 G/DL
KETONES URINE: NEGATIVE MG/DL
LEUKOCYTE ESTERASE URINE: NEGATIVE
LYMPHOCYTES # BLD AUTO: 0.92 K/UL
LYMPHOCYTES NFR BLD AUTO: 23.9 %
MAN DIFF?: NORMAL
MCHC RBC-ENTMCNC: 32.7 G/DL
MCHC RBC-ENTMCNC: 33.7 PG
MCV RBC AUTO: 103.3 FL
MONOCYTES # BLD AUTO: 0.31 K/UL
MONOCYTES NFR BLD AUTO: 8 %
NEUTROPHILS # BLD AUTO: 2.59 K/UL
NEUTROPHILS NFR BLD AUTO: 67.2 %
NITRITE URINE: NEGATIVE
PH URINE: 6
PLATELET # BLD AUTO: 244 K/UL
POTASSIUM SERPL-SCNC: 4.6 MMOL/L
PROT SERPL-MCNC: 6.9 G/DL
PROTEIN URINE: 30 MG/DL
RBC # BLD: 2.43 M/UL
RBC # BLD: 2.43 M/UL
RBC # FLD: 23.1 %
RETICS # AUTO: 3.2 %
RETICS AGGREG/RBC NFR: 78 K/UL
SODIUM SERPL-SCNC: 140 MMOL/L
SPECIFIC GRAVITY URINE: 1.01
UROBILINOGEN URINE: 0.2 MG/DL
WBC # FLD AUTO: 3.86 K/UL

## 2025-02-07 LAB
HGB A MFR BLD: 57 %
HGB A2 MFR BLD: 2.5 %
HGB F MFR BLD: 14.6 %
HGB FRACT BLD-IMP: NORMAL
HGB S MFR BLD: 25.9 %

## 2025-02-08 DIAGNOSIS — M10.9 GOUT, UNSPECIFIED: ICD-10-CM

## 2025-02-08 DIAGNOSIS — D57.1 SICKLE-CELL DISEASE WITHOUT CRISIS: ICD-10-CM

## 2025-02-08 DIAGNOSIS — R80.9 PROTEINURIA, UNSPECIFIED: ICD-10-CM

## 2025-02-08 DIAGNOSIS — N18.2 CHRONIC KIDNEY DISEASE, STAGE 2 (MILD): ICD-10-CM

## 2025-02-08 DIAGNOSIS — I48.92 UNSPECIFIED ATRIAL FLUTTER: ICD-10-CM

## 2025-02-08 DIAGNOSIS — E83.19 OTHER DISORDERS OF IRON METABOLISM: ICD-10-CM

## 2025-02-14 ENCOUNTER — NON-APPOINTMENT (OUTPATIENT)
Age: 55
End: 2025-02-14

## 2025-03-10 ENCOUNTER — NON-APPOINTMENT (OUTPATIENT)
Age: 55
End: 2025-03-10

## 2025-04-07 ENCOUNTER — APPOINTMENT (OUTPATIENT)
Dept: ORTHOPEDIC SURGERY | Facility: CLINIC | Age: 55
End: 2025-04-07

## 2025-04-14 ENCOUNTER — NON-APPOINTMENT (OUTPATIENT)
Age: 55
End: 2025-04-14

## 2025-04-15 ENCOUNTER — LABORATORY RESULT (OUTPATIENT)
Age: 55
End: 2025-04-15

## 2025-04-15 ENCOUNTER — OUTPATIENT (OUTPATIENT)
Dept: OUTPATIENT SERVICES | Facility: HOSPITAL | Age: 55
LOS: 1 days | End: 2025-04-15

## 2025-04-15 ENCOUNTER — APPOINTMENT (OUTPATIENT)
Dept: INTERNAL MEDICINE | Facility: CLINIC | Age: 55
End: 2025-04-15
Payer: COMMERCIAL

## 2025-04-15 VITALS
OXYGEN SATURATION: 98 % | DIASTOLIC BLOOD PRESSURE: 70 MMHG | BODY MASS INDEX: 33.6 KG/M2 | SYSTOLIC BLOOD PRESSURE: 120 MMHG | HEIGHT: 71 IN | HEART RATE: 80 BPM | WEIGHT: 240 LBS

## 2025-04-15 DIAGNOSIS — N18.31 CHRONIC KIDNEY DISEASE, STAGE 3A: ICD-10-CM

## 2025-04-15 DIAGNOSIS — M10.9 GOUT, UNSPECIFIED: ICD-10-CM

## 2025-04-15 DIAGNOSIS — R80.9 PROTEINURIA, UNSPECIFIED: ICD-10-CM

## 2025-04-15 DIAGNOSIS — Z90.49 ACQUIRED ABSENCE OF OTHER SPECIFIED PARTS OF DIGESTIVE TRACT: Chronic | ICD-10-CM

## 2025-04-15 DIAGNOSIS — I10 ESSENTIAL (PRIMARY) HYPERTENSION: ICD-10-CM

## 2025-04-15 DIAGNOSIS — D57.1 SICKLE-CELL DISEASE W/OUT CRISIS: ICD-10-CM

## 2025-04-15 DIAGNOSIS — D57.1 SICKLE-CELL DISEASE WITHOUT CRISIS: ICD-10-CM

## 2025-04-15 PROCEDURE — 99214 OFFICE O/P EST MOD 30 MIN: CPT

## 2025-04-15 PROCEDURE — G2211 COMPLEX E/M VISIT ADD ON: CPT | Mod: NC

## 2025-04-16 LAB
25(OH)D3 SERPL-MCNC: 25.6 NG/ML
ALBUMIN SERPL ELPH-MCNC: 4 G/DL
ALP BLD-CCNC: 122 U/L
ALT SERPL-CCNC: 19 U/L
ANION GAP SERPL CALC-SCNC: 13 MMOL/L
APPEARANCE: CLEAR
AST SERPL-CCNC: 24 U/L
BASOPHILS # BLD AUTO: 0 K/UL
BASOPHILS NFR BLD AUTO: 0 %
BILIRUB SERPL-MCNC: 0.4 MG/DL
BILIRUBIN URINE: NEGATIVE
BLOOD URINE: NEGATIVE
BUN SERPL-MCNC: 31 MG/DL
CALCIUM SERPL-MCNC: 10.3 MG/DL
CHLORIDE SERPL-SCNC: 108 MMOL/L
CO2 SERPL-SCNC: 16 MMOL/L
COLOR: YELLOW
CREAT SERPL-MCNC: 2.07 MG/DL
EGFRCR SERPLBLD CKD-EPI 2021: 37 ML/MIN/1.73M2
EOSINOPHIL # BLD AUTO: 0 K/UL
EOSINOPHIL NFR BLD AUTO: 0 %
FERRITIN SERPL-MCNC: 2086 NG/ML
GLUCOSE QUALITATIVE U: 100 MG/DL
GLUCOSE SERPL-MCNC: 101 MG/DL
HCT VFR BLD CALC: 24.7 %
HGB A MFR BLD: 64.5 %
HGB A2 MFR BLD: 2.6 %
HGB BLD-MCNC: 8.2 G/DL
HGB F MFR BLD: 11.7 %
HGB FRACT BLD-IMP: NORMAL
HGB S MFR BLD: 21.2 %
KETONES URINE: NEGATIVE MG/DL
LEUKOCYTE ESTERASE URINE: NEGATIVE
LYMPHOCYTES # BLD AUTO: 0.93 K/UL
LYMPHOCYTES NFR BLD AUTO: 19.4 %
MAN DIFF?: NORMAL
MCHC RBC-ENTMCNC: 30.5 PG
MCHC RBC-ENTMCNC: 33.2 G/DL
MCV RBC AUTO: 91.8 FL
MONOCYTES # BLD AUTO: 0.22 K/UL
MONOCYTES NFR BLD AUTO: 4.6 %
NEUTROPHILS # BLD AUTO: 3.51 K/UL
NEUTROPHILS NFR BLD AUTO: 73.2 %
NITRITE URINE: NEGATIVE
PH URINE: 5.5
PLATELET # BLD AUTO: 185 K/UL
POTASSIUM SERPL-SCNC: 4.4 MMOL/L
PROT SERPL-MCNC: 7.1 G/DL
PROTEIN URINE: NORMAL MG/DL
RBC # BLD: 2.69 M/UL
RBC # BLD: 2.69 M/UL
RBC # FLD: 30.4 %
RETICS # AUTO: 2.7 %
RETICS AGGREG/RBC NFR: 72.6 K/UL
SODIUM SERPL-SCNC: 137 MMOL/L
SPECIFIC GRAVITY URINE: 1.01
URATE SERPL-MCNC: 6.7 MG/DL
UROBILINOGEN URINE: 0.2 MG/DL
WBC # FLD AUTO: 4.8 K/UL

## 2025-05-04 ENCOUNTER — INPATIENT (INPATIENT)
Facility: HOSPITAL | Age: 55
LOS: 2 days | Discharge: HOME | DRG: 948 | End: 2025-05-07
Attending: STUDENT IN AN ORGANIZED HEALTH CARE EDUCATION/TRAINING PROGRAM | Admitting: INTERNAL MEDICINE
Payer: COMMERCIAL

## 2025-05-04 VITALS
HEIGHT: 71 IN | WEIGHT: 240.08 LBS | DIASTOLIC BLOOD PRESSURE: 74 MMHG | TEMPERATURE: 98 F | RESPIRATION RATE: 18 BRPM | OXYGEN SATURATION: 98 % | HEART RATE: 71 BPM | SYSTOLIC BLOOD PRESSURE: 125 MMHG

## 2025-05-04 DIAGNOSIS — Z90.49 ACQUIRED ABSENCE OF OTHER SPECIFIED PARTS OF DIGESTIVE TRACT: Chronic | ICD-10-CM

## 2025-05-04 DIAGNOSIS — R53.1 WEAKNESS: ICD-10-CM

## 2025-05-04 DIAGNOSIS — I48.0 PAROXYSMAL ATRIAL FIBRILLATION: ICD-10-CM

## 2025-05-04 DIAGNOSIS — D57.1 SICKLE-CELL DISEASE WITHOUT CRISIS: ICD-10-CM

## 2025-05-04 DIAGNOSIS — D64.9 ANEMIA, UNSPECIFIED: ICD-10-CM

## 2025-05-04 LAB
ADD ON TEST-SPECIMEN IN LAB: SIGNIFICANT CHANGE UP
ADD ON TEST-SPECIMEN IN LAB: SIGNIFICANT CHANGE UP
ALBUMIN SERPL ELPH-MCNC: 3.7 G/DL — SIGNIFICANT CHANGE UP (ref 3.3–5)
ALP SERPL-CCNC: 123 U/L — HIGH (ref 40–120)
ALT FLD-CCNC: 22 U/L — SIGNIFICANT CHANGE UP (ref 10–45)
ANION GAP SERPL CALC-SCNC: 11 MMOL/L — SIGNIFICANT CHANGE UP (ref 5–17)
ANISOCYTOSIS BLD QL: SLIGHT — SIGNIFICANT CHANGE UP
APTT BLD: 33.7 SEC — SIGNIFICANT CHANGE UP (ref 26.1–36.8)
AST SERPL-CCNC: 18 U/L — SIGNIFICANT CHANGE UP (ref 10–40)
BASOPHILS # BLD AUTO: 0 K/UL — SIGNIFICANT CHANGE UP (ref 0–0.2)
BASOPHILS NFR BLD AUTO: 0 % — SIGNIFICANT CHANGE UP (ref 0–2)
BILIRUB SERPL-MCNC: 0.3 MG/DL — SIGNIFICANT CHANGE UP (ref 0.2–1.2)
BLD GP AB SCN SERPL QL: NEGATIVE — SIGNIFICANT CHANGE UP
BUN SERPL-MCNC: 36 MG/DL — HIGH (ref 7–23)
CALCIUM SERPL-MCNC: 9.9 MG/DL — SIGNIFICANT CHANGE UP (ref 8.4–10.5)
CHLORIDE SERPL-SCNC: 107 MMOL/L — SIGNIFICANT CHANGE UP (ref 96–108)
CO2 SERPL-SCNC: 18 MMOL/L — LOW (ref 22–31)
CREAT SERPL-MCNC: 2.06 MG/DL — HIGH (ref 0.5–1.3)
EGFR: 37 ML/MIN/1.73M2 — LOW
EGFR: 37 ML/MIN/1.73M2 — LOW
ELLIPTOCYTES BLD QL SMEAR: SLIGHT — SIGNIFICANT CHANGE UP
EOSINOPHIL # BLD AUTO: 0.05 K/UL — SIGNIFICANT CHANGE UP (ref 0–0.5)
EOSINOPHIL NFR BLD AUTO: 0.9 % — SIGNIFICANT CHANGE UP (ref 0–6)
FERRITIN SERPL-MCNC: 2070 NG/ML — HIGH (ref 30–400)
FLUAV AG NPH QL: SIGNIFICANT CHANGE UP
FLUBV AG NPH QL: SIGNIFICANT CHANGE UP
FOLATE SERPL-MCNC: 5.6 NG/ML — SIGNIFICANT CHANGE UP
GAS PNL BLDV: SIGNIFICANT CHANGE UP
GIANT PLATELETS BLD QL SMEAR: PRESENT — SIGNIFICANT CHANGE UP
GLUCOSE SERPL-MCNC: 117 MG/DL — HIGH (ref 70–99)
HAPTOGLOB SERPL-MCNC: <20 MG/DL — LOW (ref 34–200)
HCT VFR BLD CALC: 14 % — CRITICAL LOW (ref 39–50)
HCT VFR BLD CALC: 27.6 % — LOW (ref 39–50)
HGB BLD-MCNC: 4.5 G/DL — CRITICAL LOW (ref 13–17)
HGB BLD-MCNC: 9.1 G/DL — LOW (ref 13–17)
HOWELL-JOLLY BOD BLD QL SMEAR: PRESENT — SIGNIFICANT CHANGE UP
INR BLD: 1.32 RATIO — HIGH (ref 0.85–1.16)
IRON SATN MFR SERPL: 241 UG/DL — HIGH (ref 45–165)
IRON SATN MFR SERPL: 63 % — HIGH (ref 16–55)
LACTATE SERPL-SCNC: 1.3 MMOL/L — SIGNIFICANT CHANGE UP (ref 0.5–2)
LDH SERPL L TO P-CCNC: 233 U/L — SIGNIFICANT CHANGE UP (ref 50–242)
LYMPHOCYTES # BLD AUTO: 1.58 K/UL — SIGNIFICANT CHANGE UP (ref 1–3.3)
LYMPHOCYTES # BLD AUTO: 29.5 % — SIGNIFICANT CHANGE UP (ref 13–44)
MACROCYTES BLD QL: SLIGHT — SIGNIFICANT CHANGE UP
MAGNESIUM SERPL-MCNC: 2.4 MG/DL — SIGNIFICANT CHANGE UP (ref 1.6–2.6)
MANUAL SMEAR VERIFICATION: SIGNIFICANT CHANGE UP
MCHC RBC-ENTMCNC: 30.4 PG — SIGNIFICANT CHANGE UP (ref 27–34)
MCHC RBC-ENTMCNC: 30.6 PG — SIGNIFICANT CHANGE UP (ref 27–34)
MCHC RBC-ENTMCNC: 32.1 G/DL — SIGNIFICANT CHANGE UP (ref 32–36)
MCHC RBC-ENTMCNC: 33 G/DL — SIGNIFICANT CHANGE UP (ref 32–36)
MCV RBC AUTO: 92.3 FL — SIGNIFICANT CHANGE UP (ref 80–100)
MCV RBC AUTO: 95.2 FL — SIGNIFICANT CHANGE UP (ref 80–100)
MONOCYTES # BLD AUTO: 0.28 K/UL — SIGNIFICANT CHANGE UP (ref 0–0.9)
MONOCYTES NFR BLD AUTO: 5.3 % — SIGNIFICANT CHANGE UP (ref 2–14)
NEUTROPHILS # BLD AUTO: 3.45 K/UL — SIGNIFICANT CHANGE UP (ref 1.8–7.4)
NEUTROPHILS NFR BLD AUTO: 64.3 % — SIGNIFICANT CHANGE UP (ref 43–77)
NRBC # BLD: 10 /100 WBCS — HIGH (ref 0–0)
NRBC BLD AUTO-RTO: 22 /100 WBCS — HIGH (ref 0–0)
NRBC BLD-RTO: 10 /100 WBCS — HIGH (ref 0–0)
PHOSPHATE SERPL-MCNC: 2.9 MG/DL — SIGNIFICANT CHANGE UP (ref 2.5–4.5)
PLAT MORPH BLD: NORMAL — SIGNIFICANT CHANGE UP
PLATELET # BLD AUTO: 180 K/UL — SIGNIFICANT CHANGE UP (ref 150–400)
PLATELET # BLD AUTO: 226 K/UL — SIGNIFICANT CHANGE UP (ref 150–400)
POIKILOCYTOSIS BLD QL AUTO: SLIGHT — SIGNIFICANT CHANGE UP
POTASSIUM SERPL-MCNC: 4.3 MMOL/L — SIGNIFICANT CHANGE UP (ref 3.5–5.3)
POTASSIUM SERPL-SCNC: 4.3 MMOL/L — SIGNIFICANT CHANGE UP (ref 3.5–5.3)
PROT SERPL-MCNC: 7 G/DL — SIGNIFICANT CHANGE UP (ref 6–8.3)
PROTHROM AB SERPL-ACNC: 15.1 SEC — HIGH (ref 9.9–13.4)
RBC # BLD: 1.47 M/UL — LOW (ref 4.2–5.8)
RBC # BLD: 1.47 M/UL — LOW (ref 4.2–5.8)
RBC # BLD: 2.99 M/UL — LOW (ref 4.2–5.8)
RBC # FLD: 24.3 % — HIGH (ref 10.3–14.5)
RBC # FLD: 29.2 % — HIGH (ref 10.3–14.5)
RBC BLD AUTO: ABNORMAL
RETICS #: 39.4 K/UL — SIGNIFICANT CHANGE UP (ref 25–125)
RETICS/RBC NFR: 2.7 % — HIGH (ref 0.5–2.5)
RH IG SCN BLD-IMP: POSITIVE — SIGNIFICANT CHANGE UP
RSV RNA NPH QL NAA+NON-PROBE: SIGNIFICANT CHANGE UP
SARS-COV-2 RNA SPEC QL NAA+PROBE: SIGNIFICANT CHANGE UP
SCHISTOCYTES BLD QL AUTO: SLIGHT — SIGNIFICANT CHANGE UP
SODIUM SERPL-SCNC: 136 MMOL/L — SIGNIFICANT CHANGE UP (ref 135–145)
SOURCE RESPIRATORY: SIGNIFICANT CHANGE UP
TARGETS BLD QL SMEAR: SLIGHT — SIGNIFICANT CHANGE UP
TIBC SERPL-MCNC: 381 UG/DL — SIGNIFICANT CHANGE UP (ref 220–430)
UIBC SERPL-MCNC: 140 UG/DL — SIGNIFICANT CHANGE UP (ref 110–370)
VIT B12 SERPL-MCNC: 476 PG/ML — SIGNIFICANT CHANGE UP (ref 232–1245)
WBC # BLD: 3.76 K/UL — LOW (ref 3.8–10.5)
WBC # BLD: 5.37 K/UL — SIGNIFICANT CHANGE UP (ref 3.8–10.5)
WBC # FLD AUTO: 3.76 K/UL — LOW (ref 3.8–10.5)
WBC # FLD AUTO: 5.37 K/UL — SIGNIFICANT CHANGE UP (ref 3.8–10.5)

## 2025-05-04 PROCEDURE — 93010 ELECTROCARDIOGRAM REPORT: CPT

## 2025-05-04 PROCEDURE — 83020 HEMOGLOBIN ELECTROPHORESIS: CPT | Mod: 26

## 2025-05-04 PROCEDURE — 99254 IP/OBS CNSLTJ NEW/EST MOD 60: CPT | Mod: GC

## 2025-05-04 PROCEDURE — 71045 X-RAY EXAM CHEST 1 VIEW: CPT | Mod: 26

## 2025-05-04 PROCEDURE — 99291 CRITICAL CARE FIRST HOUR: CPT

## 2025-05-04 PROCEDURE — 93010 ELECTROCARDIOGRAM REPORT: CPT | Mod: 77

## 2025-05-04 RX ORDER — DRONEDARONE 400 MG/1
400 TABLET, FILM COATED ORAL
Refills: 0 | Status: DISCONTINUED | OUTPATIENT
Start: 2025-05-04 | End: 2025-05-07

## 2025-05-04 RX ORDER — DIPHENHYDRAMINE HCL 12.5MG/5ML
25 ELIXIR ORAL ONCE
Refills: 0 | Status: COMPLETED | OUTPATIENT
Start: 2025-05-04 | End: 2025-05-04

## 2025-05-04 RX ORDER — SODIUM CHLORIDE 9 G/1000ML
1000 INJECTION, SOLUTION INTRAVENOUS
Refills: 0 | Status: DISCONTINUED | OUTPATIENT
Start: 2025-05-04 | End: 2025-05-07

## 2025-05-04 RX ORDER — DAPAGLIFLOZIN 5 MG/1
1 TABLET, FILM COATED ORAL
Refills: 0 | DISCHARGE

## 2025-05-04 RX ORDER — AMLODIPINE BESYLATE 10 MG/1
1 TABLET ORAL
Refills: 0 | DISCHARGE

## 2025-05-04 RX ORDER — LISINOPRIL 5 MG/1
1 TABLET ORAL
Refills: 0 | DISCHARGE

## 2025-05-04 RX ORDER — HYDROMORPHONE/SOD CHLOR,ISO/PF 2 MG/10 ML
2 SYRINGE (ML) INJECTION EVERY 4 HOURS
Refills: 0 | Status: DISCONTINUED | OUTPATIENT
Start: 2025-05-04 | End: 2025-05-05

## 2025-05-04 RX ORDER — SODIUM CHLORIDE 9 G/1000ML
1000 INJECTION, SOLUTION INTRAVENOUS
Refills: 0 | Status: DISCONTINUED | OUTPATIENT
Start: 2025-05-04 | End: 2025-05-04

## 2025-05-04 RX ORDER — FOLIC ACID 1 MG/1
1 TABLET ORAL DAILY
Refills: 0 | Status: DISCONTINUED | OUTPATIENT
Start: 2025-05-04 | End: 2025-05-07

## 2025-05-04 RX ORDER — ONDANSETRON HCL/PF 4 MG/2 ML
4 VIAL (ML) INJECTION EVERY 8 HOURS
Refills: 0 | Status: DISCONTINUED | OUTPATIENT
Start: 2025-05-04 | End: 2025-05-04

## 2025-05-04 RX ORDER — ONDANSETRON HCL/PF 4 MG/2 ML
4 VIAL (ML) INJECTION ONCE
Refills: 0 | Status: COMPLETED | OUTPATIENT
Start: 2025-05-04 | End: 2025-05-07

## 2025-05-04 RX ORDER — HYDROMORPHONE/SOD CHLOR,ISO/PF 2 MG/10 ML
2 SYRINGE (ML) INJECTION ONCE
Refills: 0 | Status: DISCONTINUED | OUTPATIENT
Start: 2025-05-04 | End: 2025-05-04

## 2025-05-04 RX ORDER — AMLODIPINE BESYLATE 10 MG/1
2.5 TABLET ORAL DAILY
Refills: 0 | Status: DISCONTINUED | OUTPATIENT
Start: 2025-05-04 | End: 2025-05-07

## 2025-05-04 RX ORDER — METOPROLOL SUCCINATE 50 MG/1
75 TABLET, EXTENDED RELEASE ORAL DAILY
Refills: 0 | Status: DISCONTINUED | OUTPATIENT
Start: 2025-05-04 | End: 2025-05-07

## 2025-05-04 RX ORDER — SENNA 187 MG
2 TABLET ORAL
Refills: 0 | DISCHARGE

## 2025-05-04 RX ORDER — LISINOPRIL 5 MG/1
10 TABLET ORAL
Refills: 0 | Status: DISCONTINUED | OUTPATIENT
Start: 2025-05-04 | End: 2025-05-07

## 2025-05-04 RX ORDER — HYDROXYUREA 500 MG/1
1500 CAPSULE ORAL DAILY
Refills: 0 | Status: DISCONTINUED | OUTPATIENT
Start: 2025-05-04 | End: 2025-05-04

## 2025-05-04 RX ORDER — ONDANSETRON HCL/PF 4 MG/2 ML
4 VIAL (ML) INJECTION ONCE
Refills: 0 | Status: COMPLETED | OUTPATIENT
Start: 2025-05-04 | End: 2025-05-04

## 2025-05-04 RX ORDER — SODIUM CHLORIDE 9 G/1000ML
1000 INJECTION, SOLUTION INTRAVENOUS ONCE
Refills: 0 | Status: COMPLETED | OUTPATIENT
Start: 2025-05-04 | End: 2025-05-04

## 2025-05-04 RX ORDER — DIPHENHYDRAMINE HCL 12.5MG/5ML
25 ELIXIR ORAL ONCE
Refills: 0 | Status: DISCONTINUED | OUTPATIENT
Start: 2025-05-04 | End: 2025-05-04

## 2025-05-04 RX ORDER — APIXABAN 2.5 MG/1
5 TABLET, FILM COATED ORAL EVERY 12 HOURS
Refills: 0 | Status: DISCONTINUED | OUTPATIENT
Start: 2025-05-04 | End: 2025-05-07

## 2025-05-04 RX ORDER — HYDROMORPHONE/SOD CHLOR,ISO/PF 2 MG/10 ML
2 SYRINGE (ML) INJECTION EVERY 4 HOURS
Refills: 0 | Status: DISCONTINUED | OUTPATIENT
Start: 2025-05-04 | End: 2025-05-04

## 2025-05-04 RX ORDER — ACETAMINOPHEN 500 MG/5ML
1000 LIQUID (ML) ORAL ONCE
Refills: 0 | Status: COMPLETED | OUTPATIENT
Start: 2025-05-04 | End: 2025-05-04

## 2025-05-04 RX ORDER — DIPHENHYDRAMINE HCL 12.5MG/5ML
25 ELIXIR ORAL EVERY 6 HOURS
Refills: 0 | Status: DISCONTINUED | OUTPATIENT
Start: 2025-05-04 | End: 2025-05-07

## 2025-05-04 RX ADMIN — Medication 25 MILLIGRAM(S): at 08:14

## 2025-05-04 RX ADMIN — FOLIC ACID 1 MILLIGRAM(S): 1 TABLET ORAL at 11:41

## 2025-05-04 RX ADMIN — Medication 2 MILLIGRAM(S): at 15:41

## 2025-05-04 RX ADMIN — Medication 2 MILLIGRAM(S): at 06:46

## 2025-05-04 RX ADMIN — Medication 2 MILLIGRAM(S): at 16:40

## 2025-05-04 RX ADMIN — Medication 400 MILLIGRAM(S): at 06:47

## 2025-05-04 RX ADMIN — SODIUM CHLORIDE 100 MILLILITER(S): 9 INJECTION, SOLUTION INTRAVENOUS at 19:39

## 2025-05-04 RX ADMIN — LISINOPRIL 10 MILLIGRAM(S): 5 TABLET ORAL at 17:23

## 2025-05-04 RX ADMIN — Medication 2 MILLIGRAM(S): at 08:05

## 2025-05-04 RX ADMIN — Medication 25 MILLIGRAM(S): at 18:29

## 2025-05-04 RX ADMIN — APIXABAN 5 MILLIGRAM(S): 2.5 TABLET, FILM COATED ORAL at 17:23

## 2025-05-04 RX ADMIN — Medication 2 MILLIGRAM(S): at 19:41

## 2025-05-04 RX ADMIN — Medication 2 MILLIGRAM(S): at 20:15

## 2025-05-04 RX ADMIN — Medication 25 MILLIGRAM(S): at 09:08

## 2025-05-04 RX ADMIN — DRONEDARONE 400 MILLIGRAM(S): 400 TABLET, FILM COATED ORAL at 17:24

## 2025-05-04 RX ADMIN — Medication 4 MILLIGRAM(S): at 08:14

## 2025-05-04 RX ADMIN — Medication 2 MILLIGRAM(S): at 11:44

## 2025-05-04 RX ADMIN — Medication 2 MILLIGRAM(S): at 23:45

## 2025-05-04 RX ADMIN — Medication 2 MILLIGRAM(S): at 12:45

## 2025-05-04 RX ADMIN — SODIUM CHLORIDE 1000 MILLILITER(S): 9 INJECTION, SOLUTION INTRAVENOUS at 06:47

## 2025-05-04 RX ADMIN — SODIUM CHLORIDE 100 MILLILITER(S): 9 INJECTION, SOLUTION INTRAVENOUS at 15:22

## 2025-05-04 NOTE — PATIENT PROFILE ADULT - FALL HARM RISK - HARM RISK INTERVENTIONS

## 2025-05-04 NOTE — ED PROCEDURE NOTE - CPROC ED NEEDLE GAUGE1
Instructions: This plan will send the code FBSE to the PM system.  DO NOT or CHANGE the price.
Price (Do Not Change): 0.00
Detail Level: Simple
20

## 2025-05-04 NOTE — ED PROVIDER NOTE - SECONDARY DIAGNOSIS.
Sickle cell pain crisis Sickle cell disease Paroxysmal atrial fibrillation Chronic kidney disease (CKD)

## 2025-05-04 NOTE — ED PROCEDURE NOTE - CPROC ED POST PROC CARE GUIDE1
Verbal/written post procedure instructions were given to patient/caregiver./Instructed patient/caregiver to follow-up with primary care physician./Instructed patient/caregiver regarding signs and symptoms of infection. Verbal/written post procedure instructions were given to patient/caregiver./Instructed patient/caregiver regarding signs and symptoms of infection./Keep the cast/splint/dressing clean and dry.

## 2025-05-04 NOTE — ED PROVIDER NOTE - ATTENDING CONTRIBUTION TO CARE
Jasper Montague MD (Attending Physician):    I performed a history and physical exam of the patient and discussed their management with the resident/fellow/ACP/student. I have reviewed the resident/fellow/ACP/student note and agree with the documented findings and plan of care, except as noted. I have personally performed a substantive portion of the visit including all aspects of the medical decision making. My medical decision making and observations are found below. Please refer to any progress notes for updates on clinical course.    HPI:  54 year old male with pmhx of sickle cell anemia, paroxysmal afib on Eliquis (last dose Tues am 3/5/24), PE (pt denies), HTN, peripheral venous insufficiency, GERD, gout presenting w/ generalized weakness and sickle cell crisis pain. Patient recently admitted March 8 for ALFREDO on CKD and positive nuclear stress test, noted to have symptomatic anemia for which he received 1 unit pRBC transfusion and was discharged home. Patient reports symptoms since Wednesday, initially managed with home supply of Percocet 5/325 however due to worsening of pain patient presented to ED. Pain is sharp, localized to bilateral shoulders, chest, abdomen, legs, and hips despite taking home pain medications. Patient follows with Dr. Veloz for sickle cell disease as well as is Dr. Danny Herrera for hematology. Patient endorses receiving weekly injections (Epo -> Araneft)  to treat anemia, however expressed concern regarding modest benefit. Pt is requesting second opinion from house hematology team regarding anemia management. Patient states that Dilaudid works well for his sickle cell pain crises. Denies fever, cough, leg swelling, calf pain, or GI bleed.    PE:  Vitals noted  GEN - +In mild discomfort, A&Ox3  HEAD - NC/AT  EYES - PERRL, EOMI. +B/l conjunctival pallor.  ENT - Airway patent, mucous membranes moist  NECK - Supple, non-tender without lymphadenopathy, no masses  PULMONARY - Normal wob, CTA b/l, symmetric breath sounds, no W/R/R, satting 98% on RA  CARDIAC - +S1S2, RRR, no M/G/R, no JVD  CHEST - +TTP over b/l chest wall  ABDOMEN - +BS, ND, NT, soft, no guarding, no rebound, no masses, no rigidity   - No CVA TTP b/l  BACK - No midline tenderness  EXTREMITIES - FROM, symmetric pulses, no edema. +TTP over b/l shoulders and knees.  SKIN - No rash or bruising  NEUROLOGIC - Alert, speech clear, moving all extremities spontaneously, CN II-XII grossly intact, no pronator drift, normal gait, strength and sensation grossly intact, FTN negative b/l  PSYCH - Normal mood/affect, normal insight    MDM:  DDx includes, but not limited to: sickle cell pain crisis, anemia, metabolic derangement, ACS, PTX, viral syndrome. ekg, cxr, labs including troponin/reticulocytes/LDH/haptoglobin, possible blood transfusion, pain meds, IVF, hematology consult. Will most likely require admission.

## 2025-05-04 NOTE — ED PROVIDER NOTE - PROGRESS NOTE DETAILS
Jasper Montague MD (Attending Physician): Pt's Hgb is 4.5 on CBC. Pt consented for blood transfusion. Will give 2 units pRBCs, each over 3 hours. Will require admission.

## 2025-05-04 NOTE — CONSULT NOTE ADULT - SUBJECTIVE AND OBJECTIVE BOX
CHIEF COMPLAINT:    HISTORY OF PRESENT ILLNESS:  53 year old male paroxysmal afib on Elquis (last dose Tues am 3/5/24), PE (pt denies), HTN, peripheral venous insufficiency, GERD, gout presenting w/ weakness.   Patient recently admitted March 8 for ALFREDO on CKD and positive nuclear stress test noted to have symptomatic anemia for which he received 1 unit platelet transfusion and discharged home    PAST MEDICAL & SURGICAL HISTORY:  Sickle cell disease      Right knee pain      Paroxysmal atrial fibrillation      Chronic venous insufficiency      Gout      GERD (gastroesophageal reflux disease)      History of cholecystectomy              MEDICATIONS:                  FAMILY HISTORY:  Family history of essential hypertension    Family history of sickle cell trait        SOCIAL HISTORY:    [ ] Non-smoker  [ ] Smoker  [ ] Alcohol    Allergies    shellfish (Short breath)  [This allergen will not trigger allergy alert] No Known Drug Allergies (Unknown)    Intolerances    	    REVIEW OF SYSTEMS:  CONSTITUTIONAL: No fever, weight loss, or fatigue  EYES: No eye pain, visual disturbances, or discharge  ENMT:  No difficulty hearing, tinnitus, vertigo; No sinus or throat pain  NECK: No pain or stiffness  RESPIRATORY: No cough, wheezing, chills or hemoptysis; No Shortness of Breath  CARDIOVASCULAR: No chest pain, palpitations, passing out, dizziness, or leg swelling  GASTROINTESTINAL: No abdominal or epigastric pain. No nausea, vomiting, or hematemesis; No diarrhea or constipation. No melena or hematochezia.  GENITOURINARY: No dysuria, frequency, hematuria, or incontinence  NEUROLOGICAL: No headaches, memory loss, loss of strength, numbness, or tremors  SKIN: No itching, burning, rashes, or lesions   LYMPH Nodes: No enlarged glands  ENDOCRINE: No heat or cold intolerance; No hair loss  MUSCULOSKELETAL: No joint pain or swelling; No muscle, back, or extremity pain  PSYCHIATRIC: No depression, anxiety, mood swings, or difficulty sleeping  HEME/LYMPH: No easy bruising, or bleeding gums  ALLERY AND IMMUNOLOGIC: No hives or eczema	    [ ] All others negative	  [ ] Unable to obtain    PHYSICAL EXAM:  T(C): 36.4 (05-04-25 @ 09:34), Max: 36.9 (05-04-25 @ 05:12)  HR: 53 (05-04-25 @ 09:34) (53 - 71)  BP: 127/70 (05-04-25 @ 09:34) (109/66 - 127/70)  RR: 16 (05-04-25 @ 09:34) (16 - 18)  SpO2: 98% (05-04-25 @ 09:34) (95% - 99%)  Wt(kg): --  I&O's Summary      Appearance: Normal	  HEENT:   Normal oral mucosa, PERRL, EOMI	  Lymphatic: No lymphadenopathy  Cardiovascular: Normal S1 S2, No JVD, No murmurs, No edema  Respiratory: Lungs clear to auscultation	  Psychiatry: A & O x 3, Mood & affect appropriate  Gastrointestinal:  Soft, Non-tender, + BS	  Skin: No rashes, No ecchymoses, No cyanosis	  Neurologic: Non-focal  Extremities: Normal range of motion, No clubbing, cyanosis or edema  Vascular: Peripheral pulses palpable 2+ bilaterally    TELEMETRY: SR	    ECG:  	  RADIOLOGY:  OTHER: 	  	  LABS:	Complete Blood Count + Automated Diff (05.04.25 @ 07:09)   WBC Count: 5.37 K/uL  RBC Count: 1.47 M/uL  Hemoglobin: 4.5 g/dL  Hematocrit: 14.0 %  Mean Cell Volume: 95.2 fl  Mean Cell Hemoglobin: 30.6 pg  Mean Cell Hemoglobin Conc: 32.1 g/dL  Red Cell Distrib Width: 29.2 %  Platelet Count - Automated: 226 K/uL  Neutrophil #: 3.45 K/uL  Lymphocyte #: 1.58 K/uL  Monocyte #: 0.28 K/uL  Eosinophil #: 0.05 K/uL  Basophil #: 0.00 K/uL  Neutrophil %: 64.3: Differential percentages must be correlated with absolute numbers for   clinical significance. %  Lymphocyte %: 29.5 %  Monocyte %: 5.3 %  Eosinophil %: 0.9 %  Basophil %: 0.0 % 	    CARDIAC MARKERS:                                  4.5    5.37  )-----------( 226      ( 04 May 2025 07:09 )             14.0     05-04    136  |  107  |  36[H]  ----------------------------<  117[H]  4.3   |  18[L]  |  2.06[H]    Ca    9.9      04 May 2025 07:07  Phos  2.9     05-04  Mg     2.4     05-04    TPro  7.0  /  Alb  3.7  /  TBili  0.3  /  DBili  x   /  AST  18  /  ALT  22  /  AlkPhos  123[H]  05-04    proBNP:   Lipid Profile:   HgA1c:   TSH:            CHIEF COMPLAINT:    HISTORY OF PRESENT ILLNESS:  53 year old male paroxysmal afib on Elquis (last dose Tues am 3/5/24), PE (pt denies), HTN, peripheral venous insufficiency, GERD, gout presenting w/ weakness.   Patient recently admitted March 8 for ALFREDO on CKD and positive nuclear stress test noted to have symptomatic anemia for which he received 1 unit platelet transfusion and discharged home    PAST MEDICAL & SURGICAL HISTORY:  Sickle cell disease      Right knee pain      Paroxysmal atrial fibrillation      Chronic venous insufficiency      Gout      GERD (gastroesophageal reflux disease)      History of cholecystectomy              MEDICATIONS:                  FAMILY HISTORY:  Family history of essential hypertension    Family history of sickle cell trait        SOCIAL HISTORY:    [ ] Non-smoker  [ ] Smoker  [ ] Alcohol    Allergies    shellfish (Short breath)  [This allergen will not trigger allergy alert] No Known Drug Allergies (Unknown)    Intolerances    	    REVIEW OF SYSTEMS:  CONSTITUTIONAL: No fever, weight loss, or fatigue  EYES: No eye pain, visual disturbances, or discharge  ENMT:  No difficulty hearing, tinnitus, vertigo; No sinus or throat pain  NECK: No pain or stiffness  RESPIRATORY: No cough, wheezing, chills or hemoptysis; No Shortness of Breath  CARDIOVASCULAR: No chest pain, palpitations, passing out, dizziness, or leg swelling  GASTROINTESTINAL: +abdominal or epigastric pain. +nausea, vomiting, or hematemesis; No diarrhea or constipation. No melena or hematochezia.  GENITOURINARY: No dysuria, frequency, hematuria, or incontinence  NEUROLOGICAL: No headaches, memory loss, loss of strength, numbness, or tremors  SKIN: No itching, burning, rashes, or lesions   LYMPH Nodes: No enlarged glands  ENDOCRINE: No heat or cold intolerance; No hair loss  MUSCULOSKELETAL: No joint pain or swelling; No muscle, back, or extremity pain  PSYCHIATRIC: No depression, anxiety, mood swings, or difficulty sleeping  HEME/LYMPH: No easy bruising, or bleeding gums  ALLERY AND IMMUNOLOGIC: No hives or eczema	    [ ] All others negative	  [ ] Unable to obtain    PHYSICAL EXAM:  T(C): 36.4 (05-04-25 @ 09:34), Max: 36.9 (05-04-25 @ 05:12)  HR: 53 (05-04-25 @ 09:34) (53 - 71)  BP: 127/70 (05-04-25 @ 09:34) (109/66 - 127/70)  RR: 16 (05-04-25 @ 09:34) (16 - 18)  SpO2: 98% (05-04-25 @ 09:34) (95% - 99%)  Wt(kg): --  I&O's Summary      Appearance: Normal	  HEENT:   Normal oral mucosa, PERRL, EOMI	  Lymphatic: No lymphadenopathy  Cardiovascular: Normal S1 S2, No JVD, No murmurs, No edema  Respiratory: Lungs clear to auscultation	  Psychiatry: A & O x 3, Mood & affect appropriate  Gastrointestinal:  Soft, Non-tender, + BS	  Skin: No rashes, No ecchymoses, No cyanosis	  Neurologic: Non-focal  Extremities: Normal range of motion, No clubbing, cyanosis or edema  Vascular: Peripheral pulses palpable 2+ bilaterally    TELEMETRY: SR	    ECG:  	  RADIOLOGY:  OTHER: 	  	  LABS:	Complete Blood Count + Automated Diff (05.04.25 @ 07:09)   WBC Count: 5.37 K/uL  RBC Count: 1.47 M/uL  Hemoglobin: 4.5 g/dL  Hematocrit: 14.0 %  Mean Cell Volume: 95.2 fl  Mean Cell Hemoglobin: 30.6 pg  Mean Cell Hemoglobin Conc: 32.1 g/dL  Red Cell Distrib Width: 29.2 %  Platelet Count - Automated: 226 K/uL  Neutrophil #: 3.45 K/uL  Lymphocyte #: 1.58 K/uL  Monocyte #: 0.28 K/uL  Eosinophil #: 0.05 K/uL  Basophil #: 0.00 K/uL  Neutrophil %: 64.3: Differential percentages must be correlated with absolute numbers for   clinical significance. %  Lymphocyte %: 29.5 %  Monocyte %: 5.3 %  Eosinophil %: 0.9 %  Basophil %: 0.0 % 	    CARDIAC MARKERS:                                  4.5    5.37  )-----------( 226      ( 04 May 2025 07:09 )             14.0     05-04    136  |  107  |  36[H]  ----------------------------<  117[H]  4.3   |  18[L]  |  2.06[H]    Ca    9.9      04 May 2025 07:07  Phos  2.9     05-04  Mg     2.4     05-04    TPro  7.0  /  Alb  3.7  /  TBili  0.3  /  DBili  x   /  AST  18  /  ALT  22  /  AlkPhos  123[H]  05-04    proBNP:   Lipid Profile:   HgA1c:   TSH:

## 2025-05-04 NOTE — ED PROVIDER NOTE - CARE PLAN
1 Principal Discharge DX:	Weakness  Assessment and plan of treatment:	see MDM   Principal Discharge DX:	Anemia  Secondary Diagnosis:	Paroxysmal atrial fibrillation  Secondary Diagnosis:	Sickle cell disease  Secondary Diagnosis:	Chronic kidney disease (CKD)  Secondary Diagnosis:	Sickle cell pain crisis

## 2025-05-04 NOTE — CONSULT NOTE ADULT - ATTENDING COMMENTS
Pt has history of sickle cell and may have mild hemolysis but does not have evidence of hyperhemolysis that would be cause of lower than expected baseline anemia (hgb of 7 to 8g/dl. Sitting up in bed, NAD, normal respiratory effort, abdomen distended, no pitting edema BLE. Peripheral smear reviewed showing anisocytosis, reticulocytes, very few sickle cells, decreased neutrophils/ lymphocytes, large platelets. This with lower than expected reticulocytes indicate that he has decreased production which may be multifactorial: renal insufficiency and hydroxyurea. Iron overload also affecting red cells. He states he has been on iron chelation at home: would repeat ferritin levels given transfusions every 2 to 3 weeks. S/p transfusion and will trend if H/H remains stable. Hold hydroxyurea. Await hemoglobin electrophoresis and ferritin levels.

## 2025-05-04 NOTE — H&P ADULT - HISTORY OF PRESENT ILLNESS
53 yo M with PMH of sickle cell disease presented with generalized weakness, malaise for 1 week. Pt also c/o b/l shoulder pain, back pain, knee pain which patient attributes to sickle cell pain crisis. Pt follows Dr. Veloz for Sickle cell disease (last seen 3 weeks ago) and Hematologist Dr. Javier Lincoln at [facility] (last seen 1 week ago). Pt mentions his last hospitalization for sickle cell pain crisis was back in 2022. His baseline hemoglobin is around 8. He gets PRBC transfusion every 2-4 weeks. Last blood transfusion was 1 week ago at his hematologist's office for Hgb of 7. He also mentions he was previously on procrit and currently on [id number] Aranesp (last dose was on last tuesday, unclear which dose).   Pt denies any recent fever/chills/cough. He mentions that he had some rt sided chest pain last few days.  He denies blood in stool/ dark stool. His last colonoscopy was 1 year ago with [facility] (normal according to patient). His father was diagnosed with colon cancer and passed away last year.

## 2025-05-04 NOTE — ED ADULT NURSE REASSESSMENT NOTE - NS ED NURSE REASSESS COMMENT FT1
port accessed for blood draws and medication administration. Pt educated on procedure and verbalized understanding. 2 RN's at bedside to maintain sterility. pt tolerated procedure well. +blood return.

## 2025-05-04 NOTE — ED ADULT NURSE REASSESSMENT NOTE - NS ED NURSE REASSESS COMMENT FT1
Transfusion consent form signed and located in pt's charts. Pt educated on benefits and side effects of blood transfusion. Pt verbalized understanding. Blood product and patient information verified with 2 RN at bedside. Call bell within reach, side rails up and bed in lowest position. Pt pre-medicated prior to start of transfusion

## 2025-05-04 NOTE — PATIENT PROFILE ADULT - FUNCTIONAL SCREEN CURRENT LEVEL: SWALLOWING (IF SCORE 2 OR MORE FOR ANY ITEM, CONSULT REHAB SERVICES), MLM)
MIKY DE JESUS          MRN-[id number]            (3/19/1922)    HPI:  89yo Female, DNR/DNI (no abx, no tube feeds), with PMHx of HTN, HLD, dCHF and generalized anxiety disorder who was brought to [facility] ED from [facility] after pt was found to be hypoxic to the 80s on RA and hypotensive. Pt endorses generalized weakness x few weeks and per facility nursing report pt has been dyspneic for a few days. Denies cough, congestion, fever/chills, myalgias, abd pain, N/V/D, CP, palpitations, LE edema or calf tenderness.  On arrival to ED, T 97.4, HR 84bpm, /62, RR 14, SpO2 99% RA; Labs significant for WBC 21.11 with left shift, PLT 71, Ddimer 3667, CRP 25.72, Ferritin 3048, Lactate 2.9, procal 0.79, Na 159, Cl 120, BUN/Cr 86/1.61, , Trop 0.04; CXR revealed clear lungs and COVID PCR positive. Pt received NS IVF 500cc bolus. Pt now admitted to [facility] for further management of COVID, RETA, and dehydration.    Date of onset of fever: NONE  Date of onset of dyspnea: few days  Recent Travel: NONE  Sick Contacts/COVID exposure: possible, lives in [facility] nursing facility    ROS:  Fevers: N  Malaise: Y  Myalgias: N  SOB: Y      Cough: N         Productive: N  Nausea: N  Vomiting: N  Diarrhea: N    PMHx:   HTN: Y  DM: N  Lung Disease: N  Cardiovascular disease: N  Malignancy: N  Immunosuppression: N  HIV: N  CKD/ESRD: N  Chronic Liver Disease: N (2020 03:05)      PAST MEDICAL & SURGICAL HISTORY:  Chronic kidney disease (CKD)  HLD (hyperlipidemia)  Diastolic CHF  UTI (urinary tract infection)  Syncope and collapse  Hypertension  ASHD (arteriosclerotic heart disease)  Anxiety  H/O bilateral hip replacements      FAMILY HISTORY:  No pertinent family history in first degree relatives   Reviewed and found non contributory in mother or father    SOCIAL HISTORY: Unable to provided secondary to AMS. Patient resides long term at [facility].     ROS:    Unable to attain due to:   Cognitive impairment                    Dyspnea (Gus 0-10):        n/a                 N/V (Y/N):                           n/a                    Secretions (Y/N) :                    n/a             Agitation(Y/N):       n/a             Pain (Y/N):           n/a             -Provocation/Palliation:      n/a             -Quality/Quantity:      n/a             -Radiating:      n/a             -Severity:      n/a             -Timing/Frequency:      n/a             -Impact on ADLs:      n/a               General:        n/a             HEENT:         n/a             Neck:        n/a             CVS:        n/a             Resp:       n/a             GI:        n/a             :        n/a             Musc:       n/a             Neuro:        n/a             Psych:        n/a             Skin:        n/a             Lymph:        n/a               Allergies    No Known Allergies    Intolerances      Opiate Naive (Y/N): Y  -iStop reviewed (Y/N): (Ref#:     [id number]       )    Medications:      MEDICATIONS  (STANDING):  ammonium lactate 12% Lotion 1 Application(s) Topical two times a day  apixaban 2.5 milliGRAM(s) Oral every 12 hours  artificial  tears Solution 1 Drop(s) Both EYES two times a day  chlorhexidine 2% Cloths 1 Application(s) Topical <User Schedule>  dextrose 10% + sodium chloride 0.45%. 1000 milliLiter(s) (100 mL/Hr) IV Continuous <Continuous>  dextrose 5%. 1000 milliLiter(s) (50 mL/Hr) IV Continuous <Continuous>  dextrose 50% Injectable 12.5 Gram(s) IV Push once  dextrose 50% Injectable 25 Gram(s) IV Push once  dextrose 50% Injectable 25 Gram(s) IV Push once  ertapenem  IVPB 1000 milliGRAM(s) IV Intermittent every 24 hours  insulin lispro (HumaLOG) corrective regimen sliding scale   SubCutaneous Before meals and at bedtime  mirtazapine 15 milliGRAM(s) Oral at bedtime  multivitamin 1 Tablet(s) Oral daily  potassium chloride  10 mEq/100 mL IVPB 10 milliEquivalent(s) IV Intermittent every 1 hour    MEDICATIONS  (PRN):  dextrose 40% Gel 15 Gram(s) Oral once PRN Blood Glucose LESS THAN 70 milliGRAM(s)/deciliter  glucagon  Injectable 1 milliGRAM(s) IntraMuscular once PRN Glucose LESS THAN 70 milligrams/deciliter  sodium chloride 0.9% lock flush 10 milliLiter(s) IV Push every 1 hour PRN Pre/post blood products, medications, blood draw, and to maintain line patency      Labs:    CBC:                        10.4   11.31 )-----------( 123      ( 2020 06:16 )             33.0     CMP:        149<H>  |  118<H>  |  30<H>  ----------------------------<  100<H>  3.5   |  22  |  0.64    Ca    7.5<L>      2020 06:16  Phos  2.0       Mg     1.9         TPro  4.6<L>  /  Alb  1.9<L>  /  TBili  0.3  /  DBili  x   /  AST  42<H>  /  ALT  40  /  AlkPhos  51    Albumin, Serum: 1.9 g/dL         Imaging:  R  < from: CT Head No Cont (20 @ 09:47) >  IMPRESSION:     1.  No acute intracranial hemorrhage, mass effect, or evidence of recent transcortical infarction.  2.  Interval progression of parenchymal volume loss and small vessel ischemic disease since 2018.    < end of copied text >    < from: Xray Chest 1 View-PORTABLE IMMEDIATE (20 @ 00:58) >    EXAM:  XR CHEST PORTABLE IMMED 1V                          PROCEDURE DATE:  2020          INTERPRETATION:  Portable Chest X-Ray dated 2020 12:58 AM    Indication: SOB    Prior studies: 2018    An AP portable view of the chest reveals poor inspiratory volume. The patient is rotated to the left side. Slight increased interstitial markings is unchanged. Prominent left epicardial fat pad is again noted. No consolidation or pleural effusions. Degenerative changes of the spine and right shoulder.    IMPRESSION:  No significant change. No focal infiltrates seen.        < end of copied text >      PEx:  T(C): 36.9 (20 @ 06:17), Max: 36.9 (20 @ 06:17)  HR: 78 (20 @ 06:17) (78 - 91)  BP: 132/79 (20 @ 06:17) (90/61 - 132/79)  RR: 22 (20 @ 06:17) (22 - 26)  SpO2: 98% (20 @ 06:17) (98% - 98%)  Wt(kg): --  Daily     Daily   CAPILLARY BLOOD GLUCOSE      POCT Blood Glucose.: 84 mg/dL (2020 11:47)    I&O's Summary    Given COVID positive patients and Constraints on Palliative personal physical exam deferred and based on primary teams documentation.   PHYSICAL EXAM:  General: chronically ill appearing frail elderly female, does not open eyes to voice, AOx0  HEENT: NC/AT; PERRL, anicteric sclera; poor oral hygiene, dry lips  Respiratory: no increased work of breathing, breathing comfortably on RA; saturating 98%  Gastrointestinal: soft, NT/ND  : primafit in place draining yellow-orange urine  Extremities: WWP; no edema, chronic vascular changes bilaterally up to knees with scaling, no purulence or exudates  Neurological: does not open eyes to voice, AOx0    Preadmit Karnofsky: 60 %           Current Karnofsky:    40 %  Cachexia (Y/N): N  BMI: 21.5kg/m2    Advanced Directives:     DNR/DNI     [facility]      Decision maker: Radha [id number] and Milka Smith [phone]    GOALS OF CARE DISCUSSION       Palliative care info/counseling provided	          Documentation of GOC: 	DNR/DNI          REFERRALS	        Unit SW/Case Mgmt       Patient/Family Support 0 = swallows foods/liquids without difficulty

## 2025-05-04 NOTE — ED ADULT NURSE NOTE - OBJECTIVE STATEMENT
55 y/o M complaining of sickle cell crisis. Pt states he's been having recurrent episodes but today began to have crisis, took an oxycodone, and did not have relief so he decided to come into the ED. Pt states he feels weak and has pain in all of his joints. Denies chest pain, sob, headache, abd pain, n/v/d, and dysuria. AAOx4. Breathing spontaneous and unlabored. Skin warm, dry, and color normal for race.

## 2025-05-04 NOTE — CONSULT NOTE ADULT - ASSESSMENT
53 year old male paroxysmal afib on Elquis (last dose Tues am 3/5/24), PE (pt denies), HTN, peripheral venous insufficiency, GERD, gout presenting w/ weakness.   Patient recently admitted March 8 for ALFREDO on CKD and positive nuclear stress test noted to have symptomatic anemia for which she received 1 unit platelet transfusion and discharged home

## 2025-05-04 NOTE — H&P ADULT - ASSESSMENT
54 male h/o sickle cell, p afib on eliquis, htn, here with c/o chest discomfort and severe anemia    anemia  sickle cell disease  pain  heme consult apprec  currently receiving prbc transfusion  monitor post transfusion cbc   -Obtain daily CBC, CMP, indirect bilirubin, LDH, Haptoglobin, retic count  - Obtain Type and screen q72 hours  hold hydroxyurea at this time  supp o2  ivf  incentive spirometer  pain mngt consult for pain control    p afib  AC with eliquis  cont multaq and metoprolol  cards consult f/u    htn  cont home bp meds  monitor bp    pts care to be assumed by full time hospitalist     Advanced care planning was discussed with patient and family.  Advanced care planning forms were reviewed and discussed as appropriate.  Differential diagnosis and plan of care discussed with patient after the evaluation.   Pain assessed and judicious use of narcotics when appropriate was discussed.  Importance of Fall prevention discussed.  Counseling on Smoking and Alcohol cessation was offered when appropriate.  Counseling on Diet, exercise, and medication compliance was done.

## 2025-05-04 NOTE — ED ADULT NURSE NOTE - CHIEF COMPLAINT QUOTE
Generalized weakness associated with malaise, BL knee and BL shoulder pain since Wednesday. Endorses "I feel like Im in a crisis". Took PO Oxycodone without relief. Denies any recent sick contact or travel. pmh: Sickle Cell Anemia

## 2025-05-04 NOTE — ED ADULT TRIAGE NOTE - CHIEF COMPLAINT QUOTE
Generalized weakness associated with malaise, BL knee and BL shoulder pain since Wednesday. Endorses "I feel like Im in a crisis". Took PO Oxycodone without relief. Denies any recent sick contact or travel. pmh: SCC Generalized weakness associated with malaise, BL knee and BL shoulder pain since Wednesday. Endorses "I feel like Im in a crisis". Took PO Oxycodone without relief. Denies any recent sick contact or travel. pmh: Sickle Cell Anemia

## 2025-05-04 NOTE — H&P ADULT - NSHPPHYSICALEXAM_GEN_ALL_CORE
Vital Signs Last 24 Hrs  T(C): 36.4 (04 May 2025 09:34), Max: 36.9 (04 May 2025 05:12)  T(F): 97.6 (04 May 2025 09:34), Max: 98.4 (04 May 2025 05:12)  HR: 53 (04 May 2025 09:34) (53 - 71)  BP: 127/70 (04 May 2025 09:34) (109/66 - 127/70)  BP(mean): --  RR: 16 (04 May 2025 09:34) (16 - 18)  SpO2: 98% (04 May 2025 09:34) (95% - 99%)    Parameters below as of 04 May 2025 09:34  Patient On (Oxygen Delivery Method): room air        PHYSICAL EXAM:  GENERAL: NAD, well-developed  HEAD:  Atraumatic, Normocephalic  EYES: EOMI, PERRLA, conjunctiva and sclera clear  NECK: Supple, No JVD  CHEST/LUNG: Clear to auscultation bilaterally; No wheeze  HEART: Regular rate and rhythm; No murmurs, rubs, or gallops  ABDOMEN: Soft, Nontender, Nondistended; Bowel sounds present  EXTREMITIES:  2+ Peripheral Pulses, No clubbing, cyanosis, or edema  PSYCH: AAOx3  NEUROLOGY: non-focal  SKIN: No rashes or lesions

## 2025-05-04 NOTE — CONSULT NOTE ADULT - ASSESSMENT
55 yo M with hx of Sickle cell disease, hemorrhoids p/w generalized weakness associated with malaise, BL knee and BL shoulder pain since Wednesday. ED workup shows Hgb 4.5    # Sickle Cell Disease  # Anemia  *Hgb 4.5, MCV 95.2, Plt 226 retic 2.7%, , T Bili 0.3  - PRBC ordered by Primary team  - f/u Haptoglobin, Iron, TIBC, ferritin, Vit B12, Folate (added on)  - obtain serum erythropoietin  - r/o GI bleed  - hold Eliquis until GI bleed ruled out  - hold hydroxyurea            ----incomplete note--- do not refer to until completion 53 yo M with hx of Sickle cell disease, hemorrhoids p/w generalized weakness associated with malaise, BL knee and BL shoulder pain since Wednesday. ED workup shows Hgb 4.5    # Sickle Cell Disease  # Anemia  *Hgb 4.5, MCV 95.2, Plt 226 retic 2.7%, , T Bili 0.3  - PRBC ordered by Primary team  - f/u Haptoglobin, Iron, TIBC, ferritin, Vit B12, Folate (added on)  - obtain Hgb electrophoresis, serum erythropoietin  - r/o GI bleed  - hold Eliquis until GI bleed ruled out  - hold hydroxyurea            ----incomplete note--- do not refer to until completion 53 yo M with hx of Sickle cell disease, hemorrhoids p/w generalized weakness associated with malaise, BL knee and BL shoulder pain since Wednesday. ED workup shows Hgb 4.5    # Sickle Cell Disease  # Anemia  *Hgb 4.5, MCV 95.2, Plt 226 retic 2.7%, , T Bili 0.3  - PRBC ordered by Primary team  - f/u Haptoglobin, Iron, TIBC, ferritin, Vit B12, Folate (added on)  - f/u Hgb electrophoresis  - obtain serum erythropoietin  - r/o GI bleed  - hold Eliquis until GI bleed ruled out  - hold hydroxyurea            ----incomplete note--- do not refer to until completion 53 yo M with hx of Sickle cell disease,  p/w generalized weakness, malaise, BL knee, BL shoulder and back pain since last week. ED workup shows Hgb 4.5. Pt follows hematology at [facility] but requesting a second opinion regarding his more frequent PRBC transfusion. No prior bone marrow biopsy.     # Sickle Cell Disease  # Normochromic Anemia  *Hgb 4.5, MCV 95.2, Plt 226 retic 2.7%, , Haptoglobin <20, T Bili 0.3  * Ab screen -Negative  * CXR- no consolidation  - PRBC ordered by Primary team   -reviewed peripheral smear- anisopoikilocytosis, target cell, reticulocytes, rare schistocytes (0-2/hpf), large platelet  - His peripheral smear and lab results is not consistent with overt hemolysis induced by sickle cell crisis. His anemia is likely mutifactorial.  - His current hydoxyurea dose might also be contributing to his bone marrow suppression    Plan  - Obtain daily CBC, CMP, indirect bilirubin, LDH, Haptoglobin, retic count  - Obtain Type and screen q72 hours  - f/u Iron, TIBC, ferritin, Vit B12, Folate (added on)  - f/u Hgb electrophoresis  - obtain serum erythropoietin  - hold hydroxyurea at this time  - To prevent sickling of RBC-> Start patient on 0.45  cc/hr continuous, incentive spirometry 10times/hr  - pain control as per primary team. We do not recommend dilaudid PCA at this time as we don't think pt is in acute sickle cell crisis.  - can control pain with IV Dilaudid push q2-4 hr PRN  - obtain record from outpatient hematology office (need more data on recent CBC trend, transfusion frequency, hydroxyurea dose, Aranesp dose)  - Hematology will follow  - ok to continue Eliquis as currently there are no signs of bleeding. Monitor for signs of bleeding.    Patient seen at bedside and discussed with attending Dr. Reyes.    Randal Izaguirre MD  PGY4  Hematology-Oncology Fellow  [facility]/[place] 53 yo M with hx of Sickle cell disease,  p/w generalized weakness, malaise, BL knee, BL shoulder and back pain since last week. ED workup shows Hgb 4.5. Pt follows hematology at [facility] but requesting a second opinion regarding his more frequent PRBC transfusion. No prior bone marrow biopsy.     # Sickle Cell Disease  # Normochromic Anemia  *Hgb 4.5, MCV 95.2, Plt 226 retic 2.7%, , Haptoglobin <20, T Bili 0.3  * Ab screen -Negative  * CXR- no consolidation  - PRBC ordered by Primary team   -reviewed peripheral smear- anisopoikilocytosis, target cell, reticulocytes, rare schistocytes (0-2/hpf), large platelet  - His peripheral smear and lab results is not consistent with overt hemolysis induced by sickle cell crisis. His anemia is likely mutifactorial.  - His current hydoxyurea dose might also be contributing to his bone marrow suppression    Plan  - Obtain daily CBC, CMP, indirect bilirubin, LDH, Haptoglobin, retic count  - Obtain Type and screen q72 hours  - f/u Iron, TIBC, ferritin, Vit B12, Folate (added on)  - f/u Hgb electrophoresis  - obtain serum erythropoietin  - hold hydroxyurea at this time  - To prevent sickling of RBC-> Start patient on 0.45  cc/hr continuous, folic acid 1 mg daily, incentive spirometry 10times/hr  - pain control as per primary team. We do not recommend dilaudid PCA at this time as we don't think pt is in acute sickle cell crisis.  - can control pain with IV Dilaudid push q2-4 hr PRN  - obtain record from outpatient hematology office (need more data on recent CBC trend, transfusion frequency, hydroxyurea dose, Aranesp dose)  - Hematology will follow  - ok to continue Eliquis as currently there are no signs of bleeding. Monitor for signs of bleeding.    Patient seen at bedside and discussed with attending Dr. Reyes.    Randal Izaguirre MD  PGY4  Hematology-Oncology Fellow  [facility]/[place]

## 2025-05-04 NOTE — CONSULT NOTE ADULT - PROBLEM SELECTOR RECOMMENDATION 9
Hgb 4.5  PRBC   Heme/onc consulted  F/u Hgb electrophoresis  R/O GI bleed  Hold Eliquis until GI bleed ruled out  Hold hydroxyurea

## 2025-05-04 NOTE — ED ADULT NURSE REASSESSMENT NOTE - NS ED NURSE REASSESS COMMENT FT1
Pt requesting another 25Mg of benadryl. Pt states that he normally takes 50mg total prior to transfusion. MD notified. pending order

## 2025-05-04 NOTE — ED PROVIDER NOTE - NSICDXPASTMEDICALHX_GEN_ALL_CORE_FT
PAST MEDICAL HISTORY:  Chronic venous insufficiency     GERD (gastroesophageal reflux disease)     Gout     Paroxysmal atrial fibrillation     Right knee pain     Sickle cell disease

## 2025-05-04 NOTE — ED ADULT NURSE REASSESSMENT NOTE - NS ED NURSE REASSESS COMMENT FT1
As per MD spears, okay to wait for t/s to result to start blood transfusion. 2 RNs at bedside attempting IV access with no success, MD notified pending USIV

## 2025-05-04 NOTE — ED PROVIDER NOTE - CLINICAL SUMMARY MEDICAL DECISION MAKING FREE TEXT BOX
53 year old male paroxysmal afib on Elquis (last dose Tues am 3/5/24), PE (pt denies), HTN, peripheral venous insufficiency, GERD, gout presenting w/ weakness.   Patient recently admitted March 8 for ALFREDO on CKD and positive nuclear stress test noted to have symptomatic anemia for which she received 1 unit platelet transfusion and discharged home 54 year old male paroxysmal afib on Elquis (last dose Tues am 3/5/24), PE (pt denies), HTN, peripheral venous insufficiency, GERD, gout presenting w/ weakness.   Patient recently admitted March 8 for ALFREDO on CKD and positive nuclear stress test noted to have symptomatic anemia for which she received 1 unit platelet transfusion and discharged home    Presentation consistent with sickle cell crisis will check labs ischemic eval, IV fluids, pain relief.  Patient reports symptoms since Wednesday initially managed with home supply of Percocet 5/325 however due to worsening of pain patient presented to ED.  Pain is sharp localized to bilateral shoulders abdomen legs and hips despite home pain medication.  Patient follows with Dr. Veloz for sickle cell disease as well as is Dr. Danny Herrera for hematology.  Patient endorses receiving weekly injections (Epo -> Araneft)  to treat anemia however expressed concern regarding modest benefit.  Requesting second opinion from house hematology team regarding anemia management.  Patient states during sickle cell crises notes improvement with Dilaudid.    Plan for labs, Dilaudid, onc consult. Dr. Elena notified discussed plan to admit under Dr. Lupe Yu.

## 2025-05-04 NOTE — ED PROVIDER NOTE - NSFOLLOWUPINSTRUCTIONS_ED_ALL_ED_FT
Presentation consistent with sickle cell crisis will check labs ischemic eval, IV fluids, pain relief.  Patient reports symptoms since Wednesday initially managed with home supply of Percocet 5/325 however due to worsening of pain patient presented to ED.  Pain is sharp localized to bilateral shoulders abdomen legs and hips despite home pain medication.  Patient follows with Dr. Veloz for sickle cell disease as well as is Dr. Danny Herrera for hematology.  Patient endorses receiving weekly injections to treat anemia however concern regarding modest benefit.  Requesting second opinion from house hematology team regarding anemia management.  Patient states during sickle cell crises notes improvement with Dilaudid. Presentation consistent with sickle cell crisis will check labs ischemic eval, IV fluids, pain relief.  Patient reports symptoms since Wednesday initially managed with home supply of Percocet 5/325 however due to worsening of pain patient presented to ED.  Pain is sharp localized to bilateral shoulders abdomen legs and hips despite home pain medication.  Patient follows with Dr. Veloz for sickle cell disease as well as is Dr. Danny Herrera for hematology.  Patient endorses receiving weekly injections (Epo -> Araneft)  to treat anemia however expressed concern regarding modest benefit.  Requesting second opinion from house hematology team regarding anemia management.  Patient states during sickle cell crises notes improvement with Dilaudid. Presentation consistent with sickle cell crisis will check labs ischemic eval, IV fluids, pain relief.  Patient reports symptoms since Wednesday initially managed with home supply of Percocet 5/325 however due to worsening of pain patient presented to ED.  Pain is sharp localized to bilateral shoulders abdomen legs and hips despite home pain medication.  Patient follows with Dr. Veloz for sickle cell disease as well as is Dr. Danny Herrera for hematology.  Patient endorses receiving weekly injections (Epo -> Araneft)  to treat anemia however expressed concern regarding modest benefit.  Requesting second opinion from house hematology team regarding anemia management.  Patient states during sickle cell crises notes improvement with Dilaudid.    Plan for labs, Dilaudid, onc consult. Dr. Elena notified discussed plan to admit under Dr. Lupe Yu.

## 2025-05-04 NOTE — CONSULT NOTE ADULT - SUBJECTIVE AND OBJECTIVE BOX
HPI:      PAST MEDICAL & SURGICAL HISTORY:  Sickle cell disease      Right knee pain      Paroxysmal atrial fibrillation      Chronic venous insufficiency      Gout      GERD (gastroesophageal reflux disease)      History of cholecystectomy          Allergies    shellfish (Short breath)  [This allergen will not trigger allergy alert] No Known Drug Allergies (Unknown)    Intolerances        MEDICATIONS  (STANDING):    MEDICATIONS  (PRN):      FAMILY HISTORY:  Family history of essential hypertension    Family history of sickle cell trait        SOCIAL HISTORY: No EtOH, no tobacco    REVIEW OF SYSTEMS:    CONSTITUTIONAL: No weakness, fevers or chills  EYES/ENT: No visual changes;  No vertigo or throat pain   NECK: No pain or stiffness  RESPIRATORY: No cough, wheezing, hemoptysis; No shortness of breath  CARDIOVASCULAR: No chest pain or palpitations  GASTROINTESTINAL: No abdominal or epigastric pain. No nausea, vomiting, or hematemesis; No diarrhea or constipation. No melena or hematochezia.  GENITOURINARY: No dysuria, frequency or hematuria  NEUROLOGICAL: No numbness or weakness  SKIN: No itching, burning, rashes, or lesions   All other review of systems is negative unless indicated above.    Height (cm): 180.3 (05-04 @ 05:12)  Weight (kg): 108.9 (05-04 @ 05:12)  BMI (kg/m2): 33.5 (05-04 @ 05:12)  BSA (m2): 2.28 (05-04 @ 05:12)    T(F): 98.1 (05-04-25 @ 07:10), Max: 98.4 (05-04-25 @ 05:12)  HR: 62 (05-04-25 @ 07:10)  BP: 112/66 (05-04-25 @ 07:10)  RR: 16 (05-04-25 @ 07:10)  SpO2: 96% (05-04-25 @ 07:10)  Wt(kg): --    GENERAL: NAD, well-developed  HEAD:  Atraumatic, Normocephalic  EYES: EOMI, PERRLA, conjunctiva and sclera clear  NECK: Supple, No JVD  CHEST/LUNG: Clear to auscultation bilaterally; No wheeze  HEART: Regular rate and rhythm; No murmurs, rubs, or gallops  ABDOMEN: Soft, Nontender, Nondistended; Bowel sounds present  EXTREMITIES:  2+ Peripheral Pulses, No clubbing, cyanosis, or edema  NEUROLOGY: non-focal  SKIN: No rashes or lesions                          4.5    5.37  )-----------( 226      ( 04 May 2025 07:09 )             14.0       05-04    136  |  107  |  36[H]  ----------------------------<  117[H]  4.3   |  18[L]  |  2.06[H]    Ca    9.9      04 May 2025 07:07  Phos  2.9     05-04  Mg     2.4     05-04    TPro  7.0  /  Alb  3.7  /  TBili  0.3  /  DBili  x   /  AST  18  /  ALT  22  /  AlkPhos  123[H]  05-04      Magnesium: 2.4 mg/dL (05-04 @ 07:07)  Phosphorus: 2.9 mg/dL (05-04 @ 07:07)  Lactate Dehydrogenase, Serum: 233 U/L (05-04 @ 07:07)      PT/INR - ( 04 May 2025 07:09 )   PT: 15.1 sec;   INR: 1.32 ratio         PTT - ( 04 May 2025 07:09 )  PTT:33.7 sec     HPI: 55 yo M with PMH of sickle cell disease presented with generalized weakness, malaise for 1 week. Pt also c/o b/l shoulder pain, back pain, knee pain which patient attributes to sickle cell pain crisis. Pt follows Dr. Veloz for Sickle cell disease (last seen 3 weeks ago) and Hematologist Dr. Javier Lincoln at [facility] (last seen 1 week ago). Pt mentions his last hospitalization for sickle cell pain crisis was back in 2022. His baseline hemoglobin is around 8. He gets PRBC transfusion every 2-4 weeks. Last blood transfusion was 1 week ago at his hematologist's office for Hgb of 7. He also mentions he was previously on procrit and currently on [id number] Aranesp (last dose was on last tuesday, unclear which dose).   Pt denies any recent fever/chills/cough. He mentions that he had some rt sided chest pain last few days.  He denies blood in stool/ dark stool. His last colonoscopy was 1 year ago with [facility] (normal according to patient). His father was diagnosed with colon cancer and passed away last year.       PAST MEDICAL & SURGICAL HISTORY:  Sickle cell disease      Right knee pain      Paroxysmal atrial fibrillation      Chronic venous insufficiency      Gout      GERD (gastroesophageal reflux disease)      History of cholecystectomy          Allergies    shellfish (Short breath)  [This allergen will not trigger allergy alert] No Known Drug Allergies (Unknown)    Intolerances        MEDICATIONS  (STANDING):    MEDICATIONS  (PRN):      FAMILY HISTORY:  Family history of essential hypertension    Family history of sickle cell trait        SOCIAL HISTORY: No EtOH, no tobacco    REVIEW OF SYSTEMS:    CONSTITUTIONAL: No weakness, fevers or chills  EYES/ENT: No visual changes;  No vertigo or throat pain   NECK: No pain or stiffness  RESPIRATORY: No cough, wheezing, hemoptysis; No shortness of breath  CARDIOVASCULAR: No chest pain or palpitations  GASTROINTESTINAL: No abdominal or epigastric pain. No nausea, vomiting, or hematemesis; No diarrhea or constipation. No melena or hematochezia.  GENITOURINARY: No dysuria, frequency or hematuria  NEUROLOGICAL: No numbness or weakness  SKIN: No itching, burning, rashes, or lesions   All other review of systems is negative unless indicated above.    Height (cm): 180.3 (05-04 @ 05:12)  Weight (kg): 108.9 (05-04 @ 05:12)  BMI (kg/m2): 33.5 (05-04 @ 05:12)  BSA (m2): 2.28 (05-04 @ 05:12)    T(F): 98.1 (05-04-25 @ 07:10), Max: 98.4 (05-04-25 @ 05:12)  HR: 62 (05-04-25 @ 07:10)  BP: 112/66 (05-04-25 @ 07:10)  RR: 16 (05-04-25 @ 07:10)  SpO2: 96% (05-04-25 @ 07:10)  Wt(kg): --    GENERAL: NAD, well-developed  HEAD:  Atraumatic, Normocephalic  EYES: EOMI, PERRLA, conjunctiva and sclera clear  NECK: Supple, No JVD  CHEST/LUNG: Clear to auscultation bilaterally; No wheeze  HEART: Regular rate and rhythm; No murmurs, rubs, or gallops  ABDOMEN: Soft, Nontender, Nondistended; Bowel sounds present  EXTREMITIES:  2+ Peripheral Pulses, No clubbing, cyanosis, or edema  NEUROLOGY: non-focal  SKIN: No rashes or lesions                          4.5    5.37  )-----------( 226      ( 04 May 2025 07:09 )             14.0       05-04    136  |  107  |  36[H]  ----------------------------<  117[H]  4.3   |  18[L]  |  2.06[H]    Ca    9.9      04 May 2025 07:07  Phos  2.9     05-04  Mg     2.4     05-04    TPro  7.0  /  Alb  3.7  /  TBili  0.3  /  DBili  x   /  AST  18  /  ALT  22  /  AlkPhos  123[H]  05-04      Magnesium: 2.4 mg/dL (05-04 @ 07:07)  Phosphorus: 2.9 mg/dL (05-04 @ 07:07)  Lactate Dehydrogenase, Serum: 233 U/L (05-04 @ 07:07)      PT/INR - ( 04 May 2025 07:09 )   PT: 15.1 sec;   INR: 1.32 ratio         PTT - ( 04 May 2025 07:09 )  PTT:33.7 sec     HPI: 53 yo M with PMH of sickle cell disease presented with generalized weakness, malaise for 1 week. Pt also c/o b/l shoulder pain, back pain, knee pain which patient attributes to sickle cell pain crisis. Pt follows Dr. Veloz for Sickle cell disease (last seen 3 weeks ago) and Hematologist Dr. Javier Lincoln at [facility] (last seen 1 week ago). Pt mentions his last hospitalization for sickle cell pain crisis was back in 2022. His baseline hemoglobin is around 8. He gets PRBC transfusion every 2-4 weeks. Last blood transfusion was 1 week ago at his hematologist's office for Hgb of 7. He also mentions he was previously on procrit and currently on [id number] Aranesp (last dose was on last tuesday, unclear which dose).   Pt denies any recent fever/chills/cough. He mentions that he had some rt sided chest pain last few days.  He denies blood in stool/ dark stool. His last colonoscopy was 1 year ago with [facility] (normal according to patient). His father was diagnosed with colon cancer and passed away last year.       PAST MEDICAL & SURGICAL HISTORY:  Sickle cell disease      Right knee pain      Paroxysmal atrial fibrillation      Chronic venous insufficiency      Gout      GERD (gastroesophageal reflux disease)      History of cholecystectomy          Allergies    shellfish (Short breath)  [This allergen will not trigger allergy alert] No Known Drug Allergies (Unknown)    Intolerances        MEDICATIONS  (STANDING):    MEDICATIONS  (PRN):      FAMILY HISTORY:  Family history of essential hypertension    Family history of sickle cell trait        SOCIAL HISTORY: No EtOH, no tobacco    REVIEW OF SYSTEMS:    CONSTITUTIONAL: No weakness, fevers or chills  EYES/ENT: No visual changes;  No vertigo or throat pain   NECK: No pain or stiffness  RESPIRATORY: No cough, wheezing, hemoptysis; No shortness of breath  CARDIOVASCULAR: No chest pain or palpitations  GASTROINTESTINAL: No abdominal or epigastric pain. No nausea, vomiting, or hematemesis; No diarrhea or constipation. No melena or hematochezia.  GENITOURINARY: No dysuria, frequency or hematuria  NEUROLOGICAL: No numbness or weakness  SKIN: No itching, burning, rashes, or lesions   All other review of systems is negative unless indicated above.    Height (cm): 180.3 (05-04 @ 05:12)  Weight (kg): 108.9 (05-04 @ 05:12)  BMI (kg/m2): 33.5 (05-04 @ 05:12)  BSA (m2): 2.28 (05-04 @ 05:12)    T(F): 98.1 (05-04-25 @ 07:10), Max: 98.4 (05-04-25 @ 05:12)  HR: 62 (05-04-25 @ 07:10)  BP: 112/66 (05-04-25 @ 07:10)  RR: 16 (05-04-25 @ 07:10)  SpO2: 96% (05-04-25 @ 07:10)  Wt(kg): --    GENERAL: NAD, well-developed  HEAD:  Atraumatic, Normocephalic  EYES: EOMI, PERRLA, conjunctiva and sclera clear  NECK: Supple, No JVD  CHEST/LUNG: Clear to auscultation bilaterally; No wheeze  HEART: Regular rate and rhythm; No murmurs, rubs, or gallops  ABDOMEN: Soft, Nontender, Nondistended; Bowel sounds present  EXTREMITIES:  2+ Peripheral Pulses, No clubbing, cyanosis, or edema  NEUROLOGY: non-focal  SKIN: No rashes or lesions                          4.5    5.37  )-----------( 226      ( 04 May 2025 07:09 )             14.0       05-04    136  |  107  |  36[H]  ----------------------------<  117[H]  4.3   |  18[L]  |  2.06[H]    Ca    9.9      04 May 2025 07:07  Phos  2.9     05-04  Mg     2.4     05-04    TPro  7.0  /  Alb  3.7  /  TBili  0.3  /  DBili  x   /  AST  18  /  ALT  22  /  AlkPhos  123[H]  05-04      Magnesium: 2.4 mg/dL (05-04 @ 07:07)  Phosphorus: 2.9 mg/dL (05-04 @ 07:07)  Lactate Dehydrogenase, Serum: 233 U/L (05-04 @ 07:07)      PT/INR - ( 04 May 2025 07:09 )   PT: 15.1 sec;   INR: 1.32 ratio         PTT - ( 04 May 2025 07:09 )  PTT:33.7 sec

## 2025-05-05 LAB
ANION GAP SERPL CALC-SCNC: 10 MMOL/L — SIGNIFICANT CHANGE UP (ref 5–17)
BILIRUB DIRECT SERPL-MCNC: 0.2 MG/DL — SIGNIFICANT CHANGE UP (ref 0–0.3)
BILIRUB INDIRECT FLD-MCNC: 0.4 MG/DL — SIGNIFICANT CHANGE UP (ref 0.2–1)
BILIRUB SERPL-MCNC: 0.6 MG/DL — SIGNIFICANT CHANGE UP (ref 0.2–1.2)
BUN SERPL-MCNC: 30 MG/DL — HIGH (ref 7–23)
CALCIUM SERPL-MCNC: 10.3 MG/DL — SIGNIFICANT CHANGE UP (ref 8.4–10.5)
CHLORIDE SERPL-SCNC: 109 MMOL/L — HIGH (ref 96–108)
CO2 SERPL-SCNC: 19 MMOL/L — LOW (ref 22–31)
CREAT SERPL-MCNC: 1.68 MG/DL — HIGH (ref 0.5–1.3)
EGFR: 48 ML/MIN/1.73M2 — LOW
EGFR: 48 ML/MIN/1.73M2 — LOW
GLUCOSE SERPL-MCNC: 103 MG/DL — HIGH (ref 70–99)
HAPTOGLOB SERPL-MCNC: <20 MG/DL — LOW (ref 34–200)
HCT VFR BLD CALC: 28.3 % — LOW (ref 39–50)
HEMOGLOBIN INTERPRETATION: SIGNIFICANT CHANGE UP
HGB A MFR BLD: 62.7 % — LOW (ref 95.8–98)
HGB A2 MFR BLD: 2.7 % — SIGNIFICANT CHANGE UP (ref 2–3.2)
HGB BLD-MCNC: 9.4 G/DL — LOW (ref 13–17)
HGB F MFR BLD: 12.3 % — HIGH (ref 0–1)
HGB S MFR BLD: 22.3 % — HIGH
LDH SERPL L TO P-CCNC: 291 U/L — HIGH (ref 50–242)
MCHC RBC-ENTMCNC: 30.8 PG — SIGNIFICANT CHANGE UP (ref 27–34)
MCHC RBC-ENTMCNC: 33.2 G/DL — SIGNIFICANT CHANGE UP (ref 32–36)
MCV RBC AUTO: 92.8 FL — SIGNIFICANT CHANGE UP (ref 80–100)
NRBC BLD AUTO-RTO: 17 /100 WBCS — HIGH (ref 0–0)
PLATELET # BLD AUTO: 197 K/UL — SIGNIFICANT CHANGE UP (ref 150–400)
POTASSIUM SERPL-MCNC: 4.8 MMOL/L — SIGNIFICANT CHANGE UP (ref 3.5–5.3)
POTASSIUM SERPL-SCNC: 4.8 MMOL/L — SIGNIFICANT CHANGE UP (ref 3.5–5.3)
RBC # BLD: 3.05 M/UL — LOW (ref 4.2–5.8)
RBC # BLD: 3.05 M/UL — LOW (ref 4.2–5.8)
RBC # FLD: 24.5 % — HIGH (ref 10.3–14.5)
RETICS #: 85.1 K/UL — SIGNIFICANT CHANGE UP (ref 25–125)
RETICS/RBC NFR: 2.8 % — HIGH (ref 0.5–2.5)
SODIUM SERPL-SCNC: 138 MMOL/L — SIGNIFICANT CHANGE UP (ref 135–145)
WBC # BLD: 5.27 K/UL — SIGNIFICANT CHANGE UP (ref 3.8–10.5)
WBC # FLD AUTO: 5.27 K/UL — SIGNIFICANT CHANGE UP (ref 3.8–10.5)

## 2025-05-05 PROCEDURE — 99232 SBSQ HOSP IP/OBS MODERATE 35: CPT

## 2025-05-05 PROCEDURE — 99233 SBSQ HOSP IP/OBS HIGH 50: CPT

## 2025-05-05 RX ORDER — SENNA 187 MG
2 TABLET ORAL AT BEDTIME
Refills: 0 | Status: DISCONTINUED | OUTPATIENT
Start: 2025-05-05 | End: 2025-05-07

## 2025-05-05 RX ORDER — HYALURONATE SODIUM 20 MG/2 ML
20 SYRINGE (ML) INTRAARTICULAR
Qty: 1 | Refills: 1 | Status: ACTIVE | COMMUNITY
Start: 2025-05-05

## 2025-05-05 RX ORDER — NALOXONE HYDROCHLORIDE 0.4 MG/ML
0.1 INJECTION, SOLUTION INTRAMUSCULAR; INTRAVENOUS; SUBCUTANEOUS
Refills: 0 | Status: DISCONTINUED | OUTPATIENT
Start: 2025-05-05 | End: 2025-05-07

## 2025-05-05 RX ORDER — HYDROMORPHONE/SOD CHLOR,ISO/PF 2 MG/10 ML
30 SYRINGE (ML) INJECTION
Refills: 0 | Status: DISCONTINUED | OUTPATIENT
Start: 2025-05-05 | End: 2025-05-05

## 2025-05-05 RX ORDER — POLYETHYLENE GLYCOL 3350 17 G/17G
17 POWDER, FOR SOLUTION ORAL DAILY
Refills: 0 | Status: DISCONTINUED | OUTPATIENT
Start: 2025-05-05 | End: 2025-05-07

## 2025-05-05 RX ORDER — HYDROMORPHONE/SOD CHLOR,ISO/PF 2 MG/10 ML
2 SYRINGE (ML) INJECTION ONCE
Refills: 0 | Status: DISCONTINUED | OUTPATIENT
Start: 2025-05-05 | End: 2025-05-05

## 2025-05-05 RX ORDER — HYDROMORPHONE/SOD CHLOR,ISO/PF 2 MG/10 ML
30 SYRINGE (ML) INJECTION
Refills: 0 | Status: DISCONTINUED | OUTPATIENT
Start: 2025-05-05 | End: 2025-05-07

## 2025-05-05 RX ADMIN — Medication 30 MILLILITER(S): at 17:51

## 2025-05-05 RX ADMIN — AMLODIPINE BESYLATE 2.5 MILLIGRAM(S): 10 TABLET ORAL at 05:27

## 2025-05-05 RX ADMIN — Medication 2 MILLIGRAM(S): at 04:12

## 2025-05-05 RX ADMIN — Medication 30 MILLILITER(S): at 14:38

## 2025-05-05 RX ADMIN — Medication 2 MILLIGRAM(S): at 09:38

## 2025-05-05 RX ADMIN — SODIUM CHLORIDE 100 MILLILITER(S): 9 INJECTION, SOLUTION INTRAVENOUS at 03:55

## 2025-05-05 RX ADMIN — LISINOPRIL 10 MILLIGRAM(S): 5 TABLET ORAL at 17:53

## 2025-05-05 RX ADMIN — Medication 30 MILLILITER(S): at 19:11

## 2025-05-05 RX ADMIN — SODIUM CHLORIDE 100 MILLILITER(S): 9 INJECTION, SOLUTION INTRAVENOUS at 20:32

## 2025-05-05 RX ADMIN — DRONEDARONE 400 MILLIGRAM(S): 400 TABLET, FILM COATED ORAL at 17:53

## 2025-05-05 RX ADMIN — Medication 2 MILLIGRAM(S): at 03:55

## 2025-05-05 RX ADMIN — Medication 2 MILLIGRAM(S): at 12:47

## 2025-05-05 RX ADMIN — Medication 2 TABLET(S): at 20:31

## 2025-05-05 RX ADMIN — Medication 2 MILLIGRAM(S): at 00:19

## 2025-05-05 RX ADMIN — Medication 25 MILLIGRAM(S): at 17:53

## 2025-05-05 RX ADMIN — METOPROLOL SUCCINATE 75 MILLIGRAM(S): 50 TABLET, EXTENDED RELEASE ORAL at 05:27

## 2025-05-05 RX ADMIN — POLYETHYLENE GLYCOL 3350 17 GRAM(S): 17 POWDER, FOR SOLUTION ORAL at 06:41

## 2025-05-05 RX ADMIN — Medication 1 APPLICATION(S): at 17:55

## 2025-05-05 RX ADMIN — DRONEDARONE 400 MILLIGRAM(S): 400 TABLET, FILM COATED ORAL at 05:27

## 2025-05-05 RX ADMIN — Medication 2 MILLIGRAM(S): at 08:38

## 2025-05-05 RX ADMIN — APIXABAN 5 MILLIGRAM(S): 2.5 TABLET, FILM COATED ORAL at 05:27

## 2025-05-05 RX ADMIN — FOLIC ACID 1 MILLIGRAM(S): 1 TABLET ORAL at 11:47

## 2025-05-05 RX ADMIN — LISINOPRIL 10 MILLIGRAM(S): 5 TABLET ORAL at 05:27

## 2025-05-05 RX ADMIN — Medication 25 MILLIGRAM(S): at 08:37

## 2025-05-05 RX ADMIN — APIXABAN 5 MILLIGRAM(S): 2.5 TABLET, FILM COATED ORAL at 17:53

## 2025-05-05 RX ADMIN — Medication 2 MILLIGRAM(S): at 11:47

## 2025-05-05 NOTE — PROGRESS NOTE ADULT - ATTENDING COMMENTS
54M with sickle cell disease, p/w generalized weakness, malaise, bilateral knee, shoulder and back pain for about a week, admitted with suspected vasoocclusive pain crisis and acute on chronic anemia.     Background:  - takes percocet prn at home  - reports was following with Dr. Lincoln at [facility] for annual transfusions to keep Hgb >8 (unclear why in setting of sickle cell disease), then every 6 months, and in recent years every 2-4 weeks.  - recently started iron chelator and also takes [facility] Course:   - ED workup showed Hgb 4.5. Pt follows hematology at [facility] but requesting a second opinion regarding his more frequent PRBC transfusion. No prior bone marrow biopsy.   - s/p 2u pRBCs on 5/4/25 with Hgb response 4.5 -> 9.1  - reviewed peripheral smear- anisopoikilocytosis, target cell, reticulocytes, rare schistocytes (0-2/hpf), large platelet    Plan  - Obtain daily CBC, CMP, indirect bilirubin, LDH, Haptoglobin, retic count  - Obtain Type and screen q72 hours  - f/u Iron - 241, TIBC - WNL, ferritin - 2070, Vit B12 - 476, Folate - 5.6  - f/u Hgb electrophoresis - hgb S 22.3%, hgb A 62.7%, A2 2.7%, hgb F 12.3%  - hold hydroxyurea at this time  - To prevent sickling of RBC-> continue 0.45  cc/hr  - folic acid 1 mg daily  - incentive spirometry 10 times/hr  - pain control as per primary team. Would recommend dilaudid PCA at this time   will follow.

## 2025-05-06 LAB
ALBUMIN SERPL ELPH-MCNC: 3.7 G/DL — SIGNIFICANT CHANGE UP (ref 3.3–5)
ALP SERPL-CCNC: 123 U/L — HIGH (ref 40–120)
ALT FLD-CCNC: 28 U/L — SIGNIFICANT CHANGE UP (ref 10–45)
ANION GAP SERPL CALC-SCNC: 10 MMOL/L — SIGNIFICANT CHANGE UP (ref 5–17)
AST SERPL-CCNC: 28 U/L — SIGNIFICANT CHANGE UP (ref 10–40)
BILIRUB DIRECT SERPL-MCNC: 0.2 MG/DL — SIGNIFICANT CHANGE UP (ref 0–0.3)
BILIRUB INDIRECT FLD-MCNC: 0.4 MG/DL — SIGNIFICANT CHANGE UP (ref 0.2–1)
BILIRUB SERPL-MCNC: 0.6 MG/DL — SIGNIFICANT CHANGE UP (ref 0.2–1.2)
BILIRUB SERPL-MCNC: 0.6 MG/DL — SIGNIFICANT CHANGE UP (ref 0.2–1.2)
BUN SERPL-MCNC: 27 MG/DL — HIGH (ref 7–23)
CALCIUM SERPL-MCNC: 10.2 MG/DL — SIGNIFICANT CHANGE UP (ref 8.4–10.5)
CHLORIDE SERPL-SCNC: 108 MMOL/L — SIGNIFICANT CHANGE UP (ref 96–108)
CO2 SERPL-SCNC: 20 MMOL/L — LOW (ref 22–31)
CREAT SERPL-MCNC: 1.55 MG/DL — HIGH (ref 0.5–1.3)
EGFR: 53 ML/MIN/1.73M2 — LOW
EGFR: 53 ML/MIN/1.73M2 — LOW
GLUCOSE SERPL-MCNC: 99 MG/DL — SIGNIFICANT CHANGE UP (ref 70–99)
HAPTOGLOB SERPL-MCNC: <20 MG/DL — LOW (ref 34–200)
HCT VFR BLD CALC: 28.1 % — LOW (ref 39–50)
HGB BLD-MCNC: 9.3 G/DL — LOW (ref 13–17)
LDH SERPL L TO P-CCNC: 299 U/L — HIGH (ref 50–242)
MCHC RBC-ENTMCNC: 30.8 PG — SIGNIFICANT CHANGE UP (ref 27–34)
MCHC RBC-ENTMCNC: 33.1 G/DL — SIGNIFICANT CHANGE UP (ref 32–36)
MCV RBC AUTO: 93 FL — SIGNIFICANT CHANGE UP (ref 80–100)
NRBC BLD AUTO-RTO: 16 /100 WBCS — HIGH (ref 0–0)
PLATELET # BLD AUTO: 190 K/UL — SIGNIFICANT CHANGE UP (ref 150–400)
POTASSIUM SERPL-MCNC: 4.7 MMOL/L — SIGNIFICANT CHANGE UP (ref 3.5–5.3)
POTASSIUM SERPL-SCNC: 4.7 MMOL/L — SIGNIFICANT CHANGE UP (ref 3.5–5.3)
PROT SERPL-MCNC: 7.2 G/DL — SIGNIFICANT CHANGE UP (ref 6–8.3)
RBC # BLD: 3.02 M/UL — LOW (ref 4.2–5.8)
RBC # BLD: 3.02 M/UL — LOW (ref 4.2–5.8)
RBC # FLD: 25 % — HIGH (ref 10.3–14.5)
RETICS #: 79.7 K/UL — SIGNIFICANT CHANGE UP (ref 25–125)
RETICS/RBC NFR: 2.6 % — HIGH (ref 0.5–2.5)
SODIUM SERPL-SCNC: 138 MMOL/L — SIGNIFICANT CHANGE UP (ref 135–145)
WBC # BLD: 5.66 K/UL — SIGNIFICANT CHANGE UP (ref 3.8–10.5)
WBC # FLD AUTO: 5.66 K/UL — SIGNIFICANT CHANGE UP (ref 3.8–10.5)

## 2025-05-06 PROCEDURE — 99233 SBSQ HOSP IP/OBS HIGH 50: CPT

## 2025-05-06 RX ORDER — SODIUM BICARBONATE 1 MEQ/ML
650 SYRINGE (ML) INTRAVENOUS DAILY
Refills: 0 | Status: DISCONTINUED | OUTPATIENT
Start: 2025-05-06 | End: 2025-05-07

## 2025-05-06 RX ADMIN — FOLIC ACID 1 MILLIGRAM(S): 1 TABLET ORAL at 10:58

## 2025-05-06 RX ADMIN — Medication 30 MILLILITER(S): at 07:04

## 2025-05-06 RX ADMIN — Medication 1 APPLICATION(S): at 10:58

## 2025-05-06 RX ADMIN — METOPROLOL SUCCINATE 75 MILLIGRAM(S): 50 TABLET, EXTENDED RELEASE ORAL at 05:23

## 2025-05-06 RX ADMIN — DRONEDARONE 400 MILLIGRAM(S): 400 TABLET, FILM COATED ORAL at 05:26

## 2025-05-06 RX ADMIN — DRONEDARONE 400 MILLIGRAM(S): 400 TABLET, FILM COATED ORAL at 17:38

## 2025-05-06 RX ADMIN — Medication 2 TABLET(S): at 21:09

## 2025-05-06 RX ADMIN — LISINOPRIL 10 MILLIGRAM(S): 5 TABLET ORAL at 05:25

## 2025-05-06 RX ADMIN — Medication 30 MILLILITER(S): at 19:05

## 2025-05-06 RX ADMIN — APIXABAN 5 MILLIGRAM(S): 2.5 TABLET, FILM COATED ORAL at 17:38

## 2025-05-06 RX ADMIN — AMLODIPINE BESYLATE 2.5 MILLIGRAM(S): 10 TABLET ORAL at 05:23

## 2025-05-06 RX ADMIN — SODIUM CHLORIDE 100 MILLILITER(S): 9 INJECTION, SOLUTION INTRAVENOUS at 07:06

## 2025-05-06 RX ADMIN — APIXABAN 5 MILLIGRAM(S): 2.5 TABLET, FILM COATED ORAL at 05:26

## 2025-05-06 RX ADMIN — Medication 25 MILLIGRAM(S): at 05:22

## 2025-05-06 RX ADMIN — LISINOPRIL 10 MILLIGRAM(S): 5 TABLET ORAL at 17:38

## 2025-05-06 NOTE — PROGRESS NOTE ADULT - NSPROGADDITIONALINFOA_GEN_ALL_CORE
Plan of care discussed with Hematology team.
Discussed plan of care with Hematology  Obtained records from [facility] where patient sees Hematology, placed in chart

## 2025-05-06 NOTE — CONSULT NOTE ADULT - ASSESSMENT
53 yo M with PMH of sickle cell disease presented with generalized weakness, malaise for 1 week. Pt also c/o b/l shoulder pain, back pain, knee pain which patient attributes to sickle cell pain crisis. Pt follows Dr. Veloz for Sickle cell disease (last seen 3 weeks ago) and Hematologist Dr. Javier Lincoln at [facility] (last seen 1 week ago). Pt mentions his last hospitalization for sickle cell pain crisis was back in 2022. His baseline hemoglobin is around 8. He gets PRBC transfusion every 2-4 weeks. Last blood transfusion was 1 week ago at his hematologist's office for Hgb of 7. He also mentions he was previously on procrit and currently on [id number] Aranesp (last dose was on last tuesday, unclear which dose). Pt denies any recent fever/chills/cough. He mentions that he had some rt sided chest pain last few days.  He denies blood in stool/ dark stool. His last colonoscopy was 1 year ago with [facility] (normal according to patient). His father was diagnosed with colon cancer and passed away last year.  (04 May 2025 11:31)    CHRONIC KIDNEY DISEASE, STAGE 3a: continue with sodium bicarbonate   sodium chloride 0.45%. 1000 milliLiter(s) (100 mL/Hr) IV Continuous   Serum creatinine is stable at 1.55, approximating a GFR of controlled ml/min.   There is no progression.  No uremic symptoms. pos  evidence of  worsening  Anemia. Fluid status stable.   Will continue to avoid nephrotoxic drugs.  Patient remains asymptomatic.  Continue current therapy.        BP monitoring,continue current antihypertensive meds, low salt diet,followup with PMD in 1-2 weeks  amLODIPine   Tablet 2.5 milliGRAM(s) Oral daily  metoprolol succinate ER 75 milliGRAM(s) Oral daily      pain management HYDROmorphone PCA (5 mG/mL) 30 milliLiter(s) PCA Continuous PCA Continuous

## 2025-05-06 NOTE — CONSULT NOTE ADULT - SUBJECTIVE AND OBJECTIVE BOX
Patient is a 54y Male whom presented to the hospital with ckd     PAST MEDICAL & SURGICAL HISTORY:  Sickle cell disease      Right knee pain      Paroxysmal atrial fibrillation      Chronic venous insufficiency      Gout      GERD (gastroesophageal reflux disease)      History of cholecystectomy          MEDICATIONS  (STANDING):  amLODIPine   Tablet 2.5 milliGRAM(s) Oral daily  apixaban 5 milliGRAM(s) Oral every 12 hours  chlorhexidine 2% Cloths 1 Application(s) Topical daily  dronedarone 400 milliGRAM(s) Oral two times a day  folic acid 1 milliGRAM(s) Oral daily  HYDROmorphone PCA (5 mG/mL) 30 milliLiter(s) PCA Continuous PCA Continuous  lisinopril 10 milliGRAM(s) Oral two times a day  metoprolol succinate ER 75 milliGRAM(s) Oral daily  ondansetron Injectable 4 milliGRAM(s) IV Push once  senna 2 Tablet(s) Oral at bedtime  sodium chloride 0.45%. 1000 milliLiter(s) (100 mL/Hr) IV Continuous <Continuous>      Allergies    shellfish (Short breath)  [This allergen will not trigger allergy alert] No Known Drug Allergies (Unknown)    Intolerances        SOCIAL HISTORY:  Denies ETOh,Smoking,     FAMILY HISTORY:  Family history of essential hypertension    Family history of sickle cell trait        REVIEW OF SYSTEMS:    CONSTITUTIONAL: No weakness, fevers or chills  RESPIRATORY: No cough, wheezing, hemoptysis; No shortness of breath  CARDIOVASCULAR: No chest pain or palpitations  GASTROINTESTINAL: No abdominal or epigastric pain. No nausea, vomiting,     No diarrhea or constipation. No melena   SKIN: dry      VITAL:  T(C): , Max: 37.1 (05-06-25 @ 20:00)  T(F): , Max: 98.7 (05-06-25 @ 20:00)  HR: 61 (05-06-25 @ 20:00)  BP: 145/76 (05-06-25 @ 20:00)  BP(mean): --  RR: 20 (05-06-25 @ 20:00)  SpO2: 98% (05-06-25 @ 20:00)  Wt(kg): --    I and O's:    05-05 @ 07:01  -  05-06 @ 07:00  --------------------------------------------------------  IN: 120 mL / OUT: 0 mL / NET: 120 mL    05-06 @ 07:01  -  05-06 @ 20:09  --------------------------------------------------------  IN: 720 mL / OUT: 1400 mL / NET: -680 mL          PHYSICAL EXAM:    Constitutional: NAD  HEENT: conjunctive   clear   Neck:  No JVD  Respiratory: CTAB  Cardiovascular: S1 and S2  Gastrointestinal: BS+, soft, NT/ND  Extremities: No peripheral edema  Neurological: A/O x 3, no focal deficits  Psychiatric: Normal mood, normal affect  : No Tamayo  Skin: No rashes  Access: Not applicable    LABS:                        9.3    5.66  )-----------( 190      ( 06 May 2025 07:21 )             28.1     05-06    138  |  108  |  27[H]  ----------------------------<  99  4.7   |  20[L]  |  1.55[H]    Ca    10.2      06 May 2025 07:23    TPro  7.2  /  Alb  3.7  /  TBili  0.6  /  DBili  0.2  /  AST  28  /  ALT  28  /  AlkPhos  123[H]  05-06      Urine Studies:  Urinalysis Basic - ( 06 May 2025 07:23 )    Color: x / Appearance: x / SG: x / pH: x  Gluc: 99 mg/dL / Ketone: x  / Bili: x / Urobili: x   Blood: x / Protein: x / Nitrite: x   Leuk Esterase: x / RBC: x / WBC x   Sq Epi: x / Non Sq Epi: x / Bacteria: x            RADIOLOGY & ADDITIONAL STUDIES:          MEDICATIONS  (STANDING):  amLODIPine   Tablet 2.5 milliGRAM(s) Oral daily  apixaban 5 milliGRAM(s) Oral every 12 hours  chlorhexidine 2% Cloths 1 Application(s) Topical daily  dronedarone 400 milliGRAM(s) Oral two times a day  folic acid 1 milliGRAM(s) Oral daily  HYDROmorphone PCA (5 mG/mL) 30 milliLiter(s) PCA Continuous PCA Continuous  lisinopril 10 milliGRAM(s) Oral two times a day  metoprolol succinate ER 75 milliGRAM(s) Oral daily  ondansetron Injectable 4 milliGRAM(s) IV Push once  senna 2 Tablet(s) Oral at bedtime  sodium chloride 0.45%. 1000 milliLiter(s) (100 mL/Hr) IV Continuous <Continuous>

## 2025-05-07 ENCOUNTER — TRANSCRIPTION ENCOUNTER (OUTPATIENT)
Age: 55
End: 2025-05-07

## 2025-05-07 VITALS
DIASTOLIC BLOOD PRESSURE: 79 MMHG | RESPIRATION RATE: 18 BRPM | TEMPERATURE: 98 F | OXYGEN SATURATION: 97 % | HEART RATE: 58 BPM | SYSTOLIC BLOOD PRESSURE: 142 MMHG

## 2025-05-07 LAB
ALBUMIN SERPL ELPH-MCNC: 3.6 G/DL — SIGNIFICANT CHANGE UP (ref 3.3–5)
ALP SERPL-CCNC: 118 U/L — SIGNIFICANT CHANGE UP (ref 40–120)
ALT FLD-CCNC: 25 U/L — SIGNIFICANT CHANGE UP (ref 10–45)
ANION GAP SERPL CALC-SCNC: 10 MMOL/L — SIGNIFICANT CHANGE UP (ref 5–17)
AST SERPL-CCNC: 20 U/L — SIGNIFICANT CHANGE UP (ref 10–40)
BILIRUB DIRECT SERPL-MCNC: 0.2 MG/DL — SIGNIFICANT CHANGE UP (ref 0–0.3)
BILIRUB INDIRECT FLD-MCNC: 0.3 MG/DL — SIGNIFICANT CHANGE UP (ref 0.2–1)
BILIRUB SERPL-MCNC: 0.5 MG/DL — SIGNIFICANT CHANGE UP (ref 0.2–1.2)
BILIRUB SERPL-MCNC: 0.5 MG/DL — SIGNIFICANT CHANGE UP (ref 0.2–1.2)
BUN SERPL-MCNC: 21 MG/DL — SIGNIFICANT CHANGE UP (ref 7–23)
CALCIUM SERPL-MCNC: 10.1 MG/DL — SIGNIFICANT CHANGE UP (ref 8.4–10.5)
CHLORIDE SERPL-SCNC: 108 MMOL/L — SIGNIFICANT CHANGE UP (ref 96–108)
CO2 SERPL-SCNC: 19 MMOL/L — LOW (ref 22–31)
CREAT SERPL-MCNC: 1.32 MG/DL — HIGH (ref 0.5–1.3)
EGFR: 64 ML/MIN/1.73M2 — SIGNIFICANT CHANGE UP
EGFR: 64 ML/MIN/1.73M2 — SIGNIFICANT CHANGE UP
GLUCOSE SERPL-MCNC: 102 MG/DL — HIGH (ref 70–99)
HAPTOGLOB SERPL-MCNC: <20 MG/DL — LOW (ref 34–200)
HCT VFR BLD CALC: 26.3 % — LOW (ref 39–50)
HGB BLD-MCNC: 8.7 G/DL — LOW (ref 13–17)
LDH SERPL L TO P-CCNC: 292 U/L — HIGH (ref 50–242)
MCHC RBC-ENTMCNC: 30.9 PG — SIGNIFICANT CHANGE UP (ref 27–34)
MCHC RBC-ENTMCNC: 33.1 G/DL — SIGNIFICANT CHANGE UP (ref 32–36)
MCV RBC AUTO: 93.3 FL — SIGNIFICANT CHANGE UP (ref 80–100)
NRBC BLD AUTO-RTO: 16 /100 WBCS — HIGH (ref 0–0)
PLATELET # BLD AUTO: 183 K/UL — SIGNIFICANT CHANGE UP (ref 150–400)
POTASSIUM SERPL-MCNC: 4.5 MMOL/L — SIGNIFICANT CHANGE UP (ref 3.5–5.3)
POTASSIUM SERPL-SCNC: 4.5 MMOL/L — SIGNIFICANT CHANGE UP (ref 3.5–5.3)
PROT SERPL-MCNC: 6.9 G/DL — SIGNIFICANT CHANGE UP (ref 6–8.3)
RBC # BLD: 2.82 M/UL — LOW (ref 4.2–5.8)
RBC # BLD: 2.82 M/UL — LOW (ref 4.2–5.8)
RBC # FLD: 24.8 % — HIGH (ref 10.3–14.5)
RETICS #: 82.3 K/UL — SIGNIFICANT CHANGE UP (ref 25–125)
RETICS/RBC NFR: 2.9 % — HIGH (ref 0.5–2.5)
SODIUM SERPL-SCNC: 137 MMOL/L — SIGNIFICANT CHANGE UP (ref 135–145)
WBC # BLD: 5.62 K/UL — SIGNIFICANT CHANGE UP (ref 3.8–10.5)
WBC # FLD AUTO: 5.62 K/UL — SIGNIFICANT CHANGE UP (ref 3.8–10.5)

## 2025-05-07 PROCEDURE — 80053 COMPREHEN METABOLIC PANEL: CPT

## 2025-05-07 PROCEDURE — 82803 BLOOD GASES ANY COMBINATION: CPT

## 2025-05-07 PROCEDURE — 86923 COMPATIBILITY TEST ELECTRIC: CPT

## 2025-05-07 PROCEDURE — 82607 VITAMIN B-12: CPT

## 2025-05-07 PROCEDURE — 83540 ASSAY OF IRON: CPT

## 2025-05-07 PROCEDURE — 82746 ASSAY OF FOLIC ACID SERUM: CPT

## 2025-05-07 PROCEDURE — 96374 THER/PROPH/DIAG INJ IV PUSH: CPT

## 2025-05-07 PROCEDURE — 83605 ASSAY OF LACTIC ACID: CPT

## 2025-05-07 PROCEDURE — 86902 BLOOD TYPE ANTIGEN DONOR EA: CPT

## 2025-05-07 PROCEDURE — 82435 ASSAY OF BLOOD CHLORIDE: CPT

## 2025-05-07 PROCEDURE — 93005 ELECTROCARDIOGRAM TRACING: CPT

## 2025-05-07 PROCEDURE — 85610 PROTHROMBIN TIME: CPT

## 2025-05-07 PROCEDURE — 96375 TX/PRO/DX INJ NEW DRUG ADDON: CPT

## 2025-05-07 PROCEDURE — 87637 SARSCOV2&INF A&B&RSV AMP PRB: CPT

## 2025-05-07 PROCEDURE — 71045 X-RAY EXAM CHEST 1 VIEW: CPT

## 2025-05-07 PROCEDURE — 82248 BILIRUBIN DIRECT: CPT

## 2025-05-07 PROCEDURE — 85018 HEMOGLOBIN: CPT

## 2025-05-07 PROCEDURE — 85014 HEMATOCRIT: CPT

## 2025-05-07 PROCEDURE — 82247 BILIRUBIN TOTAL: CPT

## 2025-05-07 PROCEDURE — 80048 BASIC METABOLIC PNL TOTAL CA: CPT

## 2025-05-07 PROCEDURE — 82728 ASSAY OF FERRITIN: CPT

## 2025-05-07 PROCEDURE — 99285 EMERGENCY DEPT VISIT HI MDM: CPT | Mod: 25

## 2025-05-07 PROCEDURE — 83550 IRON BINDING TEST: CPT

## 2025-05-07 PROCEDURE — 82947 ASSAY GLUCOSE BLOOD QUANT: CPT

## 2025-05-07 PROCEDURE — 84100 ASSAY OF PHOSPHORUS: CPT

## 2025-05-07 PROCEDURE — P9040: CPT

## 2025-05-07 PROCEDURE — 83615 LACTATE (LD) (LDH) ENZYME: CPT

## 2025-05-07 PROCEDURE — 83735 ASSAY OF MAGNESIUM: CPT

## 2025-05-07 PROCEDURE — 85045 AUTOMATED RETICULOCYTE COUNT: CPT

## 2025-05-07 PROCEDURE — 83010 ASSAY OF HAPTOGLOBIN QUANT: CPT

## 2025-05-07 PROCEDURE — 36415 COLL VENOUS BLD VENIPUNCTURE: CPT

## 2025-05-07 PROCEDURE — 83880 ASSAY OF NATRIURETIC PEPTIDE: CPT

## 2025-05-07 PROCEDURE — 83020 HEMOGLOBIN ELECTROPHORESIS: CPT

## 2025-05-07 PROCEDURE — 86850 RBC ANTIBODY SCREEN: CPT

## 2025-05-07 PROCEDURE — 85027 COMPLETE CBC AUTOMATED: CPT

## 2025-05-07 PROCEDURE — 84132 ASSAY OF SERUM POTASSIUM: CPT

## 2025-05-07 PROCEDURE — 82330 ASSAY OF CALCIUM: CPT

## 2025-05-07 PROCEDURE — 36430 TRANSFUSION BLD/BLD COMPNT: CPT

## 2025-05-07 PROCEDURE — 85025 COMPLETE CBC W/AUTO DIFF WBC: CPT

## 2025-05-07 PROCEDURE — 84484 ASSAY OF TROPONIN QUANT: CPT

## 2025-05-07 PROCEDURE — 84295 ASSAY OF SERUM SODIUM: CPT

## 2025-05-07 PROCEDURE — 85730 THROMBOPLASTIN TIME PARTIAL: CPT

## 2025-05-07 PROCEDURE — 86901 BLOOD TYPING SEROLOGIC RH(D): CPT

## 2025-05-07 PROCEDURE — 99239 HOSP IP/OBS DSCHRG MGMT >30: CPT

## 2025-05-07 PROCEDURE — 86900 BLOOD TYPING SEROLOGIC ABO: CPT

## 2025-05-07 RX ORDER — HEPARIN SODIUM,PORCINE/NS/PF 20/20 ML
300 SYRINGE (ML) INTRAVENOUS ONCE
Refills: 0 | Status: COMPLETED | OUTPATIENT
Start: 2025-05-07 | End: 2025-05-07

## 2025-05-07 RX ORDER — HEPARIN SODIUM 1000 [USP'U]/ML
5000 INJECTION INTRAVENOUS; SUBCUTANEOUS ONCE
Refills: 0 | Status: DISCONTINUED | OUTPATIENT
Start: 2025-05-07 | End: 2025-05-07

## 2025-05-07 RX ORDER — SODIUM BICARBONATE 1 MEQ/ML
1 SYRINGE (ML) INTRAVENOUS
Qty: 30 | Refills: 0
Start: 2025-05-07 | End: 2025-06-05

## 2025-05-07 RX ORDER — APIXABAN 2.5 MG/1
1 TABLET, FILM COATED ORAL
Qty: 0 | Refills: 0 | DISCHARGE
Start: 2025-05-07

## 2025-05-07 RX ORDER — SODIUM BICARBONATE 1 MEQ/ML
1 SYRINGE (ML) INTRAVENOUS
Qty: 0 | Refills: 0 | DISCHARGE
Start: 2025-05-07

## 2025-05-07 RX ORDER — POLYETHYLENE GLYCOL 3350 17 G/17G
17 POWDER, FOR SOLUTION ORAL
Qty: 0 | Refills: 0 | DISCHARGE
Start: 2025-05-07

## 2025-05-07 RX ORDER — APIXABAN 2.5 MG/1
1 TABLET, FILM COATED ORAL
Refills: 0 | DISCHARGE

## 2025-05-07 RX ORDER — APIXABAN 2.5 MG/1
1 TABLET, FILM COATED ORAL
Qty: 60 | Refills: 0
Start: 2025-05-07 | End: 2025-06-05

## 2025-05-07 RX ADMIN — SODIUM CHLORIDE 100 MILLILITER(S): 9 INJECTION, SOLUTION INTRAVENOUS at 09:05

## 2025-05-07 RX ADMIN — Medication 30 MILLILITER(S): at 07:08

## 2025-05-07 RX ADMIN — LISINOPRIL 10 MILLIGRAM(S): 5 TABLET ORAL at 05:02

## 2025-05-07 RX ADMIN — Medication 4 MILLIGRAM(S): at 09:05

## 2025-05-07 RX ADMIN — AMLODIPINE BESYLATE 2.5 MILLIGRAM(S): 10 TABLET ORAL at 05:01

## 2025-05-07 RX ADMIN — Medication 300 UNIT(S): at 13:15

## 2025-05-07 RX ADMIN — DRONEDARONE 400 MILLIGRAM(S): 400 TABLET, FILM COATED ORAL at 05:01

## 2025-05-07 RX ADMIN — Medication 650 MILLIGRAM(S): at 11:11

## 2025-05-07 RX ADMIN — METOPROLOL SUCCINATE 75 MILLIGRAM(S): 50 TABLET, EXTENDED RELEASE ORAL at 05:01

## 2025-05-07 RX ADMIN — APIXABAN 5 MILLIGRAM(S): 2.5 TABLET, FILM COATED ORAL at 05:02

## 2025-05-07 RX ADMIN — FOLIC ACID 1 MILLIGRAM(S): 1 TABLET ORAL at 11:11

## 2025-05-07 RX ADMIN — Medication 1 APPLICATION(S): at 11:11

## 2025-05-07 NOTE — DISCHARGE NOTE PROVIDER - CARE PROVIDER_API CALL
Brittany Veloz  Internal Medicine  [address] [place] [address]  Phone: [phone]  Fax: [phone]  Follow Up Time: 1-3 days    Javier Lincoln  Medical Oncology  [address] [place] [address]  Phone: [phone]  Fax: [phone]  Follow Up Time: 1 week    Bassem Lagos  Nephrology  [address] [place] [address], [address]  [place] [address]  Phone: [phone]  Fax: [phone]  Follow Up Time: 2 weeks

## 2025-05-07 NOTE — DISCHARGE NOTE PROVIDER - NSDCMRMEDTOKEN_GEN_ALL_CORE_FT
amLODIPine 2.5 mg oral tablet: 1 tab(s) orally once a day  apixaban 5 mg oral tablet: 1 tab(s) orally every 12 hours  Farxiga 5 mg oral tablet: 1 tab(s) orally once a day  folic acid 1 mg oral tablet: 1 tab(s) orally once a day  hydroxyurea 500 mg oral capsule: 3 tab(s) orally once a day  Linzess 290 mcg oral capsule: 1 cap(s) orally once a day  lisinopril 10 mg oral tablet: 1 tab(s) orally 2 times a day  metoprolol succinate 25 mg oral tablet, extended release: 3 tab(s) orally once a day  Multaq 400 mg oral tablet: 1 tab(s) orally 2 times a day  polyethylene glycol 3350 oral powder for reconstitution: 17 gram(s) orally once a day As needed Constipation  senna (sennosides) 8.6 mg oral tablet: 2 tab(s) orally once a day (at bedtime)  sodium bicarbonate 650 mg oral tablet: 1 tab(s) orally once a day

## 2025-05-07 NOTE — PROGRESS NOTE ADULT - SUBJECTIVE AND OBJECTIVE BOX
INTERVAL HPI/OVERNIGHT EVENTS:  O/N: no acute overnight events  This morning: Patient was seen and examined at bedside. Reports 8/10 pain in both shoulders, low back, and knees. This pain feels similar to his crisis pain. No chest pain. No difficulty breathing. No blood in stools or urine.     VITAL SIGNS:  T(F): 97.5 (05-05-25 @ 11:52)  HR: 61 (05-05-25 @ 11:52)  BP: 128/79 (05-05-25 @ 11:52)  RR: 18 (05-05-25 @ 11:52)  SpO2: 95% (05-05-25 @ 11:52)  Wt(kg): --    PHYSICAL EXAM:    Constitutional: fatigued appearing  HEENT: EOMI, sclera non-icteric  Respiratory: CTAB  Cardiovascular: RRR, normal S1S2, no M/R/G  Gastrointestinal: abdomen soft, NTND  Extremities: legs warm, well perfused, no edema  Neurological: alert and oriented    MEDICATIONS  (STANDING):  amLODIPine   Tablet 2.5 milliGRAM(s) Oral daily  apixaban 5 milliGRAM(s) Oral every 12 hours  dronedarone 400 milliGRAM(s) Oral two times a day  folic acid 1 milliGRAM(s) Oral daily  HYDROmorphone PCA (5 mG/mL) 30 milliLiter(s) PCA Continuous PCA Continuous  lisinopril 10 milliGRAM(s) Oral two times a day  metoprolol succinate ER 75 milliGRAM(s) Oral daily  ondansetron Injectable 4 milliGRAM(s) IV Push once  senna 2 Tablet(s) Oral at bedtime  sodium chloride 0.45%. 1000 milliLiter(s) (100 mL/Hr) IV Continuous <Continuous>    MEDICATIONS  (PRN):  diphenhydrAMINE 25 milliGRAM(s) Oral every 6 hours PRN Rash and/or Itching  HYDROmorphone  Injectable 2 milliGRAM(s) IV Push every 4 hours PRN Moderate Pain (4 - 6)  naloxone Injectable 0.1 milliGRAM(s) IV Push every 3 minutes PRN For ANY of the following changes in patient status:  A. RR LESS THAN 10 breaths per minute, B. Oxygen saturation LESS THAN 90%, C. Sedation score of 6  polyethylene glycol 3350 17 Gram(s) Oral daily PRN Constipation      Allergies    shellfish (Short breath)  [This allergen will not trigger allergy alert] No Known Drug Allergies (Unknown)    Intolerances        LABS:                        9.4    5.27  )-----------( 197      ( 05 May 2025 07:00 )             28.3     05-05    138  |  109[H]  |  30[H]  ----------------------------<  103[H]  4.8   |  19[L]  |  1.68[H]    Ca    10.3      05 May 2025 06:54  Phos  2.9     05-04  Mg     2.4     05-04    TPro  x   /  Alb  x   /  TBili  0.6  /  DBili  0.2  /  AST  x   /  ALT  x   /  AlkPhos  x   05-05    PT/INR - ( 04 May 2025 07:09 )   PT: 15.1 sec;   INR: 1.32 ratio         PTT - ( 04 May 2025 07:09 )  PTT:33.7 sec  Urinalysis Basic - ( 05 May 2025 06:54 )    Color: x / Appearance: x / SG: x / pH: x  Gluc: 103 mg/dL / Ketone: x  / Bili: x / Urobili: x   Blood: x / Protein: x / Nitrite: x   Leuk Esterase: x / RBC: x / WBC x   Sq Epi: x / Non Sq Epi: x / Bacteria: x              RADIOLOGY & ADDITIONAL TESTS:  Reviewed
MEDICATIONS  (STANDING):  amLODIPine   Tablet 2.5 milliGRAM(s) Oral daily  apixaban 5 milliGRAM(s) Oral every 12 hours  chlorhexidine 2% Cloths 1 Application(s) Topical daily  dronedarone 400 milliGRAM(s) Oral two times a day  folic acid 1 milliGRAM(s) Oral daily  lisinopril 10 milliGRAM(s) Oral two times a day  metoprolol succinate ER 75 milliGRAM(s) Oral daily  senna 2 Tablet(s) Oral at bedtime  sodium bicarbonate 650 milliGRAM(s) Oral daily  sodium chloride 0.45%. 1000 milliLiter(s) (100 mL/Hr) IV Continuous <Continuous>                                    8.7    5.62  )-----------( 183      ( 07 May 2025 07:09 )             26.3       CBC Full  -  ( 07 May 2025 07:09 )  WBC Count : 5.62 K/uL  RBC Count : 2.82 M/uL  Hemoglobin : 8.7 g/dL  Hematocrit : 26.3 %  Platelet Count - Automated : 183 K/uL  Mean Cell Volume : 93.3 fl  Mean Cell Hemoglobin : 30.9 pg  Mean Cell Hemoglobin Concentration : 33.1 g/dL  Auto Neutrophil # : x  Auto Lymphocyte # : x  Auto Monocyte # : x  Auto Eosinophil # : x  Auto Basophil # : x  Auto Neutrophil % : x  Auto Lymphocyte % : x  Auto Monocyte % : x  Auto Eosinophil % : x  Auto Basophil % : x      05-07    137  |  108  |  21  ----------------------------<  102[H]  4.5   |  19[L]  |  1.32[H]    Ca    10.1      07 May 2025 07:09    TPro  6.9  /  Alb  3.6  /  TBili  0.5  /  DBili  0.2  /  AST  20  /  ALT  25  /  AlkPhos  118  05-07      CAPILLARY BLOOD GLUCOSE          Vital Signs Last 24 Hrs  T(C): 36.6 (07 May 2025 12:09), Max: 37.1 (06 May 2025 20:00)  T(F): 97.9 (07 May 2025 12:09), Max: 98.7 (06 May 2025 20:00)  HR: 58 (07 May 2025 12:09) (58 - 94)  BP: 142/79 (07 May 2025 12:09) (142/79 - 145/76)  BP(mean): --  RR: 18 (07 May 2025 12:09) (18 - 20)  SpO2: 97% (07 May 2025 12:09) (96% - 98%)    Parameters below as of 07 May 2025 12:09  Patient On (Oxygen Delivery Method): room air        Urinalysis Basic - ( 07 May 2025 07:09 )    Color: x / Appearance: x / SG: x / pH: x  Gluc: 102 mg/dL / Ketone: x  / Bili: x / Urobili: x   Blood: x / Protein: x / Nitrite: x   Leuk Esterase: x / RBC: x / WBC x   Sq Epi: x / Non Sq Epi: x / Bacteria: x        
Subjective: Patient seen and examined. No new events except as noted.   Pt feeling much better    REVIEW OF SYSTEMS:    CONSTITUTIONAL: +weakness, fevers or chills  EYES/ENT: No visual changes;  No vertigo or throat pain   NECK: No pain or stiffness  RESPIRATORY: No cough, wheezing, hemoptysis; No shortness of breath  CARDIOVASCULAR: No chest pain or palpitations  GASTROINTESTINAL: No abdominal or epigastric pain. No nausea, vomiting, or hematemesis; No diarrhea or constipation. No melena or hematochezia.  GENITOURINARY: No dysuria, frequency or hematuria  NEUROLOGICAL: No numbness or weakness  SKIN: No itching, burning, rashes, or lesions   All other review of systems is negative unless indicated above.    MEDICATIONS:  MEDICATIONS  (STANDING):  amLODIPine   Tablet 2.5 milliGRAM(s) Oral daily  apixaban 5 milliGRAM(s) Oral every 12 hours  chlorhexidine 2% Cloths 1 Application(s) Topical daily  dronedarone 400 milliGRAM(s) Oral two times a day  folic acid 1 milliGRAM(s) Oral daily  HYDROmorphone PCA (5 mG/mL) 30 milliLiter(s) PCA Continuous PCA Continuous  lisinopril 10 milliGRAM(s) Oral two times a day  metoprolol succinate ER 75 milliGRAM(s) Oral daily  ondansetron Injectable 4 milliGRAM(s) IV Push once  senna 2 Tablet(s) Oral at bedtime  sodium chloride 0.45%. 1000 milliLiter(s) (100 mL/Hr) IV Continuous <Continuous>      PHYSICAL EXAM:  T(C): 36.8 (05-06-25 @ 08:20), Max: 36.8 (05-06-25 @ 08:20)  HR: 62 (05-06-25 @ 08:20) (57 - 63)  BP: 135/74 (05-06-25 @ 08:20) (127/76 - 157/92)  RR: 18 (05-06-25 @ 08:20) (18 - 19)  SpO2: 97% (05-06-25 @ 08:20) (95% - 100%)  Wt(kg): --  I&O's Summary    05 May 2025 07:01  -  06 May 2025 07:00  --------------------------------------------------------  IN: 120 mL / OUT: 0 mL / NET: 120 mL    06 May 2025 07:01  -  06 May 2025 11:35  --------------------------------------------------------  IN: 0 mL / OUT: 300 mL / NET: -300 mL          Appearance: Normal	  HEENT:   Normal oral mucosa, PERRL, EOMI	  Lymphatic: No lymphadenopathy , no edema  Cardiovascular: Normal S1 S2, No JVD, No murmurs , Peripheral pulses palpable 2+ bilaterally  Respiratory: Lungs clear to auscultation, normal effort 	  Gastrointestinal:  Soft, Non-tender, + BS	  Skin: No rashes, No ecchymoses, No cyanosis, warm to touch  Musculoskeletal: Normal range of motion, normal strength  Psychiatry:  Mood & affect appropriate  Ext: No edema    LABS:    CARDIAC MARKERS:                                9.3    5.66  )-----------( 190      ( 06 May 2025 07:21 )             28.1     05-06    138  |  108  |  27[H]  ----------------------------<  99  4.7   |  20[L]  |  1.55[H]    Ca    10.2      06 May 2025 07:23    TPro  7.2  /  Alb  3.7  /  TBili  0.6  /  DBili  0.2  /  AST  28  /  ALT  28  /  AlkPhos  123[H]  05-06    proBNP:   Lipid Profile:   HgA1c:   TSH:             TELEMETRY: 	    ECG:  	  RADIOLOGY:   DIAGNOSTIC TESTING:  [ ] Echocardiogram:  [ ]  Catheterization:  [ ] Stress Test:    OTHER: 	          
Subjective: Patient seen and examined. No new events except as noted.     REVIEW OF SYSTEMS:    CONSTITUTIONAL: +weakness, fevers or chills  EYES/ENT: No visual changes;  No vertigo or throat pain   NECK: No pain or stiffness  RESPIRATORY: No cough, wheezing, hemoptysis; No shortness of breath  CARDIOVASCULAR: No chest pain or palpitations  GASTROINTESTINAL: No abdominal or epigastric pain. No nausea, vomiting, or hematemesis; No diarrhea or constipation. No melena or hematochezia.  GENITOURINARY: No dysuria, frequency or hematuria  NEUROLOGICAL: No numbness or weakness  SKIN: No itching, burning, rashes, or lesions   All other review of systems is negative unless indicated above.    MEDICATIONS:  MEDICATIONS  (STANDING):  amLODIPine   Tablet 2.5 milliGRAM(s) Oral daily  apixaban 5 milliGRAM(s) Oral every 12 hours  dronedarone 400 milliGRAM(s) Oral two times a day  folic acid 1 milliGRAM(s) Oral daily  lisinopril 10 milliGRAM(s) Oral two times a day  metoprolol succinate ER 75 milliGRAM(s) Oral daily  ondansetron Injectable 4 milliGRAM(s) IV Push once  senna 2 Tablet(s) Oral at bedtime  sodium chloride 0.45%. 1000 milliLiter(s) (100 mL/Hr) IV Continuous <Continuous>      PHYSICAL EXAM:  T(C): 36.5 (05-05-25 @ 09:05), Max: 37.2 (05-04-25 @ 11:59)  HR: 59 (05-05-25 @ 09:05) (57 - 63)  BP: 151/84 (05-05-25 @ 09:05) (118/65 - 151/84)  RR: 18 (05-05-25 @ 09:05) (18 - 18)  SpO2: 99% (05-05-25 @ 09:05) (96% - 99%)  Wt(kg): --  I&O's Summary    04 May 2025 07:01  -  05 May 2025 07:00  --------------------------------------------------------  IN: 840 mL / OUT: 1650 mL / NET: -810 mL          Appearance: Normal	  HEENT:   Normal oral mucosa, PERRL, EOMI	  Lymphatic: No lymphadenopathy , no edema  Cardiovascular: Normal S1 S2, No JVD, No murmurs , Peripheral pulses palpable 2+ bilaterally  Respiratory: Lungs clear to auscultation, normal effort 	  Gastrointestinal:  Soft, Non-tender, + BS	  Skin: No rashes, No ecchymoses, No cyanosis, warm to touch  Musculoskeletal: Normal range of motion, normal strength  Psychiatry:  Mood & affect appropriate  Ext: No edema      LABS:    CARDIAC MARKERS:                                9.4    5.27  )-----------( 197      ( 05 May 2025 07:00 )             28.3     05-05    138  |  109[H]  |  30[H]  ----------------------------<  103[H]  4.8   |  19[L]  |  1.68[H]    Ca    10.3      05 May 2025 06:54  Phos  2.9     05-04  Mg     2.4     05-04    TPro  x   /  Alb  x   /  TBili  0.6  /  DBili  0.2  /  AST  x   /  ALT  x   /  AlkPhos  x   05-05    proBNP:   Lipid Profile:   HgA1c:   TSH:             TELEMETRY: 	    ECG:  	  RADIOLOGY:   DIAGNOSTIC TESTING:  [ ] Echocardiogram:  [ ]  Catheterization:  [ ] Stress Test:    OTHER: 	          
Subjective: Patient seen and examined. No new events except as noted.     REVIEW OF SYSTEMS:    CONSTITUTIONAL: +weakness, fevers or chills  EYES/ENT: No visual changes;  No vertigo or throat pain   NECK: No pain or stiffness  RESPIRATORY: No cough, wheezing, hemoptysis; No shortness of breath  CARDIOVASCULAR: No chest pain or palpitations  GASTROINTESTINAL: No abdominal or epigastric pain. No nausea, vomiting, or hematemesis; No diarrhea or constipation. No melena or hematochezia.  GENITOURINARY: No dysuria, frequency or hematuria  NEUROLOGICAL: No numbness or weakness  SKIN: No itching, burning, rashes, or lesions   All other review of systems is negative unless indicated above.    MEDICATIONS:  MEDICATIONS  (STANDING):  amLODIPine   Tablet 2.5 milliGRAM(s) Oral daily  apixaban 5 milliGRAM(s) Oral every 12 hours  chlorhexidine 2% Cloths 1 Application(s) Topical daily  dronedarone 400 milliGRAM(s) Oral two times a day  folic acid 1 milliGRAM(s) Oral daily  HYDROmorphone PCA (5 mG/mL) 30 milliLiter(s) PCA Continuous PCA Continuous  lisinopril 10 milliGRAM(s) Oral two times a day  metoprolol succinate ER 75 milliGRAM(s) Oral daily  senna 2 Tablet(s) Oral at bedtime  sodium bicarbonate 650 milliGRAM(s) Oral daily  sodium chloride 0.45%. 1000 milliLiter(s) (100 mL/Hr) IV Continuous <Continuous>      PHYSICAL EXAM:  T(C): 36.6 (05-07-25 @ 04:55), Max: 37.1 (05-06-25 @ 20:00)  HR: 94 (05-07-25 @ 04:55) (58 - 94)  BP: 143/79 (05-07-25 @ 04:55) (143/79 - 159/81)  RR: 18 (05-07-25 @ 04:55) (18 - 20)  SpO2: 96% (05-07-25 @ 04:55) (96% - 98%)  Wt(kg): --  I&O's Summary    06 May 2025 07:01  -  07 May 2025 07:00  --------------------------------------------------------  IN: 720 mL / OUT: 1400 mL / NET: -680 mL          Appearance: Normal	  HEENT:   Normal oral mucosa, PERRL, EOMI	  Lymphatic: No lymphadenopathy , no edema  Cardiovascular: Normal S1 S2, No JVD, No murmurs , Peripheral pulses palpable 2+ bilaterally  Respiratory: Lungs clear to auscultation, normal effort 	  Gastrointestinal:  Soft, Non-tender, + BS	  Skin: No rashes, No ecchymoses, No cyanosis, warm to touch  Musculoskeletal: Normal range of motion, normal strength  Psychiatry:  Mood & affect appropriate  Ext: No edema      LABS:    CARDIAC MARKERS:                                8.7    5.62  )-----------( 183      ( 07 May 2025 07:09 )             26.3     05-07    137  |  108  |  21  ----------------------------<  102[H]  4.5   |  19[L]  |  1.32[H]    Ca    10.1      07 May 2025 07:09    TPro  6.9  /  Alb  3.6  /  TBili  0.5  /  DBili  0.2  /  AST  20  /  ALT  25  /  AlkPhos  118  05-07    proBNP:   Lipid Profile:   HgA1c:   TSH:             TELEMETRY: 	    ECG:  	  RADIOLOGY:   DIAGNOSTIC TESTING:  [ ] Echocardiogram:  [ ]  Catheterization:  [ ] Stress Test:    OTHER: 	          
[facility] Division of Hospital Medicine  Mj Perkins DO  Pager (M-F, 8A-5P):  MS Teams PREFERRED        SUBJECTIVE / OVERNIGHT EVENTS:  Pt seen at bedside in no acute distress  States pain is still bothering him and if he could increase dose.   Will discuss with Hematology.       MEDICATIONS  (STANDING):  amLODIPine   Tablet 2.5 milliGRAM(s) Oral daily  apixaban 5 milliGRAM(s) Oral every 12 hours  chlorhexidine 2% Cloths 1 Application(s) Topical daily  dronedarone 400 milliGRAM(s) Oral two times a day  folic acid 1 milliGRAM(s) Oral daily  HYDROmorphone PCA (5 mG/mL) 30 milliLiter(s) PCA Continuous PCA Continuous  lisinopril 10 milliGRAM(s) Oral two times a day  metoprolol succinate ER 75 milliGRAM(s) Oral daily  ondansetron Injectable 4 milliGRAM(s) IV Push once  senna 2 Tablet(s) Oral at bedtime  sodium chloride 0.45%. 1000 milliLiter(s) (100 mL/Hr) IV Continuous <Continuous>    MEDICATIONS  (PRN):  diphenhydrAMINE 25 milliGRAM(s) Oral every 6 hours PRN Rash and/or Itching  naloxone Injectable 0.1 milliGRAM(s) IV Push every 3 minutes PRN For ANY of the following changes in patient status:  A. RR LESS THAN 10 breaths per minute, B. Oxygen saturation LESS THAN 90%, C. Sedation score of 6  polyethylene glycol 3350 17 Gram(s) Oral daily PRN Constipation      I&O's Summary    04 May 2025 07:01  -  05 May 2025 07:00  --------------------------------------------------------  IN: 840 mL / OUT: 1650 mL / NET: -810 mL        PHYSICAL EXAM:  Vital Signs Last 24 Hrs  T(C): 36.4 (05 May 2025 11:52), Max: 36.7 (05 May 2025 04:01)  T(F): 97.5 (05 May 2025 11:52), Max: 98 (05 May 2025 04:01)  HR: 61 (05 May 2025 11:52) (59 - 63)  BP: 128/79 (05 May 2025 11:52) (127/73 - 151/84)  BP(mean): --  RR: 18 (05 May 2025 11:52) (18 - 18)  SpO2: 95% (05 May 2025 11:52) (95% - 99%)    Parameters below as of 05 May 2025 11:52  Patient On (Oxygen Delivery Method): room air      CONSTITUTIONAL: NAD, well-developed, well-groomed  EYES: PERRLA; conjunctiva and sclera clear  ENMT: Moist oral mucosa, no pharyngeal injection or exudates; normal dentition  NECK: Supple, no palpable masses; no thyromegaly  RESPIRATORY: Normal respiratory effort; lungs are clear to auscultation bilaterally  CARDIOVASCULAR: Regular rate and rhythm, normal S1 and S2, no murmur/rub/gallop; No lower extremity edema; Peripheral pulses are 2+ bilaterally  ABDOMEN: Nontender to palpation, normoactive bowel sounds, no rebound/guarding; No hepatosplenomegaly  MUSCULOSKELETAL:  Normal gait; no clubbing or cyanosis of digits; no joint swelling or tenderness to palpation  PSYCH: A+O to person, place, and time; affect appropriate  NEUROLOGY: CN 2-12 are intact and symmetric; no gross sensory deficits   SKIN: No rashes; no palpable lesions    LABS:                        9.4    5.27  )-----------( 197      ( 05 May 2025 07:00 )             28.3     05-05    138  |  109[H]  |  30[H]  ----------------------------<  103[H]  4.8   |  19[L]  |  1.68[H]    Ca    10.3      05 May 2025 06:54  Phos  2.9     05-04  Mg     2.4     05-04    TPro  x   /  Alb  x   /  TBili  0.6  /  DBili  0.2  /  AST  x   /  ALT  x   /  AlkPhos  x   05-05    PT/INR - ( 04 May 2025 07:09 )   PT: 15.1 sec;   INR: 1.32 ratio         PTT - ( 04 May 2025 07:09 )  PTT:33.7 sec      Urinalysis Basic - ( 05 May 2025 06:54 )    Color: x / Appearance: x / SG: x / pH: x  Gluc: 103 mg/dL / Ketone: x  / Bili: x / Urobili: x   Blood: x / Protein: x / Nitrite: x   Leuk Esterase: x / RBC: x / WBC x   Sq Epi: x / Non Sq Epi: x / Bacteria: x          RADIOLOGY & ADDITIONAL TESTS:  Results Reviewed:   Imaging Personally Reviewed:  Electrocardiogram Personally Reviewed:    COORDINATION OF CARE:  Care Discussed with Consultants/Other Providers [Y/N]:  Prior or Outpatient Records Reviewed [Y/N]:  
[facility] Division of Hospital Medicine  Mj Perkins DO  Pager (M-F, 8A-5P):  MS Teams PREFERRED        SUBJECTIVE / OVERNIGHT EVENTS:  Pt seen at bedside in no acute distress  States the PCA Pump has helped him, believes he needs 1 more day  Discussed Cardiology recs regarding restarting Eliquis  Plan for DC in AM, pt verbalizes agreement.     MEDICATIONS  (STANDING):  amLODIPine   Tablet 2.5 milliGRAM(s) Oral daily  apixaban 5 milliGRAM(s) Oral every 12 hours  chlorhexidine 2% Cloths 1 Application(s) Topical daily  dronedarone 400 milliGRAM(s) Oral two times a day  folic acid 1 milliGRAM(s) Oral daily  HYDROmorphone PCA (5 mG/mL) 30 milliLiter(s) PCA Continuous PCA Continuous  lisinopril 10 milliGRAM(s) Oral two times a day  metoprolol succinate ER 75 milliGRAM(s) Oral daily  ondansetron Injectable 4 milliGRAM(s) IV Push once  senna 2 Tablet(s) Oral at bedtime  sodium chloride 0.45%. 1000 milliLiter(s) (100 mL/Hr) IV Continuous <Continuous>    MEDICATIONS  (PRN):  diphenhydrAMINE 25 milliGRAM(s) Oral every 6 hours PRN Rash and/or Itching  naloxone Injectable 0.1 milliGRAM(s) IV Push every 3 minutes PRN For ANY of the following changes in patient status:  A. RR LESS THAN 10 breaths per minute, B. Oxygen saturation LESS THAN 90%, C. Sedation score of 6  polyethylene glycol 3350 17 Gram(s) Oral daily PRN Constipation      I&O's Summary    05 May 2025 07:01  -  06 May 2025 07:00  --------------------------------------------------------  IN: 120 mL / OUT: 0 mL / NET: 120 mL    06 May 2025 07:01  -  06 May 2025 13:47  --------------------------------------------------------  IN: 0 mL / OUT: 1000 mL / NET: -1000 mL        PHYSICAL EXAM:  Vital Signs Last 24 Hrs  T(C): 36.8 (06 May 2025 08:20), Max: 36.8 (06 May 2025 08:20)  T(F): 98.3 (06 May 2025 08:20), Max: 98.3 (06 May 2025 08:20)  HR: 62 (06 May 2025 08:20) (57 - 63)  BP: 135/74 (06 May 2025 08:20) (127/76 - 157/92)  BP(mean): --  RR: 18 (06 May 2025 08:20) (18 - 19)  SpO2: 97% (06 May 2025 08:20) (96% - 100%)    Parameters below as of 06 May 2025 08:20  Patient On (Oxygen Delivery Method): room air      CONSTITUTIONAL: NAD, well-developed, well-groomed  EYES: PERRLA; conjunctiva and sclera clear  ENMT: Moist oral mucosa, no pharyngeal injection or exudates; normal dentition  NECK: Supple, no palpable masses; no thyromegaly  RESPIRATORY: Normal respiratory effort; lungs are clear to auscultation bilaterally  CARDIOVASCULAR: Regular rate and rhythm, normal S1 and S2, no murmur/rub/gallop; No lower extremity edema; Peripheral pulses are 2+ bilaterally  ABDOMEN: Nontender to palpation, normoactive bowel sounds, no rebound/guarding; No hepatosplenomegaly  MUSCULOSKELETAL:  Normal gait; no clubbing or cyanosis of digits; no joint swelling or tenderness to palpation  PSYCH: A+O to person, place, and time; affect appropriate  NEUROLOGY: CN 2-12 are intact and symmetric; no gross sensory deficits   SKIN: PCA Pump attached, no redness at site.  LABS:                        9.3    5.66  )-----------( 190      ( 06 May 2025 07:21 )             28.1     05-06    138  |  108  |  27[H]  ----------------------------<  99  4.7   |  20[L]  |  1.55[H]    Ca    10.2      06 May 2025 07:23    TPro  7.2  /  Alb  3.7  /  TBili  0.6  /  DBili  0.2  /  AST  28  /  ALT  28  /  AlkPhos  123[H]  05-06          Urinalysis Basic - ( 06 May 2025 07:23 )    Color: x / Appearance: x / SG: x / pH: x  Gluc: 99 mg/dL / Ketone: x  / Bili: x / Urobili: x   Blood: x / Protein: x / Nitrite: x   Leuk Esterase: x / RBC: x / WBC x   Sq Epi: x / Non Sq Epi: x / Bacteria: x          RADIOLOGY & ADDITIONAL TESTS:  Results Reviewed:   Imaging Personally Reviewed:  Electrocardiogram Personally Reviewed:    COORDINATION OF CARE:  Care Discussed with Consultants/Other Providers [Y/N]:  Prior or Outpatient Records Reviewed [Y/N]:

## 2025-05-07 NOTE — PROGRESS NOTE ADULT - PROBLEM SELECTOR PROBLEM 3
Sickle cell disease
Sickle cell disease
Paroxysmal atrial fibrillation

## 2025-05-07 NOTE — DISCHARGE NOTE PROVIDER - NSDCFUSCHEDAPPT_GEN_ALL_CORE_FT
Brittany Veloz  [facility]  INT[place] [id number] [address]  Scheduled Appointment: 06/03/2025    Bassem Lagos  [facility]  NEPHRO [address]  Scheduled Appointment: 06/25/2025

## 2025-05-07 NOTE — DISCHARGE NOTE NURSING/CASE MANAGEMENT/SOCIAL WORK - FINANCIAL ASSISTANCE
[facility] provides services at a reduced cost to those who are determined to be eligible through [facility]’s financial assistance program. Information regarding [facility]’s financial assistance program can be found by going to [email or web address] or by calling [phone].

## 2025-05-07 NOTE — DISCHARGE NOTE PROVIDER - COLLABORATE WITH
Bryson Webster (: 1999) is a 20 y.o. female, patient, here for evaluation of the following chief complaint(s):  Shoulder Pain (left shoulder pain)       HPI:    Patient presents the office today now status post stabilization of left shoulder. She is recently been discharged from physical therapy. She states she is doing well. Allergies   Allergen Reactions    Amoxicillin Unable to Obtain    Suprax [Cefixime] Unable to Obtain    Zithromax [Azithromycin] Unable to Obtain       No current outpatient medications on file. No current facility-administered medications for this visit. Past Medical History:   Diagnosis Date    Allergy     Anemia     Anxiety disorder     Gastroesophageal reflux     Migraine         Past Surgical History:   Procedure Laterality Date    HX SHOULDER ARTHROSCOPY Left 2021       Family History   Problem Relation Age of Onset    Cancer Father     Depression Father     Heart Disease Father     Hypertension Father     Thyroid Disease Father     Depression Sister         Social History     Socioeconomic History    Marital status: SINGLE     Spouse name: Not on file    Number of children: Not on file    Years of education: Not on file    Highest education level: Not on file   Occupational History    Not on file   Tobacco Use    Smoking status: Not on file    Smokeless tobacco: Not on file   Substance and Sexual Activity    Alcohol use: Yes     Comment: 1-2 drinks per occasion    Drug use: Not on file    Sexual activity: Not on file   Other Topics Concern    Not on file   Social History Narrative    Not on file     Social Determinants of Health     Financial Resource Strain:     Difficulty of Paying Living Expenses: Not on file   Food Insecurity:     Worried About Running Out of Food in the Last Year: Not on file    Molly of Food in the Last Year: Not on file   Transportation Needs:     Lack of Transportation (Medical):  Not on file    Lack of Transportation (Non-Medical): Not on file   Physical Activity:     Days of Exercise per Week: Not on file    Minutes of Exercise per Session: Not on file   Stress:     Feeling of Stress : Not on file   Social Connections:     Frequency of Communication with Friends and Family: Not on file    Frequency of Social Gatherings with Friends and Family: Not on file    Attends Yazidi Services: Not on file    Active Member of Clubs or Organizations: Not on file    Attends Club or Organization Meetings: Not on file    Marital Status: Not on file   Intimate Partner Violence:     Fear of Current or Ex-Partner: Not on file    Emotionally Abused: Not on file    Physically Abused: Not on file    Sexually Abused: Not on file   Housing Stability:     Unable to Pay for Housing in the Last Year: Not on file    Number of [place] in the Last Year: Not on file    Unstable Housing in the Last Year: Not on file       Review of Systems   Musculoskeletal:        Left shoulder pain       Vitals: There were no vitals taken for this visit. There is no height or weight on file to calculate BMI. Ortho Exam     Left shoulder: Patient has full forward elevation and lateral duction and has a slight loss of external rotation and a slight loss of internal rotation. She has no crepitation. She has negative apprehension. Her load shift test on her left is better than the right. Jerk test is negative. Neurovascular examination is intact    ASSESSMENT/PLAN:    Patient is doing quite well. She is reached maximal medical improvement. She can advance her own home exercise program.  She is to return to the office as needed.         Мария Kim MD ACP

## 2025-05-07 NOTE — PROGRESS NOTE ADULT - TIME BILLING
Advanced care planning was discussed with patient and family.  Advanced care planning forms were reviewed and discussed as appropriate.  Differential diagnosis and plan of care discussed with patient after the evaluation.   Pain assessed and judicious use of narcotics when appropriate was discussed.  Importance of Fall prevention discussed.  Counseling on Smoking and Alcohol cessation was offered when appropriate.  Counseling on Diet, exercise, and medication compliance was done.
Time-based billing (NON-critical care).     The necessity of the time spent during the encounter on this date of service was due to:     - Ordering, reviewing, and interpreting labs, testing, and imaging.  - Independently obtaining a review of systems and performing a physical exam  - Reviewing prior hospitalization and where necessary, outpatient records.  - Counselling and educating patient and family regarding interpretation of aforementioned items and plan of care.\  -I have spent 55 minutes of time on the encounter which excludes teaching and separately reported services.
40 minutes
Advanced care planning was discussed with patient and family.  Advanced care planning forms were reviewed and discussed as appropriate.  Differential diagnosis and plan of care discussed with patient after the evaluation.   Pain assessed and judicious use of narcotics when appropriate was discussed.  Importance of Fall prevention discussed.  Counseling on Smoking and Alcohol cessation was offered when appropriate.  Counseling on Diet, exercise, and medication compliance was done.
Advanced care planning was discussed with patient and family.  Advanced care planning forms were reviewed and discussed as appropriate.  Differential diagnosis and plan of care discussed with patient after the evaluation.   Pain assessed and judicious use of narcotics when appropriate was discussed.  Importance of Fall prevention discussed.  Counseling on Smoking and Alcohol cessation was offered when appropriate.  Counseling on Diet, exercise, and medication compliance was done.
Time-based billing (NON-critical care).     The necessity of the time spent during the encounter on this date of service was due to:     - Ordering, reviewing, and interpreting labs, testing, and imaging.  - Independently obtaining a review of systems and performing a physical exam  - Reviewing prior hospitalization and where necessary, outpatient records.  - Counselling and educating patient and family regarding interpretation of aforementioned items and plan of care.\  -I have spent 52 minutes of time on the encounter which excludes teaching and separately reported services.

## 2025-05-07 NOTE — PROGRESS NOTE ADULT - PROBLEM SELECTOR PLAN 3
maintaining SR  Eliquis restarted
maintaining SR  Eliquis restarted
Continue 1/2NS  Followup Hematology recommendations  Transitioned pain meds to PCA Pump, continue for now.
Continue 1/2NS  Followup Hematology recommendations  Transitioned pain meds to PCA Pump today, will continue to monitor.
maintaining SR  Eliquis restarted

## 2025-05-07 NOTE — DISCHARGE NOTE PROVIDER - ATTENDING DISCHARGE PHYSICAL EXAMINATION:
Vital Signs Last 24 Hrs  T(C): 36.6 (07 May 2025 12:09), Max: 37.1 (06 May 2025 20:00)  T(F): 97.9 (07 May 2025 12:09), Max: 98.7 (06 May 2025 20:00)  HR: 58 (07 May 2025 12:09) (58 - 94)  BP: 142/79 (07 May 2025 12:09) (142/79 - 159/81)  BP(mean): --  RR: 18 (07 May 2025 12:09) (18 - 20)  SpO2: 97% (07 May 2025 12:09) (96% - 98%)    Parameters below as of 07 May 2025 12:09  Patient On (Oxygen Delivery Method): room air        CONSTITUTIONAL: No acute distress, pain improved.   RESPIRATORY: lungs are clear to auscultation bilaterally  CARDIOVASCULAR: normal S1 and S2, no murmur; No lower extremity edema  ABDOMEN: Nontender to palpation, normoactive bowel sounds  MUSCULOSKELETAL: no joint swelling or tenderness to palpation  PSYCH: A+O to person, place, and time; affect appropriate

## 2025-05-07 NOTE — PROGRESS NOTE ADULT - PROBLEM SELECTOR PLAN 1
Hgb 4.5---->9.4  s/p PRBC   Monitor cbc  Heme/onc consulted  F/u Hgb electrophoresis  Hold hydroxyurea.
Initial Hgb of 4.5  Received 2 units transfusions--> appropriate raise to 9.2  Will hold off Eliquis at this time until GI bleed is ruled out  Cardiology and Hematology following  Followup daily CBC, CMP, indirect bilirubin, LDH, Haptoglobin, retic count
Hgb 4.5---->9.4  s/p 2 PRBCs  Monitor cbc  Heme/onc following  F/u Hgb electrophoresis  Hold hydroxyurea.
Initial Hgb of 4.5  Received 2 units transfusions--> appropriate raise to 9.3  Eliquis restarted   Cardiology and Hematology following  Followup daily CBC, CMP, indirect bilirubin, LDH, Haptoglobin, retic count.
Hgb 4.5---->9.4  s/p 2 PRBCs  Monitor cbc  Heme/onc following  F/u Hgb electrophoresis  Hold hydroxyurea.

## 2025-05-07 NOTE — DISCHARGE NOTE PROVIDER - HOSPITAL COURSE
HPI:  54-year-old male with sickle cell disease presented with a one-week history of generalized weakness, malaise, and pain involving both knees, shoulders, and back. He was admitted with a diagnosis of suspected vasoocclusive pain crisis and acute on chronic anemia.    Hospital Course:  During his hospital stay, the patient was found to have significant anemia with a hemoglobin (Hgb) level of 4.5. He received 2 units of red blood cell transfusions which appropriately elevated his Hgb level to 9.3. His medical management included the reinitiation of Eliquis, under the supervision of both cardiology and hematology. The plan was established for a rigorous follow-up including daily complete blood count (CBC), comprehensive metabolic panel (CMP), indirect bilirubin, lactate dehydrogenase (LDH), haptoglobin, and reticulocyte count to closely monitor his condition.    Another concerning issue was the patient's history of paroxysmal atrial fibrillation. Cardiology recommendations were made to temporarily hold anticoagulants until further evaluation.    Regarding his sickle cell disease, it was noted that the patient had been under the management of Dr. Lincoln at [facility] with a regimen that included annual transfusions to maintain Hgb levels above 8, subsequently shifting to every 6 months, and more recently increasing in frequency to every 2-4 weeks. At home, he managed his pain with Percocet as needed. Furthermore, he had recently started an iron chelator and was on Hydrea for his condition.    Additionally, the patient was evaluated by nephrology for chronic kidney disease (CKD). His serum creatinine was stable at 1.55. The patient's fluid status remained stable, and he remained asymptomatic concerning renal disease. The management plan included continued avoidance of nephrotoxic drugs and maintaining current therapy. The patient was also started on sodium bicarbonate tablets to manage any acid-base disturbances related to his kidney condition, further stabilizing his CKD.    Important Medication Changes and Reason: see med rec    Active or Pending Issues Requiring Follow-up: PCP, hematologist, nephrologist    Advanced Directives:   [x] Full code  [ ] DNR  [ ] Hospice    Discharge Diagnoses:  anemia  PAF  sickle cell disease  CKD

## 2025-05-07 NOTE — DISCHARGE NOTE NURSING/CASE MANAGEMENT/SOCIAL WORK - PATIENT PORTAL LINK FT
You can access the FollowMyHealth Patient Portal offered by [facility] by registering at the following website: [email or web address]. By joining [employer]’s FollowMyHealth portal, you will also be able to view your health information using other applications (apps) compatible with our system.

## 2025-05-07 NOTE — DISCHARGE NOTE PROVIDER - NSDCCPCAREPLAN_GEN_ALL_CORE_FT
PRINCIPAL DISCHARGE DIAGNOSIS  Diagnosis: Anemia  Assessment and Plan of Treatment:   You were found to have significant anemia with a hemoglobin (Hgb) level of 4.5. You received 2 units of red blood cell transfusions which appropriately elevated his Hgb level to 9.3. Your medical management included the reinitiation of Eliquis, under the supervision of both cardiology and hematology.         SECONDARY DISCHARGE DIAGNOSES  Diagnosis: Sickle cell disease  Assessment and Plan of Treatment:   You are under the management of Dr. Lincoln at [facility] with a regimen that includes annual transfusions to maintain Hgb levels above 8, subsequently shifting to every 6 months, and more recently increasing in frequency to every 2-4 weeks. At home, your pain is managed with Percocet as needed. Furthermore, you were recently started an iron chelator and was on Hydrea for your condition.  Please follow up as an outpatient with your hematologist to continue evaluation and management of your sickle cell disease.    Diagnosis: Paroxysmal atrial fibrillation  Assessment and Plan of Treatment: Atrial fibrillation is the most common heart rhythm problem and puts you at risk for stroke and heart attack. It is beneficial to control your blood pressure, limit yourself to no more than 1-2 alcoholic drinks per day, reduce caffeine intake, seek treatment for an overactive thyroid gland, and engage in regular exercise. If you experience your heart racing or beating unusually, chest tightness or pain, lightheadedness, faintness, or shortness of breath—especially during exercise—call your doctor immediately. It is important to take your heart medication as prescribed. If you are on anticoagulation therapy, it is crucial to take it as directed; you may also need blood work to monitor drug levels.      Diagnosis: Chronic kidney disease (CKD)  Assessment and Plan of Treatment:   Evaluated by nephrology for chronic kidney disease (CKD). Your serum creatinine was stable at 1.55.  Your fluid status remained stable. The management plan included continued avoidance of nephrotoxic drugs and maintaining current therapy. You were also started on sodium bicarbonate tablets to manage any acid-base disturbances related to your kidney condition, further stabilizing your CKD.  Please follow up with your nephrologist after discharge.

## 2025-05-07 NOTE — PROGRESS NOTE ADULT - PROBLEM SELECTOR PROBLEM 2
Paroxysmal atrial fibrillation
Sickle cell disease
Sickle cell disease
Paroxysmal atrial fibrillation
Sickle cell disease

## 2025-05-07 NOTE — PROGRESS NOTE ADULT - ASSESSMENT
54M with sickle cell disease, p/w generalized weakness, malaise, bilateral knee, shoulder and back pain for about a week, admitted with suspected vasoocclusive pain crisis and acute on chronic anemia.     Background:  - takes percocet prn at home  - reports was following with Dr. Lincoln at [facility] for annual transfusions to keep Hgb >8 (unclear why in setting of sickle cell disease), then every 6 months, and in recent years every 2-4 weeks.  - recently started iron chelator and also takes [facility] Course:   - ED workup showed Hgb 4.5. Pt follows hematology at [facility] but requesting a second opinion regarding his more frequent PRBC transfusion. No prior bone marrow biopsy.   - s/p 2u pRBCs on 5/4/25 with Hgb response 4.5 -> 9.1    # Sickle Cell Disease  # Normochromic Anemia  *Hgb 4.5, MCV 95.2, Plt 226 retic 2.7%, , Haptoglobin <20, T Bili 0.3  * Ab screen -Negative  * CXR- no consolidation  - reviewed peripheral smear- anisopoikilocytosis, target cell, reticulocytes, rare schistocytes (0-2/hpf), large platelet    Impression: Patient's peripheral smear and lab results are not consistent with overt hemolysis induced by sickle cell crisis. His anemia is likely multifactorial, including from hydroxyurea contributing to his bone marrow suppression possibly in addition to sickle cell disease. No reported bleeding. Unclear why he was getting such frequent blood transfusions, however his Hgb S% here is <25% which is unusually low in SS sickle cell patient not on exchange transfusions recently and suggestive of sickle beta thalassemia     Plan  - Obtain daily CBC, CMP, indirect bilirubin, LDH, Haptoglobin, retic count  - Obtain Type and screen q72 hours  - f/u Iron - 241, TIBC - WNL, ferritin - 2070, Vit B12 - 476, Folate - 5.6  - f/u Hgb electrophoresis - hgb S 22.3%, hgb A 62.7%, A2 2.7%, hgb F 12.3%  Plan:  - obtain serum erythropoietin  - hold hydroxyurea at this time  - To prevent sickling of RBC-> continue 0.45  cc/hr  - folic acid 1 mg daily  - incentive spirometry 10 times/hr  - pain control as per primary team. Would recommend dilaudid PCA at this time   - obtain record from outpatient hematology office (need more data on recent CBC trend, transfusion frequency, hydroxyurea dose, Aranesp dose)  - ok to continue Eliquis as currently there are no signs of bleeding. Monitor for signs of bleeding.    Patient seen at bedside and discussed with attending Dr. Limon  Recommendations preliminary until attending attestation   ***************************************************************  Roge Edwards MD  Hematology/Oncology Fellow, PGY5  [facility] TEAMS  After 5pm or on weekends please contact  to page on-call fellow   *************************************************************** 
f/u in office in two days       amLODIPine   Tablet 2.5 milliGRAM(s) Oral daily  apixaban 5 milliGRAM(s) Oral every 12 hours  chlorhexidine 2% Cloths 1 Application(s) Topical daily  dronedarone 400 milliGRAM(s) Oral two times a day  folic acid 1 milliGRAM(s) Oral daily  lisinopril 10 milliGRAM(s) Oral two times a day  metoprolol succinate ER 75 milliGRAM(s) Oral daily  senna 2 Tablet(s) Oral at bedtime  sodium bicarbonate 650 milliGRAM(s) Oral daily  sodium chloride 0.45%. 1000 milliLiter(s) (100 mL/Hr) IV Continuous <Continuous>      < from: Xray Chest 1 View- PORTABLE-Urgent (Xray Chest 1 View- PORTABLE-Urgent .) (05.04.25 @ 06:38) >    ACC: [id number] EXAM:  XR CHEST PORTABLE URGENT 1V   ORDERED BY: NAMAN GILES     PROCEDURE DATE:  05/04/2025          INTERPRETATION:  CLINICAL INDICATION: Chest pain.    EXAM: Frontal radiograph of the chest.    COMPARISON: Chest radiograph from 6/4/2023    FINDINGS:  Right port catheter with tip overlying the right subclavian vein.  The lungs are clear.  No pleural effusion. No pneumothorax.  The heart is normal in size  The visualized osseous structures demonstrate no acute pathology.    IMPRESSION:  No focal consolidations.    --- End of Report ---          SONY SPAULDING MD; Resident Radiologist  This document has been electronically signed.  IMAN COHEN MD; Attending Radiologist  This document has been electronically signed. May  4 2025  8:39A    < end of copied text >  < from: US Kidney and Bladder (09.03.24 @ 07:47) >    EXAM: [id number] - US KIDNEYS AND BLADDER  - ORDERED BY: CARLOS GONGORA      PROCEDURE DATE:  09/03/2024           INTERPRETATION:  CLINICAL INFORMATION: CKD    COMPARISON: 12/8/2022    TECHNIQUE: Sonography of the kidneys and bladder.    FINDINGS:  Right kidney: 11.6 cm. No renal mass, hydronephrosis or calculi. The   renal cortex is thin and echogenic.    Left kidney: 11.9 cm. No renal mass, hydronephrosis or calculi. The renal   cortex is thin and echogenic. There is a cyst in the midpole measuring   2.0 x 2.2 x 2.6 cm.    Urinary bladder: Within normal limits.    IMPRESSION:  Thin and echogenic renal cortices consistent with chronic medical renal   disease.  Left renal cyst. No hydronephrosis bilaterally.      --- End of Report ---               IVAN AUGUSTIN MD; Attending Radiologist   This document has been electronically signed. Sep  3 2024  8:42AM    < end of copied text >  
54 male h/o sickle cell, p afib on eliquis, htn, here with c/o chest discomfort and severe anemia    anemia  sickle cell disease  pain  heme consult apprec  currently receiving prbc transfusion  monitor post transfusion cbc   -Obtain daily CBC, CMP, indirect bilirubin, LDH, Haptoglobin, retic count  - Obtain Type and screen q72 hours  hold hydroxyurea at this time  supp o2  ivf  incentive spirometer  pain mngt consult for pain control    p afib  AC with eliquis  cont multaq and metoprolol  cards consult followup    htn  cont home bp meds  monitor bp    pts care to be assumed by full time hospitalist     Advanced care planning was discussed with patient and family.  Advanced care planning forms were reviewed and discussed as appropriate.  Differential diagnosis and plan of care discussed with patient after the evaluation.   Pain assessed and judicious use of narcotics when appropriate was discussed.  Importance of Fall prevention discussed.  Counseling on Smoking and Alcohol cessation was offered when appropriate.  Counseling on Diet, exercise, and medication compliance was done.         
53 year old male paroxysmal afib on Elquis (last dose Tues am 3/5/24), PE (pt denies), HTN, peripheral venous insufficiency, GERD, gout presenting w/ weakness.   Patient recently admitted March 8 for ALFREDO on CKD and positive nuclear stress test noted to have symptomatic anemia for which she received 1 unit platelet transfusion and discharged home  
53 year old male paroxysmal afib on Elquis (last dose Tues am 3/5/24), PE (pt denies), HTN, peripheral venous insufficiency, GERD, gout presenting w/ weakness.   Patient recently admitted March 8 for ALFREDO on CKD and positive nuclear stress test noted to have symptomatic anemia for which she received 1 unit platelet transfusion and discharged home  
54 male h/o sickle cell, p afib on eliquis, htn, here with c/o chest discomfort and severe anemia            
53 year old male paroxysmal afib on Elquis (last dose Tues am 3/5/24), PE (pt denies), HTN, peripheral venous insufficiency, GERD, gout presenting w/ weakness.   Patient recently admitted March 8 for ALFREDO on CKD and positive nuclear stress test noted to have symptomatic anemia for which she received 1 unit platelet transfusion and discharged home

## 2025-05-07 NOTE — DISCHARGE NOTE PROVIDER - DISCHARGE DIET
Regular Diet - No restrictions Helical Rim Advancement Flap Text: The defect edges were debeveled with a #15 blade scalpel.  Given the location of the defect and the proximity to free margins (helical rim) a double helical rim advancement flap was deemed most appropriate.  Using a sterile surgical marker, the appropriate advancement flaps were drawn incorporating the defect and placing the expected incisions between the helical rim and antihelix where possible.  The area thus outlined was incised through and through with a #15 scalpel blade.  With a skin hook and iris scissors, the flaps were gently and sharply undermined and freed up.

## 2025-05-07 NOTE — PROGRESS NOTE ADULT - NUTRITIONAL ASSESSMENT
8.7    5.62  )-----------( 183      ( 07 May 2025 07:09 )             26.3       CBC Full  -  ( 07 May 2025 07:09 )  WBC Count : 5.62 K/uL  RBC Count : 2.82 M/uL  Hemoglobin : 8.7 g/dL  Hematocrit : 26.3 %  Platelet Count - Automated : 183 K/uL  Mean Cell Volume : 93.3 fl  Mean Cell Hemoglobin : 30.9 pg  Mean Cell Hemoglobin Concentration : 33.1 g/dL  Auto Neutrophil # : x  Auto Lymphocyte # : x  Auto Monocyte # : x  Auto Eosinophil # : x  Auto Basophil # : x  Auto Neutrophil % : x  Auto Lymphocyte % : x  Auto Monocyte % : x  Auto Eosinophil % : x  Auto Basophil % : x      05-07    137  |  108  |  21  ----------------------------<  102[H]  4.5   |  19[L]  |  1.32[H]    Ca    10.1      07 May 2025 07:09    TPro  6.9  /  Alb  3.6  /  TBili  0.5  /  DBili  0.2  /  AST  20  /  ALT  25  /  AlkPhos  118  05-07      CAPILLARY BLOOD GLUCOSE          Vital Signs Last 24 Hrs  T(C): 36.6 (07 May 2025 12:09), Max: 37.1 (06 May 2025 20:00)  T(F): 97.9 (07 May 2025 12:09), Max: 98.7 (06 May 2025 20:00)  HR: 58 (07 May 2025 12:09) (58 - 94)  BP: 142/79 (07 May 2025 12:09) (142/79 - 145/76)  BP(mean): --  RR: 18 (07 May 2025 12:09) (18 - 20)  SpO2: 97% (07 May 2025 12:09) (96% - 98%)    Parameters below as of 07 May 2025 12:09  Patient On (Oxygen Delivery Method): room air        Urinalysis Basic - ( 07 May 2025 07:09 )    Color: x / Appearance: x / SG: x / pH: x  Gluc: 102 mg/dL / Ketone: x  / Bili: x / Urobili: x   Blood: x / Protein: x / Nitrite: x   Leuk Esterase: x / RBC: x / WBC x   Sq Epi: x / Non Sq Epi: x / Bacteria: x

## 2025-05-07 NOTE — DISCHARGE NOTE PROVIDER - PROVIDER TOKENS
PROVIDER:[TOKEN:[[id number]],FOLLOWUP:[1-3 days]],PROVIDER:[TOKEN:[[id number]],FOLLOWUP:[1 week]],PROVIDER:[TOKEN:[[id number]],FOLLOWUP:[2 weeks]]

## 2025-05-07 NOTE — PROGRESS NOTE ADULT - PROBLEM SELECTOR PLAN 2
Followup cardiology recommendations  Hold AC for now
Symptoms consistent with sickle cell crisis  Hydroxyurea on hold.
Followup cardiology recommendations  Hold AC for now.
Symptoms consistent with sickle cell crisis  Hydroxyurea on hold.
Symptoms consistent with sickle cell crisis  Hydroxyurea on hold.

## 2025-05-07 NOTE — DISCHARGE NOTE PROVIDER - CARE PROVIDERS DIRECT ADDRESSES
[email or web address]

## 2025-05-20 ENCOUNTER — APPOINTMENT (OUTPATIENT)
Dept: ORTHOPEDIC SURGERY | Facility: CLINIC | Age: 55
End: 2025-05-20
Payer: COMMERCIAL

## 2025-05-20 ENCOUNTER — EMERGENCY (EMERGENCY)
Facility: HOSPITAL | Age: 55
LOS: 1 days | End: 2025-05-20
Attending: EMERGENCY MEDICINE
Payer: COMMERCIAL

## 2025-05-20 VITALS
HEART RATE: 77 BPM | TEMPERATURE: 98 F | HEIGHT: 71 IN | RESPIRATION RATE: 20 BRPM | DIASTOLIC BLOOD PRESSURE: 76 MMHG | OXYGEN SATURATION: 98 % | WEIGHT: 237 LBS | SYSTOLIC BLOOD PRESSURE: 144 MMHG

## 2025-05-20 VITALS
DIASTOLIC BLOOD PRESSURE: 76 MMHG | TEMPERATURE: 98 F | SYSTOLIC BLOOD PRESSURE: 154 MMHG | OXYGEN SATURATION: 95 % | HEART RATE: 62 BPM | RESPIRATION RATE: 18 BRPM

## 2025-05-20 DIAGNOSIS — Z90.49 ACQUIRED ABSENCE OF OTHER SPECIFIED PARTS OF DIGESTIVE TRACT: Chronic | ICD-10-CM

## 2025-05-20 LAB
ALBUMIN SERPL ELPH-MCNC: 3.8 G/DL — SIGNIFICANT CHANGE UP (ref 3.3–5)
ALP SERPL-CCNC: 118 U/L — SIGNIFICANT CHANGE UP (ref 40–120)
ALT FLD-CCNC: 18 U/L — SIGNIFICANT CHANGE UP (ref 10–45)
ANION GAP SERPL CALC-SCNC: 12 MMOL/L — SIGNIFICANT CHANGE UP (ref 5–17)
APTT BLD: 25.8 SEC — LOW (ref 26.1–36.8)
AST SERPL-CCNC: 19 U/L — SIGNIFICANT CHANGE UP (ref 10–40)
BASOPHILS # BLD AUTO: 0.01 K/UL — SIGNIFICANT CHANGE UP (ref 0–0.2)
BASOPHILS NFR BLD AUTO: 0.2 % — SIGNIFICANT CHANGE UP (ref 0–2)
BILIRUB SERPL-MCNC: 0.4 MG/DL — SIGNIFICANT CHANGE UP (ref 0.2–1.2)
BUN SERPL-MCNC: 25 MG/DL — HIGH (ref 7–23)
CALCIUM SERPL-MCNC: 10.2 MG/DL — SIGNIFICANT CHANGE UP (ref 8.4–10.5)
CHLORIDE SERPL-SCNC: 107 MMOL/L — SIGNIFICANT CHANGE UP (ref 96–108)
CO2 SERPL-SCNC: 21 MMOL/L — LOW (ref 22–31)
CREAT SERPL-MCNC: 1.59 MG/DL — HIGH (ref 0.5–1.3)
EGFR: 51 ML/MIN/1.73M2 — LOW
EGFR: 51 ML/MIN/1.73M2 — LOW
EOSINOPHIL # BLD AUTO: 0.04 K/UL — SIGNIFICANT CHANGE UP (ref 0–0.5)
EOSINOPHIL NFR BLD AUTO: 0.7 % — SIGNIFICANT CHANGE UP (ref 0–6)
GLUCOSE SERPL-MCNC: 94 MG/DL — SIGNIFICANT CHANGE UP (ref 70–99)
HCT VFR BLD CALC: 23.7 % — LOW (ref 39–50)
HGB BLD-MCNC: 8.1 G/DL — LOW (ref 13–17)
IMM GRANULOCYTES NFR BLD AUTO: 0.5 % — SIGNIFICANT CHANGE UP (ref 0–0.9)
INR BLD: 1.04 RATIO — SIGNIFICANT CHANGE UP (ref 0.85–1.16)
LDH SERPL L TO P-CCNC: 234 U/L — SIGNIFICANT CHANGE UP (ref 50–242)
LYMPHOCYTES # BLD AUTO: 1.28 K/UL — SIGNIFICANT CHANGE UP (ref 1–3.3)
LYMPHOCYTES # BLD AUTO: 22.9 % — SIGNIFICANT CHANGE UP (ref 13–44)
MCHC RBC-ENTMCNC: 32.1 PG — SIGNIFICANT CHANGE UP (ref 27–34)
MCHC RBC-ENTMCNC: 34.2 G/DL — SIGNIFICANT CHANGE UP (ref 32–36)
MCV RBC AUTO: 94 FL — SIGNIFICANT CHANGE UP (ref 80–100)
MONOCYTES # BLD AUTO: 0.59 K/UL — SIGNIFICANT CHANGE UP (ref 0–0.9)
MONOCYTES NFR BLD AUTO: 10.5 % — SIGNIFICANT CHANGE UP (ref 2–14)
NEUTROPHILS # BLD AUTO: 3.65 K/UL — SIGNIFICANT CHANGE UP (ref 1.8–7.4)
NEUTROPHILS NFR BLD AUTO: 65.2 % — SIGNIFICANT CHANGE UP (ref 43–77)
NRBC BLD AUTO-RTO: 13 /100 WBCS — HIGH (ref 0–0)
PLATELET # BLD AUTO: 248 K/UL — SIGNIFICANT CHANGE UP (ref 150–400)
POTASSIUM SERPL-MCNC: 3.9 MMOL/L — SIGNIFICANT CHANGE UP (ref 3.5–5.3)
POTASSIUM SERPL-SCNC: 3.9 MMOL/L — SIGNIFICANT CHANGE UP (ref 3.5–5.3)
PROT SERPL-MCNC: 6.9 G/DL — SIGNIFICANT CHANGE UP (ref 6–8.3)
PROTHROM AB SERPL-ACNC: 11.8 SEC — SIGNIFICANT CHANGE UP (ref 9.9–13.4)
RBC # BLD: 2.52 M/UL — LOW (ref 4.2–5.8)
RBC # BLD: 2.52 M/UL — LOW (ref 4.2–5.8)
RBC # FLD: 25.7 % — HIGH (ref 10.3–14.5)
RETICS #: 86.9 K/UL — SIGNIFICANT CHANGE UP (ref 25–125)
RETICS/RBC NFR: 3.5 % — HIGH (ref 0.5–2.5)
SODIUM SERPL-SCNC: 140 MMOL/L — SIGNIFICANT CHANGE UP (ref 135–145)
WBC # BLD: 5.6 K/UL — SIGNIFICANT CHANGE UP (ref 3.8–10.5)
WBC # FLD AUTO: 5.6 K/UL — SIGNIFICANT CHANGE UP (ref 3.8–10.5)

## 2025-05-20 PROCEDURE — 85610 PROTHROMBIN TIME: CPT

## 2025-05-20 PROCEDURE — 36410 VNPNXR 3YR/> PHY/QHP DX/THER: CPT

## 2025-05-20 PROCEDURE — 83605 ASSAY OF LACTIC ACID: CPT

## 2025-05-20 PROCEDURE — 85025 COMPLETE CBC W/AUTO DIFF WBC: CPT

## 2025-05-20 PROCEDURE — 99285 EMERGENCY DEPT VISIT HI MDM: CPT

## 2025-05-20 PROCEDURE — 20610 DRAIN/INJ JOINT/BURSA W/O US: CPT | Mod: 50

## 2025-05-20 PROCEDURE — 86900 BLOOD TYPING SEROLOGIC ABO: CPT

## 2025-05-20 PROCEDURE — 80053 COMPREHEN METABOLIC PANEL: CPT

## 2025-05-20 PROCEDURE — 85018 HEMOGLOBIN: CPT

## 2025-05-20 PROCEDURE — 96375 TX/PRO/DX INJ NEW DRUG ADDON: CPT

## 2025-05-20 PROCEDURE — 86850 RBC ANTIBODY SCREEN: CPT

## 2025-05-20 PROCEDURE — 84132 ASSAY OF SERUM POTASSIUM: CPT

## 2025-05-20 PROCEDURE — 82803 BLOOD GASES ANY COMBINATION: CPT

## 2025-05-20 PROCEDURE — 82947 ASSAY GLUCOSE BLOOD QUANT: CPT

## 2025-05-20 PROCEDURE — 96376 TX/PRO/DX INJ SAME DRUG ADON: CPT

## 2025-05-20 PROCEDURE — 82435 ASSAY OF BLOOD CHLORIDE: CPT

## 2025-05-20 PROCEDURE — 99284 EMERGENCY DEPT VISIT MOD MDM: CPT | Mod: 25

## 2025-05-20 PROCEDURE — 82330 ASSAY OF CALCIUM: CPT

## 2025-05-20 PROCEDURE — 86901 BLOOD TYPING SEROLOGIC RH(D): CPT

## 2025-05-20 PROCEDURE — 84295 ASSAY OF SERUM SODIUM: CPT

## 2025-05-20 PROCEDURE — 96366 THER/PROPH/DIAG IV INF ADDON: CPT

## 2025-05-20 PROCEDURE — 85730 THROMBOPLASTIN TIME PARTIAL: CPT

## 2025-05-20 PROCEDURE — 96365 THER/PROPH/DIAG IV INF INIT: CPT

## 2025-05-20 PROCEDURE — 83615 LACTATE (LD) (LDH) ENZYME: CPT

## 2025-05-20 PROCEDURE — 85014 HEMATOCRIT: CPT

## 2025-05-20 PROCEDURE — 85045 AUTOMATED RETICULOCYTE COUNT: CPT

## 2025-05-20 RX ORDER — HYDROMORPHONE/SOD CHLOR,ISO/PF 2 MG/10 ML
2 SYRINGE (ML) INJECTION ONCE
Refills: 0 | Status: DISCONTINUED | OUTPATIENT
Start: 2025-05-20 | End: 2025-05-20

## 2025-05-20 RX ORDER — DIPHENHYDRAMINE HCL 12.5MG/5ML
25 ELIXIR ORAL ONCE
Refills: 0 | Status: COMPLETED | OUTPATIENT
Start: 2025-05-20 | End: 2025-05-20

## 2025-05-20 RX ORDER — ACETAMINOPHEN 500 MG/5ML
1000 LIQUID (ML) ORAL ONCE
Refills: 0 | Status: COMPLETED | OUTPATIENT
Start: 2025-05-20 | End: 2025-05-20

## 2025-05-20 RX ADMIN — Medication 2 MILLIGRAM(S): at 20:12

## 2025-05-20 RX ADMIN — Medication 400 MILLIGRAM(S): at 15:35

## 2025-05-20 RX ADMIN — Medication 2 MILLIGRAM(S): at 21:11

## 2025-05-20 RX ADMIN — Medication 1000 MILLIGRAM(S): at 20:12

## 2025-05-20 RX ADMIN — Medication 2 MILLIGRAM(S): at 18:57

## 2025-05-20 RX ADMIN — Medication 2 MILLIGRAM(S): at 15:36

## 2025-05-20 RX ADMIN — Medication 1000 MILLILITER(S): at 20:12

## 2025-05-20 RX ADMIN — Medication 25 MILLIGRAM(S): at 15:35

## 2025-05-20 RX ADMIN — Medication 20 MILLIGRAM(S): at 18:57

## 2025-05-20 RX ADMIN — Medication 1000 MILLILITER(S): at 15:36

## 2025-05-20 RX ADMIN — Medication 2 MILLIGRAM(S): at 17:26

## 2025-05-20 NOTE — ED PROVIDER NOTE - PHYSICAL EXAMINATION
Gen: No acute distress  HEENT: EOMI, no nasal discharge  CV: RRR, +S1/S2, no M/R/G, 2+ radial pulses b/l  Resp: CTAB, no W/R/R, no accessory muscle use, no increased work of breathing  GI: Abdomen soft non-distended, NTTP  MSK: Mild tenderness to palpation of shoulders and lower back. No open wounds, no bruising, no LE edema  Neuro: A&Ox3, following commands, moving all four extremities spontaneously  Psych: appropriate mood

## 2025-05-20 NOTE — ED PROVIDER NOTE - PROGRESS NOTE DETAILS
Attending Michel: I received sign out on this patient. I am aware of the previously determined ongoing plan of care and what, if any, tests/consults are pending from the previous provider. I am available for supervision of the ongoing plan of care for the Resident/BRENT/Fellow/Student. Saint Wilson, DO (PGY2): Patient reassessed.  States he feels better.  However notes that he would benefit from another round of pain medication, which has been ordered. Saint Wilson (PGY2): Patient reassessed, feeling better, cleared for discharge. Lab and imaging results discussed with patient. Follow-up information reviewed with patient. Return precautions including but not limited to those listed on discharge instructions were discussed at length and patient felt comfortable going home. All questions answered prior to discharge. Saint Wilson, DO (PGY2): Patient signed out to me at shift change.  Briefly, he is a 54-year-old male, with a history of sickle cell disease, DVT who presented to the ED today for lower back and shoulder pain that is typical of his sickle cell pain crisis.  Patient status post Dilaudid x 3.  Will reassess.

## 2025-05-20 NOTE — ED PROVIDER NOTE - OBJECTIVE STATEMENT
54-year-old male past medical history of sickle cell disease, hypertension, prediabetes, DVT presents the emergency department due to pain in his lower back and shoulders.  Patient states he was recently admitted and required 2 blood transfusions approximately 2-3 weeks ago due to hemoglobin of 4.  Patient says feels like his normal sickle cell crisis pain.  Denies fever, chills, nausea, vomiting, chest pain, shortness of breath, lightheadedness, syncope.  Denies any change in bowel movements or urination and has no blood in either.  No source of bleeding that he is aware of.

## 2025-05-20 NOTE — ED PROVIDER NOTE - PATIENT PORTAL LINK FT
You can access the FollowMyHealth Patient Portal offered by [facility] by registering at the following website: [email or web address]. By joining [employer]’s FollowMyHealth portal, you will also be able to view your health information using other applications (apps) compatible with our system.

## 2025-05-20 NOTE — ED ADULT NURSE REASSESSMENT NOTE - NS ED NURSE REASSESS COMMENT FT1
1804 pt port was accessed but noted w/ difficulty in flushing the port, resident made aware that port access is not an option needed us guided line/rt ac 20g/

## 2025-05-20 NOTE — ED PROVIDER NOTE - NSFOLLOWUPINSTRUCTIONS_ED_ALL_ED_FT
You were seen and evaluated here in the emergency department due to sickle cell crisis.  Your pain was decreased once given medication.  We feel it is safe you to be discharged home.  Although you can be discharged you should follow up with your primary care provider within the next week.     Please return to the Emergency Department or seek medical attention immediately if you have any of the following symptoms.  These include but not limited to chest pain, shortness of breath, fever, uncontrollable pain, worsening symptoms, inability to ambulate. You were seen and evaluated here in the emergency department due to sickle cell crisis.  Your pain was decreased once given medication.  We feel it is safe you to be discharged home.  Although you can be discharged you should follow up with your primary care provider within the next week.     Please return to the Emergency Department or seek medical attention immediately if you have any of the following symptoms.  These include but not limited to chest pain, shortness of breath, fever, uncontrollable pain, worsening symptoms, inability to walk.

## 2025-05-20 NOTE — ED PROVIDER NOTE - CLINICAL SUMMARY MEDICAL DECISION MAKING FREE TEXT BOX
53-year-old male past medical history of sickle cell disease, hypertension, prediabetes, DVT presents to the emergency department due to pain in his lower back and shoulders.  Similar to previous sickle cell pain.  Patient requiring blood transfusions on last admission less than a month ago.  Hemodynamically stable and afebrile upon evaluation here in the emergency department.  Satting well on room air.  Not hypoxic or tachypneic.  Will get CBC, CMP, reticulocyte count, LDH as well as type and screen due to patient's history of transfusion.  Will give pain control with Dilaudid and open have as well as oral Benadryl.  Low concern for sickle cell acute chest with no chest pain, shortness of breath and normal lung sounds. Donahue: 53-year-old male past medical history of sickle cell disease, hypertension, prediabetes, DVT presents to the emergency department due to pain in his lower back and shoulders.  Similar to previous sickle cell pain.  Patient requiring blood transfusions on last admission less than a month ago.  Hemodynamically stable and afebrile upon evaluation here in the emergency department.  Satting well on room air.  Not hypoxic or tachypneic.  Will get CBC, CMP, reticulocyte count, LDH as well as type and screen due to patient's history of transfusion.  Will give pain control with Dilaudid and open have as well as oral Benadryl.  Low concern for sickle cell acute chest with no chest pain, shortness of breath and normal lung sounds.

## 2025-05-20 NOTE — ED ADULT NURSE REASSESSMENT NOTE - NS ED NURSE REASSESS COMMENT FT1
Received report from KASSIDY Green RN. Patient presenting with sickle cell crisis. Patient medications administered, notes pain 7/10 in severity. Patient notes pain has since improved and requesting discharge at this time. MD Pearson made aware. AOx4 and speaking coherently. Pt placed in position of comfort. Bed in lowest position, wheels locked, appropriate side rails raised. Pt denies needs at this time.

## 2025-05-20 NOTE — ED ADULT TRIAGE NOTE - CHIEF COMPLAINT QUOTE
D/c'd from here for SCC 12d ago. At that time received 2u of PRBC. Pt coming back for pain onset since Sunday and worsening. Pain in back and shoulders.

## 2025-05-20 NOTE — ED ADULT NURSE NOTE - OBJECTIVE STATEMENT
1435 pt 54ym aox4 c/o D/c'd from here for SCC 12d ago. At that time received 2u of PRBC. Pt coming back for pain onset since Sunday and worsening. Pain in back and shoulders.

## 2025-05-27 ENCOUNTER — APPOINTMENT (OUTPATIENT)
Dept: ELECTROPHYSIOLOGY | Facility: CLINIC | Age: 55
End: 2025-05-27
Payer: COMMERCIAL

## 2025-05-27 ENCOUNTER — NON-APPOINTMENT (OUTPATIENT)
Age: 55
End: 2025-05-27

## 2025-05-27 VITALS — SYSTOLIC BLOOD PRESSURE: 109 MMHG | OXYGEN SATURATION: 98 % | HEART RATE: 84 BPM | DIASTOLIC BLOOD PRESSURE: 69 MMHG

## 2025-05-27 DIAGNOSIS — I48.92 UNSPECIFIED ATRIAL FLUTTER: ICD-10-CM

## 2025-05-27 DIAGNOSIS — I48.0 PAROXYSMAL ATRIAL FIBRILLATION: ICD-10-CM

## 2025-05-27 PROCEDURE — 93000 ELECTROCARDIOGRAM COMPLETE: CPT

## 2025-05-27 PROCEDURE — 99205 OFFICE O/P NEW HI 60 MIN: CPT | Mod: 25

## 2025-05-28 ENCOUNTER — APPOINTMENT (OUTPATIENT)
Dept: ORTHOPEDIC SURGERY | Facility: CLINIC | Age: 55
End: 2025-05-28
Payer: COMMERCIAL

## 2025-05-28 PROBLEM — I48.0 PAF (PAROXYSMAL ATRIAL FIBRILLATION): Status: ACTIVE | Noted: 2025-05-28

## 2025-05-28 PROCEDURE — 20610 DRAIN/INJ JOINT/BURSA W/O US: CPT | Mod: LT

## 2025-06-03 ENCOUNTER — OUTPATIENT (OUTPATIENT)
Dept: OUTPATIENT SERVICES | Facility: HOSPITAL | Age: 55
LOS: 1 days | End: 2025-06-03

## 2025-06-03 ENCOUNTER — NON-APPOINTMENT (OUTPATIENT)
Age: 55
End: 2025-06-03

## 2025-06-03 ENCOUNTER — APPOINTMENT (OUTPATIENT)
Dept: ORTHOPEDIC SURGERY | Facility: CLINIC | Age: 55
End: 2025-06-03
Payer: COMMERCIAL

## 2025-06-03 ENCOUNTER — APPOINTMENT (OUTPATIENT)
Dept: INTERNAL MEDICINE | Facility: CLINIC | Age: 55
End: 2025-06-03
Payer: COMMERCIAL

## 2025-06-03 VITALS
HEIGHT: 71 IN | HEART RATE: 78 BPM | DIASTOLIC BLOOD PRESSURE: 66 MMHG | BODY MASS INDEX: 34.3 KG/M2 | SYSTOLIC BLOOD PRESSURE: 140 MMHG | OXYGEN SATURATION: 97 % | WEIGHT: 245 LBS

## 2025-06-03 DIAGNOSIS — D57.1 SICKLE-CELL DISEASE W/OUT CRISIS: ICD-10-CM

## 2025-06-03 DIAGNOSIS — I10 ESSENTIAL (PRIMARY) HYPERTENSION: ICD-10-CM

## 2025-06-03 DIAGNOSIS — M17.0 BILATERAL PRIMARY OSTEOARTHRITIS OF KNEE: ICD-10-CM

## 2025-06-03 PROCEDURE — 20610 DRAIN/INJ JOINT/BURSA W/O US: CPT | Mod: 50

## 2025-06-03 PROCEDURE — 99214 OFFICE O/P EST MOD 30 MIN: CPT

## 2025-06-03 RX ORDER — OXYCODONE 10 MG/1
10 TABLET ORAL EVERY 6 HOURS
Qty: 32 | Refills: 0 | Status: ACTIVE | COMMUNITY
Start: 2025-06-03 | End: 1900-01-01

## 2025-06-09 DIAGNOSIS — D57.1 SICKLE-CELL DISEASE WITHOUT CRISIS: ICD-10-CM

## 2025-06-09 DIAGNOSIS — E83.19 OTHER DISORDERS OF IRON METABOLISM: ICD-10-CM

## 2025-06-09 DIAGNOSIS — I10 ESSENTIAL (PRIMARY) HYPERTENSION: ICD-10-CM

## 2025-06-09 DIAGNOSIS — R80.9 PROTEINURIA, UNSPECIFIED: ICD-10-CM

## 2025-06-14 ENCOUNTER — EMERGENCY (EMERGENCY)
Facility: HOSPITAL | Age: 55
LOS: 1 days | End: 2025-06-14
Attending: EMERGENCY MEDICINE
Payer: COMMERCIAL

## 2025-06-14 VITALS
HEIGHT: 71 IN | RESPIRATION RATE: 20 BRPM | HEART RATE: 80 BPM | TEMPERATURE: 98 F | OXYGEN SATURATION: 96 % | DIASTOLIC BLOOD PRESSURE: 83 MMHG | WEIGHT: 240.08 LBS | SYSTOLIC BLOOD PRESSURE: 169 MMHG

## 2025-06-14 VITALS
HEART RATE: 68 BPM | RESPIRATION RATE: 19 BRPM | DIASTOLIC BLOOD PRESSURE: 80 MMHG | SYSTOLIC BLOOD PRESSURE: 161 MMHG | OXYGEN SATURATION: 97 %

## 2025-06-14 DIAGNOSIS — Z90.49 ACQUIRED ABSENCE OF OTHER SPECIFIED PARTS OF DIGESTIVE TRACT: Chronic | ICD-10-CM

## 2025-06-14 LAB
ALBUMIN SERPL ELPH-MCNC: 3.8 G/DL — SIGNIFICANT CHANGE UP (ref 3.3–5)
ALP SERPL-CCNC: 121 U/L — HIGH (ref 40–120)
ALT FLD-CCNC: 14 U/L — SIGNIFICANT CHANGE UP (ref 10–45)
ANION GAP SERPL CALC-SCNC: 12 MMOL/L — SIGNIFICANT CHANGE UP (ref 5–17)
ANISOCYTOSIS BLD QL: SIGNIFICANT CHANGE UP
AST SERPL-CCNC: 15 U/L — SIGNIFICANT CHANGE UP (ref 10–40)
BASOPHILS # BLD AUTO: 0 K/UL — SIGNIFICANT CHANGE UP (ref 0–0.2)
BASOPHILS NFR BLD AUTO: 0 % — SIGNIFICANT CHANGE UP (ref 0–2)
BILIRUB DIRECT SERPL-MCNC: 0.2 MG/DL — SIGNIFICANT CHANGE UP (ref 0–0.3)
BILIRUB INDIRECT FLD-MCNC: 0.4 MG/DL — SIGNIFICANT CHANGE UP (ref 0.2–1)
BILIRUB SERPL-MCNC: 0.6 MG/DL — SIGNIFICANT CHANGE UP (ref 0.2–1.2)
BILIRUB SERPL-MCNC: 0.6 MG/DL — SIGNIFICANT CHANGE UP (ref 0.2–1.2)
BUN SERPL-MCNC: 23 MG/DL — SIGNIFICANT CHANGE UP (ref 7–23)
CALCIUM SERPL-MCNC: 10.5 MG/DL — SIGNIFICANT CHANGE UP (ref 8.4–10.5)
CHLORIDE SERPL-SCNC: 106 MMOL/L — SIGNIFICANT CHANGE UP (ref 96–108)
CO2 SERPL-SCNC: 18 MMOL/L — LOW (ref 22–31)
CREAT SERPL-MCNC: 1.7 MG/DL — HIGH (ref 0.5–1.3)
EGFR: 47 ML/MIN/1.73M2 — LOW
EGFR: 47 ML/MIN/1.73M2 — LOW
EOSINOPHIL # BLD AUTO: 0.09 K/UL — SIGNIFICANT CHANGE UP (ref 0–0.5)
EOSINOPHIL NFR BLD AUTO: 1.3 % — SIGNIFICANT CHANGE UP (ref 0–6)
FERRITIN SERPL-MCNC: 1960 NG/ML — HIGH (ref 30–400)
GLUCOSE SERPL-MCNC: 102 MG/DL — HIGH (ref 70–99)
HCT VFR BLD CALC: 23.9 % — LOW (ref 39–50)
HGB BLD-MCNC: 7.8 G/DL — LOW (ref 13–17)
IRON SATN MFR SERPL: 238 UG/DL — HIGH (ref 45–165)
IRON SATN MFR SERPL: 63 % — HIGH (ref 16–55)
LDH SERPL L TO P-CCNC: 271 U/L — HIGH (ref 50–242)
LYMPHOCYTES # BLD AUTO: 0.87 K/UL — LOW (ref 1–3.3)
LYMPHOCYTES # BLD AUTO: 13 % — SIGNIFICANT CHANGE UP (ref 13–44)
MACROCYTES BLD QL: SLIGHT — SIGNIFICANT CHANGE UP
MANUAL SMEAR VERIFICATION: SIGNIFICANT CHANGE UP
MCHC RBC-ENTMCNC: 32.1 PG — SIGNIFICANT CHANGE UP (ref 27–34)
MCHC RBC-ENTMCNC: 32.6 G/DL — SIGNIFICANT CHANGE UP (ref 32–36)
MCV RBC AUTO: 98.4 FL — SIGNIFICANT CHANGE UP (ref 80–100)
MONOCYTES # BLD AUTO: 0.35 K/UL — SIGNIFICANT CHANGE UP (ref 0–0.9)
MONOCYTES NFR BLD AUTO: 5.2 % — SIGNIFICANT CHANGE UP (ref 2–14)
NEUTROPHILS # BLD AUTO: 5.41 K/UL — SIGNIFICANT CHANGE UP (ref 1.8–7.4)
NEUTROPHILS NFR BLD AUTO: 80.5 % — HIGH (ref 43–77)
NRBC # BLD: 29 /100 WBCS — HIGH (ref 0–0)
NRBC BLD-RTO: 29 /100 WBCS — HIGH (ref 0–0)
OVALOCYTES BLD QL SMEAR: SLIGHT — SIGNIFICANT CHANGE UP
PAPPENHEIMER BOD BLD QL SMEAR: PRESENT — SIGNIFICANT CHANGE UP
PLAT MORPH BLD: NORMAL — SIGNIFICANT CHANGE UP
PLATELET # BLD AUTO: 206 K/UL — SIGNIFICANT CHANGE UP (ref 150–400)
POIKILOCYTOSIS BLD QL AUTO: SLIGHT — SIGNIFICANT CHANGE UP
POLYCHROMASIA BLD QL SMEAR: SLIGHT — SIGNIFICANT CHANGE UP
POTASSIUM SERPL-MCNC: 4.1 MMOL/L — SIGNIFICANT CHANGE UP (ref 3.5–5.3)
POTASSIUM SERPL-SCNC: 4.1 MMOL/L — SIGNIFICANT CHANGE UP (ref 3.5–5.3)
PROT SERPL-MCNC: 7.2 G/DL — SIGNIFICANT CHANGE UP (ref 6–8.3)
RBC # BLD: 2.43 M/UL — LOW (ref 4.2–5.8)
RBC # BLD: 2.43 M/UL — LOW (ref 4.2–5.8)
RBC # FLD: 26.7 % — HIGH (ref 10.3–14.5)
RBC BLD AUTO: ABNORMAL
RETICS #: 178.4 K/UL — HIGH (ref 25–125)
RETICS/RBC NFR: 7.3 % — HIGH (ref 0.5–2.5)
SCHISTOCYTES BLD QL AUTO: SLIGHT — SIGNIFICANT CHANGE UP
SODIUM SERPL-SCNC: 136 MMOL/L — SIGNIFICANT CHANGE UP (ref 135–145)
TARGETS BLD QL SMEAR: SLIGHT — SIGNIFICANT CHANGE UP
TIBC SERPL-MCNC: 380 UG/DL — SIGNIFICANT CHANGE UP (ref 220–430)
UIBC SERPL-MCNC: 141 UG/DL — SIGNIFICANT CHANGE UP (ref 110–370)
WBC # BLD: 6.72 K/UL — SIGNIFICANT CHANGE UP (ref 3.8–10.5)
WBC # FLD AUTO: 6.72 K/UL — SIGNIFICANT CHANGE UP (ref 3.8–10.5)

## 2025-06-14 PROCEDURE — 71046 X-RAY EXAM CHEST 2 VIEWS: CPT | Mod: 26

## 2025-06-14 PROCEDURE — 85025 COMPLETE CBC W/AUTO DIFF WBC: CPT

## 2025-06-14 PROCEDURE — 71046 X-RAY EXAM CHEST 2 VIEWS: CPT

## 2025-06-14 PROCEDURE — 99285 EMERGENCY DEPT VISIT HI MDM: CPT

## 2025-06-14 PROCEDURE — 93005 ELECTROCARDIOGRAM TRACING: CPT

## 2025-06-14 PROCEDURE — 93010 ELECTROCARDIOGRAM REPORT: CPT

## 2025-06-14 PROCEDURE — 96376 TX/PRO/DX INJ SAME DRUG ADON: CPT

## 2025-06-14 PROCEDURE — 82728 ASSAY OF FERRITIN: CPT

## 2025-06-14 PROCEDURE — 71045 X-RAY EXAM CHEST 1 VIEW: CPT | Mod: 26,59

## 2025-06-14 PROCEDURE — 82247 BILIRUBIN TOTAL: CPT

## 2025-06-14 PROCEDURE — 71045 X-RAY EXAM CHEST 1 VIEW: CPT

## 2025-06-14 PROCEDURE — 83615 LACTATE (LD) (LDH) ENZYME: CPT

## 2025-06-14 PROCEDURE — 96375 TX/PRO/DX INJ NEW DRUG ADDON: CPT

## 2025-06-14 PROCEDURE — 85045 AUTOMATED RETICULOCYTE COUNT: CPT

## 2025-06-14 PROCEDURE — 80053 COMPREHEN METABOLIC PANEL: CPT

## 2025-06-14 PROCEDURE — 82248 BILIRUBIN DIRECT: CPT

## 2025-06-14 PROCEDURE — 99285 EMERGENCY DEPT VISIT HI MDM: CPT | Mod: 25

## 2025-06-14 PROCEDURE — 83540 ASSAY OF IRON: CPT

## 2025-06-14 PROCEDURE — 83550 IRON BINDING TEST: CPT

## 2025-06-14 PROCEDURE — 96374 THER/PROPH/DIAG INJ IV PUSH: CPT

## 2025-06-14 RX ORDER — SODIUM CHLORIDE 9 G/1000ML
1000 INJECTION, SOLUTION INTRAVENOUS ONCE
Refills: 0 | Status: COMPLETED | OUTPATIENT
Start: 2025-06-14 | End: 2025-06-14

## 2025-06-14 RX ORDER — HYDROMORPHONE/SOD CHLOR,ISO/PF 2 MG/10 ML
2 SYRINGE (ML) INJECTION ONCE
Refills: 0 | Status: DISCONTINUED | OUTPATIENT
Start: 2025-06-14 | End: 2025-06-14

## 2025-06-14 RX ORDER — ONDANSETRON HCL/PF 4 MG/2 ML
4 VIAL (ML) INJECTION ONCE
Refills: 0 | Status: COMPLETED | OUTPATIENT
Start: 2025-06-14 | End: 2025-06-14

## 2025-06-14 RX ORDER — SODIUM CHLORIDE 9 G/1000ML
500 INJECTION, SOLUTION INTRAVENOUS
Refills: 0 | Status: DISCONTINUED | OUTPATIENT
Start: 2025-06-14 | End: 2025-06-17

## 2025-06-14 RX ORDER — ONDANSETRON HCL/PF 4 MG/2 ML
1 VIAL (ML) INJECTION
Qty: 2 | Refills: 0
Start: 2025-06-14

## 2025-06-14 RX ORDER — METOPROLOL SUCCINATE 50 MG/1
75 TABLET, EXTENDED RELEASE ORAL ONCE
Refills: 0 | Status: COMPLETED | OUTPATIENT
Start: 2025-06-14 | End: 2025-06-14

## 2025-06-14 RX ORDER — LISINOPRIL 5 MG/1
10 TABLET ORAL ONCE
Refills: 0 | Status: COMPLETED | OUTPATIENT
Start: 2025-06-14 | End: 2025-06-14

## 2025-06-14 RX ADMIN — SODIUM CHLORIDE 1000 MILLILITER(S): 9 INJECTION, SOLUTION INTRAVENOUS at 08:34

## 2025-06-14 RX ADMIN — SODIUM CHLORIDE 500 MILLILITER(S): 9 INJECTION, SOLUTION INTRAVENOUS at 12:56

## 2025-06-14 RX ADMIN — SODIUM CHLORIDE 1000 MILLILITER(S): 9 INJECTION, SOLUTION INTRAVENOUS at 05:32

## 2025-06-14 RX ADMIN — Medication 4 MILLIGRAM(S): at 06:05

## 2025-06-14 RX ADMIN — Medication 2 MILLIGRAM(S): at 05:29

## 2025-06-14 RX ADMIN — Medication 4 MILLIGRAM(S): at 07:54

## 2025-06-14 RX ADMIN — LISINOPRIL 10 MILLIGRAM(S): 5 TABLET ORAL at 13:18

## 2025-06-14 RX ADMIN — Medication 2 MILLIGRAM(S): at 06:56

## 2025-06-14 RX ADMIN — Medication 2 MILLIGRAM(S): at 12:57

## 2025-06-14 RX ADMIN — METOPROLOL SUCCINATE 75 MILLIGRAM(S): 50 TABLET, EXTENDED RELEASE ORAL at 13:18

## 2025-06-14 RX ADMIN — Medication 2 MILLIGRAM(S): at 08:35

## 2025-06-14 NOTE — ED PROVIDER NOTE - NSFOLLOWUPINSTRUCTIONS_ED_ALL_ED_FT
You presented to the hospital with pain due to sickle cell disease.  He received IV pain control and IV fluids, with improvement.  You had some nausea and vomiting as well, which was treated with Zofran.  He will be sent home with a short-term prescription of oral Zofran to take as an outpatient if your nausea persists.  Please return to the hospital if your pain becomes severe and uncontrollable, if you experience severe chest pain, shortness of breath, belly pain, or intractable nausea vomiting.

## 2025-06-14 NOTE — ED PROVIDER NOTE - CLINICAL SUMMARY MEDICAL DECISION MAKING FREE TEXT BOX
54-year-old male past medical history of sickle cell disease, hypertension, prediabetes, DVT, a fib on eliquis presenting with BL shoulder and back pain consistent with sickle cell crisis x2 days. Patient afebrile and hemodynamically stable, hypertensive, appears uncomfortable however exam otherwise grossly benign.  Will obtain sickle cell and order pts typical regimen of 2mg dilaudid and IV fluids. Dispo pending workup and response to fluids.

## 2025-06-14 NOTE — ED ADULT NURSE REASSESSMENT NOTE - NS ED NURSE REASSESS COMMENT FT1
Placement of port confirmed via chest x-ray. Okay to access as per MD orders. Port accessed using sterile technique. Pt states it is a mediport. Blood return not present but MD William and radiology confirm access in the correct place. Sterile dressing applied to site. Patient tolerated well. Safety and comfort of patient maintained.

## 2025-06-14 NOTE — ED PROVIDER NOTE - PROGRESS NOTE DETAILS
Juhi Casillas D.O.  EM PGY-1: Pt still in pain however with some improvement. Requesting additional 2mg dilaudid, will order. Hb 7.8, other sickle cell crisis labs pending. Dispo pending response to interventions received sign out pending reassess. briefly, SCC 54 yr m w hip and shoulder pain, Cr close to baseline and retic count not low. received 2 doses. - Maicol Peñaloza MD attending physician

## 2025-06-14 NOTE — ED ADULT NURSE NOTE - OBJECTIVE STATEMENT
Pt is a 55 y/o male PMH Sickle Cell Disease and afib (on Eliquis) presents to the ED c/o sickle cell crisis. Pt state she has had back and b/l shoulder pain x2 days. Pt took oxycodone at 2300 last night with minimal relief. Pt states when he does ocme to the ED, he usually gets 2 mg of Dilaudid with relief. Pt also receives blood transfusions for low hgb, last one was approx 2-3 weeks ago. Pt does not have a hx of acute chest. Pt denies chest pain, SOB, N/V/D, fever/chills, abdominal pain, HA/vision changes or GI/ symptoms. Pt has a R chest wall port, last accessed last week. RN can access port and wait for confirmation of placement with xray before administering medications.

## 2025-06-14 NOTE — ED PROVIDER NOTE - PHYSICAL EXAMINATION
Gen: NAD, non-toxic appearing  Head: NCAT  Lung: no respiratory distress, speaking in full sentences  CV: regular rate and rhythm  Abd: soft, non distended, non tender   MSK: no visible deformities  Neuro: No focal deficits, AAOx3  Skin: Warm, appropriate coloring

## 2025-06-14 NOTE — ED PROVIDER NOTE - PATIENT PORTAL LINK FT
You can access the FollowMyHealth Patient Portal offered by [facility] by registering at the following website: [email or web address]. By joining [employer]’s FollowMyHealth portal, you will also be able to view your health information using other applications (apps) compatible with our system.

## 2025-06-14 NOTE — ED PROVIDER NOTE - OBJECTIVE STATEMENT
54-year-old male past medical history of sickle cell disease, hypertension, prediabetes, DVT, a fib on eliquis presenting with BL shoulder and back pain consistent with sickle cell crisis x2 days. Took oxycodone at 11 PM.  States that when he comes to the ED typically gets 2 mg of Dilaudid.  Typically 8-9, most recent transfusion approx 2-3 wks ago. No history of acute chest or splenic crisis.  No fevers, chills, chest pain, SOB, abdominal pain, NVD.  Follows with hematologists Dr Veloz and Dr Javier Lincoln

## 2025-06-14 NOTE — ED PROVIDER NOTE - ATTENDING CONTRIBUTION TO CARE
Attending MD William: I personally have seen and examined this patient.  Resident note reviewed and agree on plan of care except where noted.  See below for details.     Seen in [place] [address]  Heme Dr. Veloz and Dr. Boggs    54M with PMH/PSH including sickle cell disease, HTN, preDM, DVT, AFib on Eliquis presents to the ED with L>R shoulder pain, back pain consistent with his previous sickle cell crisis for two days.  Reports home analgesics providing no improvement, reports took Oxycodone at 11pm.  Reports does sometimes necessitate transfusion, reports last about 2-3 weeks ago.  Denies abdominal pain, nausea, vomiting, diarrhea, bloody or black stools. Denies fevers, chills.  Denies chest pain, shortness of breath.    Exam:   General: calm  HENT: head NCAT, airway patent  Eyes: anicteric, no conjunctival injection   Lungs: lungs CTAB with good inspiratory effort, no wheezing, no rhonchi, no rales  Cardiac: +S1S2, no obvious m/r/g  GI: abdomen soft with +BS, NT, ND  : no CVAT  MSK: ranging neck freely, no gross deformities  Neuro: moving all extremities spontaneously, nonfocal  Psych: normal mood and affect       A/P: 54M with shoulder and back pain, similar to previous sickle cell crisis, suspect same, will obtain labs including retic count, LDH, will give IVFs, analgesics, CXR, EKG

## 2025-06-23 NOTE — DISCHARGE NOTE PROVIDER - NSDCFUSCHEDAPPT_GEN_ALL_CORE_FT
MAXI BUCHANAN ; 04/05/2022 ; NPP Intmed OP [id number] [facility]  MAXI BUCHANAN ; 06/01/2022 ; NPP Med Nephro [address]  MAXI BUCHANAN ; 03/15/2022 ; NPP Intmed OP [id number] [place] MAXI Pathak ; 04/05/2022 ; NPP Intmed OP [id number] [place] MAXI Pathak ; 06/01/2022 ; NPP Med Nephro [address]  MAXI BUCHANAN ; 03/15/2022 ; NPP Intmed OP [id number] [place] MAXI Pathak ; 03/24/2022 ; NPP Rheum [address] MAXI Stroud ; 04/05/2022 ; NPP Intmed OP [id number] [place] MAXI Pathak ; 06/01/2022 ; NPP Med Nephro [address]  MAXI BUCHANAN ; 04/25/2022 ; NPP Med Rheum [id number] [facility]  MAXI BUCHANAN ; 05/16/2022 ; NPP Med Rheum [address] [facility]  MAXI BUCHANAN ; 06/01/2022 ; NPP Med Nephro [address] MAXI Stroud ; 06/09/2022 ; NPP Intmed OP [id number] [facility]  MAXI BUCHANAN ; 06/15/2022 ; NPP Med Nephro [address]  no

## 2025-06-24 ENCOUNTER — OUTPATIENT (OUTPATIENT)
Dept: OUTPATIENT SERVICES | Facility: HOSPITAL | Age: 55
LOS: 1 days | End: 2025-06-24
Payer: COMMERCIAL

## 2025-06-24 ENCOUNTER — APPOINTMENT (OUTPATIENT)
Dept: INTERNAL MEDICINE | Facility: CLINIC | Age: 55
End: 2025-06-24
Payer: COMMERCIAL

## 2025-06-24 ENCOUNTER — NON-APPOINTMENT (OUTPATIENT)
Age: 55
End: 2025-06-24

## 2025-06-24 VITALS
SYSTOLIC BLOOD PRESSURE: 137 MMHG | HEART RATE: 74 BPM | TEMPERATURE: 96.6 F | HEIGHT: 71 IN | RESPIRATION RATE: 18 BRPM | BODY MASS INDEX: 33.6 KG/M2 | OXYGEN SATURATION: 98 % | DIASTOLIC BLOOD PRESSURE: 80 MMHG | WEIGHT: 240 LBS

## 2025-06-24 DIAGNOSIS — Z90.49 ACQUIRED ABSENCE OF OTHER SPECIFIED PARTS OF DIGESTIVE TRACT: Chronic | ICD-10-CM

## 2025-06-24 DIAGNOSIS — Z00.00 ENCOUNTER FOR GENERAL ADULT MEDICAL EXAMINATION WITHOUT ABNORMAL FINDINGS: ICD-10-CM

## 2025-06-24 PROCEDURE — G0463: CPT

## 2025-06-24 PROCEDURE — 99213 OFFICE O/P EST LOW 20 MIN: CPT

## 2025-06-25 ENCOUNTER — APPOINTMENT (OUTPATIENT)
Dept: NEPHROLOGY | Facility: CLINIC | Age: 55
End: 2025-06-25
Payer: COMMERCIAL

## 2025-06-25 VITALS
SYSTOLIC BLOOD PRESSURE: 114 MMHG | OXYGEN SATURATION: 98 % | WEIGHT: 238 LBS | BODY MASS INDEX: 33.19 KG/M2 | TEMPERATURE: 97 F | HEART RATE: 72 BPM | DIASTOLIC BLOOD PRESSURE: 74 MMHG

## 2025-06-25 PROCEDURE — 99214 OFFICE O/P EST MOD 30 MIN: CPT

## 2025-06-25 PROCEDURE — G2211 COMPLEX E/M VISIT ADD ON: CPT | Mod: NC

## 2025-06-25 RX ORDER — AMLODIPINE BESYLATE 5 MG/1
5 TABLET ORAL
Refills: 0 | Status: ACTIVE | COMMUNITY
Start: 2025-06-25

## 2025-06-27 DIAGNOSIS — D57.1 SICKLE-CELL DISEASE WITHOUT CRISIS: ICD-10-CM

## 2025-06-30 ENCOUNTER — NON-APPOINTMENT (OUTPATIENT)
Age: 55
End: 2025-06-30

## 2025-08-01 ENCOUNTER — APPOINTMENT (OUTPATIENT)
Dept: INTERNAL MEDICINE | Facility: CLINIC | Age: 55
End: 2025-08-01
Payer: COMMERCIAL

## 2025-08-01 ENCOUNTER — OUTPATIENT (OUTPATIENT)
Dept: OUTPATIENT SERVICES | Facility: HOSPITAL | Age: 55
LOS: 1 days | End: 2025-08-01
Payer: COMMERCIAL

## 2025-08-01 VITALS
WEIGHT: 242 LBS | SYSTOLIC BLOOD PRESSURE: 119 MMHG | DIASTOLIC BLOOD PRESSURE: 71 MMHG | TEMPERATURE: 97 F | RESPIRATION RATE: 18 BRPM | HEART RATE: 72 BPM | BODY MASS INDEX: 33.88 KG/M2 | OXYGEN SATURATION: 98 % | HEIGHT: 71 IN

## 2025-08-01 DIAGNOSIS — R11.0 NAUSEA: ICD-10-CM

## 2025-08-01 DIAGNOSIS — N18.31 CHRONIC KIDNEY DISEASE, STAGE 3A: ICD-10-CM

## 2025-08-01 DIAGNOSIS — Z87.39 PERSONAL HISTORY OF OTHER DISEASES OF THE MUSCULOSKELETAL SYSTEM AND CONNECTIVE TISSUE: ICD-10-CM

## 2025-08-01 DIAGNOSIS — D57.1 SICKLE-CELL DISEASE W/OUT CRISIS: ICD-10-CM

## 2025-08-01 DIAGNOSIS — I10 ESSENTIAL (PRIMARY) HYPERTENSION: ICD-10-CM

## 2025-08-01 DIAGNOSIS — Z00.00 ENCOUNTER FOR GENERAL ADULT MEDICAL EXAMINATION WITHOUT ABNORMAL FINDINGS: ICD-10-CM

## 2025-08-01 DIAGNOSIS — Z86.79 PERSONAL HISTORY OF OTHER DISEASES OF THE CIRCULATORY SYSTEM: ICD-10-CM

## 2025-08-01 DIAGNOSIS — M87.00 IDIOPATHIC ASEPTIC NECROSIS OF UNSPECIFIED BONE: ICD-10-CM

## 2025-08-01 DIAGNOSIS — I48.92 UNSPECIFIED ATRIAL FLUTTER: ICD-10-CM

## 2025-08-01 DIAGNOSIS — R79.89 OTHER SPECIFIED ABNORMAL FINDINGS OF BLOOD CHEMISTRY: ICD-10-CM

## 2025-08-01 DIAGNOSIS — M10.9 GOUT, UNSPECIFIED: ICD-10-CM

## 2025-08-01 PROCEDURE — 99214 OFFICE O/P EST MOD 30 MIN: CPT

## 2025-08-01 PROCEDURE — G2211 COMPLEX E/M VISIT ADD ON: CPT | Mod: NC

## 2025-08-01 RX ORDER — APIXABAN 2.5 MG/1
2.5 TABLET, FILM COATED ORAL TWICE DAILY
Refills: 0 | Status: ACTIVE | COMMUNITY

## 2025-08-01 RX ORDER — HYDROCHLOROTHIAZIDE 12.5 MG/1
12.5 CAPSULE ORAL DAILY
Qty: 30 | Refills: 0 | Status: ACTIVE | COMMUNITY
Start: 2025-08-01 | End: 1900-01-01

## 2025-08-02 LAB
ALBUMIN SERPL ELPH-MCNC: 3.8 G/DL
ALP BLD-CCNC: 118 U/L
ALT SERPL-CCNC: 23 U/L
ANION GAP SERPL CALC-SCNC: 15 MMOL/L
AST SERPL-CCNC: 36 U/L
BILIRUB SERPL-MCNC: 0.4 MG/DL
BUN SERPL-MCNC: 36 MG/DL
CALCIUM SERPL-MCNC: 10.1 MG/DL
CHLORIDE SERPL-SCNC: 111 MMOL/L
CHOLEST SERPL-MCNC: 147 MG/DL
CO2 SERPL-SCNC: 11 MMOL/L
CREAT SERPL-MCNC: 1.76 MG/DL
EGFRCR SERPLBLD CKD-EPI 2021: 45 ML/MIN/1.73M2
GLUCOSE SERPL-MCNC: 109 MG/DL
HDLC SERPL-MCNC: 47 MG/DL
LDLC SERPL-MCNC: 84 MG/DL
NONHDLC SERPL-MCNC: 100 MG/DL
POTASSIUM SERPL-SCNC: 4.8 MMOL/L
PROT SERPL-MCNC: 7 G/DL
SODIUM SERPL-SCNC: 137 MMOL/L
TRIGL SERPL-MCNC: 84 MG/DL

## 2025-08-02 PROCEDURE — 80061 LIPID PANEL: CPT

## 2025-08-02 PROCEDURE — 85025 COMPLETE CBC W/AUTO DIFF WBC: CPT

## 2025-08-02 PROCEDURE — 85045 AUTOMATED RETICULOCYTE COUNT: CPT

## 2025-08-02 PROCEDURE — 83036 HEMOGLOBIN GLYCOSYLATED A1C: CPT

## 2025-08-02 PROCEDURE — 80053 COMPREHEN METABOLIC PANEL: CPT

## 2025-08-02 PROCEDURE — G0463: CPT

## 2025-08-06 DIAGNOSIS — R79.89 OTHER SPECIFIED ABNORMAL FINDINGS OF BLOOD CHEMISTRY: ICD-10-CM

## 2025-08-06 DIAGNOSIS — M87.00 IDIOPATHIC ASEPTIC NECROSIS OF UNSPECIFIED BONE: ICD-10-CM

## 2025-08-06 DIAGNOSIS — R11.0 NAUSEA: ICD-10-CM

## 2025-08-06 DIAGNOSIS — I10 ESSENTIAL (PRIMARY) HYPERTENSION: ICD-10-CM

## 2025-08-06 DIAGNOSIS — I48.92 UNSPECIFIED ATRIAL FLUTTER: ICD-10-CM

## 2025-08-06 DIAGNOSIS — Z86.79 PERSONAL HISTORY OF OTHER DISEASES OF THE CIRCULATORY SYSTEM: ICD-10-CM

## 2025-08-06 DIAGNOSIS — D57.1 SICKLE-CELL DISEASE WITHOUT CRISIS: ICD-10-CM

## 2025-08-06 DIAGNOSIS — Z87.39 PERSONAL HISTORY OF OTHER DISEASES OF THE MUSCULOSKELETAL SYSTEM AND CONNECTIVE TISSUE: ICD-10-CM

## 2025-08-13 ENCOUNTER — LABORATORY RESULT (OUTPATIENT)
Age: 55
End: 2025-08-13

## 2025-08-13 ENCOUNTER — APPOINTMENT (OUTPATIENT)
Dept: NEPHROLOGY | Facility: CLINIC | Age: 55
End: 2025-08-13
Payer: COMMERCIAL

## 2025-08-13 VITALS
WEIGHT: 248 LBS | SYSTOLIC BLOOD PRESSURE: 142 MMHG | HEART RATE: 66 BPM | OXYGEN SATURATION: 99 % | DIASTOLIC BLOOD PRESSURE: 69 MMHG | HEIGHT: 71 IN | TEMPERATURE: 97.3 F | BODY MASS INDEX: 34.72 KG/M2

## 2025-08-13 DIAGNOSIS — N18.2 CHRONIC KIDNEY DISEASE, STAGE 2 (MILD): ICD-10-CM

## 2025-08-13 PROCEDURE — 99213 OFFICE O/P EST LOW 20 MIN: CPT

## 2025-08-13 PROCEDURE — G2211 COMPLEX E/M VISIT ADD ON: CPT | Mod: NC

## 2025-08-13 RX ORDER — LISINOPRIL 10 MG/1
10 TABLET ORAL DAILY
Qty: 90 | Refills: 3 | Status: ACTIVE | COMMUNITY
Start: 2025-08-01 | End: 1900-01-01

## 2025-08-13 RX ORDER — FINERENONE 10 MG/1
10 TABLET, FILM COATED ORAL
Qty: 90 | Refills: 3 | Status: ACTIVE | COMMUNITY
Start: 2025-08-13 | End: 1900-01-01

## 2025-08-14 ENCOUNTER — NON-APPOINTMENT (OUTPATIENT)
Age: 55
End: 2025-08-14

## 2025-08-14 LAB
25(OH)D3 SERPL-MCNC: 21.8 NG/ML
ALBUMIN SERPL ELPH-MCNC: 3.8 G/DL
ALBUMIN, RANDOM URINE: 15.2 MG/DL
ANION GAP SERPL CALC-SCNC: 13 MMOL/L
APPEARANCE: CLEAR
BACTERIA: NEGATIVE /HPF
BASOPHILS # BLD AUTO: 0.03 K/UL
BASOPHILS NFR BLD AUTO: 0.5 %
BILIRUBIN URINE: NEGATIVE
BLOOD URINE: NEGATIVE
BUN SERPL-MCNC: 24 MG/DL
CALCIUM SERPL-MCNC: 9.9 MG/DL
CAST: 0 /LPF
CHLORIDE SERPL-SCNC: 108 MMOL/L
CO2 SERPL-SCNC: 17 MMOL/L
COLOR: YELLOW
CREAT SERPL-MCNC: 1.7 MG/DL
CREAT SERPL-MCNC: 1.7 MG/DL
CREAT SPEC-SCNC: 66 MG/DL
CREAT SPEC-SCNC: 66 MG/DL
CREAT/PROT UR: 0.5 RATIO
CYSTATIN C SERPL-MCNC: 1.7 MG/L
EGFRCR SERPLBLD CKD-EPI 2021: 47 ML/MIN/1.73M2
EGFRCR SERPLBLD CKD-EPI 2021: 47 ML/MIN/1.73M2
EGFRCR-CYS SERPLBLD CKD-EPI 2021: 43 ML/MIN/1.73M2
EOSINOPHIL # BLD AUTO: 0.04 K/UL
EOSINOPHIL NFR BLD AUTO: 0.7 %
EPITHELIAL CELLS: 0 /HPF
ESTIMATED AVERAGE GLUCOSE: 114 MG/DL
GFR/BSA.PRED SERPLBLD CYS-BASED-ARV: 39 ML/MIN/1.73M2
GLUCOSE QUALITATIVE U: 100 MG/DL
GLUCOSE SERPL-MCNC: 84 MG/DL
HBA1C MFR BLD HPLC: 5.6 %
HCT VFR BLD CALC: 24.8 %
HGB BLD-MCNC: 8.1 G/DL
IMM GRANULOCYTES NFR BLD AUTO: 0.7 %
KETONES URINE: NEGATIVE MG/DL
LEUKOCYTE ESTERASE URINE: NEGATIVE
LYMPHOCYTES # BLD AUTO: 1.07 K/UL
LYMPHOCYTES NFR BLD AUTO: 18.2 %
MAGNESIUM SERPL-MCNC: 2.2 MG/DL
MAN DIFF?: NORMAL
MCHC RBC-ENTMCNC: 32.7 G/DL
MCHC RBC-ENTMCNC: 33.5 PG
MCV RBC AUTO: 102.5 FL
MICROALBUMIN/CREAT 24H UR-RTO: 229 MG/G
MICROSCOPIC-UA: NORMAL
MONOCYTES # BLD AUTO: 0.69 K/UL
MONOCYTES NFR BLD AUTO: 11.7 %
NEUTROPHILS # BLD AUTO: 4.02 K/UL
NEUTROPHILS NFR BLD AUTO: 68.2 %
NITRITE URINE: NEGATIVE
PH URINE: 6
PHOSPHATE SERPL-MCNC: 2.8 MG/DL
PLATELET # BLD AUTO: 243 K/UL
PMV BLD AUTO: 29 /100 WBCS
POTASSIUM SERPL-SCNC: 5 MMOL/L
PROT UR-MCNC: 31 MG/DL
PROTEIN URINE: 30 MG/DL
RBC # BLD: 2.42 M/UL
RBC # FLD: 25.7 %
RED BLOOD CELLS URINE: 0 /HPF
SODIUM SERPL-SCNC: 139 MMOL/L
SPECIFIC GRAVITY URINE: 1.01
UROBILINOGEN URINE: 0.2 MG/DL
WBC # FLD AUTO: 5.89 K/UL
WHITE BLOOD CELLS URINE: 0 /HPF

## 2025-08-20 ENCOUNTER — APPOINTMENT (OUTPATIENT)
Dept: INTERNAL MEDICINE | Facility: CLINIC | Age: 55
End: 2025-08-20
Payer: COMMERCIAL

## 2025-08-20 VITALS
DIASTOLIC BLOOD PRESSURE: 67 MMHG | TEMPERATURE: 97.8 F | HEART RATE: 79 BPM | OXYGEN SATURATION: 98 % | WEIGHT: 247 LBS | SYSTOLIC BLOOD PRESSURE: 125 MMHG | BODY MASS INDEX: 34.58 KG/M2 | RESPIRATION RATE: 16 BRPM | HEIGHT: 71 IN

## 2025-08-20 DIAGNOSIS — D57.1 SICKLE-CELL DISEASE W/OUT CRISIS: ICD-10-CM

## 2025-08-20 DIAGNOSIS — N18.2 CHRONIC KIDNEY DISEASE, STAGE 2 (MILD): ICD-10-CM

## 2025-08-20 DIAGNOSIS — I10 ESSENTIAL (PRIMARY) HYPERTENSION: ICD-10-CM

## 2025-08-20 PROCEDURE — 99214 OFFICE O/P EST MOD 30 MIN: CPT

## 2025-08-20 PROCEDURE — G2211 COMPLEX E/M VISIT ADD ON: CPT | Mod: NC

## 2025-09-12 ENCOUNTER — APPOINTMENT (OUTPATIENT)
Dept: INTERNAL MEDICINE | Facility: CLINIC | Age: 55
End: 2025-09-12
Payer: COMMERCIAL

## 2025-09-12 ENCOUNTER — NON-APPOINTMENT (OUTPATIENT)
Age: 55
End: 2025-09-12

## 2025-09-12 VITALS
HEIGHT: 71 IN | BODY MASS INDEX: 33.46 KG/M2 | TEMPERATURE: 97.5 F | OXYGEN SATURATION: 98 % | DIASTOLIC BLOOD PRESSURE: 66 MMHG | HEART RATE: 69 BPM | RESPIRATION RATE: 18 BRPM | WEIGHT: 239 LBS | SYSTOLIC BLOOD PRESSURE: 102 MMHG

## 2025-09-12 DIAGNOSIS — I48.0 PAROXYSMAL ATRIAL FIBRILLATION: ICD-10-CM

## 2025-09-12 DIAGNOSIS — I10 ESSENTIAL (PRIMARY) HYPERTENSION: ICD-10-CM

## 2025-09-12 DIAGNOSIS — D57.1 SICKLE-CELL DISEASE W/OUT CRISIS: ICD-10-CM

## 2025-09-12 PROCEDURE — 99214 OFFICE O/P EST MOD 30 MIN: CPT | Mod: GC

## 2025-09-16 ENCOUNTER — APPOINTMENT (OUTPATIENT)
Dept: INTERNAL MEDICINE | Facility: CLINIC | Age: 55
End: 2025-09-16

## 2025-09-18 ENCOUNTER — APPOINTMENT (OUTPATIENT)
Dept: MRI IMAGING | Facility: CLINIC | Age: 55
End: 2025-09-18

## 2025-09-19 ENCOUNTER — RESULT REVIEW (OUTPATIENT)
Age: 55
End: 2025-09-19

## 2025-09-19 ENCOUNTER — APPOINTMENT (OUTPATIENT)
Dept: HEMATOLOGY ONCOLOGY | Facility: CLINIC | Age: 55
End: 2025-09-19

## 2025-09-19 ENCOUNTER — NON-APPOINTMENT (OUTPATIENT)
Age: 55
End: 2025-09-19

## 2025-09-22 LAB
ALBUMIN SERPL ELPH-MCNC: 4 G/DL
ALP BLD-CCNC: 118 U/L
ALT SERPL-CCNC: 29 U/L
ANION GAP SERPL CALC-SCNC: 13 MMOL/L
AST SERPL-CCNC: 50 U/L
BILIRUB SERPL-MCNC: 0.8 MG/DL
BUN SERPL-MCNC: 24 MG/DL
CALCIUM SERPL-MCNC: 10.8 MG/DL
CHLORIDE SERPL-SCNC: 105 MMOL/L
CO2 SERPL-SCNC: 20 MMOL/L
CREAT SERPL-MCNC: 1.97 MG/DL
EGFRCR SERPLBLD CKD-EPI 2021: 39 ML/MIN/1.73M2
FERRITIN SERPL-MCNC: 3547 NG/ML
GLUCOSE SERPL-MCNC: 92 MG/DL
POTASSIUM SERPL-SCNC: 4.8 MMOL/L
PROT SERPL-MCNC: 7.2 G/DL
SODIUM SERPL-SCNC: 137 MMOL/L

## 2025-09-23 LAB
HGB A MFR BLD: 53 %
HGB A2 MFR BLD: 3 %
HGB F MFR BLD: 9.3 %
HGB FRACT BLD-IMP: NORMAL
HGB S MFR BLD: 34.7 %